# Patient Record
Sex: FEMALE | Race: WHITE | NOT HISPANIC OR LATINO | Employment: OTHER | URBAN - NONMETROPOLITAN AREA
[De-identification: names, ages, dates, MRNs, and addresses within clinical notes are randomized per-mention and may not be internally consistent; named-entity substitution may affect disease eponyms.]

---

## 2023-01-01 ENCOUNTER — TELEPHONE (OUTPATIENT)
Dept: PRIMARY CARE | Facility: CLINIC | Age: 68
End: 2023-01-01
Payer: MEDICARE

## 2023-01-01 ENCOUNTER — TREATMENT (OUTPATIENT)
Dept: PHYSICAL THERAPY | Facility: HOSPITAL | Age: 68
End: 2023-01-01
Payer: MEDICARE

## 2023-01-01 ENCOUNTER — OFFICE VISIT (OUTPATIENT)
Dept: PRIMARY CARE | Facility: CLINIC | Age: 68
End: 2023-01-01
Payer: MEDICARE

## 2023-01-01 ENCOUNTER — TELEPHONE (OUTPATIENT)
Dept: RHEUMATOLOGY | Facility: CLINIC | Age: 68
End: 2023-01-01

## 2023-01-01 ENCOUNTER — OFFICE VISIT (OUTPATIENT)
Dept: RHEUMATOLOGY | Facility: CLINIC | Age: 68
End: 2023-01-01
Payer: MEDICARE

## 2023-01-01 ENCOUNTER — LAB (OUTPATIENT)
Dept: LAB | Facility: LAB | Age: 68
End: 2023-01-01
Payer: MEDICARE

## 2023-01-01 ENCOUNTER — DOCUMENTATION (OUTPATIENT)
Dept: PHYSICAL THERAPY | Facility: HOSPITAL | Age: 68
End: 2023-01-01
Payer: MEDICARE

## 2023-01-01 ENCOUNTER — INFUSION (OUTPATIENT)
Dept: HEMATOLOGY/ONCOLOGY | Facility: HOSPITAL | Age: 68
End: 2023-01-01
Payer: MEDICARE

## 2023-01-01 ENCOUNTER — APPOINTMENT (OUTPATIENT)
Dept: PHYSICAL THERAPY | Facility: HOSPITAL | Age: 68
End: 2023-01-01
Payer: MEDICARE

## 2023-01-01 ENCOUNTER — TELEPHONE (OUTPATIENT)
Dept: HEMATOLOGY/ONCOLOGY | Facility: HOSPITAL | Age: 68
End: 2023-01-01
Payer: MEDICARE

## 2023-01-01 ENCOUNTER — EVALUATION (OUTPATIENT)
Dept: PHYSICAL THERAPY | Facility: HOSPITAL | Age: 68
End: 2023-01-01
Payer: MEDICARE

## 2023-01-01 ENCOUNTER — ANCILLARY PROCEDURE (OUTPATIENT)
Dept: RADIOLOGY | Facility: CLINIC | Age: 68
End: 2023-01-01
Payer: MEDICARE

## 2023-01-01 VITALS
RESPIRATION RATE: 16 BRPM | OXYGEN SATURATION: 99 % | BODY MASS INDEX: 31.82 KG/M2 | WEIGHT: 207.56 LBS | SYSTOLIC BLOOD PRESSURE: 140 MMHG | HEART RATE: 63 BPM | DIASTOLIC BLOOD PRESSURE: 79 MMHG | TEMPERATURE: 98.2 F

## 2023-01-01 VITALS
BODY MASS INDEX: 30.84 KG/M2 | DIASTOLIC BLOOD PRESSURE: 86 MMHG | WEIGHT: 203.48 LBS | SYSTOLIC BLOOD PRESSURE: 127 MMHG | HEIGHT: 68 IN | RESPIRATION RATE: 18 BRPM | HEART RATE: 84 BPM | OXYGEN SATURATION: 98 % | TEMPERATURE: 96.8 F

## 2023-01-01 VITALS
HEIGHT: 68 IN | WEIGHT: 228.5 LBS | HEART RATE: 58 BPM | SYSTOLIC BLOOD PRESSURE: 116 MMHG | BODY MASS INDEX: 34.63 KG/M2 | OXYGEN SATURATION: 98 % | DIASTOLIC BLOOD PRESSURE: 80 MMHG

## 2023-01-01 VITALS — HEIGHT: 67 IN | WEIGHT: 206 LBS | BODY MASS INDEX: 32.33 KG/M2

## 2023-01-01 VITALS
DIASTOLIC BLOOD PRESSURE: 70 MMHG | TEMPERATURE: 97 F | RESPIRATION RATE: 17 BRPM | OXYGEN SATURATION: 97 % | SYSTOLIC BLOOD PRESSURE: 137 MMHG | BODY MASS INDEX: 31.74 KG/M2 | HEART RATE: 64 BPM | WEIGHT: 207.01 LBS

## 2023-01-01 DIAGNOSIS — M25.562 ACUTE POSTOPERATIVE PAIN OF LEFT KNEE: Primary | ICD-10-CM

## 2023-01-01 DIAGNOSIS — G89.18 ACUTE POSTOPERATIVE PAIN OF LEFT KNEE: Primary | ICD-10-CM

## 2023-01-01 DIAGNOSIS — G89.18 ACUTE POSTOPERATIVE PAIN OF LEFT KNEE: ICD-10-CM

## 2023-01-01 DIAGNOSIS — M25.562 ACUTE POSTOPERATIVE PAIN OF LEFT KNEE: ICD-10-CM

## 2023-01-01 DIAGNOSIS — M25.561 ACUTE PAIN OF RIGHT KNEE: ICD-10-CM

## 2023-01-01 DIAGNOSIS — Z01.818 PREOPERATIVE CLEARANCE: Primary | ICD-10-CM

## 2023-01-01 DIAGNOSIS — E66.09 CLASS 1 OBESITY DUE TO EXCESS CALORIES WITH SERIOUS COMORBIDITY AND BODY MASS INDEX (BMI) OF 34.0 TO 34.9 IN ADULT: ICD-10-CM

## 2023-01-01 DIAGNOSIS — F41.1 PRE-OPERATIVE ANXIETY: ICD-10-CM

## 2023-01-01 DIAGNOSIS — R94.31 ABNORMAL EKG: Primary | ICD-10-CM

## 2023-01-01 DIAGNOSIS — Z96.652 S/P TOTAL KNEE REPLACEMENT, LEFT: ICD-10-CM

## 2023-01-01 DIAGNOSIS — M25.551 RIGHT HIP PAIN: ICD-10-CM

## 2023-01-01 DIAGNOSIS — M05.79 SEROPOSITIVE RHEUMATOID ARTHRITIS OF MULTIPLE JOINTS (MULTI): ICD-10-CM

## 2023-01-01 DIAGNOSIS — I48.91 ATRIAL FIBRILLATION, UNSPECIFIED TYPE (MULTI): ICD-10-CM

## 2023-01-01 DIAGNOSIS — M05.79 SEROPOSITIVE RHEUMATOID ARTHRITIS OF MULTIPLE JOINTS (MULTI): Primary | ICD-10-CM

## 2023-01-01 LAB
ALANINE AMINOTRANSFERASE (SGPT) (U/L) IN SER/PLAS: 12 U/L (ref 7–45)
ALBUMIN (G/DL) IN SER/PLAS: 3.8 G/DL (ref 3.4–5)
ALBUMIN SERPL BCP-MCNC: 3.5 G/DL (ref 3.4–5)
ALKALINE PHOSPHATASE (U/L) IN SER/PLAS: 93 U/L (ref 33–136)
ALP SERPL-CCNC: 106 U/L (ref 33–136)
ALT SERPL W P-5'-P-CCNC: 13 U/L (ref 7–45)
ANION GAP IN SER/PLAS: 9 MMOL/L (ref 10–20)
ANION GAP SERPL CALC-SCNC: 13 MMOL/L (ref 10–20)
APPEARANCE, URINE: CLEAR
ASPARTATE AMINOTRANSFERASE (SGOT) (U/L) IN SER/PLAS: 16 U/L (ref 9–39)
AST SERPL W P-5'-P-CCNC: 27 U/L (ref 9–39)
BACTERIA, URINE: ABNORMAL /HPF
BASOPHILS (10*3/UL) IN BLOOD BY AUTOMATED COUNT: 0.11 X10E9/L (ref 0–0.1)
BASOPHILS/100 LEUKOCYTES IN BLOOD BY AUTOMATED COUNT: 1 % (ref 0–2)
BILIRUB SERPL-MCNC: 0.5 MG/DL (ref 0–1.2)
BILIRUBIN TOTAL (MG/DL) IN SER/PLAS: 0.3 MG/DL (ref 0–1.2)
BILIRUBIN, URINE: NEGATIVE
BLOOD, URINE: NEGATIVE
BUN SERPL-MCNC: 14 MG/DL (ref 6–23)
CALCIUM (MG/DL) IN SER/PLAS: 9.3 MG/DL (ref 8.6–10.3)
CALCIUM SERPL-MCNC: 9.3 MG/DL (ref 8.6–10.3)
CARBON DIOXIDE, TOTAL (MMOL/L) IN SER/PLAS: 24 MMOL/L (ref 21–32)
CHLORIDE (MMOL/L) IN SER/PLAS: 107 MMOL/L (ref 98–107)
CHLORIDE SERPL-SCNC: 99 MMOL/L (ref 98–107)
CO2 SERPL-SCNC: 28 MMOL/L (ref 21–32)
COLOR, URINE: YELLOW
CREAT SERPL-MCNC: 1.02 MG/DL (ref 0.5–1.05)
CREATININE (MG/DL) IN SER/PLAS: 0.98 MG/DL (ref 0.5–1.05)
CRP SERPL-MCNC: 4.28 MG/DL
EOSINOPHILS (10*3/UL) IN BLOOD BY AUTOMATED COUNT: 0.22 X10E9/L (ref 0–0.7)
EOSINOPHILS/100 LEUKOCYTES IN BLOOD BY AUTOMATED COUNT: 1.9 % (ref 0–6)
ERYTHROCYTE DISTRIBUTION WIDTH (RATIO) BY AUTOMATED COUNT: 13.4 % (ref 11.5–14.5)
ERYTHROCYTE MEAN CORPUSCULAR HEMOGLOBIN CONCENTRATION (G/DL) BY AUTOMATED: 32.8 G/DL (ref 32–36)
ERYTHROCYTE MEAN CORPUSCULAR VOLUME (FL) BY AUTOMATED COUNT: 92 FL (ref 80–100)
ERYTHROCYTE [DISTWIDTH] IN BLOOD BY AUTOMATED COUNT: 15 % (ref 11.5–14.5)
ERYTHROCYTE [SEDIMENTATION RATE] IN BLOOD BY WESTERGREN METHOD: 79 MM/H (ref 0–30)
ERYTHROCYTES (10*6/UL) IN BLOOD BY AUTOMATED COUNT: 4.77 X10E12/L (ref 4–5.2)
GFR FEMALE: 63 ML/MIN/1.73M2
GFR SERPL CREATININE-BSD FRML MDRD: 60 ML/MIN/1.73M*2
GLUCOSE (MG/DL) IN SER/PLAS: 82 MG/DL (ref 74–99)
GLUCOSE SERPL-MCNC: 84 MG/DL (ref 74–99)
GLUCOSE, URINE: NEGATIVE MG/DL
HCT VFR BLD AUTO: 41.8 % (ref 36–46)
HEMATOCRIT (%) IN BLOOD BY AUTOMATED COUNT: 43.9 % (ref 36–46)
HEMOGLOBIN (G/DL) IN BLOOD: 14.4 G/DL (ref 12–16)
HGB BLD-MCNC: 13.2 G/DL (ref 12–16)
IMMATURE GRANULOCYTES/100 LEUKOCYTES IN BLOOD BY AUTOMATED COUNT: 0.4 % (ref 0–0.9)
KETONES, URINE: NEGATIVE MG/DL
LEUKOCYTE ESTERASE, URINE: ABNORMAL
LEUKOCYTES (10*3/UL) IN BLOOD BY AUTOMATED COUNT: 11.5 X10E9/L (ref 4.4–11.3)
LYMPHOCYTES (10*3/UL) IN BLOOD BY AUTOMATED COUNT: 2.84 X10E9/L (ref 1.2–4.8)
LYMPHOCYTES/100 LEUKOCYTES IN BLOOD BY AUTOMATED COUNT: 24.8 % (ref 13–44)
MCH RBC QN AUTO: 28.1 PG (ref 26–34)
MCHC RBC AUTO-ENTMCNC: 31.6 G/DL (ref 32–36)
MCV RBC AUTO: 89 FL (ref 80–100)
MONOCYTES (10*3/UL) IN BLOOD BY AUTOMATED COUNT: 1.18 X10E9/L (ref 0.1–1)
MONOCYTES/100 LEUKOCYTES IN BLOOD BY AUTOMATED COUNT: 10.3 % (ref 2–10)
NEUTROPHILS (10*3/UL) IN BLOOD BY AUTOMATED COUNT: 7.05 X10E9/L (ref 1.2–7.7)
NEUTROPHILS/100 LEUKOCYTES IN BLOOD BY AUTOMATED COUNT: 61.6 % (ref 40–80)
NIL(NEG) CONTROL SPOT COUNT: NORMAL
NITRITE, URINE: NEGATIVE
NRBC BLD-RTO: 0 /100 WBCS (ref 0–0)
PANEL A SPOT COUNT: 0
PANEL B SPOT COUNT: 0
PH, URINE: 5 (ref 5–8)
PLATELET # BLD AUTO: 487 X10*3/UL (ref 150–450)
PLATELETS (10*3/UL) IN BLOOD AUTOMATED COUNT: 302 X10E9/L (ref 150–450)
POS CONTROL SPOT COUNT: NORMAL
POTASSIUM (MMOL/L) IN SER/PLAS: 4.4 MMOL/L (ref 3.5–5.3)
POTASSIUM SERPL-SCNC: 4.1 MMOL/L (ref 3.5–5.3)
PROT SERPL-MCNC: 7.7 G/DL (ref 6.4–8.2)
PROTEIN TOTAL: 7 G/DL (ref 6.4–8.2)
PROTEIN, URINE: NEGATIVE MG/DL
RBC # BLD AUTO: 4.7 X10*6/UL (ref 4–5.2)
RBC, URINE: ABNORMAL /HPF (ref 0–5)
SODIUM (MMOL/L) IN SER/PLAS: 136 MMOL/L (ref 136–145)
SODIUM SERPL-SCNC: 136 MMOL/L (ref 136–145)
SPECIFIC GRAVITY, URINE: 1.02 (ref 1–1.03)
SQUAMOUS EPITHELIAL CELLS, URINE: ABNORMAL /HPF
T-SPOT. TB INTERPRETATION: NEGATIVE
UREA NITROGEN (MG/DL) IN SER/PLAS: 15 MG/DL (ref 6–23)
UROBILINOGEN, URINE: <2 MG/DL (ref 0–1.9)
WBC # BLD AUTO: 10.3 X10*3/UL (ref 4.4–11.3)
WBC, URINE: ABNORMAL /HPF (ref 0–5)

## 2023-01-01 PROCEDURE — 96375 TX/PRO/DX INJ NEW DRUG ADDON: CPT | Mod: INF

## 2023-01-01 PROCEDURE — 1036F TOBACCO NON-USER: CPT | Performed by: INTERNAL MEDICINE

## 2023-01-01 PROCEDURE — 96365 THER/PROPH/DIAG IV INF INIT: CPT | Mod: INF

## 2023-01-01 PROCEDURE — 85027 COMPLETE CBC AUTOMATED: CPT

## 2023-01-01 PROCEDURE — 96413 CHEMO IV INFUSION 1 HR: CPT

## 2023-01-01 PROCEDURE — 1159F MED LIST DOCD IN RCRD: CPT | Performed by: INTERNAL MEDICINE

## 2023-01-01 PROCEDURE — 2500000004 HC RX 250 GENERAL PHARMACY W/ HCPCS (ALT 636 FOR OP/ED): Performed by: INTERNAL MEDICINE

## 2023-01-01 PROCEDURE — 86140 C-REACTIVE PROTEIN: CPT

## 2023-01-01 PROCEDURE — 1160F RVW MEDS BY RX/DR IN RCRD: CPT | Performed by: FAMILY MEDICINE

## 2023-01-01 PROCEDURE — 97110 THERAPEUTIC EXERCISES: CPT | Mod: GP,CQ

## 2023-01-01 PROCEDURE — 96366 THER/PROPH/DIAG IV INF ADDON: CPT

## 2023-01-01 PROCEDURE — 97110 THERAPEUTIC EXERCISES: CPT | Mod: GP | Performed by: PHYSICAL THERAPIST

## 2023-01-01 PROCEDURE — 99213 OFFICE O/P EST LOW 20 MIN: CPT | Performed by: FAMILY MEDICINE

## 2023-01-01 PROCEDURE — 86481 TB AG RESPONSE T-CELL SUSP: CPT

## 2023-01-01 PROCEDURE — 1036F TOBACCO NON-USER: CPT | Performed by: FAMILY MEDICINE

## 2023-01-01 PROCEDURE — 3008F BODY MASS INDEX DOCD: CPT | Performed by: INTERNAL MEDICINE

## 2023-01-01 PROCEDURE — 3008F BODY MASS INDEX DOCD: CPT | Performed by: FAMILY MEDICINE

## 2023-01-01 PROCEDURE — 80053 COMPREHEN METABOLIC PANEL: CPT

## 2023-01-01 PROCEDURE — 1159F MED LIST DOCD IN RCRD: CPT | Performed by: FAMILY MEDICINE

## 2023-01-01 PROCEDURE — 96415 CHEMO IV INFUSION ADDL HR: CPT

## 2023-01-01 PROCEDURE — 1125F AMNT PAIN NOTED PAIN PRSNT: CPT | Performed by: INTERNAL MEDICINE

## 2023-01-01 PROCEDURE — 99214 OFFICE O/P EST MOD 30 MIN: CPT | Performed by: INTERNAL MEDICINE

## 2023-01-01 PROCEDURE — 73502 X-RAY EXAM HIP UNI 2-3 VIEWS: CPT | Mod: RT

## 2023-01-01 PROCEDURE — 73502 X-RAY EXAM HIP UNI 2-3 VIEWS: CPT | Mod: RIGHT SIDE | Performed by: RADIOLOGY

## 2023-01-01 PROCEDURE — 97140 MANUAL THERAPY 1/> REGIONS: CPT | Mod: GP,CQ

## 2023-01-01 PROCEDURE — 36415 COLL VENOUS BLD VENIPUNCTURE: CPT

## 2023-01-01 PROCEDURE — 85025 COMPLETE CBC W/AUTO DIFF WBC: CPT

## 2023-01-01 PROCEDURE — 81001 URINALYSIS AUTO W/SCOPE: CPT

## 2023-01-01 PROCEDURE — 97162 PT EVAL MOD COMPLEX 30 MIN: CPT | Mod: GP | Performed by: PHYSICAL THERAPIST

## 2023-01-01 PROCEDURE — 85652 RBC SED RATE AUTOMATED: CPT

## 2023-01-01 PROCEDURE — 1160F RVW MEDS BY RX/DR IN RCRD: CPT | Performed by: INTERNAL MEDICINE

## 2023-01-01 RX ORDER — EPINEPHRINE 0.3 MG/.3ML
0.3 INJECTION SUBCUTANEOUS EVERY 5 MIN PRN
Status: CANCELLED | OUTPATIENT
Start: 2023-01-01

## 2023-01-01 RX ORDER — ALBUTEROL SULFATE 0.83 MG/ML
3 SOLUTION RESPIRATORY (INHALATION) AS NEEDED
Status: CANCELLED | OUTPATIENT
Start: 2023-01-01

## 2023-01-01 RX ORDER — ACETAMINOPHEN 325 MG/1
650 TABLET ORAL ONCE
Status: CANCELLED | OUTPATIENT
Start: 2023-01-01 | End: 2023-01-01

## 2023-01-01 RX ORDER — ACETAMINOPHEN 500 MG
TABLET ORAL
Status: ON HOLD | COMMUNITY
Start: 2023-01-01 | End: 2024-01-01 | Stop reason: WASHOUT

## 2023-01-01 RX ORDER — HEPARIN SODIUM,PORCINE/PF 10 UNIT/ML
50 SYRINGE (ML) INTRAVENOUS AS NEEDED
Status: CANCELLED | OUTPATIENT
Start: 2023-01-01

## 2023-01-01 RX ORDER — HEPARIN 100 UNIT/ML
500 SYRINGE INTRAVENOUS AS NEEDED
Status: CANCELLED | OUTPATIENT
Start: 2023-01-01

## 2023-01-01 RX ORDER — NAPROXEN SODIUM 220 MG
1 TABLET ORAL 3 TIMES DAILY PRN
COMMUNITY
Start: 2021-02-10

## 2023-01-01 RX ORDER — ASPIRIN 81 MG/1
81 TABLET ORAL DAILY
Status: ON HOLD | COMMUNITY
End: 2024-01-01 | Stop reason: WASHOUT

## 2023-01-01 RX ORDER — EPINEPHRINE 0.3 MG/.3ML
0.3 INJECTION SUBCUTANEOUS EVERY 5 MIN PRN
Status: CANCELLED | OUTPATIENT
Start: 2024-01-01

## 2023-01-01 RX ORDER — DIPHENHYDRAMINE HYDROCHLORIDE 50 MG/ML
50 INJECTION INTRAMUSCULAR; INTRAVENOUS AS NEEDED
Status: CANCELLED | OUTPATIENT
Start: 2024-01-01

## 2023-01-01 RX ORDER — ACETAMINOPHEN 325 MG/1
1-2 TABLET ORAL AS NEEDED
Status: ON HOLD | COMMUNITY
End: 2024-01-01 | Stop reason: WASHOUT

## 2023-01-01 RX ORDER — MONTELUKAST SODIUM 10 MG/1
1 TABLET ORAL NIGHTLY
Status: ON HOLD | COMMUNITY
Start: 2014-05-08 | End: 2024-01-01 | Stop reason: WASHOUT

## 2023-01-01 RX ORDER — DIPHENHYDRAMINE HYDROCHLORIDE 50 MG/ML
50 INJECTION INTRAMUSCULAR; INTRAVENOUS AS NEEDED
Status: CANCELLED | OUTPATIENT
Start: 2023-01-01

## 2023-01-01 RX ORDER — FOLIC ACID 1 MG/1
1 TABLET ORAL DAILY
Status: ON HOLD | COMMUNITY
Start: 2013-11-25 | End: 2024-01-01 | Stop reason: WASHOUT

## 2023-01-01 RX ORDER — OXYCODONE HYDROCHLORIDE 5 MG/1
5 TABLET ORAL EVERY 8 HOURS PRN
Status: ON HOLD | COMMUNITY
Start: 2023-01-01 | End: 2024-01-01 | Stop reason: WASHOUT

## 2023-01-01 RX ORDER — ALBUTEROL SULFATE 0.83 MG/ML
3 SOLUTION RESPIRATORY (INHALATION) AS NEEDED
Status: CANCELLED | OUTPATIENT
Start: 2024-01-01

## 2023-01-01 RX ORDER — ERGOCALCIFEROL 1.25 MG/1
1 CAPSULE ORAL
Status: ON HOLD | COMMUNITY
Start: 2022-04-14 | End: 2024-01-01 | Stop reason: WASHOUT

## 2023-01-01 RX ORDER — FAMOTIDINE 10 MG/ML
20 INJECTION INTRAVENOUS ONCE AS NEEDED
Status: CANCELLED | OUTPATIENT
Start: 2023-01-01

## 2023-01-01 RX ORDER — DOCUSATE SODIUM 100 MG/1
100 CAPSULE, LIQUID FILLED ORAL 2 TIMES DAILY PRN
Status: ON HOLD | COMMUNITY
Start: 2023-01-01 | End: 2024-01-01 | Stop reason: WASHOUT

## 2023-01-01 RX ORDER — ACETAMINOPHEN 325 MG/1
650 TABLET ORAL ONCE
Status: COMPLETED | OUTPATIENT
Start: 2023-01-01 | End: 2023-01-01

## 2023-01-01 RX ORDER — FAMOTIDINE 10 MG/ML
20 INJECTION INTRAVENOUS ONCE AS NEEDED
Status: CANCELLED | OUTPATIENT
Start: 2024-01-01

## 2023-01-01 RX ORDER — ACETAMINOPHEN 325 MG/1
650 TABLET ORAL ONCE
Status: CANCELLED | OUTPATIENT
Start: 2024-01-01 | End: 2024-01-01

## 2023-01-01 RX ORDER — ACETAMINOPHEN 325 MG/1
650 TABLET ORAL ONCE
Status: DISCONTINUED | OUTPATIENT
Start: 2023-01-01 | End: 2023-01-01 | Stop reason: HOSPADM

## 2023-01-01 RX ORDER — ASPIRIN 325 MG
50000 TABLET, DELAYED RELEASE (ENTERIC COATED) ORAL WEEKLY
Status: ON HOLD | COMMUNITY
Start: 2013-12-09 | End: 2024-01-01 | Stop reason: WASHOUT

## 2023-01-01 RX ORDER — PREDNISONE 5 MG/1
5 TABLET ORAL 2 TIMES DAILY
Status: ON HOLD | COMMUNITY
Start: 2014-06-30 | End: 2024-01-01 | Stop reason: WASHOUT

## 2023-01-01 RX ORDER — METHOTREXATE 2.5 MG/1
6 TABLET ORAL
Status: ON HOLD | COMMUNITY
End: 2024-01-01 | Stop reason: WASHOUT

## 2023-01-01 RX ADMIN — Medication 280 MG: at 11:24

## 2023-01-01 RX ADMIN — METHYLPREDNISOLONE SODIUM SUCCINATE 100 MG: 125 INJECTION, POWDER, FOR SOLUTION INTRAMUSCULAR; INTRAVENOUS at 10:56

## 2023-01-01 RX ADMIN — ACETAMINOPHEN 650 MG: 325 TABLET ORAL at 10:33

## 2023-01-01 RX ADMIN — SODIUM CHLORIDE 277 MG: 9 INJECTION, SOLUTION INTRAVENOUS at 10:50

## 2023-01-01 RX ADMIN — METHYLPREDNISOLONE SODIUM SUCCINATE 100 MG: 125 INJECTION, POWDER, FOR SOLUTION INTRAMUSCULAR; INTRAVENOUS at 10:35

## 2023-01-01 RX ADMIN — SODIUM CHLORIDE 282 MG: 9 INJECTION, SOLUTION INTRAVENOUS at 11:23

## 2023-01-01 RX ADMIN — ACETAMINOPHEN 650 MG: 325 TABLET ORAL at 10:56

## 2023-01-01 RX ADMIN — METHYLPREDNISOLONE SODIUM SUCCINATE 100 MG: 125 INJECTION, POWDER, FOR SOLUTION INTRAMUSCULAR; INTRAVENOUS at 10:40

## 2023-01-01 ASSESSMENT — PAIN SCALES - GENERAL
PAINLEVEL_OUTOF10: 8
PAINLEVEL: 10-WORST PAIN EVER
PAINLEVEL_OUTOF10: 8
PAINLEVEL: 5
PAINLEVEL_OUTOF10: 9
PAINLEVEL_OUTOF10: 0 - NO PAIN
PAINLEVEL: 8
PAINLEVEL: 5
PAINLEVEL_OUTOF10: 9

## 2023-01-01 ASSESSMENT — ENCOUNTER SYMPTOMS
FATIGUE: 1
OCCASIONAL FEELINGS OF UNSTEADINESS: 1
ABDOMINAL PAIN: 0
LOSS OF SENSATION IN FEET: 0
DEPRESSION: 1
OCCASIONAL FEELINGS OF UNSTEADINESS: 0
SLEEP DISTURBANCE: 1
LOSS OF SENSATION IN FEET: 0
DEPRESSION: 0
OCCASIONAL FEELINGS OF UNSTEADINESS: 1
LOSS OF SENSATION IN FEET: 1
DEPRESSION: 0

## 2023-01-01 ASSESSMENT — PAIN DESCRIPTION - DESCRIPTORS
DESCRIPTORS: THROBBING
DESCRIPTORS: THROBBING
DESCRIPTORS: JABBING;THROBBING

## 2023-01-01 ASSESSMENT — PATIENT HEALTH QUESTIONNAIRE - PHQ9
SUM OF ALL RESPONSES TO PHQ9 QUESTIONS 1 & 2: 0
1. LITTLE INTEREST OR PLEASURE IN DOING THINGS: NOT AT ALL
2. FEELING DOWN, DEPRESSED OR HOPELESS: NOT AT ALL

## 2023-01-01 ASSESSMENT — PAIN - FUNCTIONAL ASSESSMENT
PAIN_FUNCTIONAL_ASSESSMENT: 0-10

## 2023-01-01 ASSESSMENT — ACTIVITIES OF DAILY LIVING (ADL): ADL_ASSISTANCE: INDEPENDENT

## 2023-04-25 NOTE — TELEPHONE ENCOUNTER
Patient daughter called for patient to get [employer] access. Sent link to patients email.

## 2023-05-30 PROBLEM — F17.210 CIGARETTE NICOTINE DEPENDENCE WITHOUT COMPLICATION: Status: ACTIVE | Noted: 2023-01-01

## 2023-05-30 PROBLEM — M25.561 CHRONIC PAIN OF BOTH KNEES: Status: ACTIVE | Noted: 2023-01-01

## 2023-05-30 PROBLEM — R53.82 CHRONIC FATIGUE: Status: ACTIVE | Noted: 2023-01-01

## 2023-05-30 PROBLEM — H10.13 ALLERGIC CONJUNCTIVITIS OF BOTH EYES: Status: ACTIVE | Noted: 2023-01-01

## 2023-05-30 PROBLEM — J01.01 ACUTE RECURRENT MAXILLARY SINUSITIS: Status: ACTIVE | Noted: 2023-01-01

## 2023-05-30 PROBLEM — N13.2 HYDRONEPHROSIS WITH URINARY OBSTRUCTION DUE TO URETERAL CALCULUS: Status: ACTIVE | Noted: 2023-01-01

## 2023-05-30 PROBLEM — M25.562 CHRONIC PAIN OF BOTH KNEES: Status: ACTIVE | Noted: 2023-01-01

## 2023-05-30 PROBLEM — M25.50 JOINT PAIN: Status: ACTIVE | Noted: 2023-01-01

## 2023-05-30 PROBLEM — E78.5 DYSLIPIDEMIA: Status: ACTIVE | Noted: 2023-01-01

## 2023-05-30 PROBLEM — N20.0 MULTIPLE KIDNEY STONES: Status: ACTIVE | Noted: 2023-01-01

## 2023-05-30 PROBLEM — R60.0 BILATERAL LEG EDEMA: Status: ACTIVE | Noted: 2023-01-01

## 2023-05-30 PROBLEM — R10.84 GENERALIZED ABDOMINAL PAIN: Status: ACTIVE | Noted: 2023-01-01

## 2023-05-30 PROBLEM — K21.9 GERD (GASTROESOPHAGEAL REFLUX DISEASE): Status: ACTIVE | Noted: 2023-01-01

## 2023-05-30 PROBLEM — M54.9 BACK PAIN: Status: ACTIVE | Noted: 2023-01-01

## 2023-05-30 PROBLEM — M54.41 ACUTE RIGHT-SIDED LOW BACK PAIN WITH RIGHT-SIDED SCIATICA: Status: ACTIVE | Noted: 2023-01-01

## 2023-05-30 PROBLEM — K80.20 GALL STONES: Status: ACTIVE | Noted: 2023-01-01

## 2023-05-30 PROBLEM — L30.9 DERMATITIS: Status: ACTIVE | Noted: 2023-01-01

## 2023-05-30 PROBLEM — M17.0 LOCALIZED OSTEOARTHRITIS OF KNEES, BILATERAL: Status: ACTIVE | Noted: 2023-01-01

## 2023-05-30 PROBLEM — R74.8 ABNORMAL LIVER ENZYMES: Status: ACTIVE | Noted: 2023-01-01

## 2023-05-30 PROBLEM — M54.50 LUMBAGO: Status: ACTIVE | Noted: 2023-01-01

## 2023-05-30 PROBLEM — I73.9 CLAUDICATION (CMS-HCC): Status: ACTIVE | Noted: 2023-01-01

## 2023-05-30 PROBLEM — B36.9 FUNGAL DERMATITIS: Status: ACTIVE | Noted: 2023-01-01

## 2023-05-30 PROBLEM — G89.29 CHRONIC PAIN OF BOTH KNEES: Status: ACTIVE | Noted: 2023-01-01

## 2023-05-30 PROBLEM — J20.9 ACUTE BRONCHITIS: Status: ACTIVE | Noted: 2023-01-01

## 2023-05-30 PROBLEM — R10.9 FLANK PAIN: Status: ACTIVE | Noted: 2023-01-01

## 2023-05-30 NOTE — PROGRESS NOTES
"Subjective     Meghan Waddell is a 68 y.o. female who presents for Pre-op Exam (Medical clearance.  Pt scheduled for right total knee arthroplasty 6/23/23 at [facility].).  Today she is accompanied by her daughter.   Educated on obesity and diet and exercise.  Advised to lose weight.  HPI  The patient is a 68 year-old male presenting to the clinic for preoperative clearance    Going for right knee replacement surgery.  Quit smoking tobacco     is going for June 9 for preadmission testing.      Discussed diet and exercise.  Patient reports that she quit smoking 4/2023.    Discussed kidney function.  Patient reports chronic right knee pain upon walking.       Educated on obesity and diet and exercise.  Advised to lose weight.  I did explain some right knee pain ongoing for right knee replacement surgery      Review of Systems  Review of systems:    General:  Denies fever.  Denies chills.    HEENT: Denies nasal congestion.  Denies sinus pressure.    Respiratory:  Denies cough.  Denies shortness of breath.    Cardiovascular:  Dictated as above.    Gastrointestinal:  Denies nausea vomiting diarrhea.  Denies abdominal pain.    Genitourinary: Denies burning urination.  Denies frequent urination.  Denies flank pain.  Denies blood in the urine.  Denies abnormal vaginal discharge.    Neurology:  Denies tingling numbness but denies weakness.  Denies headache.  Denies blurred vision.    Musculoskeletal:  Dictated as above.    Endocrinology:  Denies cold intolerance.  Denies hot intolerance.    Psychiatric:  Denies depression.  Denies anxiety.  Denies suicidal.  Denies homicidal.      Objective   /80 (BP Location: Left arm, Patient Position: Sitting, BP Cuff Size: Large adult)   Pulse 58   Ht 1.727 m (5' 8\")   Wt 104 kg (228 lb 8 oz)   SpO2 98%   BMI 34.74 kg/m²        Physical Exam      General.  Not in distress.  HEENT normocephalic anicteric sclerae.  Neck soft supple no thyromegaly.  No " carotid bruit.  Lungs are clear.  Heart regular.  Abdomen soft nontender nondistended bowel sounds are positive.  Extremities no clubbing cyanosis or edema.  Psychiatric.  Has good eye contact.  No crying spells noted.  Speech was normal.  Denies depression.  Denies suicidal.  Denies homicidal.    Assessment/Plan     1.  Preoperative clearance.  Recommended blood work and urinalysis and EKG.   is also getting clearance from rheumatologist.    2.  Obesity.  Educated on diet and exercise.  Advised to lose weight.    3.  Right knee pain.  Educated on knee exercises.  Keep appointment with the specialist.    4.  Preoperative anxiety.  Denies depression.  Denies anxiety.  Denies suicidal.  Denies homicidal.    Note was addended on 6/20/2023 8/5/2009.    After I have reviewed her EKG results which had shown atrial fibrillation I have called and discussed with the patient and recommended cardiology consultation.  I have discussed with cardiologist Dr. dumas.  Patient was seen by cardiologist and she was cleared by cardiologist.    Patient was medically cleared for the surgery                          Problem List Items Addressed This Visit    None  Visit Diagnoses       Pre-operative anxiety    -  Primary    Relevant Orders    ECG 12 lead (Ancillary Performed)    CBC and Auto Differential    Comprehensive metabolic panel    Urinalysis with Reflex Microscopic    Class 1 obesity due to excess calories with serious comorbidity and body mass index (BMI) of 34.0 to 34.9 in adult        Acute pain of right knee            Scribe Attestation  By signing my name below, Yun MARIE Scribe   attest that this documentation has been prepared under the direction and in the presence of Tate Hurley MD.

## 2023-06-06 NOTE — TELEPHONE ENCOUNTER
Recently patient was seen for preop clearance.  She had the EKG done.  EKG revealed atrial fibrillation.  I have called Dr. Bustamante and discussed with him and he kindly agreed to see the patient sooner.  I have called and explained the patient about abnormal EKG and patient is asymptomatic and denies shortness of breath and denies chest pain and denies heart palpitations.  She agreed to go see the cardiologist    Advised patient call 9 1 if develops chest pain and/or heart palpitations and/or shortness of breath.  She understood verbalized understood and agreed

## 2023-06-20 NOTE — TELEPHONE ENCOUNTER
Received notice from [facility] orthopedic [place] that Dr. Bustamante has cleared patient from cardiac standpoint and they require medical clearance from PCP.

## 2023-10-20 PROBLEM — F17.200 NICOTINE ADDICTION: Status: ACTIVE | Noted: 2023-01-01

## 2023-10-20 PROBLEM — E66.9 OBESITY: Status: ACTIVE | Noted: 2023-01-01

## 2023-10-20 PROBLEM — R00.2 PALPITATIONS: Status: ACTIVE | Noted: 2023-01-01

## 2023-10-20 PROBLEM — E53.8 FOLIC ACID DEFICIENCY: Status: ACTIVE | Noted: 2023-01-01

## 2023-10-20 PROBLEM — R06.02 EXERTIONAL SHORTNESS OF BREATH: Status: ACTIVE | Noted: 2023-01-01

## 2023-10-20 PROBLEM — I49.1 PAC (PREMATURE ATRIAL CONTRACTION): Status: ACTIVE | Noted: 2023-01-01

## 2023-10-20 PROBLEM — N18.30 STAGE 3 CHRONIC KIDNEY DISEASE (MULTI): Status: ACTIVE | Noted: 2023-01-01

## 2023-10-20 PROBLEM — M05.79 SEROPOSITIVE RHEUMATOID ARTHRITIS OF MULTIPLE JOINTS (MULTI): Status: ACTIVE | Noted: 2018-12-27

## 2023-10-23 NOTE — PROGRESS NOTES
Physical Therapy    Physical Therapy Evaluation and Treatment      Patient Name: Meghan Wdadell  MRN: [id number]  Today's Date: 10/23/2023  Time Calculation  Start Time: 0921  Stop Time: 0959  Time Calculation (min): 38 min      Assessment:  PT Assessment Results: Decreased strength, Decreased range of motion, Decreased endurance, Impaired balance, Decreased mobility, Decreased safety awareness, Impaired judgement  Rehab Prognosis: Fair  Evaluation/Treatment Tolerance: Patient limited by pain  Barriers to Participation: Attitude of self  Patient demonstrated impaired bilateral knee range of motion and impaired left lower extremity strength. Patient required encouragement to participate in exercises and range of motion activities. Skilled physical therapy is warranted to address the above stated impairments, so the patient can perform functional activities and work duties without increased pain or difficulty.      Plan:  Treatment/Interventions: Education/ Instruction, Gait training, Manual therapy, Neuromuscular re-education, Therapeutic activities, Therapeutic exercises  PT Plan: Skilled PT  PT Frequency: 2 times per week  Duration: 4 weeks  Onset Date: 09/29/23  Certification Period Start Date: 10/23/23  Certification Period End Date: 12/18/23  Number of Treatments Authorized: 1  Rehab Potential: Good  Plan of Care Agreement: Patient    Current Problem:   1. Acute postoperative pain of left knee  Follow Up In Physical Therapy      2. S/P total knee replacement, left  Referral to Physical Therapy          Subjective    General:  General  Reason for Referral: left knee pain  Referred By: Dr. Nolasco  Patient is a 68 year old female status post left total knee arthroplasty on 9/29/2023. Patient discharged home post op day 1 and received home health physical therapy until 10/17/2023. Patient has a history of right total knee arthroplasty in June 2023.    Precautions:  Precautions  STEADI Fall Risk Score (The score of 4  "or more indicates an increased risk of falling): 6  LE Weight Bearing Status: Weight Bearing as Tolerated  Medical Precautions: Fall precautions  Vital Signs:     Pain:  Pain Assessment  Pain Assessment: 0-10  Pain Score: 9  Pain Type: Chronic pain  Pain Location:  (\"from toes to neck\")  Pain Descriptors: Throbbing  Pain Frequency: Constant/continuous  Clinical Progression: Not changed  Patient's Stated Pain Goal: 3  Pain Interventions: Medication (See MAR)  Home Living:     Prior Level of Function:  Prior Function Per Pt/Caregiver Report  Level of [place]: Independent with ADLs and functional transfers, Needs assistance with homemaking  Receives Help From: Family  ADL Assistance: Independent  Homemaking Assistance: Needs assistance  Homemaking Assistance Comments: assistance for meals and chores  Ambulatory Assistance: Needs assistance  Transfers: Assistive device  Gait: Assistive device  Stairs: Assistive device, Railings  Prior Function Comments: Using a fww prior to left tka.    Objective     Extremity/Trunk Assessments:  AROM RLE (degrees)  R Knee Flexion 0-130: 105  R Knee Extension 0-130: -10  Strength RLE  R Hip Flexion: 3/5  R Knee Flexion: 4+/5  R Knee Extension: 4+/5  AROM LLE (degrees)  L Knee Flexion 0-130: 103  L Knee Extension 0-130: -11  Strength LLE  L Hip Flexion: 4-/5  L Knee Flexion: 4/5  L Knee Extension: 4/5    Outcome Measures:  LEFS=22     Treatments:  Therapeutic Exercise  Therapeutic Exercise Performed: Yes  Therapeutic Exercise Activity 1: QS x10  Therapeutic Exercise Activity 2: heel slides x20 LLE  Therapeutic Exercise Activity 3: SLR x10 LLE  Therapeutic Exercise Activity 4: SAQ x20 BLE    OP EDUCATION:  Education  Individual(s) Educated: Patient  Education Provided: Anatomy, Fall Risk, Home Exercise Program, POC  Patient/Caregiver Demonstrated Understanding: yes  Plan of Care Discussed and Agreed Upon: yes  Patient Response to Education: Patient/Caregiver Verbalized Understanding " of Information    Goals:  Active       PT Problem       Patient will ambulate household distance using cane with modified independent.       Start:  10/23/23    Expected End:  11/20/23            Patient will ambulate with normal gait pattern with no device household distances.       Start:  10/23/23    Expected End:  12/18/23            Patient will ascend and descend 1 flight of stairs with rail and no cane with modified independent.       Start:  10/23/23    Expected End:  12/18/23            Patient will achieve left knee ROM of  0-120 degrees for improved functional mobility.       Start:  10/23/23    Expected End:  12/18/23            Patient will achieve left knee flexion strength of at least 4+/5 for improved functional mobility.       Start:  10/23/23    Expected End:  11/20/23            Patient will achieve left knee extension strength of 4+/5 for improved functional mobility.       Start:  10/23/23    Expected End:  11/20/23            Patient will demonstrate independence in home program for support of progression       Start:  10/23/23    Expected End:  11/06/23            Patient will report pain of no more than 5/10 demonstrating a reduction of overall pain.       Start:  10/23/23    Expected End:  11/20/23            Patient will show a significant change in LEFS patient reported outcome tool to demonstrate subjective imporovement       Start:  10/23/23    Expected End:  11/20/23

## 2023-10-23 NOTE — LETTER
October 23, 2023    Emmanuel Nolasco MD  [address] [facility]  [place] [address]    Patient: Meghan Waddell   YOB: 1955   Date of Visit: 10/23/2023       Dear Emmanuel Nolasco Md  [address] [facility] [place] [address]    The attached plan of care is being sent to you because your patient’s medical reimbursement requires that you certify the plan of care. Your signature is required to allow uninterrupted insurance coverage.      You may indicate your approval by signing below and faxing this form back to us at Dept Fax: [phone].    Please call Dept: [phone] with any questions or concerns.    Thank you for this referral,        Colin Puente, PT  [place] [address] [facility] [address]    Payer: Payor: [employer] MEDICARE / Plan: HU[employer] GOLD CHOICE / Product Type: *No Product type* /                                                                         Date:     Dear Colin Puente, PT,     Re: Ms. Meghan Waddell, MRN:[id number]    I certify that I have reviewed the attached plan of care and it is medically necessary for Ms. Meghan Waddell (1955) who is under my care.          ______________________________________                    _________________  Provider name and credentials                                           Date and time                                                                                           Plan of Care 10/23/23   Effective from: 10/23/2023  Effective to: 12/18/2023    Plan ID: [id number]            Participants as of Finalize on 10/23/2023    Name Type Comments Contact Info    Emmanuel Nolasco MD Referring Provider  [phone]    Colin Puente, PT Physical Therapist  [phone]       Last Plan Note     Author: Colin Puente PT Status: Incomplete Last edited: 10/23/2023  9:15 AM      "  Physical Therapy    Physical Therapy Evaluation and Treatment      Patient Name: Meghan Waddell  MRN: [id number]  Today's Date: 10/23/2023  Time Calculation  Start Time: 0921  Stop Time: 0959  Time Calculation (min): 38 min      Assessment:  PT Assessment Results: Decreased strength, Decreased range of motion, Decreased endurance, Impaired balance, Decreased mobility, Decreased safety awareness, Impaired judgement  Rehab Prognosis: Fair  Evaluation/Treatment Tolerance: Patient limited by pain  Barriers to Participation: Attitude of self    Plan:       Current Problem:   1. Acute postoperative pain of left knee  Follow Up In Physical Therapy      2. S/P total knee replacement, left  Referral to Physical Therapy          Subjective   General:  General  Reason for Referral: left knee pain  Referred By: Dr. Nolasco  Patient is a 68 year old female status post left total knee arthroplasty on 9/29/2023. Patient discharged home post op day 1 and received home health physical therapy until 10/17/2023. Patient has a history of right total knee arthroplasty in June 2023.    Precautions:  Precautions  STEADI Fall Risk Score (The score of 4 or more indicates an increased risk of falling): 6  LE Weight Bearing Status: Weight Bearing as Tolerated  Medical Precautions: Fall precautions  Vital Signs:     Pain:  Pain Assessment  Pain Assessment: 0-10  Pain Score: 9  Pain Type: Chronic pain  Pain Location:  (\"from toes to neck\")  Pain Descriptors: Throbbing  Pain Frequency: Constant/continuous  Clinical Progression: Not changed  Patient's Stated Pain Goal: 3  Pain Interventions: Medication (See MAR)  Home Living:     Prior Level of Function:  Prior Function Per Pt/Caregiver Report  Level of [place]: Independent with ADLs and functional transfers, Needs assistance with homemaking  Receives Help From: Family  ADL Assistance: Independent  Homemaking Assistance: Needs assistance  Homemaking Assistance Comments: assistance for meals " and chores  Ambulatory Assistance: Needs assistance  Transfers: Assistive device  Gait: Assistive device  Stairs: Assistive device, Railings  Prior Function Comments: Using a fww prior to left tka.    Objective  Cognition:     General Assessments:  {General Assessments:67138}  Functional Assessments:  {Functional Assessments:71954}  Extremity/Trunk Assessments:  {Extremity/Trunk Assessments:04170}    {Spine,UE,LE,HAND,LYMPHEDEMA:69848}    Outcome Measures:  {PT Outcome Measures:57690}     Treatments:  {PT Treatments:78376}  Activity:  {Activity:68243}    OP EDUCATION:  Education  Individual(s) Educated: Patient  Education Provided: Anatomy, Fall Risk, Home Exercise Program, POC  Patient/Caregiver Demonstrated Understanding: yes  Plan of Care Discussed and Agreed Upon: yes  Patient Response to Education: Patient/Caregiver Verbalized Understanding of Information    Goals:                   Current Participants as of 10/23/2023    Name Type Comments Contact Info    Emmanuel Nolasco MD Referring Provider  [phone]    Signature pending    Colin Puente PT Physical Therapist  [phone]    Signature pending

## 2023-10-23 NOTE — LETTER
October 23, 2023     Patient: Meghan Waddell   YOB: 1955   Date of Visit: 10/23/2023       To Whom it May Concern:    Meghan Waddell was seen in my clinic on 10/23/2023. She {Return to school/sport:10826}.    If you have any questions or concerns, please don't hesitate to call.         Sincerely,          Colin Puente, PT        CC: No Recipients

## 2023-10-23 NOTE — LETTER
October 23, 2023     Patient: Meghan Waddell   YOB: 1955   Date of Visit: 10/23/2023       To Whom It May Concern:    It is my medical opinion that Meghan Waddell {Work release (duty restriction):22621}.    If you have any questions or concerns, please don't hesitate to call.         Sincerely,        Colin Puente, PT    CC: No Recipients

## 2023-11-01 NOTE — PROGRESS NOTES
Subjective   Patient ID: Meghan Waddell is a 68 y.o. female who presents for Rheumatoid Arthritis (Follow up).  HPI  Patient with positive RF/CCP rheumatoid arthritis and osteoarthritis.  Had elevations of transaminase with methotrexate.  Has been seen by rheumatology at [place], [place].  Had been on Orencia when she was in [place] and thought that it was helpful for her symptoms.  We had resumed Orencia infusions but only for a few months as she went forward with bilateral knee replacements and it had been held.    She is accompanied by her daughter today    At her last visit we had her hold off her Orencia infusions even after her bilateral knee replacements because she was still having issues after her right knee replacement.  Had decreased range of motion and they had talked about doing a procedure to improve range of motion.  She had been doing physical therapy.    Her ROM has been improving in the left knee but the right has been decreasing range of motion with increase in flexion.  The right hip has been having pain as well.  She has been in physical therapy    She has been having a lot of pain and cannot sleep.  She has been getting oxycodone through her orthopedics.    She is very frustrated and has a lot of pain.  Has lot of decreased range of motion in her bilateral shoulders more on the right than the left  Review of Systems   Constitutional:  Positive for fatigue.   Eyes:         Blurry vision   Respiratory:          History of tobacco use   Cardiovascular:  Negative for chest pain.   Gastrointestinal:  Negative for abdominal pain.   Musculoskeletal:         Per HPI   Skin:         Raynaud's symptoms   Neurological:         Memory issues   Psychiatric/Behavioral:  Positive for sleep disturbance.         Depression       Objective   Physical Exam  GEN: NAD A&O x3 appropriate affect  EYES:  no conjunctival redness, eyelids normal  LYMPH: no cervical   lymphadenopathy  NEURO: 5/5 strength upper and lower extremities, DTR +2 bicep and patellar tendons  SKIN: no rashes, ulcers, or subcutaneous nodules  PULSES: +radials  TENDER POINTS: 0/18   MSK:  Bilateral knee replacement.  Almost 180 degrees with the left knee but still has flexion contracture and right knee.  Still has swelling in the left knee.  Some mild tenderness in the PIPs and MCPs  Decreased range of motion of the bilateral shoulders more on the right than the left  Some mild discomfort with internal/external rotation of the right hip  Scoliosis kyphosis of the thoracic lumbar spine  Assessment/Plan        Positive RF/CCP rheumatoid arthritis and osteoarthritis -in the past methotrexate had cause elevations of transaminases.  Since discontinuation she has had normal LFTs.  Had been on Orencia and [place] and most recently at the beginning of the year she did not feel that it was as helpful.   -Having increasing rheumatoid symptoms and still having difficulty healing from bilateral knee replacement, the right is worse than the left.  Now also having right hip pain.     -I would like to start on infliximab 3 mg/kg dosing.  Discussed side effects of medication with her.  Positive tobacco use but she says she does not have any emphysema/COPD or heart failure.     -We will get blood work concerning her CBC and CMP to see if her liver functions are still normal without methotrexate.  We will retest her TB test.    Patient is asking for an prescription for oxycodone which she has been getting regularly from orthopedics though it sounds like they may not continue this medication.  I would rather not start her on this medication.  We will try to get her started with infusions again.  She should continue with physical therapy.    Right hip pain we will get a new x-ray.    We will have her come back in 4 months.

## 2023-11-02 PROBLEM — G89.18 ACUTE POSTOPERATIVE PAIN OF LEFT KNEE: Status: ACTIVE | Noted: 2023-01-01

## 2023-11-02 NOTE — PROGRESS NOTES
"Physical Therapy    Physical Therapy Treatment    Patient Name: Meghan Waddell  MRN: [id number]  Today's Date: 11/2/2023  Time Calculation  Start Time: 1255  Stop Time: 1342  Time Calculation (min): 47 min      Assessment:  PT Assessment  PT Assessment Results: Decreased strength, Decreased range of motion, Decreased endurance, Impaired balance, Decreased mobility, Decreased safety awareness, Impaired judgement  Rehab Prognosis: Fair  Evaluation/Treatment Tolerance: Patient tolerated treatment well  Barriers to Participation: Attitude of self (self limits due to pain)  Patient demonstrated difficulty performing straight leg raises, QS, and standing marching in place due to right groin pain.     Plan:  OP PT Plan  Treatment/Interventions: Education/ Instruction, Gait training, Manual therapy, Neuromuscular re-education, Therapeutic activities, Therapeutic exercises  PT Plan: Skilled PT  PT Frequency: 2 times per week  Duration: 4 weeks  Onset Date: 09/29/23  Certification Period Start Date: 10/23/23  Certification Period End Date: 12/18/23  Number of Treatments Authorized: 8 (2 of 8)  Rehab Potential: Good  Plan of Care Agreement: Patient    Current Problem  1. Acute postoperative pain of left knee  Follow Up In Physical Therapy          Subjective   General  Reason for Referral: left knee pain  Referred By: Dr. Nolasco  Patient states that she had x-ray performed on her right hip yesterday. She states that she is just awaiting the results.    Precautions  Precautions  LE Weight Bearing Status: Weight Bearing as Tolerated  Medical Precautions: Fall precautions  Vital Signs     Pain  Pain Assessment: 0-10  Pain Score: 9  Pain Type: Chronic pain  Pain Location:  (\"everywhere\")  Pain Frequency: Constant/continuous  Clinical Progression: Not changed    Objective     Extremity/Trunk Assessment  AROM LLE (degrees)  L Knee Flexion 0-130: 104    Treatments:  Therapeutic Exercise  Therapeutic Exercise Performed: Yes  Therapeutic " "Exercise Activity 1: QS with towel under heel x20  Therapeutic Exercise Activity 2: heel slides x20 LLE  Therapeutic Exercise Activity 3: SLR x10LLE  Therapeutic Exercise Activity 5: Sci Fit bike lvl1 x5' seat #11  Therapeutic Exercise Activity 6: standing hip abd x10 BLE  Therapeutic Exercise Activity 7: standing marching x10 BLE  Therapeutic Exercise Activity 8: heel raises x10  Therapeutic Exercise Activity 9: step ups 4\" x10  Therapeutic Exercise Activity 10: hamstring 3x15\" BLE  Therapeutic Exercise Activity 11: gastroc stretch x1' LLE  Therapeutic Exercise Activity 12: LAQ x20 BLE    OP EDUCATION:  Education  Individual(s) Educated: Patient  Education Provided: Anatomy, Fall Risk, Home Exercise Program, POC  Patient/Caregiver Demonstrated Understanding: yes  Plan of Care Discussed and Agreed Upon: yes  Patient Response to Education: Patient/Caregiver Verbalized Understanding of Information    Goals:  Active       PT Problem       Patient will ambulate household distance using cane with modified independent.       Start:  10/23/23    Expected End:  11/20/23            Patient will ambulate with normal gait pattern with no device household distances.       Start:  10/23/23    Expected End:  12/18/23            Patient will ascend and descend 1 flight of stairs with rail and no cane with modified independent.       Start:  10/23/23    Expected End:  12/18/23            Patient will achieve left knee ROM of  0-120 degrees for improved functional mobility.       Start:  10/23/23    Expected End:  12/18/23            Patient will achieve left knee flexion strength of at least 4+/5 for improved functional mobility.       Start:  10/23/23    Expected End:  11/20/23            Patient will achieve left knee extension strength of 4+/5 for improved functional mobility.       Start:  10/23/23    Expected End:  11/20/23            Patient will demonstrate independence in home program for support of progression       Start: "  10/23/23    Expected End:  11/06/23            Patient will report pain of no more than 5/10 demonstrating a reduction of overall pain.       Start:  10/23/23    Expected End:  11/20/23            Patient will show a significant change in LEFS patient reported outcome tool to demonstrate subjective imporovement       Start:  10/23/23    Expected End:  11/20/23

## 2023-11-02 NOTE — TELEPHONE ENCOUNTER
Patients daughter (Linda) called to schedule Remicade infusion appointments for her mom. Scheduled 3 infusion appointments for patient on 11/15/2023, 11/29/2023, and 12/27/2023 all at 10:00 am. Daughter asked if patients infusions are pre-certed earlier than scheduled dates that we move her appointments closer. Confirmed with her that I will keep a eye on pre-cert. Patients daughter verbalized and agreed to appointments.

## 2023-11-06 NOTE — PROGRESS NOTES
"Physical Therapy    Physical Therapy Treatment    Patient Name: Meghan Waddell  MRN: [id number]  Today's Date: 11/6/2023  Time Calculation  Start Time: 1345  Stop Time: 1425  Time Calculation (min): 40 min      Assessment: Pt requires increase time d/t R hip pain, to complete exercises which limits what she can get done in a session. Pt required cuing to increase heel/toe gait as she was keeping the L knee straight with step through phase, with good follow through demonstrated.  PT Assessment  PT Assessment Results: Decreased strength, Decreased range of motion, Decreased endurance, Impaired balance, Decreased mobility, Decreased safety awareness, Impaired judgement  Rehab Prognosis: Fair  Evaluation/Treatment Tolerance: Patient tolerated treatment well    Plan:Continue to progress current POC as tolerated to facilitate ability to perform functional activities.   OP PT Plan  PT Plan: Skilled PT  PT Frequency: 2 times per week    Current Problem  1. Acute postoperative pain of left knee  Follow Up In Physical Therapy          Subjective Pt states she is having increased pain in the R hip today.  General  Reason for Referral: left knee pain  Referred By: Dr. Nolasco  Precautions     Vital Signs     Pain  Pain Assessment: 0-10  Pain Score: 0 - No pain  Pain Location: Knee  Pain Orientation: Left    Objective   Cognition     Posture     Extremity/Trunk Assessment  AROM LLE (degrees)  L Knee Flexion 0-130: 112        Outcome Measures:      Treatments:  Therapeutic Exercise  Therapeutic Exercise Performed: Yes  Therapeutic Exercise Activity 2: heel slides x20 LLE  Therapeutic Exercise Activity 4: SAQ x20 BLE  Therapeutic Exercise Activity 5: Sci Fit bike Level 1 x5' seat #11  Therapeutic Exercise Activity 9: step ups 4\" x10  Therapeutic Exercise Activity 11: gastroc stretch x 2 30 LLE  Therapeutic Exercise Activity 13: sidestepping 8' x 4    Manual Therapy  Manual Therapy Performed: Yes  Manual Therapy Activity 1: L " alli singer  Manual Therapy Activity 2: L knee flexion PROM to increase ROM  Activity:    OP EDUCATION:       Goals:  Active       PT Problem       Patient will ambulate household distance using cane with modified independent.       Start:  10/23/23    Expected End:  11/20/23            Patient will ambulate with normal gait pattern with no device household distances. (Progressing)       Start:  10/23/23    Expected End:  12/18/23         Goal Note       Patient will ambulate with normal gait pattern w              Patient will ascend and descend 1 flight of stairs with rail and no cane with modified independent. (Progressing)       Start:  10/23/23    Expected End:  12/18/23            Patient will achieve left knee ROM of  0-120 degrees for improved functional mobility. (Progressing)       Start:  10/23/23    Expected End:  12/18/23            Patient will achieve left knee flexion strength of at least 4+/5 for improved functional mobility. (Progressing)       Start:  10/23/23    Expected End:  11/20/23            Patient will achieve left knee extension strength of 4+/5 for improved functional mobility. (Progressing)       Start:  10/23/23    Expected End:  11/20/23            Patient will demonstrate independence in home program for support of progression (Progressing)       Start:  10/23/23    Expected End:  11/06/23            Patient will report pain of no more than 5/10 demonstrating a reduction of overall pain. (Progressing)       Start:  10/23/23    Expected End:  11/20/23            Patient will show a significant change in LEFS patient reported outcome tool to demonstrate subjective imporovement (Progressing)       Start:  10/23/23    Expected End:  11/20/23

## 2023-11-06 NOTE — RESULT ENCOUNTER NOTE
I have reviewed your x-ray of your right hip from 11/2/2023.  There did show some mild to moderate arthritis which may be contributing to your symptoms.  You did have elevations of your inflammatory markers that are consistent with your rheumatoid activity.  I would have you continue with our plan to proceed with infliximab (Remicade) infusions.

## 2023-11-09 NOTE — PROGRESS NOTES
Physical Therapy    Physical Therapy Treatment    Patient Name: Meghan Waddell  MRN: [id number]  Today's Date: 11/9/2023  Time Calculation  Start Time: 1350  Stop Time: 1430  Time Calculation (min): 40 min      Assessment: Pt. C/o increased LE pain today due to RA and being off her RA meds. Pt able to complete most of her exercises, but refused to lay down for supine exercises. Modified some of her exercises for comfort. Pt did demonstrate improved L knee ext. After HS stretch.  PT Assessment  PT Assessment Results: Decreased strength, Decreased range of motion, Decreased endurance, Impaired balance, Decreased mobility, Decreased safety awareness, Impaired judgement  Rehab Prognosis: Fair  Evaluation/Treatment Tolerance: Patient limited by pain    Plan:continue with POC, advance as tolerated  OP PT Plan  PT Plan: Skilled PT  PT Frequency: 2 times per week    Current Problem  1. Acute postoperative pain of left knee  Follow Up In Physical Therapy          Subjective Pt. Reports she is having much pain today and has difficulty rising from a chair. Pt. Reports her RA is acting up today  General  Reason for Referral: left knee pain  Referred By: Dr. Nolasco  Precautions  Precautions  STEADI Fall Risk Score (The score of 4 or more indicates an increased risk of falling): 6  LE Weight Bearing Status: Weight Bearing as Tolerated  Medical Precautions: Fall precautions       Pain  Pain Assessment: 0-10  Pain Score: 8  Pain Type: Chronic pain  Pain Location: Knee  Pain Orientation: Left  Pain Descriptors: Jabbing, Throbbing  Pain Frequency: Constant/continuous  Clinical Progression: Not changed    Objective pt. Tolerated most of her exercises and had to modify some due to increased pain          Extremity/Trunk Assessment  AROM LLE (degrees)  L Knee Extension 0-130: 7              Treatments:  Therapeutic Exercise  Therapeutic Exercise Performed: Yes  Therapeutic Exercise Activity 5: Sci Fit bike Level 1 x5' seat  "#11  Therapeutic Exercise Activity 6: standing hip abd 2 x 10  Therapeutic Exercise Activity 7: standing march  Therapeutic Exercise Activity 8: heel rises x 15  Therapeutic Exercise Activity 9: step ups 4\" x10  Therapeutic Exercise Activity 10: HS [id number] sec. x 3  Therapeutic Exercise Activity 11: gastroc stretch x 2 30 LLE  Therapeutic Exercise Activity 12: LAQ x 20 ea.  Therapeutic Exercise Activity 13: sidestepping 8' x 4  Activity:  Endurance: Tolerates 30 min exercise with multiple rests  Activity Tolerance Comments: decreased today    OP EDUCATION:  Education  Individual(s) Educated: Patient  Education Provided:  (scar care)  Patient/Caregiver Demonstrated Understanding: yes  Plan of Care Discussed and Agreed Upon: yes  Patient Response to Education: Patient/Caregiver Verbalized Understanding of Information    Goals:  Active       PT Problem       Patient will ambulate household distance using cane with modified independent.       Start:  10/23/23    Expected End:  11/20/23            Patient will ambulate with normal gait pattern with no device household distances. (Progressing)       Start:  10/23/23    Expected End:  12/18/23         Goal Note       Patient will ambulate with normal gait pattern w              Patient will ascend and descend 1 flight of stairs with rail and no cane with modified independent. (Progressing)       Start:  10/23/23    Expected End:  12/18/23            Patient will achieve left knee ROM of  0-120 degrees for improved functional mobility. (Progressing)       Start:  10/23/23    Expected End:  12/18/23            Patient will achieve left knee flexion strength of at least 4+/5 for improved functional mobility. (Progressing)       Start:  10/23/23    Expected End:  11/20/23            Patient will achieve left knee extension strength of 4+/5 for improved functional mobility. (Progressing)       Start:  10/23/23    Expected End:  11/20/23            Patient will demonstrate " independence in home program for support of progression (Progressing)       Start:  10/23/23    Expected End:  11/06/23            Patient will report pain of no more than 5/10 demonstrating a reduction of overall pain. (Progressing)       Start:  10/23/23    Expected End:  11/20/23            Patient will show a significant change in LEFS patient reported outcome tool to demonstrate subjective imporovement (Progressing)       Start:  10/23/23    Expected End:  11/20/23

## 2023-11-13 NOTE — PROGRESS NOTES
Physical Therapy    Physical Therapy Treatment    Patient Name: Meghan Waddell  MRN: [id number]  Today's Date: 11/13/2023  Time Calculation  Start Time: 1347  Stop Time: 1426  Time Calculation (min): 39 min      Assessment:  PT Assessment  PT Assessment Results: Decreased strength, Decreased range of motion, Decreased endurance, Impaired balance, Decreased mobility, Decreased safety awareness, Impaired judgement  Rehab Prognosis: Fair  Evaluation/Treatment Tolerance: Patient tolerated treatment well  Patient demonstrated slow and antalgic gait pattern. Patient required encouragement to participate in exercises. Patient appears to be limited by significant right lower extremity pain.    Plan:  OP PT Plan  Treatment/Interventions: Education/ Instruction, Gait training, Manual therapy, Neuromuscular re-education, Therapeutic activities, Therapeutic exercises  PT Plan: Skilled PT  PT Frequency: 2 times per week  Duration: 4 weeks  Onset Date: 09/29/23  Certification Period Start Date: 10/23/23  Certification Period End Date: 12/18/23  Number of Treatments Authorized: 8 (5 of 8)  Rehab Potential: Good  Plan of Care Agreement: Patient    Current Problem  1. Acute postoperative pain of left knee  Follow Up In Physical Therapy          Subjective   General  Reason for Referral: left knee pain  Referred By: Dr. Nolasco  Patient walked into therapy today with a single point cane instead of a front wheeled walker.    Pain  Pain Assessment: 0-10  Pain Score: 8  Pain Type: Chronic pain  Pain Location: Knee  Pain Orientation: Left, Right  Pain Descriptors: Throbbing  Pain Frequency: Constant/continuous  Clinical Progression: Not changed    Objective     Treatments:  Therapeutic Exercise  Therapeutic Exercise Performed: Yes  Therapeutic Exercise Activity 3: SLR x10 aarom RLE, 2x10 LLE  Therapeutic Exercise Activity 5: Sci Fit bike Level 1 x5' seat #11  Therapeutic Exercise Activity 6: standing hip abd 2 x 10  Therapeutic Exercise  "Activity 7: standing march x10 R/L  Therapeutic Exercise Activity 8: heel rises x 15  Therapeutic Exercise Activity 9: step ups 4\" x10 R/L  Therapeutic Exercise Activity 11: gastroc stretch on wedge x1' LLE  Therapeutic Exercise Activity 12: LAQ x 25 R/L  Therapeutic Exercise Activity 13: sidestepping 8' x 4    OP EDUCATION:  Education  Individual(s) Educated: Patient  Education Provided: Anatomy, Fall Risk, Home Exercise Program, POC  Patient/Caregiver Demonstrated Understanding: yes  Plan of Care Discussed and Agreed Upon: yes  Patient Response to Education: Patient/Caregiver Verbalized Understanding of Information    Goals:  Active       PT Problem       Patient will ambulate household distance using cane with modified independent. (Met)       Start:  10/23/23    Expected End:  11/20/23    Resolved:  11/13/23         Patient will ambulate with normal gait pattern with no device household distances. (Progressing)       Start:  10/23/23    Expected End:  12/18/23         Goal Note       Patient will ambulate with normal gait pattern w              Patient will ascend and descend 1 flight of stairs with rail and no cane with modified independent. (Progressing)       Start:  10/23/23    Expected End:  12/18/23            Patient will achieve left knee ROM of  0-120 degrees for improved functional mobility. (Progressing)       Start:  10/23/23    Expected End:  12/18/23            Patient will achieve left knee flexion strength of at least 4+/5 for improved functional mobility. (Progressing)       Start:  10/23/23    Expected End:  11/20/23            Patient will achieve left knee extension strength of 4+/5 for improved functional mobility. (Progressing)       Start:  10/23/23    Expected End:  11/20/23            Patient will demonstrate independence in home program for support of progression (Progressing)       Start:  10/23/23    Expected End:  11/06/23            Patient will report pain of no more than 5/10 " demonstrating a reduction of overall pain. (Progressing)       Start:  10/23/23    Expected End:  11/20/23            Patient will show a significant change in LEFS patient reported outcome tool to demonstrate subjective imporovement (Progressing)       Start:  10/23/23    Expected End:  11/20/23

## 2023-11-16 NOTE — PROGRESS NOTES
Physical Therapy                 Therapy Communication Note    Patient Name: Meghan Waddell  MRN: [id number]  Today's Date: 11/16/2023     Discipline: Physical Therapy    Missed Visit Reason: Missed Visit Reason: No show    Missed Time: Cancel appt for 10 am.    Comment:  Car trouble

## 2023-12-27 NOTE — SIGNIFICANT EVENT
12/27/23 1019   Prechemo Checklist   Has the patient been in the hospital, ED, or urgent care since last date of service No   Chemo/Immuno Consent Signed Not applicable   Protocol/Indications Verified Yes   Confirmed to previous date/time of medication Yes   Compared to previous dose Yes   All medications are dated accurately Yes   Pregnancy Test Negative Not applicable   Parameters Met Yes   BSA/Weight-Height Verified Yes   Dose Calculations Verified Yes

## 2023-12-29 NOTE — PROGRESS NOTES
Physical Therapy    Discharge Summary    Name: Meghan Waddell  MRN: [id number]  : 1955  Date: 23    Discharge Summary: PT    Discharge Information: Date of discharge 2023, Date of last visit 2023, Date of evaluation 10/23/2023, Number of attended visits 5, Referred by Xavi, and Referred for left knee pain    Discharge Status: progressing towards goals     Rehab Discharge Reason: Failed to schedule and/or keep follow-up appointment(s)

## 2024-01-01 ENCOUNTER — APPOINTMENT (OUTPATIENT)
Dept: RHEUMATOLOGY | Facility: CLINIC | Age: 69
End: 2024-01-01
Payer: MEDICARE

## 2024-01-01 ENCOUNTER — APPOINTMENT (OUTPATIENT)
Dept: RADIOLOGY | Facility: HOSPITAL | Age: 69
DRG: 579 | End: 2024-01-01
Payer: MEDICARE

## 2024-01-01 ENCOUNTER — APPOINTMENT (OUTPATIENT)
Dept: CARDIOLOGY | Facility: HOSPITAL | Age: 69
DRG: 579 | End: 2024-01-01
Payer: MEDICARE

## 2024-01-01 ENCOUNTER — HOSPITAL ENCOUNTER (EMERGENCY)
Facility: HOSPITAL | Age: 69
Discharge: OTHER INSTITUTION | End: 2024-02-03
Attending: EMERGENCY MEDICINE
Payer: MEDICARE

## 2024-01-01 ENCOUNTER — APPOINTMENT (OUTPATIENT)
Dept: RADIOLOGY | Facility: HOSPITAL | Age: 69
End: 2024-01-01
Payer: MEDICARE

## 2024-01-01 ENCOUNTER — TELEPHONE (OUTPATIENT)
Dept: HEMATOLOGY/ONCOLOGY | Facility: CLINIC | Age: 69
End: 2024-01-01
Payer: MEDICARE

## 2024-01-01 ENCOUNTER — APPOINTMENT (OUTPATIENT)
Dept: CARDIOLOGY | Facility: HOSPITAL | Age: 69
End: 2024-01-01
Payer: MEDICARE

## 2024-01-01 ENCOUNTER — APPOINTMENT (OUTPATIENT)
Dept: GASTROENTEROLOGY | Facility: CLINIC | Age: 69
End: 2024-01-01
Payer: MEDICARE

## 2024-01-01 ENCOUNTER — TELEPHONE (OUTPATIENT)
Dept: RHEUMATOLOGY | Facility: CLINIC | Age: 69
End: 2024-01-01
Payer: MEDICARE

## 2024-01-01 ENCOUNTER — HOSPITAL ENCOUNTER (INPATIENT)
Facility: HOSPITAL | Age: 69
LOS: 24 days | DRG: 579 | End: 2024-02-27
Attending: STUDENT IN AN ORGANIZED HEALTH CARE EDUCATION/TRAINING PROGRAM | Admitting: INTERNAL MEDICINE
Payer: MEDICARE

## 2024-01-01 VITALS
DIASTOLIC BLOOD PRESSURE: 60 MMHG | RESPIRATION RATE: 18 BRPM | OXYGEN SATURATION: 97 % | WEIGHT: 206 LBS | TEMPERATURE: 97.8 F | BODY MASS INDEX: 31.22 KG/M2 | HEIGHT: 68 IN | SYSTOLIC BLOOD PRESSURE: 118 MMHG | HEART RATE: 70 BPM

## 2024-01-01 VITALS
HEIGHT: 67 IN | HEART RATE: 99 BPM | TEMPERATURE: 99.7 F | RESPIRATION RATE: 20 BRPM | WEIGHT: 235.67 LBS | DIASTOLIC BLOOD PRESSURE: 69 MMHG | OXYGEN SATURATION: 98 % | SYSTOLIC BLOOD PRESSURE: 115 MMHG | BODY MASS INDEX: 36.99 KG/M2

## 2024-01-01 DIAGNOSIS — R92.8 OTHER ABNORMAL AND INCONCLUSIVE FINDINGS ON DIAGNOSTIC IMAGING OF BREAST: ICD-10-CM

## 2024-01-01 DIAGNOSIS — C50.912 CARCINOMA OF LEFT BREAST METASTATIC TO LIVER (MULTI): Primary | ICD-10-CM

## 2024-01-01 DIAGNOSIS — C79.89 SECONDARY MALIGNANT NEOPLASM OF OTHER SPECIFIED SITES (MULTI): ICD-10-CM

## 2024-01-01 DIAGNOSIS — C50.912 CARCINOMA OF LEFT BREAST METASTATIC TO LIVER (MULTI): ICD-10-CM

## 2024-01-01 DIAGNOSIS — N64.9 LESION OF LEFT NIPPLE: ICD-10-CM

## 2024-01-01 DIAGNOSIS — E87.6 HYPOKALEMIA: ICD-10-CM

## 2024-01-01 DIAGNOSIS — S72.122A: ICD-10-CM

## 2024-01-01 DIAGNOSIS — K74.60 CIRRHOSIS (MULTI): Primary | ICD-10-CM

## 2024-01-01 DIAGNOSIS — Z51.81 ENCOUNTER FOR MONITORING CARDIOTOXIC DRUG THERAPY: ICD-10-CM

## 2024-01-01 DIAGNOSIS — R18.8 CIRRHOSIS OF LIVER WITH ASCITES, UNSPECIFIED HEPATIC CIRRHOSIS TYPE (MULTI): ICD-10-CM

## 2024-01-01 DIAGNOSIS — R10.9 ABDOMINAL PAIN, UNSPECIFIED ABDOMINAL LOCATION: Primary | ICD-10-CM

## 2024-01-01 DIAGNOSIS — C78.7 CARCINOMA OF LEFT BREAST METASTATIC TO LIVER (MULTI): ICD-10-CM

## 2024-01-01 DIAGNOSIS — R57.9 SHOCK (MULTI): ICD-10-CM

## 2024-01-01 DIAGNOSIS — K74.60 CIRRHOSIS OF LIVER WITH ASCITES, UNSPECIFIED HEPATIC CIRRHOSIS TYPE (MULTI): ICD-10-CM

## 2024-01-01 DIAGNOSIS — R60.0 BILATERAL LEG EDEMA: ICD-10-CM

## 2024-01-01 DIAGNOSIS — Z79.899 ENCOUNTER FOR MONITORING CARDIOTOXIC DRUG THERAPY: ICD-10-CM

## 2024-01-01 DIAGNOSIS — C78.7 CARCINOMA OF LEFT BREAST METASTATIC TO LIVER (MULTI): Primary | ICD-10-CM

## 2024-01-01 LAB
25(OH)D3 SERPL-MCNC: 26 NG/ML (ref 30–100)
A1AT SERPL NEPH-MCNC: 170 MG/DL (ref 84–218)
ABO GROUP (TYPE) IN BLOOD: NORMAL
ACANTHOCYTES BLD QL SMEAR: NORMAL
AFP SERPL-MCNC: <4 NG/ML (ref 0–9)
ALBUMIN FLD-MCNC: <0.5 G/DL
ALBUMIN SERPL BCP-MCNC: 1.6 G/DL (ref 3.4–5)
ALBUMIN SERPL BCP-MCNC: 1.8 G/DL (ref 3.4–5)
ALBUMIN SERPL BCP-MCNC: 1.8 G/DL (ref 3.4–5)
ALBUMIN SERPL BCP-MCNC: 2 G/DL (ref 3.4–5)
ALBUMIN SERPL BCP-MCNC: 2.1 G/DL (ref 3.4–5)
ALBUMIN SERPL BCP-MCNC: 2.2 G/DL (ref 3.4–5)
ALBUMIN SERPL BCP-MCNC: 2.3 G/DL (ref 3.4–5)
ALBUMIN SERPL BCP-MCNC: 2.4 G/DL (ref 3.4–5)
ALBUMIN SERPL BCP-MCNC: 2.4 G/DL (ref 3.4–5)
ALBUMIN SERPL BCP-MCNC: 2.5 G/DL (ref 3.4–5)
ALBUMIN SERPL BCP-MCNC: 2.9 G/DL (ref 3.4–5)
ALBUMIN SERPL BCP-MCNC: <1.5 G/DL (ref 3.4–5)
ALP SERPL-CCNC: 118 U/L (ref 33–136)
ALP SERPL-CCNC: 140 U/L (ref 33–136)
ALP SERPL-CCNC: 177 U/L (ref 33–136)
ALP SERPL-CCNC: 187 U/L (ref 33–136)
ALP SERPL-CCNC: 191 U/L (ref 33–136)
ALP SERPL-CCNC: 192 U/L (ref 33–136)
ALP SERPL-CCNC: 198 U/L (ref 33–136)
ALP SERPL-CCNC: 199 U/L (ref 33–136)
ALP SERPL-CCNC: 208 U/L (ref 33–136)
ALP SERPL-CCNC: 212 U/L (ref 33–136)
ALP SERPL-CCNC: 218 U/L (ref 33–136)
ALP SERPL-CCNC: 226 U/L (ref 33–136)
ALP SERPL-CCNC: 229 U/L (ref 33–136)
ALP SERPL-CCNC: 234 U/L (ref 33–136)
ALP SERPL-CCNC: 238 U/L (ref 33–136)
ALP SERPL-CCNC: 238 U/L (ref 33–136)
ALP SERPL-CCNC: 246 U/L (ref 33–136)
ALP SERPL-CCNC: 250 U/L (ref 33–136)
ALP SERPL-CCNC: 251 U/L (ref 33–136)
ALP SERPL-CCNC: 251 U/L (ref 33–136)
ALP SERPL-CCNC: 253 U/L (ref 33–136)
ALP SERPL-CCNC: 255 U/L (ref 33–136)
ALP SERPL-CCNC: 265 U/L (ref 33–136)
ALP SERPL-CCNC: 266 U/L (ref 33–136)
ALP SERPL-CCNC: 269 U/L (ref 33–136)
ALP SERPL-CCNC: 271 U/L (ref 33–136)
ALP SERPL-CCNC: 287 U/L (ref 33–136)
ALP SERPL-CCNC: 302 U/L (ref 33–136)
ALP SERPL-CCNC: 346 U/L (ref 33–136)
ALP SERPL-CCNC: 85 U/L (ref 33–136)
ALP SERPL-CCNC: 88 U/L (ref 33–136)
ALP SERPL-CCNC: 93 U/L (ref 33–136)
ALP SERPL-CCNC: 99 U/L (ref 33–136)
ALT SERPL W P-5'-P-CCNC: 28 U/L (ref 7–45)
ALT SERPL W P-5'-P-CCNC: 28 U/L (ref 7–45)
ALT SERPL W P-5'-P-CCNC: 32 U/L (ref 7–45)
ALT SERPL W P-5'-P-CCNC: 33 U/L (ref 7–45)
ALT SERPL W P-5'-P-CCNC: 34 U/L (ref 7–45)
ALT SERPL W P-5'-P-CCNC: 36 U/L (ref 7–45)
ALT SERPL W P-5'-P-CCNC: 39 U/L (ref 7–45)
ALT SERPL W P-5'-P-CCNC: 39 U/L (ref 7–45)
ALT SERPL W P-5'-P-CCNC: 42 U/L (ref 7–45)
ALT SERPL W P-5'-P-CCNC: 46 U/L (ref 7–45)
ALT SERPL W P-5'-P-CCNC: 47 U/L (ref 7–45)
ALT SERPL W P-5'-P-CCNC: 48 U/L (ref 7–45)
ALT SERPL W P-5'-P-CCNC: 51 U/L (ref 7–45)
ALT SERPL W P-5'-P-CCNC: 51 U/L (ref 7–45)
ALT SERPL W P-5'-P-CCNC: 53 U/L (ref 7–45)
ALT SERPL W P-5'-P-CCNC: 53 U/L (ref 7–45)
ALT SERPL W P-5'-P-CCNC: 54 U/L (ref 7–45)
ALT SERPL W P-5'-P-CCNC: 55 U/L (ref 7–45)
ALT SERPL W P-5'-P-CCNC: 55 U/L (ref 7–45)
ALT SERPL W P-5'-P-CCNC: 57 U/L (ref 7–45)
ALT SERPL W P-5'-P-CCNC: 57 U/L (ref 7–45)
ALT SERPL W P-5'-P-CCNC: 58 U/L (ref 7–45)
ALT SERPL W P-5'-P-CCNC: 60 U/L (ref 7–45)
ALT SERPL W P-5'-P-CCNC: 62 U/L (ref 7–45)
ALT SERPL W P-5'-P-CCNC: 64 U/L (ref 7–45)
ALT SERPL W P-5'-P-CCNC: 66 U/L (ref 7–45)
ALT SERPL W P-5'-P-CCNC: 67 U/L (ref 7–45)
ALT SERPL W P-5'-P-CCNC: 71 U/L (ref 7–45)
AMMONIA PLAS-SCNC: 60 UMOL/L (ref 16–53)
AMMONIA PLAS-SCNC: 79 UMOL/L (ref 16–53)
ANA PATTERN: ABNORMAL
ANA SER QL HEP2 SUBST: POSITIVE
ANA TITR SER IF: ABNORMAL {TITER}
ANION GAP BLDA CALCULATED.4IONS-SCNC: 16 MMO/L (ref 10–25)
ANION GAP BLDA CALCULATED.4IONS-SCNC: 17 MMO/L (ref 10–25)
ANION GAP BLDA CALCULATED.4IONS-SCNC: 18 MMO/L (ref 10–25)
ANION GAP BLDA CALCULATED.4IONS-SCNC: 18 MMO/L (ref 10–25)
ANION GAP BLDA CALCULATED.4IONS-SCNC: 19 MMO/L (ref 10–25)
ANION GAP BLDA CALCULATED.4IONS-SCNC: 19 MMO/L (ref 10–25)
ANION GAP BLDA CALCULATED.4IONS-SCNC: 23 MMO/L (ref 10–25)
ANION GAP BLDV CALCULATED.4IONS-SCNC: 17 MMOL/L (ref 10–25)
ANION GAP BLDV CALCULATED.4IONS-SCNC: 19 MMOL/L (ref 10–25)
ANION GAP BLDV CALCULATED.4IONS-SCNC: 20 MMOL/L (ref 10–25)
ANION GAP SERPL CALC-SCNC: 11 MMOL/L (ref 10–20)
ANION GAP SERPL CALC-SCNC: 12 MMOL/L (ref 10–20)
ANION GAP SERPL CALC-SCNC: 13 MMOL/L (ref 10–20)
ANION GAP SERPL CALC-SCNC: 14 MMOL/L (ref 10–20)
ANION GAP SERPL CALC-SCNC: 15 MMOL/L (ref 10–20)
ANION GAP SERPL CALC-SCNC: 16 MMOL/L (ref 10–20)
ANION GAP SERPL CALC-SCNC: 16 MMOL/L (ref 10–20)
ANION GAP SERPL CALC-SCNC: 20 MMOL/L (ref 10–20)
ANION GAP SERPL CALC-SCNC: 21 MMOL/L (ref 10–20)
ANION GAP SERPL CALC-SCNC: 21 MMOL/L (ref 10–20)
ANION GAP SERPL CALC-SCNC: 22 MMOL/L (ref 10–20)
ANION GAP SERPL CALC-SCNC: 23 MMOL/L (ref 10–20)
ANION GAP SERPL CALC-SCNC: 24 MMOL/L (ref 10–20)
ANION GAP SERPL CALC-SCNC: 24 MMOL/L (ref 10–20)
ANION GAP SERPL CALC-SCNC: 25 MMOL/L (ref 10–20)
ANION GAP SERPL CALC-SCNC: 8 MMOL/L (ref 10–20)
ANION GAP SERPL CALC-SCNC: 9 MMOL/L (ref 10–20)
ANTIBODY SCREEN: NORMAL
AORTIC VALVE PEAK VELOCITY: 1.7 M/S
APPEARANCE UR: ABNORMAL
APPEARANCE UR: ABNORMAL
APTT PPP: 28 SECONDS (ref 27–38)
APTT PPP: 29 SECONDS (ref 27–38)
APTT PPP: 30 SECONDS (ref 27–38)
APTT PPP: 31 SECONDS (ref 27–38)
APTT PPP: 44 SECONDS (ref 27–38)
APTT PPP: 45 SECONDS (ref 27–38)
APTT PPP: 62 SECONDS (ref 27–38)
ASPERGILLUS GALACTOMANNAN EIA,SERUM: 0.07
AST SERPL W P-5'-P-CCNC: 129 U/L (ref 9–39)
AST SERPL W P-5'-P-CCNC: 145 U/L (ref 9–39)
AST SERPL W P-5'-P-CCNC: 147 U/L (ref 9–39)
AST SERPL W P-5'-P-CCNC: 149 U/L (ref 9–39)
AST SERPL W P-5'-P-CCNC: 160 U/L (ref 9–39)
AST SERPL W P-5'-P-CCNC: 161 U/L (ref 9–39)
AST SERPL W P-5'-P-CCNC: 162 U/L (ref 9–39)
AST SERPL W P-5'-P-CCNC: 169 U/L (ref 9–39)
AST SERPL W P-5'-P-CCNC: 171 U/L (ref 9–39)
AST SERPL W P-5'-P-CCNC: 179 U/L (ref 9–39)
AST SERPL W P-5'-P-CCNC: 181 U/L (ref 9–39)
AST SERPL W P-5'-P-CCNC: 186 U/L (ref 9–39)
AST SERPL W P-5'-P-CCNC: 188 U/L (ref 9–39)
AST SERPL W P-5'-P-CCNC: 194 U/L (ref 9–39)
AST SERPL W P-5'-P-CCNC: 194 U/L (ref 9–39)
AST SERPL W P-5'-P-CCNC: 196 U/L (ref 9–39)
AST SERPL W P-5'-P-CCNC: 197 U/L (ref 9–39)
AST SERPL W P-5'-P-CCNC: 198 U/L (ref 9–39)
AST SERPL W P-5'-P-CCNC: 199 U/L (ref 9–39)
AST SERPL W P-5'-P-CCNC: 200 U/L (ref 9–39)
AST SERPL W P-5'-P-CCNC: 207 U/L (ref 9–39)
AST SERPL W P-5'-P-CCNC: 37 U/L (ref 9–39)
AST SERPL W P-5'-P-CCNC: 45 U/L (ref 9–39)
AST SERPL W P-5'-P-CCNC: 55 U/L (ref 9–39)
AST SERPL W P-5'-P-CCNC: 56 U/L (ref 9–39)
AST SERPL W P-5'-P-CCNC: 59 U/L (ref 9–39)
AST SERPL W P-5'-P-CCNC: 63 U/L (ref 9–39)
AST SERPL W P-5'-P-CCNC: 64 U/L (ref 9–39)
AST SERPL W P-5'-P-CCNC: 68 U/L (ref 9–39)
AST SERPL W P-5'-P-CCNC: 83 U/L (ref 9–39)
AST SERPL W P-5'-P-CCNC: 86 U/L (ref 9–39)
AST SERPL W P-5'-P-CCNC: 92 U/L (ref 9–39)
AST SERPL W P-5'-P-CCNC: 95 U/L (ref 9–39)
ATRIAL RATE: 108 BPM
ATRIAL RATE: 111 BPM
ATRIAL RATE: 120 BPM
ATRIAL RATE: 274 BPM
ATRIAL RATE: 79 BPM
AV PEAK GRADIENT: 11.6 MMHG
AVA (PEAK VEL): 2.22 CM2
BACTERIA #/AREA URNS AUTO: ABNORMAL /HPF
BACTERIA BLD AEROBE CULT: ABNORMAL
BACTERIA BLD CULT: ABNORMAL
BACTERIA BLD CULT: NORMAL
BACTERIA BLD CULT: NORMAL
BACTERIA SPEC CULT: NORMAL
BACTERIA UR CULT: NORMAL
BASE EXCESS BLDA CALC-SCNC: -10.2 MMOL/L (ref -2–3)
BASE EXCESS BLDA CALC-SCNC: -11.3 MMOL/L (ref -2–3)
BASE EXCESS BLDA CALC-SCNC: -13.7 MMOL/L (ref -2–3)
BASE EXCESS BLDA CALC-SCNC: -6.5 MMOL/L (ref -2–3)
BASE EXCESS BLDA CALC-SCNC: -6.6 MMOL/L (ref -2–3)
BASE EXCESS BLDA CALC-SCNC: -7 MMOL/L (ref -2–3)
BASE EXCESS BLDA CALC-SCNC: -7.6 MMOL/L (ref -2–3)
BASE EXCESS BLDA CALC-SCNC: -7.9 MMOL/L (ref -2–3)
BASE EXCESS BLDA CALC-SCNC: -7.9 MMOL/L (ref -2–3)
BASE EXCESS BLDA CALC-SCNC: -9.3 MMOL/L (ref -2–3)
BASE EXCESS BLDA CALC-SCNC: -9.3 MMOL/L (ref -2–3)
BASE EXCESS BLDV CALC-SCNC: -11.6 MMOL/L (ref -2–3)
BASE EXCESS BLDV CALC-SCNC: -12.8 MMOL/L (ref -2–3)
BASE EXCESS BLDV CALC-SCNC: -7.3 MMOL/L (ref -2–3)
BASOPHILS # BLD AUTO: 0 X10*3/UL (ref 0–0.1)
BASOPHILS # BLD AUTO: 0.01 X10*3/UL (ref 0–0.1)
BASOPHILS # BLD AUTO: 0.02 X10*3/UL (ref 0–0.1)
BASOPHILS # BLD AUTO: 0.03 X10*3/UL (ref 0–0.1)
BASOPHILS # BLD AUTO: 0.03 X10*3/UL (ref 0–0.1)
BASOPHILS # BLD AUTO: 0.06 X10*3/UL (ref 0–0.1)
BASOPHILS # BLD AUTO: 0.08 X10*3/UL (ref 0–0.1)
BASOPHILS # BLD AUTO: 0.09 X10*3/UL (ref 0–0.1)
BASOPHILS # BLD AUTO: 0.1 X10*3/UL (ref 0–0.1)
BASOPHILS # BLD AUTO: 0.11 X10*3/UL (ref 0–0.1)
BASOPHILS # BLD AUTO: 0.11 X10*3/UL (ref 0–0.1)
BASOPHILS # BLD AUTO: 0.12 X10*3/UL (ref 0–0.1)
BASOPHILS # BLD AUTO: 0.16 X10*3/UL (ref 0–0.1)
BASOPHILS # BLD MANUAL: 0 X10*3/UL (ref 0–0.1)
BASOPHILS # BLD MANUAL: 0.1 X10*3/UL (ref 0–0.1)
BASOPHILS NFR BLD AUTO: 0 %
BASOPHILS NFR BLD AUTO: 0.1 %
BASOPHILS NFR BLD AUTO: 0.2 %
BASOPHILS NFR BLD AUTO: 0.5 %
BASOPHILS NFR BLD AUTO: 0.6 %
BASOPHILS NFR BLD AUTO: 0.9 %
BASOPHILS NFR BLD AUTO: 1 %
BASOPHILS NFR BLD AUTO: 1.1 %
BASOPHILS NFR BLD AUTO: 1.1 %
BASOPHILS NFR BLD AUTO: 1.2 %
BASOPHILS NFR BLD AUTO: 1.3 %
BASOPHILS NFR BLD AUTO: 1.4 %
BASOPHILS NFR BLD AUTO: 1.6 %
BASOPHILS NFR BLD MANUAL: 0 %
BASOPHILS NFR BLD MANUAL: 0.9 %
BASOPHILS NFR FLD MANUAL: 0 %
BILIRUB DIRECT SERPL-MCNC: 12.9 MG/DL (ref 0–0.3)
BILIRUB DIRECT SERPL-MCNC: 4.5 MG/DL (ref 0–0.3)
BILIRUB DIRECT SERPL-MCNC: 4.7 MG/DL (ref 0–0.3)
BILIRUB DIRECT SERPL-MCNC: 4.9 MG/DL (ref 0–0.3)
BILIRUB DIRECT SERPL-MCNC: 5 MG/DL (ref 0–0.3)
BILIRUB DIRECT SERPL-MCNC: 5.5 MG/DL (ref 0–0.3)
BILIRUB DIRECT SERPL-MCNC: 5.7 MG/DL (ref 0–0.3)
BILIRUB DIRECT SERPL-MCNC: 6.1 MG/DL (ref 0–0.3)
BILIRUB DIRECT SERPL-MCNC: 6.4 MG/DL (ref 0–0.3)
BILIRUB DIRECT SERPL-MCNC: 9.3 MG/DL (ref 0–0.3)
BILIRUB DIRECT SERPL-MCNC: 9.5 MG/DL (ref 0–0.3)
BILIRUB SERPL-MCNC: 10.2 MG/DL (ref 0–1.2)
BILIRUB SERPL-MCNC: 10.6 MG/DL (ref 0–1.2)
BILIRUB SERPL-MCNC: 11.1 MG/DL (ref 0–1.2)
BILIRUB SERPL-MCNC: 11.3 MG/DL (ref 0–1.2)
BILIRUB SERPL-MCNC: 11.4 MG/DL (ref 0–1.2)
BILIRUB SERPL-MCNC: 11.9 MG/DL (ref 0–1.2)
BILIRUB SERPL-MCNC: 12.1 MG/DL (ref 0–1.2)
BILIRUB SERPL-MCNC: 12.2 MG/DL (ref 0–1.2)
BILIRUB SERPL-MCNC: 12.7 MG/DL (ref 0–1.2)
BILIRUB SERPL-MCNC: 13.3 MG/DL (ref 0–1.2)
BILIRUB SERPL-MCNC: 13.4 MG/DL (ref 0–1.2)
BILIRUB SERPL-MCNC: 13.6 MG/DL (ref 0–1.2)
BILIRUB SERPL-MCNC: 14.1 MG/DL (ref 0–1.2)
BILIRUB SERPL-MCNC: 14.2 MG/DL (ref 0–1.2)
BILIRUB SERPL-MCNC: 15.1 MG/DL (ref 0–1.2)
BILIRUB SERPL-MCNC: 15.3 MG/DL (ref 0–1.2)
BILIRUB SERPL-MCNC: 15.3 MG/DL (ref 0–1.2)
BILIRUB SERPL-MCNC: 15.4 MG/DL (ref 0–1.2)
BILIRUB SERPL-MCNC: 15.5 MG/DL (ref 0–1.2)
BILIRUB SERPL-MCNC: 15.7 MG/DL (ref 0–1.2)
BILIRUB SERPL-MCNC: 15.8 MG/DL (ref 0–1.2)
BILIRUB SERPL-MCNC: 15.9 MG/DL (ref 0–1.2)
BILIRUB SERPL-MCNC: 16.3 MG/DL (ref 0–1.2)
BILIRUB SERPL-MCNC: 19.2 MG/DL (ref 0–1.2)
BILIRUB SERPL-MCNC: 20.9 MG/DL (ref 0–1.2)
BILIRUB SERPL-MCNC: 6.4 MG/DL (ref 0–1.2)
BILIRUB SERPL-MCNC: 7 MG/DL (ref 0–1.2)
BILIRUB SERPL-MCNC: 7.4 MG/DL (ref 0–1.2)
BILIRUB SERPL-MCNC: 7.8 MG/DL (ref 0–1.2)
BILIRUB SERPL-MCNC: 7.9 MG/DL (ref 0–1.2)
BILIRUB SERPL-MCNC: 8.1 MG/DL (ref 0–1.2)
BILIRUB SERPL-MCNC: 8.9 MG/DL (ref 0–1.2)
BILIRUB SERPL-MCNC: 9.6 MG/DL (ref 0–1.2)
BILIRUB UR STRIP.AUTO-MCNC: ABNORMAL MG/DL
BILIRUB UR STRIP.AUTO-MCNC: ABNORMAL MG/DL
BLASTS NFR FLD MANUAL: 0 %
BLOOD EXPIRATION DATE: NORMAL
BODY TEMPERATURE: 37 DEGREES CELSIUS
BUN SERPL-MCNC: 113 MG/DL (ref 6–23)
BUN SERPL-MCNC: 114 MG/DL (ref 6–23)
BUN SERPL-MCNC: 116 MG/DL (ref 6–23)
BUN SERPL-MCNC: 12 MG/DL (ref 6–23)
BUN SERPL-MCNC: 13 MG/DL (ref 6–23)
BUN SERPL-MCNC: 15 MG/DL (ref 6–23)
BUN SERPL-MCNC: 16 MG/DL (ref 6–23)
BUN SERPL-MCNC: 17 MG/DL (ref 6–23)
BUN SERPL-MCNC: 18 MG/DL (ref 6–23)
BUN SERPL-MCNC: 19 MG/DL (ref 6–23)
BUN SERPL-MCNC: 22 MG/DL (ref 6–23)
BUN SERPL-MCNC: 28 MG/DL (ref 6–23)
BUN SERPL-MCNC: 29 MG/DL (ref 6–23)
BUN SERPL-MCNC: 29 MG/DL (ref 6–23)
BUN SERPL-MCNC: 31 MG/DL (ref 6–23)
BUN SERPL-MCNC: 32 MG/DL (ref 6–23)
BUN SERPL-MCNC: 36 MG/DL (ref 6–23)
BUN SERPL-MCNC: 43 MG/DL (ref 6–23)
BUN SERPL-MCNC: 44 MG/DL (ref 6–23)
BUN SERPL-MCNC: 45 MG/DL (ref 6–23)
BUN SERPL-MCNC: 46 MG/DL (ref 6–23)
BUN SERPL-MCNC: 46 MG/DL (ref 6–23)
BUN SERPL-MCNC: 51 MG/DL (ref 6–23)
BUN SERPL-MCNC: 53 MG/DL (ref 6–23)
BUN SERPL-MCNC: 56 MG/DL (ref 6–23)
BUN SERPL-MCNC: 57 MG/DL (ref 6–23)
BUN SERPL-MCNC: 61 MG/DL (ref 6–23)
BUN SERPL-MCNC: 81 MG/DL (ref 6–23)
BUN SERPL-MCNC: 82 MG/DL (ref 6–23)
BUN SERPL-MCNC: 84 MG/DL (ref 6–23)
BUN SERPL-MCNC: 84 MG/DL (ref 6–23)
BUN SERPL-MCNC: 85 MG/DL (ref 6–23)
BUN SERPL-MCNC: 85 MG/DL (ref 6–23)
BUN SERPL-MCNC: 88 MG/DL (ref 6–23)
BUN SERPL-MCNC: 92 MG/DL (ref 6–23)
BUN SERPL-MCNC: 95 MG/DL (ref 6–23)
BUN SERPL-MCNC: 98 MG/DL (ref 6–23)
BURR CELLS BLD QL SMEAR: ABNORMAL
BURR CELLS BLD QL SMEAR: NORMAL
CA-I BLD-SCNC: 0.91 MMOL/L (ref 1.1–1.33)
CA-I BLD-SCNC: 1 MMOL/L (ref 1.1–1.33)
CA-I BLDA-SCNC: 1 MMOL/L (ref 1.1–1.33)
CA-I BLDA-SCNC: 1 MMOL/L (ref 1.1–1.33)
CA-I BLDA-SCNC: 1.03 MMOL/L (ref 1.1–1.33)
CA-I BLDA-SCNC: 1.06 MMOL/L (ref 1.1–1.33)
CA-I BLDA-SCNC: 1.07 MMOL/L (ref 1.1–1.33)
CA-I BLDA-SCNC: 1.08 MMOL/L (ref 1.1–1.33)
CA-I BLDA-SCNC: 1.13 MMOL/L (ref 1.1–1.33)
CA-I BLDA-SCNC: 1.13 MMOL/L (ref 1.1–1.33)
CA-I BLDA-SCNC: 1.21 MMOL/L (ref 1.1–1.33)
CA-I BLDV-SCNC: 1.08 MMOL/L (ref 1.1–1.33)
CA-I BLDV-SCNC: 1.11 MMOL/L (ref 1.1–1.33)
CA-I BLDV-SCNC: 1.15 MMOL/L (ref 1.1–1.33)
CALCIUM SERPL-MCNC: 7.2 MG/DL (ref 8.6–10.6)
CALCIUM SERPL-MCNC: 7.2 MG/DL (ref 8.6–10.6)
CALCIUM SERPL-MCNC: 7.3 MG/DL (ref 8.6–10.6)
CALCIUM SERPL-MCNC: 7.3 MG/DL (ref 8.6–10.6)
CALCIUM SERPL-MCNC: 7.6 MG/DL (ref 8.6–10.6)
CALCIUM SERPL-MCNC: 7.7 MG/DL (ref 8.6–10.6)
CALCIUM SERPL-MCNC: 7.7 MG/DL (ref 8.6–10.6)
CALCIUM SERPL-MCNC: 7.8 MG/DL (ref 8.6–10.6)
CALCIUM SERPL-MCNC: 7.9 MG/DL (ref 8.6–10.6)
CALCIUM SERPL-MCNC: 8 MG/DL (ref 8.6–10.6)
CALCIUM SERPL-MCNC: 8.1 MG/DL (ref 8.6–10.3)
CALCIUM SERPL-MCNC: 8.1 MG/DL (ref 8.6–10.6)
CALCIUM SERPL-MCNC: 8.1 MG/DL (ref 8.6–10.6)
CALCIUM SERPL-MCNC: 8.2 MG/DL (ref 8.6–10.6)
CALCIUM SERPL-MCNC: 8.2 MG/DL (ref 8.6–10.6)
CALCIUM SERPL-MCNC: 8.3 MG/DL (ref 8.6–10.6)
CALCIUM SERPL-MCNC: 8.4 MG/DL (ref 8.6–10.6)
CALCIUM SERPL-MCNC: 8.5 MG/DL (ref 8.6–10.6)
CALCIUM SERPL-MCNC: 8.6 MG/DL (ref 8.6–10.3)
CALCIUM SERPL-MCNC: 8.6 MG/DL (ref 8.6–10.6)
CALCIUM SERPL-MCNC: 8.6 MG/DL (ref 8.6–10.6)
CALCIUM SERPL-MCNC: 8.7 MG/DL (ref 8.6–10.6)
CALCIUM SERPL-MCNC: 8.9 MG/DL (ref 8.6–10.6)
CAOX CRY #/AREA UR COMP ASSIST: ABNORMAL /HPF
CENTROMERE B AB SER-ACNC: <0.2 AI
CERULOPLASMIN SERPL-MCNC: 42.6 MG/DL (ref 20–60)
CHLORIDE BLDA-SCNC: 101 MMOL/L (ref 98–107)
CHLORIDE BLDA-SCNC: 104 MMOL/L (ref 98–107)
CHLORIDE BLDA-SCNC: 105 MMOL/L (ref 98–107)
CHLORIDE BLDA-SCNC: 93 MMOL/L (ref 98–107)
CHLORIDE BLDA-SCNC: 96 MMOL/L (ref 98–107)
CHLORIDE BLDA-SCNC: 97 MMOL/L (ref 98–107)
CHLORIDE BLDA-SCNC: 98 MMOL/L (ref 98–107)
CHLORIDE BLDA-SCNC: 98 MMOL/L (ref 98–107)
CHLORIDE BLDA-SCNC: 99 MMOL/L (ref 98–107)
CHLORIDE BLDV-SCNC: 103 MMOL/L (ref 98–107)
CHLORIDE BLDV-SCNC: 104 MMOL/L (ref 98–107)
CHLORIDE BLDV-SCNC: 98 MMOL/L (ref 98–107)
CHLORIDE SERPL-SCNC: 100 MMOL/L (ref 98–107)
CHLORIDE SERPL-SCNC: 101 MMOL/L (ref 98–107)
CHLORIDE SERPL-SCNC: 102 MMOL/L (ref 98–107)
CHLORIDE SERPL-SCNC: 103 MMOL/L (ref 98–107)
CHLORIDE SERPL-SCNC: 103 MMOL/L (ref 98–107)
CHLORIDE SERPL-SCNC: 104 MMOL/L (ref 98–107)
CHLORIDE SERPL-SCNC: 105 MMOL/L (ref 98–107)
CHLORIDE SERPL-SCNC: 106 MMOL/L (ref 98–107)
CHLORIDE SERPL-SCNC: 106 MMOL/L (ref 98–107)
CHLORIDE SERPL-SCNC: 107 MMOL/L (ref 98–107)
CHLORIDE SERPL-SCNC: 109 MMOL/L (ref 98–107)
CHLORIDE SERPL-SCNC: 111 MMOL/L (ref 98–107)
CHLORIDE SERPL-SCNC: 86 MMOL/L (ref 98–107)
CHLORIDE SERPL-SCNC: 87 MMOL/L (ref 98–107)
CHLORIDE SERPL-SCNC: 88 MMOL/L (ref 98–107)
CHLORIDE SERPL-SCNC: 92 MMOL/L (ref 98–107)
CHLORIDE SERPL-SCNC: 93 MMOL/L (ref 98–107)
CHLORIDE SERPL-SCNC: 94 MMOL/L (ref 98–107)
CHLORIDE SERPL-SCNC: 94 MMOL/L (ref 98–107)
CHLORIDE SERPL-SCNC: 95 MMOL/L (ref 98–107)
CHLORIDE SERPL-SCNC: 96 MMOL/L (ref 98–107)
CHLORIDE SERPL-SCNC: 97 MMOL/L (ref 98–107)
CHLORIDE SERPL-SCNC: 98 MMOL/L (ref 98–107)
CHLORIDE SERPL-SCNC: 99 MMOL/L (ref 98–107)
CHLORIDE UR-SCNC: 46 MMOL/L
CHLORIDE UR-SCNC: <15 MMOL/L
CHLORIDE/CREATININE (MMOL/G) IN URINE: 42 MMOL/G CREAT (ref 38–318)
CHLORIDE/CREATININE (MMOL/G) IN URINE: NORMAL
CHROMATIN AB SERPL-ACNC: 0.9 AI
CK SERPL-CCNC: 163 U/L (ref 0–215)
CK SERPL-CCNC: 29 U/L (ref 0–215)
CLARITY FLD: CLEAR
CLINICAL SIG UPDATED INFO: NORMAL
CO2 SERPL-SCNC: 13 MMOL/L (ref 21–32)
CO2 SERPL-SCNC: 14 MMOL/L (ref 21–32)
CO2 SERPL-SCNC: 15 MMOL/L (ref 21–32)
CO2 SERPL-SCNC: 16 MMOL/L (ref 21–32)
CO2 SERPL-SCNC: 17 MMOL/L (ref 21–32)
CO2 SERPL-SCNC: 17 MMOL/L (ref 21–32)
CO2 SERPL-SCNC: 18 MMOL/L (ref 21–32)
CO2 SERPL-SCNC: 18 MMOL/L (ref 21–32)
CO2 SERPL-SCNC: 19 MMOL/L (ref 21–32)
CO2 SERPL-SCNC: 20 MMOL/L (ref 21–32)
CO2 SERPL-SCNC: 21 MMOL/L (ref 21–32)
CO2 SERPL-SCNC: 22 MMOL/L (ref 21–32)
CO2 SERPL-SCNC: 23 MMOL/L (ref 21–32)
CO2 SERPL-SCNC: 26 MMOL/L (ref 21–32)
COLOR FLD: YELLOW
COLOR UR: ABNORMAL
COLOR UR: ABNORMAL
CREAT SERPL-MCNC: 0.73 MG/DL (ref 0.5–1.05)
CREAT SERPL-MCNC: 0.76 MG/DL (ref 0.5–1.05)
CREAT SERPL-MCNC: 0.77 MG/DL (ref 0.5–1.05)
CREAT SERPL-MCNC: 0.8 MG/DL (ref 0.5–1.05)
CREAT SERPL-MCNC: 0.87 MG/DL (ref 0.5–1.05)
CREAT SERPL-MCNC: 0.98 MG/DL (ref 0.5–1.05)
CREAT SERPL-MCNC: 0.99 MG/DL (ref 0.5–1.05)
CREAT SERPL-MCNC: 1 MG/DL (ref 0.5–1.05)
CREAT SERPL-MCNC: 1.03 MG/DL (ref 0.5–1.05)
CREAT SERPL-MCNC: 1.04 MG/DL (ref 0.5–1.05)
CREAT SERPL-MCNC: 1.15 MG/DL (ref 0.5–1.05)
CREAT SERPL-MCNC: 1.21 MG/DL (ref 0.5–1.05)
CREAT SERPL-MCNC: 1.27 MG/DL (ref 0.5–1.05)
CREAT SERPL-MCNC: 1.28 MG/DL (ref 0.5–1.05)
CREAT SERPL-MCNC: 1.32 MG/DL (ref 0.5–1.05)
CREAT SERPL-MCNC: 1.34 MG/DL (ref 0.5–1.05)
CREAT SERPL-MCNC: 1.36 MG/DL (ref 0.5–1.05)
CREAT SERPL-MCNC: 1.37 MG/DL (ref 0.5–1.05)
CREAT SERPL-MCNC: 1.38 MG/DL (ref 0.5–1.05)
CREAT SERPL-MCNC: 1.4 MG/DL (ref 0.5–1.05)
CREAT SERPL-MCNC: 1.41 MG/DL (ref 0.5–1.05)
CREAT SERPL-MCNC: 1.42 MG/DL (ref 0.5–1.05)
CREAT SERPL-MCNC: 1.45 MG/DL (ref 0.5–1.05)
CREAT SERPL-MCNC: 1.49 MG/DL (ref 0.5–1.05)
CREAT SERPL-MCNC: 1.71 MG/DL (ref 0.5–1.05)
CREAT SERPL-MCNC: 1.71 MG/DL (ref 0.5–1.05)
CREAT SERPL-MCNC: 1.92 MG/DL (ref 0.5–1.05)
CREAT SERPL-MCNC: 2.28 MG/DL (ref 0.5–1.05)
CREAT SERPL-MCNC: 2.54 MG/DL (ref 0.5–1.05)
CREAT SERPL-MCNC: 2.64 MG/DL (ref 0.5–1.05)
CREAT SERPL-MCNC: 2.67 MG/DL (ref 0.5–1.05)
CREAT SERPL-MCNC: 2.79 MG/DL (ref 0.5–1.05)
CREAT SERPL-MCNC: 2.85 MG/DL (ref 0.5–1.05)
CREAT SERPL-MCNC: 2.86 MG/DL (ref 0.5–1.05)
CREAT SERPL-MCNC: 2.86 MG/DL (ref 0.5–1.05)
CREAT SERPL-MCNC: 2.88 MG/DL (ref 0.5–1.05)
CREAT SERPL-MCNC: 2.95 MG/DL (ref 0.5–1.05)
CREAT SERPL-MCNC: 2.96 MG/DL (ref 0.5–1.05)
CREAT SERPL-MCNC: 2.99 MG/DL (ref 0.5–1.05)
CREAT SERPL-MCNC: 3.02 MG/DL (ref 0.5–1.05)
CREAT SERPL-MCNC: 3.07 MG/DL (ref 0.5–1.05)
CREAT UR-MCNC: 110.8 MG/DL (ref 20–320)
CREAT UR-MCNC: 66 MG/DL (ref 20–320)
CRP SERPL-MCNC: 11.62 MG/DL
DACRYOCYTES BLD QL SMEAR: NORMAL
DISPENSE STATUS: NORMAL
DSDNA AB SER-ACNC: 2 IU/ML
EGFRCR SERPLBLD CKD-EPI 2021: 16 ML/MIN/1.73M*2
EGFRCR SERPLBLD CKD-EPI 2021: 16 ML/MIN/1.73M*2
EGFRCR SERPLBLD CKD-EPI 2021: 17 ML/MIN/1.73M*2
EGFRCR SERPLBLD CKD-EPI 2021: 18 ML/MIN/1.73M*2
EGFRCR SERPLBLD CKD-EPI 2021: 19 ML/MIN/1.73M*2
EGFRCR SERPLBLD CKD-EPI 2021: 19 ML/MIN/1.73M*2
EGFRCR SERPLBLD CKD-EPI 2021: 20 ML/MIN/1.73M*2
EGFRCR SERPLBLD CKD-EPI 2021: 23 ML/MIN/1.73M*2
EGFRCR SERPLBLD CKD-EPI 2021: 28 ML/MIN/1.73M*2
EGFRCR SERPLBLD CKD-EPI 2021: 33 ML/MIN/1.73M*2
EGFRCR SERPLBLD CKD-EPI 2021: 33 ML/MIN/1.73M*2
EGFRCR SERPLBLD CKD-EPI 2021: 38 ML/MIN/1.73M*2
EGFRCR SERPLBLD CKD-EPI 2021: 40 ML/MIN/1.73M*2
EGFRCR SERPLBLD CKD-EPI 2021: 41 ML/MIN/1.73M*2
EGFRCR SERPLBLD CKD-EPI 2021: 42 ML/MIN/1.73M*2
EGFRCR SERPLBLD CKD-EPI 2021: 42 ML/MIN/1.73M*2
EGFRCR SERPLBLD CKD-EPI 2021: 43 ML/MIN/1.73M*2
EGFRCR SERPLBLD CKD-EPI 2021: 44 ML/MIN/1.73M*2
EGFRCR SERPLBLD CKD-EPI 2021: 44 ML/MIN/1.73M*2
EGFRCR SERPLBLD CKD-EPI 2021: 46 ML/MIN/1.73M*2
EGFRCR SERPLBLD CKD-EPI 2021: 46 ML/MIN/1.73M*2
EGFRCR SERPLBLD CKD-EPI 2021: 49 ML/MIN/1.73M*2
EGFRCR SERPLBLD CKD-EPI 2021: 52 ML/MIN/1.73M*2
EGFRCR SERPLBLD CKD-EPI 2021: 59 ML/MIN/1.73M*2
EGFRCR SERPLBLD CKD-EPI 2021: 60 ML/MIN/1.73M*2
EGFRCR SERPLBLD CKD-EPI 2021: 62 ML/MIN/1.73M*2
EGFRCR SERPLBLD CKD-EPI 2021: 63 ML/MIN/1.73M*2
EGFRCR SERPLBLD CKD-EPI 2021: 63 ML/MIN/1.73M*2
EGFRCR SERPLBLD CKD-EPI 2021: 73 ML/MIN/1.73M*2
EGFRCR SERPLBLD CKD-EPI 2021: 81 ML/MIN/1.73M*2
EGFRCR SERPLBLD CKD-EPI 2021: 85 ML/MIN/1.73M*2
EGFRCR SERPLBLD CKD-EPI 2021: 86 ML/MIN/1.73M*2
EGFRCR SERPLBLD CKD-EPI 2021: 90 ML/MIN/1.73M*2
EJECTION FRACTION APICAL 4 CHAMBER: 75.2
EJECTION FRACTION APICAL 4 CHAMBER: 77.9
EJECTION FRACTION: 71 %
EJECTION FRACTION: 73 %
ENA JO1 AB SER QL IA: <0.2 AI
ENA RNP AB SER IA-ACNC: 0.2 AI
ENA SCL70 AB SER QL IA: <0.2 AI
ENA SM AB SER IA-ACNC: <0.2 AI
ENA SM+RNP AB SER QL IA: <0.2 AI
ENA SS-A AB SER IA-ACNC: <0.2 AI
ENA SS-B AB SER IA-ACNC: <0.2 AI
EOSINOPHIL # BLD AUTO: 0 X10*3/UL (ref 0–0.7)
EOSINOPHIL # BLD AUTO: 0.01 X10*3/UL (ref 0–0.7)
EOSINOPHIL # BLD AUTO: 0.02 X10*3/UL (ref 0–0.7)
EOSINOPHIL # BLD AUTO: 0.02 X10*3/UL (ref 0–0.7)
EOSINOPHIL # BLD AUTO: 0.03 X10*3/UL (ref 0–0.7)
EOSINOPHIL # BLD AUTO: 0.04 X10*3/UL (ref 0–0.7)
EOSINOPHIL # BLD AUTO: 0.05 X10*3/UL (ref 0–0.7)
EOSINOPHIL # BLD MANUAL: 0 X10*3/UL (ref 0–0.7)
EOSINOPHIL NFR BLD AUTO: 0 %
EOSINOPHIL NFR BLD AUTO: 0.1 %
EOSINOPHIL NFR BLD AUTO: 0.2 %
EOSINOPHIL NFR BLD AUTO: 0.3 %
EOSINOPHIL NFR BLD AUTO: 0.4 %
EOSINOPHIL NFR BLD AUTO: 0.5 %
EOSINOPHIL NFR BLD AUTO: 0.7 %
EOSINOPHIL NFR BLD MANUAL: 0 %
EOSINOPHIL NFR FLD MANUAL: 0 %
ERYTHROCYTE [DISTWIDTH] IN BLOOD BY AUTOMATED COUNT: 19 % (ref 11.5–14.5)
ERYTHROCYTE [DISTWIDTH] IN BLOOD BY AUTOMATED COUNT: 19.3 % (ref 11.5–14.5)
ERYTHROCYTE [DISTWIDTH] IN BLOOD BY AUTOMATED COUNT: 19.3 % (ref 11.5–14.5)
ERYTHROCYTE [DISTWIDTH] IN BLOOD BY AUTOMATED COUNT: 19.4 % (ref 11.5–14.5)
ERYTHROCYTE [DISTWIDTH] IN BLOOD BY AUTOMATED COUNT: 19.4 % (ref 11.5–14.5)
ERYTHROCYTE [DISTWIDTH] IN BLOOD BY AUTOMATED COUNT: 19.6 % (ref 11.5–14.5)
ERYTHROCYTE [DISTWIDTH] IN BLOOD BY AUTOMATED COUNT: 20.6 % (ref 11.5–14.5)
ERYTHROCYTE [DISTWIDTH] IN BLOOD BY AUTOMATED COUNT: 20.6 % (ref 11.5–14.5)
ERYTHROCYTE [DISTWIDTH] IN BLOOD BY AUTOMATED COUNT: 20.8 % (ref 11.5–14.5)
ERYTHROCYTE [DISTWIDTH] IN BLOOD BY AUTOMATED COUNT: 21.2 % (ref 11.5–14.5)
ERYTHROCYTE [DISTWIDTH] IN BLOOD BY AUTOMATED COUNT: 21.4 % (ref 11.5–14.5)
ERYTHROCYTE [DISTWIDTH] IN BLOOD BY AUTOMATED COUNT: 21.6 % (ref 11.5–14.5)
ERYTHROCYTE [DISTWIDTH] IN BLOOD BY AUTOMATED COUNT: 21.6 % (ref 11.5–14.5)
ERYTHROCYTE [DISTWIDTH] IN BLOOD BY AUTOMATED COUNT: 21.7 % (ref 11.5–14.5)
ERYTHROCYTE [DISTWIDTH] IN BLOOD BY AUTOMATED COUNT: 22.1 % (ref 11.5–14.5)
ERYTHROCYTE [DISTWIDTH] IN BLOOD BY AUTOMATED COUNT: 22.2 % (ref 11.5–14.5)
ERYTHROCYTE [DISTWIDTH] IN BLOOD BY AUTOMATED COUNT: 22.5 % (ref 11.5–14.5)
ERYTHROCYTE [DISTWIDTH] IN BLOOD BY AUTOMATED COUNT: 22.6 % (ref 11.5–14.5)
ERYTHROCYTE [DISTWIDTH] IN BLOOD BY AUTOMATED COUNT: 22.9 % (ref 11.5–14.5)
ERYTHROCYTE [DISTWIDTH] IN BLOOD BY AUTOMATED COUNT: 23 % (ref 11.5–14.5)
ERYTHROCYTE [DISTWIDTH] IN BLOOD BY AUTOMATED COUNT: 23 % (ref 11.5–14.5)
ERYTHROCYTE [DISTWIDTH] IN BLOOD BY AUTOMATED COUNT: 23.2 % (ref 11.5–14.5)
ERYTHROCYTE [DISTWIDTH] IN BLOOD BY AUTOMATED COUNT: 23.3 % (ref 11.5–14.5)
ERYTHROCYTE [DISTWIDTH] IN BLOOD BY AUTOMATED COUNT: 23.7 % (ref 11.5–14.5)
ERYTHROCYTE [DISTWIDTH] IN BLOOD BY AUTOMATED COUNT: 23.9 % (ref 11.5–14.5)
ERYTHROCYTE [DISTWIDTH] IN BLOOD BY AUTOMATED COUNT: 24.1 % (ref 11.5–14.5)
ERYTHROCYTE [DISTWIDTH] IN BLOOD BY AUTOMATED COUNT: 24.1 % (ref 11.5–14.5)
ERYTHROCYTE [DISTWIDTH] IN BLOOD BY AUTOMATED COUNT: 24.2 % (ref 11.5–14.5)
ERYTHROCYTE [DISTWIDTH] IN BLOOD BY AUTOMATED COUNT: 24.3 % (ref 11.5–14.5)
ERYTHROCYTE [DISTWIDTH] IN BLOOD BY AUTOMATED COUNT: 24.4 % (ref 11.5–14.5)
ERYTHROCYTE [DISTWIDTH] IN BLOOD BY AUTOMATED COUNT: 24.9 % (ref 11.5–14.5)
ERYTHROCYTE [DISTWIDTH] IN BLOOD BY AUTOMATED COUNT: 25.2 % (ref 11.5–14.5)
ERYTHROCYTE [SEDIMENTATION RATE] IN BLOOD BY WESTERGREN METHOD: 53 MM/H (ref 0–30)
ETHANOL SERPL-MCNC: <10 MG/DL
FERRITIN SERPL-MCNC: 406 NG/ML (ref 8–150)
FERRITIN SERPL-MCNC: 864 NG/ML (ref 8–150)
FIBRINOGEN PPP-MCNC: 279 MG/DL (ref 200–400)
FIBRINOGEN PPP-MCNC: 286 MG/DL (ref 200–400)
FOLATE SERPL-MCNC: 6.2 NG/ML
FUNGITELL BETA-D GLUCAN,SERUM: <31 PG/ML
G6PD RBC QL: NORMAL
GGT SERPL-CCNC: 324 U/L (ref 5–55)
GLUCOSE BLD MANUAL STRIP-MCNC: 100 MG/DL (ref 74–99)
GLUCOSE BLD MANUAL STRIP-MCNC: 106 MG/DL (ref 74–99)
GLUCOSE BLD MANUAL STRIP-MCNC: 108 MG/DL (ref 74–99)
GLUCOSE BLD MANUAL STRIP-MCNC: 115 MG/DL (ref 74–99)
GLUCOSE BLD MANUAL STRIP-MCNC: 116 MG/DL (ref 74–99)
GLUCOSE BLD MANUAL STRIP-MCNC: 119 MG/DL (ref 74–99)
GLUCOSE BLD MANUAL STRIP-MCNC: 122 MG/DL (ref 74–99)
GLUCOSE BLD MANUAL STRIP-MCNC: 125 MG/DL (ref 74–99)
GLUCOSE BLD MANUAL STRIP-MCNC: 128 MG/DL (ref 74–99)
GLUCOSE BLD MANUAL STRIP-MCNC: 129 MG/DL (ref 74–99)
GLUCOSE BLD MANUAL STRIP-MCNC: 129 MG/DL (ref 74–99)
GLUCOSE BLD MANUAL STRIP-MCNC: 140 MG/DL (ref 74–99)
GLUCOSE BLD MANUAL STRIP-MCNC: 147 MG/DL (ref 74–99)
GLUCOSE BLD MANUAL STRIP-MCNC: 43 MG/DL (ref 74–99)
GLUCOSE BLD MANUAL STRIP-MCNC: 73 MG/DL (ref 74–99)
GLUCOSE BLD MANUAL STRIP-MCNC: 84 MG/DL (ref 74–99)
GLUCOSE BLD MANUAL STRIP-MCNC: 90 MG/DL (ref 74–99)
GLUCOSE BLDA-MCNC: 104 MG/DL (ref 74–99)
GLUCOSE BLDA-MCNC: 109 MG/DL (ref 74–99)
GLUCOSE BLDA-MCNC: 113 MG/DL (ref 74–99)
GLUCOSE BLDA-MCNC: 118 MG/DL (ref 74–99)
GLUCOSE BLDA-MCNC: 121 MG/DL (ref 74–99)
GLUCOSE BLDA-MCNC: 130 MG/DL (ref 74–99)
GLUCOSE BLDA-MCNC: 131 MG/DL (ref 74–99)
GLUCOSE BLDA-MCNC: 153 MG/DL (ref 74–99)
GLUCOSE BLDA-MCNC: 74 MG/DL (ref 74–99)
GLUCOSE BLDV-MCNC: 158 MG/DL (ref 74–99)
GLUCOSE BLDV-MCNC: 51 MG/DL (ref 74–99)
GLUCOSE BLDV-MCNC: 89 MG/DL (ref 74–99)
GLUCOSE FLD-MCNC: 98 MG/DL
GLUCOSE SERPL-MCNC: 101 MG/DL (ref 74–99)
GLUCOSE SERPL-MCNC: 104 MG/DL (ref 74–99)
GLUCOSE SERPL-MCNC: 107 MG/DL (ref 74–99)
GLUCOSE SERPL-MCNC: 107 MG/DL (ref 74–99)
GLUCOSE SERPL-MCNC: 108 MG/DL (ref 74–99)
GLUCOSE SERPL-MCNC: 109 MG/DL (ref 74–99)
GLUCOSE SERPL-MCNC: 115 MG/DL (ref 74–99)
GLUCOSE SERPL-MCNC: 115 MG/DL (ref 74–99)
GLUCOSE SERPL-MCNC: 116 MG/DL (ref 74–99)
GLUCOSE SERPL-MCNC: 117 MG/DL (ref 74–99)
GLUCOSE SERPL-MCNC: 120 MG/DL (ref 74–99)
GLUCOSE SERPL-MCNC: 121 MG/DL (ref 74–99)
GLUCOSE SERPL-MCNC: 128 MG/DL (ref 74–99)
GLUCOSE SERPL-MCNC: 129 MG/DL (ref 74–99)
GLUCOSE SERPL-MCNC: 129 MG/DL (ref 74–99)
GLUCOSE SERPL-MCNC: 132 MG/DL (ref 74–99)
GLUCOSE SERPL-MCNC: 144 MG/DL (ref 74–99)
GLUCOSE SERPL-MCNC: 156 MG/DL (ref 74–99)
GLUCOSE SERPL-MCNC: 49 MG/DL (ref 74–99)
GLUCOSE SERPL-MCNC: 69 MG/DL (ref 74–99)
GLUCOSE SERPL-MCNC: 73 MG/DL (ref 74–99)
GLUCOSE SERPL-MCNC: 73 MG/DL (ref 74–99)
GLUCOSE SERPL-MCNC: 74 MG/DL (ref 74–99)
GLUCOSE SERPL-MCNC: 75 MG/DL (ref 74–99)
GLUCOSE SERPL-MCNC: 75 MG/DL (ref 74–99)
GLUCOSE SERPL-MCNC: 79 MG/DL (ref 74–99)
GLUCOSE SERPL-MCNC: 82 MG/DL (ref 74–99)
GLUCOSE SERPL-MCNC: 84 MG/DL (ref 74–99)
GLUCOSE SERPL-MCNC: 85 MG/DL (ref 74–99)
GLUCOSE SERPL-MCNC: 86 MG/DL (ref 74–99)
GLUCOSE SERPL-MCNC: 89 MG/DL (ref 74–99)
GLUCOSE SERPL-MCNC: 91 MG/DL (ref 74–99)
GLUCOSE SERPL-MCNC: 91 MG/DL (ref 74–99)
GLUCOSE SERPL-MCNC: 93 MG/DL (ref 74–99)
GLUCOSE SERPL-MCNC: 93 MG/DL (ref 74–99)
GLUCOSE SERPL-MCNC: 97 MG/DL (ref 74–99)
GLUCOSE SERPL-MCNC: 99 MG/DL (ref 74–99)
GLUCOSE UR STRIP.AUTO-MCNC: NEGATIVE MG/DL
GLUCOSE UR STRIP.AUTO-MCNC: NORMAL MG/DL
GRAM STN SPEC: ABNORMAL
GRAM STN SPEC: NORMAL
GRAM STN SPEC: NORMAL
H CAPSUL AB SER QL ID: NOT DETECTED
H CAPSUL AG UR QL: NOT DETECTED
H CAPSUL MYC AB TITR SER CF: NORMAL {TITER}
H CAPSUL YST AB TITR SER CF: NORMAL {TITER}
HAPTOGLOB SERPL-MCNC: 77 MG/DL (ref 37–246)
HAV AB SER QL IA: NONREACTIVE
HAV IGM SER QL: NONREACTIVE
HBV CORE IGM SER QL: NONREACTIVE
HBV SURFACE AB SER-ACNC: 580.6 MIU/ML
HBV SURFACE AG SERPL QL IA: NONREACTIVE
HBV SURFACE AG SERPL QL IA: NONREACTIVE
HCO3 BLDA-SCNC: 12.6 MMOL/L (ref 22–26)
HCO3 BLDA-SCNC: 14.1 MMOL/L (ref 22–26)
HCO3 BLDA-SCNC: 14.7 MMOL/L (ref 22–26)
HCO3 BLDA-SCNC: 14.7 MMOL/L (ref 22–26)
HCO3 BLDA-SCNC: 15.5 MMOL/L (ref 22–26)
HCO3 BLDA-SCNC: 15.7 MMOL/L (ref 22–26)
HCO3 BLDA-SCNC: 16 MMOL/L (ref 22–26)
HCO3 BLDA-SCNC: 16.5 MMOL/L (ref 22–26)
HCO3 BLDA-SCNC: 18.2 MMOL/L (ref 22–26)
HCO3 BLDA-SCNC: 18.3 MMOL/L (ref 22–26)
HCO3 BLDA-SCNC: 18.6 MMOL/L (ref 22–26)
HCO3 BLDV-SCNC: 13.9 MMOL/L (ref 22–26)
HCO3 BLDV-SCNC: 15.1 MMOL/L (ref 22–26)
HCO3 BLDV-SCNC: 18.8 MMOL/L (ref 22–26)
HCT VFR BLD AUTO: 28.3 % (ref 36–46)
HCT VFR BLD AUTO: 29 % (ref 36–46)
HCT VFR BLD AUTO: 29.9 % (ref 36–46)
HCT VFR BLD AUTO: 30.6 % (ref 36–46)
HCT VFR BLD AUTO: 31.7 % (ref 36–46)
HCT VFR BLD AUTO: 32.1 % (ref 36–46)
HCT VFR BLD AUTO: 32.3 % (ref 36–46)
HCT VFR BLD AUTO: 32.3 % (ref 36–46)
HCT VFR BLD AUTO: 33.4 % (ref 36–46)
HCT VFR BLD AUTO: 33.6 % (ref 36–46)
HCT VFR BLD AUTO: 33.9 % (ref 36–46)
HCT VFR BLD AUTO: 34 % (ref 36–46)
HCT VFR BLD AUTO: 34.4 % (ref 36–46)
HCT VFR BLD AUTO: 34.6 % (ref 36–46)
HCT VFR BLD AUTO: 34.9 % (ref 36–46)
HCT VFR BLD AUTO: 35 % (ref 36–46)
HCT VFR BLD AUTO: 35 % (ref 36–46)
HCT VFR BLD AUTO: 35.3 % (ref 36–46)
HCT VFR BLD AUTO: 35.3 % (ref 36–46)
HCT VFR BLD AUTO: 35.4 % (ref 36–46)
HCT VFR BLD AUTO: 35.5 % (ref 36–46)
HCT VFR BLD AUTO: 35.6 % (ref 36–46)
HCT VFR BLD AUTO: 35.9 % (ref 36–46)
HCT VFR BLD AUTO: 36.7 % (ref 36–46)
HCT VFR BLD AUTO: 36.9 % (ref 36–46)
HCT VFR BLD AUTO: 37.2 % (ref 36–46)
HCT VFR BLD AUTO: 37.3 % (ref 36–46)
HCT VFR BLD AUTO: 37.6 % (ref 36–46)
HCT VFR BLD AUTO: 38.6 % (ref 36–46)
HCT VFR BLD AUTO: 43.5 % (ref 36–46)
HCT VFR BLD EST: 33 % (ref 36–46)
HCT VFR BLD EST: 34 % (ref 36–46)
HCT VFR BLD EST: 36 % (ref 36–46)
HCT VFR BLD EST: 37 % (ref 36–46)
HCT VFR BLD EST: 38 % (ref 36–46)
HCT VFR BLD EST: 44 % (ref 36–46)
HCV AB SER QL: NONREACTIVE
HEMOCCULT SP1 STL QL: POSITIVE
HGB BLD-MCNC: 10.2 G/DL (ref 12–16)
HGB BLD-MCNC: 10.4 G/DL (ref 12–16)
HGB BLD-MCNC: 10.8 G/DL (ref 12–16)
HGB BLD-MCNC: 11.3 G/DL (ref 12–16)
HGB BLD-MCNC: 11.6 G/DL (ref 12–16)
HGB BLD-MCNC: 11.8 G/DL (ref 12–16)
HGB BLD-MCNC: 11.9 G/DL (ref 12–16)
HGB BLD-MCNC: 12.1 G/DL (ref 12–16)
HGB BLD-MCNC: 12.3 G/DL (ref 12–16)
HGB BLD-MCNC: 12.4 G/DL (ref 12–16)
HGB BLD-MCNC: 12.5 G/DL (ref 12–16)
HGB BLD-MCNC: 12.5 G/DL (ref 12–16)
HGB BLD-MCNC: 12.6 G/DL (ref 12–16)
HGB BLD-MCNC: 12.7 G/DL (ref 12–16)
HGB BLD-MCNC: 12.8 G/DL (ref 12–16)
HGB BLD-MCNC: 12.9 G/DL (ref 12–16)
HGB BLD-MCNC: 12.9 G/DL (ref 12–16)
HGB BLD-MCNC: 13 G/DL (ref 12–16)
HGB BLD-MCNC: 13.1 G/DL (ref 12–16)
HGB BLD-MCNC: 13.2 G/DL (ref 12–16)
HGB BLD-MCNC: 13.2 G/DL (ref 12–16)
HGB BLD-MCNC: 13.3 G/DL (ref 12–16)
HGB BLD-MCNC: 13.5 G/DL (ref 12–16)
HGB BLD-MCNC: 13.5 G/DL (ref 12–16)
HGB BLD-MCNC: 14 G/DL (ref 12–16)
HGB BLD-MCNC: 14.9 G/DL (ref 12–16)
HGB BLDA-MCNC: 10.9 G/DL (ref 12–16)
HGB BLDA-MCNC: 11.2 G/DL (ref 12–16)
HGB BLDA-MCNC: 12 G/DL (ref 12–16)
HGB BLDA-MCNC: 12 G/DL (ref 12–16)
HGB BLDA-MCNC: 12.4 G/DL (ref 12–16)
HGB BLDA-MCNC: 12.4 G/DL (ref 12–16)
HGB BLDA-MCNC: 12.5 G/DL (ref 12–16)
HGB BLDA-MCNC: 12.8 G/DL (ref 12–16)
HGB BLDA-MCNC: 12.8 G/DL (ref 12–16)
HGB BLDV-MCNC: 12.1 G/DL (ref 12–16)
HGB BLDV-MCNC: 12.2 G/DL (ref 12–16)
HGB BLDV-MCNC: 14.7 G/DL (ref 12–16)
HGB RETIC QN: 39 PG (ref 28–38)
HOLD SPECIMEN: NORMAL
HYALINE CASTS #/AREA URNS AUTO: ABNORMAL /LPF
HYPOCHROMIA BLD QL SMEAR: NORMAL
IMM GRANULOCYTES # BLD AUTO: 0.01 X10*3/UL (ref 0–0.7)
IMM GRANULOCYTES # BLD AUTO: 0.01 X10*3/UL (ref 0–0.7)
IMM GRANULOCYTES # BLD AUTO: 0.02 X10*3/UL (ref 0–0.7)
IMM GRANULOCYTES # BLD AUTO: 0.03 X10*3/UL (ref 0–0.7)
IMM GRANULOCYTES # BLD AUTO: 0.04 X10*3/UL (ref 0–0.7)
IMM GRANULOCYTES # BLD AUTO: 0.05 X10*3/UL (ref 0–0.7)
IMM GRANULOCYTES # BLD AUTO: 0.06 X10*3/UL (ref 0–0.7)
IMM GRANULOCYTES # BLD AUTO: 0.07 X10*3/UL (ref 0–0.7)
IMM GRANULOCYTES # BLD AUTO: 0.07 X10*3/UL (ref 0–0.7)
IMM GRANULOCYTES # BLD AUTO: 0.09 X10*3/UL (ref 0–0.7)
IMM GRANULOCYTES # BLD AUTO: 0.09 X10*3/UL (ref 0–0.7)
IMM GRANULOCYTES # BLD AUTO: 0.1 X10*3/UL (ref 0–0.7)
IMM GRANULOCYTES # BLD AUTO: 0.1 X10*3/UL (ref 0–0.7)
IMM GRANULOCYTES # BLD AUTO: 0.11 X10*3/UL (ref 0–0.7)
IMM GRANULOCYTES # BLD AUTO: 0.12 X10*3/UL (ref 0–0.7)
IMM GRANULOCYTES # BLD AUTO: 0.12 X10*3/UL (ref 0–0.7)
IMM GRANULOCYTES # BLD AUTO: 0.14 X10*3/UL (ref 0–0.7)
IMM GRANULOCYTES # BLD AUTO: 0.18 X10*3/UL (ref 0–0.7)
IMM GRANULOCYTES # BLD AUTO: 0.25 X10*3/UL (ref 0–0.7)
IMM GRANULOCYTES NFR BLD AUTO: 0.1 % (ref 0–0.9)
IMM GRANULOCYTES NFR BLD AUTO: 0.2 % (ref 0–0.9)
IMM GRANULOCYTES NFR BLD AUTO: 0.3 % (ref 0–0.9)
IMM GRANULOCYTES NFR BLD AUTO: 0.3 % (ref 0–0.9)
IMM GRANULOCYTES NFR BLD AUTO: 0.4 % (ref 0–0.9)
IMM GRANULOCYTES NFR BLD AUTO: 0.5 % (ref 0–0.9)
IMM GRANULOCYTES NFR BLD AUTO: 0.6 % (ref 0–0.9)
IMM GRANULOCYTES NFR BLD AUTO: 0.6 % (ref 0–0.9)
IMM GRANULOCYTES NFR BLD AUTO: 0.7 % (ref 0–0.9)
IMM GRANULOCYTES NFR BLD AUTO: 0.8 % (ref 0–0.9)
IMM GRANULOCYTES NFR BLD AUTO: 0.9 % (ref 0–0.9)
IMM GRANULOCYTES NFR BLD AUTO: 0.9 % (ref 0–0.9)
IMM GRANULOCYTES NFR BLD AUTO: 1.1 % (ref 0–0.9)
IMM GRANULOCYTES NFR BLD AUTO: 1.1 % (ref 0–0.9)
IMM GRANULOCYTES NFR BLD AUTO: 1.5 % (ref 0–0.9)
IMM GRANULOCYTES NFR BLD AUTO: 2 % (ref 0–0.9)
IMMATURE GRANULOCYTES IN FLUID: 0 %
IMMATURE RETIC FRACTION: 17.4 %
INHALED O2 CONCENTRATION: 26 %
INHALED O2 CONCENTRATION: 32 %
INHALED O2 CONCENTRATION: 32 %
INHALED O2 CONCENTRATION: 35 %
INHALED O2 CONCENTRATION: 36 %
INHALED O2 CONCENTRATION: 40 %
INHALED O2 CONCENTRATION: 60 %
INR PPP: 1.1 (ref 0.9–1.1)
INR PPP: 1.1 (ref 0.9–1.1)
INR PPP: 1.2 (ref 0.9–1.1)
INR PPP: 1.3 (ref 0.9–1.1)
INR PPP: 1.4 (ref 0.9–1.1)
INR PPP: 1.5 (ref 0.9–1.1)
INR PPP: 1.6 (ref 0.9–1.1)
INR PPP: 1.7 (ref 0.9–1.1)
INR PPP: 1.9 (ref 0.9–1.1)
INR PPP: 2 (ref 0.9–1.1)
INR PPP: 2 (ref 0.9–1.1)
INR PPP: 2.2 (ref 0.9–1.1)
IRON SATN MFR SERPL: 9 % (ref 25–45)
IRON SERPL-MCNC: 12 UG/DL (ref 35–150)
KETONES UR STRIP.AUTO-MCNC: NEGATIVE MG/DL
KETONES UR STRIP.AUTO-MCNC: NEGATIVE MG/DL
LAB AP ASR DISCLAIMER: NORMAL
LAB AP ASR DISCLAIMER: NORMAL
LABORATORY COMMENT REPORT: NORMAL
LABORATORY REPORT: NORMAL
LACTATE BLDA-SCNC: 5.5 MMOL/L (ref 0.4–2)
LACTATE BLDA-SCNC: 6.8 MMOL/L (ref 0.4–2)
LACTATE BLDA-SCNC: 7.3 MMOL/L (ref 0.4–2)
LACTATE BLDA-SCNC: 7.5 MMOL/L (ref 0.4–2)
LACTATE BLDA-SCNC: 8.3 MMOL/L (ref 0.4–2)
LACTATE BLDA-SCNC: 8.5 MMOL/L (ref 0.4–2)
LACTATE BLDA-SCNC: 8.5 MMOL/L (ref 0.4–2)
LACTATE BLDA-SCNC: 8.8 MMOL/L (ref 0.4–2)
LACTATE BLDA-SCNC: 9.1 MMOL/L (ref 0.4–2)
LACTATE BLDV-SCNC: 10.1 MMOL/L (ref 0.4–2)
LACTATE BLDV-SCNC: 7.4 MMOL/L (ref 0.4–2)
LACTATE BLDV-SCNC: 7.4 MMOL/L (ref 0.4–2)
LACTATE BLDV-SCNC: 8.4 MMOL/L (ref 0.4–2)
LACTATE BLDV-SCNC: 8.5 MMOL/L (ref 0.4–2)
LACTATE BLDV-SCNC: 8.6 MMOL/L (ref 0.4–2)
LACTATE BLDV-SCNC: 9 MMOL/L (ref 0.4–2)
LACTATE BLDV-SCNC: 9.1 MMOL/L (ref 0.4–2)
LACTATE BLDV-SCNC: 9.9 MMOL/L (ref 0.4–2)
LACTATE SERPL-SCNC: 1.9 MMOL/L (ref 0.4–2)
LACTATE SERPL-SCNC: 3 MMOL/L (ref 0.4–2)
LACTATE SERPL-SCNC: 4.7 MMOL/L (ref 0.4–2)
LACTATE SERPL-SCNC: 5 MMOL/L (ref 0.4–2)
LACTATE SERPL-SCNC: 8 MMOL/L (ref 0.4–2)
LACTATE SERPL-SCNC: 8.2 MMOL/L (ref 0.4–2)
LACTATE SERPL-SCNC: 8.3 MMOL/L (ref 0.4–2)
LACTATE SERPL-SCNC: 8.8 MMOL/L (ref 0.4–2)
LACTATE SERPL-SCNC: 9 MMOL/L (ref 0.4–2)
LACTATE SERPL-SCNC: 9 MMOL/L (ref 0.4–2)
LDH SERPL L TO P-CCNC: 183 U/L (ref 84–246)
LDH SERPL L TO P-CCNC: 211 U/L (ref 84–246)
LDH SERPL L TO P-CCNC: 233 U/L (ref 84–246)
LDH SERPL L TO P-CCNC: 241 U/L (ref 84–246)
LDH SERPL L TO P-CCNC: 303 U/L (ref 84–246)
LDH SERPL L TO P-CCNC: 310 U/L (ref 84–246)
LDH SERPL L TO P-CCNC: 315 U/L (ref 84–246)
LDH SERPL L TO P-CCNC: 322 U/L (ref 84–246)
LDH SERPL L TO P-CCNC: 337 U/L (ref 84–246)
LDH SERPL L TO P-CCNC: 363 U/L (ref 84–246)
LDH SERPL L TO P-CCNC: 398 U/L (ref 84–246)
LDH SERPL L TO P-CCNC: 402 U/L (ref 84–246)
LDH SERPL L TO P-CCNC: 403 U/L (ref 84–246)
LDH SERPL L TO P-CCNC: 433 U/L (ref 84–246)
LDH SERPL L TO P-CCNC: 434 U/L (ref 84–246)
LDH SERPL L TO P-CCNC: 447 U/L (ref 84–246)
LDH SERPL L TO P-CCNC: 449 U/L (ref 84–246)
LDH SERPL L TO P-CCNC: 604 U/L (ref 84–246)
LEFT ATRIUM VOLUME AREA LENGTH INDEX BSA: 29.9 ML/M2
LEFT ATRIUM VOLUME AREA LENGTH INDEX BSA: 36.1 ML/M2
LEFT VENTRICLE INTERNAL DIMENSION DIASTOLE: 3.5 CM (ref 3.5–6)
LEFT VENTRICLE INTERNAL DIMENSION DIASTOLE: 4.4 CM (ref 3.5–6)
LEFT VENTRICULAR OUTFLOW TRACT DIAMETER: 2.3 CM
LEUKOCYTE ESTERASE UR QL STRIP.AUTO: NEGATIVE
LEUKOCYTE ESTERASE UR QL STRIP.AUTO: NEGATIVE
LIPASE SERPL-CCNC: 53 U/L (ref 9–82)
LKM AB TITR SER IF: NORMAL {TITER}
LYMPHOCYTES # BLD AUTO: 0.25 X10*3/UL (ref 1.2–4.8)
LYMPHOCYTES # BLD AUTO: 0.31 X10*3/UL (ref 1.2–4.8)
LYMPHOCYTES # BLD AUTO: 0.79 X10*3/UL (ref 1.2–4.8)
LYMPHOCYTES # BLD AUTO: 0.85 X10*3/UL (ref 1.2–4.8)
LYMPHOCYTES # BLD AUTO: 0.87 X10*3/UL (ref 1.2–4.8)
LYMPHOCYTES # BLD AUTO: 0.95 X10*3/UL (ref 1.2–4.8)
LYMPHOCYTES # BLD AUTO: 0.96 X10*3/UL (ref 1.2–4.8)
LYMPHOCYTES # BLD AUTO: 1.2 X10*3/UL (ref 1.2–4.8)
LYMPHOCYTES # BLD AUTO: 1.31 X10*3/UL (ref 1.2–4.8)
LYMPHOCYTES # BLD AUTO: 1.78 X10*3/UL (ref 1.2–4.8)
LYMPHOCYTES # BLD AUTO: 1.94 X10*3/UL (ref 1.2–4.8)
LYMPHOCYTES # BLD AUTO: 2.01 X10*3/UL (ref 1.2–4.8)
LYMPHOCYTES # BLD AUTO: 2.03 X10*3/UL (ref 1.2–4.8)
LYMPHOCYTES # BLD AUTO: 2.08 X10*3/UL (ref 1.2–4.8)
LYMPHOCYTES # BLD AUTO: 2.19 X10*3/UL (ref 1.2–4.8)
LYMPHOCYTES # BLD AUTO: 2.21 X10*3/UL (ref 1.2–4.8)
LYMPHOCYTES # BLD AUTO: 2.26 X10*3/UL (ref 1.2–4.8)
LYMPHOCYTES # BLD AUTO: 2.27 X10*3/UL (ref 1.2–4.8)
LYMPHOCYTES # BLD AUTO: 2.34 X10*3/UL (ref 1.2–4.8)
LYMPHOCYTES # BLD AUTO: 2.42 X10*3/UL (ref 1.2–4.8)
LYMPHOCYTES # BLD AUTO: 2.83 X10*3/UL (ref 1.2–4.8)
LYMPHOCYTES # BLD MANUAL: 0.21 X10*3/UL (ref 1.2–4.8)
LYMPHOCYTES # BLD MANUAL: 0.25 X10*3/UL (ref 1.2–4.8)
LYMPHOCYTES # BLD MANUAL: 0.28 X10*3/UL (ref 1.2–4.8)
LYMPHOCYTES # BLD MANUAL: 0.39 X10*3/UL (ref 1.2–4.8)
LYMPHOCYTES # BLD MANUAL: 0.65 X10*3/UL (ref 1.2–4.8)
LYMPHOCYTES # BLD MANUAL: 0.74 X10*3/UL (ref 1.2–4.8)
LYMPHOCYTES # BLD MANUAL: 0.88 X10*3/UL (ref 1.2–4.8)
LYMPHOCYTES # BLD MANUAL: 0.92 X10*3/UL (ref 1.2–4.8)
LYMPHOCYTES # BLD MANUAL: 1.04 X10*3/UL (ref 1.2–4.8)
LYMPHOCYTES NFR BLD AUTO: 14.9 %
LYMPHOCYTES NFR BLD AUTO: 18.7 %
LYMPHOCYTES NFR BLD AUTO: 2.6 %
LYMPHOCYTES NFR BLD AUTO: 23.1 %
LYMPHOCYTES NFR BLD AUTO: 23.3 %
LYMPHOCYTES NFR BLD AUTO: 24.3 %
LYMPHOCYTES NFR BLD AUTO: 25.1 %
LYMPHOCYTES NFR BLD AUTO: 25.1 %
LYMPHOCYTES NFR BLD AUTO: 25.3 %
LYMPHOCYTES NFR BLD AUTO: 25.3 %
LYMPHOCYTES NFR BLD AUTO: 3 %
LYMPHOCYTES NFR BLD AUTO: 30.8 %
LYMPHOCYTES NFR BLD AUTO: 31.5 %
LYMPHOCYTES NFR BLD AUTO: 33.1 %
LYMPHOCYTES NFR BLD AUTO: 7 %
LYMPHOCYTES NFR BLD AUTO: 7.1 %
LYMPHOCYTES NFR BLD AUTO: 7.1 %
LYMPHOCYTES NFR BLD AUTO: 7.4 %
LYMPHOCYTES NFR BLD AUTO: 7.9 %
LYMPHOCYTES NFR BLD AUTO: 9.7 %
LYMPHOCYTES NFR BLD AUTO: 9.9 %
LYMPHOCYTES NFR BLD MANUAL: 1.7 %
LYMPHOCYTES NFR BLD MANUAL: 18.5 %
LYMPHOCYTES NFR BLD MANUAL: 2.7 %
LYMPHOCYTES NFR BLD MANUAL: 4.7 %
LYMPHOCYTES NFR BLD MANUAL: 6 %
LYMPHOCYTES NFR BLD MANUAL: 7.4 %
LYMPHOCYTES NFR BLD MANUAL: 7.8 %
LYMPHOCYTES NFR BLD MANUAL: 8.6 %
LYMPHOCYTES NFR BLD MANUAL: 9 %
LYMPHOCYTES NFR FLD MANUAL: 43 %
MAGNESIUM SERPL-MCNC: 1.97 MG/DL (ref 1.6–2.4)
MAGNESIUM SERPL-MCNC: 1.99 MG/DL (ref 1.6–2.4)
MAGNESIUM SERPL-MCNC: 2.03 MG/DL (ref 1.6–2.4)
MAGNESIUM SERPL-MCNC: 2.05 MG/DL (ref 1.6–2.4)
MAGNESIUM SERPL-MCNC: 2.06 MG/DL (ref 1.6–2.4)
MAGNESIUM SERPL-MCNC: 2.2 MG/DL (ref 1.6–2.4)
MAGNESIUM SERPL-MCNC: 2.23 MG/DL (ref 1.6–2.4)
MAGNESIUM SERPL-MCNC: 2.24 MG/DL (ref 1.6–2.4)
MAGNESIUM SERPL-MCNC: 2.27 MG/DL (ref 1.6–2.4)
MAGNESIUM SERPL-MCNC: 2.28 MG/DL (ref 1.6–2.4)
MAGNESIUM SERPL-MCNC: 2.31 MG/DL (ref 1.6–2.4)
MAGNESIUM SERPL-MCNC: 2.31 MG/DL (ref 1.6–2.4)
MAGNESIUM SERPL-MCNC: 2.32 MG/DL (ref 1.6–2.4)
MAGNESIUM SERPL-MCNC: 2.32 MG/DL (ref 1.6–2.4)
MAGNESIUM SERPL-MCNC: 2.35 MG/DL (ref 1.6–2.4)
MAGNESIUM SERPL-MCNC: 2.36 MG/DL (ref 1.6–2.4)
MAGNESIUM SERPL-MCNC: 2.37 MG/DL (ref 1.6–2.4)
MAGNESIUM SERPL-MCNC: 2.4 MG/DL (ref 1.6–2.4)
MAGNESIUM SERPL-MCNC: 2.45 MG/DL (ref 1.6–2.4)
MAGNESIUM SERPL-MCNC: 2.5 MG/DL (ref 1.6–2.4)
MAGNESIUM SERPL-MCNC: 2.6 MG/DL (ref 1.6–2.4)
MAGNESIUM SERPL-MCNC: 2.6 MG/DL (ref 1.6–2.4)
MAGNESIUM SERPL-MCNC: 2.62 MG/DL (ref 1.6–2.4)
MAGNESIUM SERPL-MCNC: 2.76 MG/DL (ref 1.6–2.4)
MAGNESIUM SERPL-MCNC: 2.81 MG/DL (ref 1.6–2.4)
MAGNESIUM SERPL-MCNC: 2.83 MG/DL (ref 1.6–2.4)
MAGNESIUM SERPL-MCNC: 2.85 MG/DL (ref 1.6–2.4)
MAGNESIUM SERPL-MCNC: 2.88 MG/DL (ref 1.6–2.4)
MCH RBC QN AUTO: 29.6 PG (ref 26–34)
MCH RBC QN AUTO: 30 PG (ref 26–34)
MCH RBC QN AUTO: 30.1 PG (ref 26–34)
MCH RBC QN AUTO: 30.3 PG (ref 26–34)
MCH RBC QN AUTO: 30.4 PG (ref 26–34)
MCH RBC QN AUTO: 30.5 PG (ref 26–34)
MCH RBC QN AUTO: 30.6 PG (ref 26–34)
MCH RBC QN AUTO: 30.7 PG (ref 26–34)
MCH RBC QN AUTO: 30.8 PG (ref 26–34)
MCH RBC QN AUTO: 30.9 PG (ref 26–34)
MCH RBC QN AUTO: 30.9 PG (ref 26–34)
MCH RBC QN AUTO: 31.1 PG (ref 26–34)
MCH RBC QN AUTO: 31.2 PG (ref 26–34)
MCH RBC QN AUTO: 31.3 PG (ref 26–34)
MCH RBC QN AUTO: 31.5 PG (ref 26–34)
MCH RBC QN AUTO: 31.8 PG (ref 26–34)
MCH RBC QN AUTO: 31.9 PG (ref 26–34)
MCH RBC QN AUTO: 31.9 PG (ref 26–34)
MCHC RBC AUTO-ENTMCNC: 34.3 G/DL (ref 32–36)
MCHC RBC AUTO-ENTMCNC: 34.4 G/DL (ref 32–36)
MCHC RBC AUTO-ENTMCNC: 35 G/DL (ref 32–36)
MCHC RBC AUTO-ENTMCNC: 35 G/DL (ref 32–36)
MCHC RBC AUTO-ENTMCNC: 35.6 G/DL (ref 32–36)
MCHC RBC AUTO-ENTMCNC: 35.7 G/DL (ref 32–36)
MCHC RBC AUTO-ENTMCNC: 35.7 G/DL (ref 32–36)
MCHC RBC AUTO-ENTMCNC: 35.8 G/DL (ref 32–36)
MCHC RBC AUTO-ENTMCNC: 35.8 G/DL (ref 32–36)
MCHC RBC AUTO-ENTMCNC: 35.9 G/DL (ref 32–36)
MCHC RBC AUTO-ENTMCNC: 36 G/DL (ref 32–36)
MCHC RBC AUTO-ENTMCNC: 36 G/DL (ref 32–36)
MCHC RBC AUTO-ENTMCNC: 36.1 G/DL (ref 32–36)
MCHC RBC AUTO-ENTMCNC: 36.2 G/DL (ref 32–36)
MCHC RBC AUTO-ENTMCNC: 36.2 G/DL (ref 32–36)
MCHC RBC AUTO-ENTMCNC: 36.3 G/DL (ref 32–36)
MCHC RBC AUTO-ENTMCNC: 36.3 G/DL (ref 32–36)
MCHC RBC AUTO-ENTMCNC: 36.5 G/DL (ref 32–36)
MCHC RBC AUTO-ENTMCNC: 36.5 G/DL (ref 32–36)
MCHC RBC AUTO-ENTMCNC: 36.6 G/DL (ref 32–36)
MCHC RBC AUTO-ENTMCNC: 36.8 G/DL (ref 32–36)
MCHC RBC AUTO-ENTMCNC: 36.9 G/DL (ref 32–36)
MCHC RBC AUTO-ENTMCNC: 36.9 G/DL (ref 32–36)
MCHC RBC AUTO-ENTMCNC: 37.2 G/DL (ref 32–36)
MCHC RBC AUTO-ENTMCNC: 37.6 G/DL (ref 32–36)
MCHC RBC AUTO-ENTMCNC: 37.7 G/DL (ref 32–36)
MCHC RBC AUTO-ENTMCNC: 38.6 G/DL (ref 32–36)
MCHC RBC AUTO-ENTMCNC: 38.9 G/DL (ref 32–36)
MCV RBC AUTO: 81 FL (ref 80–100)
MCV RBC AUTO: 82 FL (ref 80–100)
MCV RBC AUTO: 82 FL (ref 80–100)
MCV RBC AUTO: 83 FL (ref 80–100)
MCV RBC AUTO: 84 FL (ref 80–100)
MCV RBC AUTO: 85 FL (ref 80–100)
MCV RBC AUTO: 86 FL (ref 80–100)
MCV RBC AUTO: 86 FL (ref 80–100)
MCV RBC AUTO: 87 FL (ref 80–100)
MCV RBC AUTO: 88 FL (ref 80–100)
METAMYELOCYTES # BLD MANUAL: 0.14 X10*3/UL
METAMYELOCYTES # BLD MANUAL: 0.26 X10*3/UL
METAMYELOCYTES # BLD MANUAL: 0.46 X10*3/UL
METAMYELOCYTES NFR BLD MANUAL: 3 %
METAMYELOCYTES NFR BLD MANUAL: 4.3 %
METAMYELOCYTES NFR BLD MANUAL: 6 %
MITOCHONDRIA AB SER QL IF: NEGATIVE
MITRAL VALVE E/A RATIO: 0.67
MITRAL VALVE E/A RATIO: 2.25
MITRAL VALVE E/E' RATIO: 6.53
MITRAL VALVE E/E' RATIO: 8.35
MONOCYTES # BLD AUTO: 0.14 X10*3/UL (ref 0.1–1)
MONOCYTES # BLD AUTO: 0.43 X10*3/UL (ref 0.1–1)
MONOCYTES # BLD AUTO: 0.75 X10*3/UL (ref 0.1–1)
MONOCYTES # BLD AUTO: 0.81 X10*3/UL (ref 0.1–1)
MONOCYTES # BLD AUTO: 0.87 X10*3/UL (ref 0.1–1)
MONOCYTES # BLD AUTO: 0.88 X10*3/UL (ref 0.1–1)
MONOCYTES # BLD AUTO: 0.89 X10*3/UL (ref 0.1–1)
MONOCYTES # BLD AUTO: 0.9 X10*3/UL (ref 0.1–1)
MONOCYTES # BLD AUTO: 0.93 X10*3/UL (ref 0.1–1)
MONOCYTES # BLD AUTO: 1.02 X10*3/UL (ref 0.1–1)
MONOCYTES # BLD AUTO: 1.08 X10*3/UL (ref 0.1–1)
MONOCYTES # BLD AUTO: 1.14 X10*3/UL (ref 0.1–1)
MONOCYTES # BLD AUTO: 1.19 X10*3/UL (ref 0.1–1)
MONOCYTES # BLD AUTO: 1.29 X10*3/UL (ref 0.1–1)
MONOCYTES # BLD AUTO: 1.3 X10*3/UL (ref 0.1–1)
MONOCYTES # BLD AUTO: 1.35 X10*3/UL (ref 0.1–1)
MONOCYTES # BLD AUTO: 1.36 X10*3/UL (ref 0.1–1)
MONOCYTES # BLD AUTO: 1.42 X10*3/UL (ref 0.1–1)
MONOCYTES # BLD AUTO: 1.55 X10*3/UL (ref 0.1–1)
MONOCYTES # BLD MANUAL: 0.05 X10*3/UL (ref 0.1–1)
MONOCYTES # BLD MANUAL: 0.26 X10*3/UL (ref 0.1–1)
MONOCYTES # BLD MANUAL: 0.36 X10*3/UL (ref 0.1–1)
MONOCYTES # BLD MANUAL: 0.41 X10*3/UL (ref 0.1–1)
MONOCYTES # BLD MANUAL: 0.99 X10*3/UL (ref 0.1–1)
MONOCYTES # BLD MANUAL: 1.08 X10*3/UL (ref 0.1–1)
MONOCYTES # BLD MANUAL: 1.46 X10*3/UL (ref 0.1–1)
MONOCYTES # BLD MANUAL: 1.69 X10*3/UL (ref 0.1–1)
MONOCYTES # BLD MANUAL: 3.07 X10*3/UL (ref 0.1–1)
MONOCYTES NFR BLD AUTO: 1.7 %
MONOCYTES NFR BLD AUTO: 10 %
MONOCYTES NFR BLD AUTO: 11.3 %
MONOCYTES NFR BLD AUTO: 11.4 %
MONOCYTES NFR BLD AUTO: 11.7 %
MONOCYTES NFR BLD AUTO: 11.9 %
MONOCYTES NFR BLD AUTO: 12.1 %
MONOCYTES NFR BLD AUTO: 12.2 %
MONOCYTES NFR BLD AUTO: 12.3 %
MONOCYTES NFR BLD AUTO: 13 %
MONOCYTES NFR BLD AUTO: 13.1 %
MONOCYTES NFR BLD AUTO: 13.4 %
MONOCYTES NFR BLD AUTO: 14 %
MONOCYTES NFR BLD AUTO: 14.9 %
MONOCYTES NFR BLD AUTO: 3.7 %
MONOCYTES NFR BLD AUTO: 6.2 %
MONOCYTES NFR BLD AUTO: 6.6 %
MONOCYTES NFR BLD AUTO: 6.7 %
MONOCYTES NFR BLD AUTO: 7 %
MONOCYTES NFR BLD AUTO: 9.7 %
MONOCYTES NFR BLD AUTO: 9.7 %
MONOCYTES NFR BLD MANUAL: 0.6 %
MONOCYTES NFR BLD MANUAL: 11 %
MONOCYTES NFR BLD MANUAL: 2.3 %
MONOCYTES NFR BLD MANUAL: 2.9 %
MONOCYTES NFR BLD MANUAL: 23 %
MONOCYTES NFR BLD MANUAL: 3.4 %
MONOCYTES NFR BLD MANUAL: 44.2 %
MONOCYTES NFR BLD MANUAL: 54.8 %
MONOCYTES NFR BLD MANUAL: 9.6 %
MONOS+MACROS NFR FLD MANUAL: 49 %
MYELOCYTES # BLD MANUAL: 0.12 X10*3/UL
MYELOCYTES # BLD MANUAL: 0.12 X10*3/UL
MYELOCYTES # BLD MANUAL: 0.43 X10*3/UL
MYELOCYTES # BLD MANUAL: 1.23 X10*3/UL
MYELOCYTES NFR BLD MANUAL: 1.3 %
MYELOCYTES NFR BLD MANUAL: 10 %
MYELOCYTES NFR BLD MANUAL: 3.7 %
MYELOCYTES NFR BLD MANUAL: 8 %
NEUTROPHILS # BLD AUTO: 10.37 X10*3/UL (ref 1.2–7.7)
NEUTROPHILS # BLD AUTO: 10.82 X10*3/UL (ref 1.2–7.7)
NEUTROPHILS # BLD AUTO: 10.93 X10*3/UL (ref 1.2–7.7)
NEUTROPHILS # BLD AUTO: 11.01 X10*3/UL (ref 1.2–7.7)
NEUTROPHILS # BLD AUTO: 11.5 X10*3/UL (ref 1.2–7.7)
NEUTROPHILS # BLD AUTO: 3.51 X10*3/UL (ref 1.2–7.7)
NEUTROPHILS # BLD AUTO: 3.84 X10*3/UL (ref 1.2–7.7)
NEUTROPHILS # BLD AUTO: 4.69 X10*3/UL (ref 1.2–7.7)
NEUTROPHILS # BLD AUTO: 4.9 X10*3/UL (ref 1.2–7.7)
NEUTROPHILS # BLD AUTO: 4.99 X10*3/UL (ref 1.2–7.7)
NEUTROPHILS # BLD AUTO: 5.29 X10*3/UL (ref 1.2–7.7)
NEUTROPHILS # BLD AUTO: 5.64 X10*3/UL (ref 1.2–7.7)
NEUTROPHILS # BLD AUTO: 5.7 X10*3/UL (ref 1.2–7.7)
NEUTROPHILS # BLD AUTO: 5.95 X10*3/UL (ref 1.2–7.7)
NEUTROPHILS # BLD AUTO: 6.09 X10*3/UL (ref 1.2–7.7)
NEUTROPHILS # BLD AUTO: 7.46 X10*3/UL (ref 1.2–7.7)
NEUTROPHILS # BLD AUTO: 7.86 X10*3/UL (ref 1.2–7.7)
NEUTROPHILS # BLD AUTO: 8.58 X10*3/UL (ref 1.2–7.7)
NEUTROPHILS # BLD AUTO: 8.64 X10*3/UL (ref 1.2–7.7)
NEUTROPHILS # BLD AUTO: 9.09 X10*3/UL (ref 1.2–7.7)
NEUTROPHILS # BLD AUTO: 9.57 X10*3/UL (ref 1.2–7.7)
NEUTROPHILS # BLD MANUAL: 10.78 X10*3/UL (ref 1.2–7.7)
NEUTROPHILS # BLD MANUAL: 14.69 X10*3/UL (ref 1.2–7.7)
NEUTROPHILS # BLD MANUAL: 2.1 X10*3/UL (ref 1.2–7.7)
NEUTROPHILS # BLD MANUAL: 8.62 X10*3/UL (ref 1.2–7.7)
NEUTROPHILS NFR BLD AUTO: 52.8 %
NEUTROPHILS NFR BLD AUTO: 53.9 %
NEUTROPHILS NFR BLD AUTO: 55.6 %
NEUTROPHILS NFR BLD AUTO: 58.4 %
NEUTROPHILS NFR BLD AUTO: 60.7 %
NEUTROPHILS NFR BLD AUTO: 61 %
NEUTROPHILS NFR BLD AUTO: 61.2 %
NEUTROPHILS NFR BLD AUTO: 61.2 %
NEUTROPHILS NFR BLD AUTO: 61.5 %
NEUTROPHILS NFR BLD AUTO: 64.9 %
NEUTROPHILS NFR BLD AUTO: 67.1 %
NEUTROPHILS NFR BLD AUTO: 72.5 %
NEUTROPHILS NFR BLD AUTO: 77.4 %
NEUTROPHILS NFR BLD AUTO: 77.8 %
NEUTROPHILS NFR BLD AUTO: 82 %
NEUTROPHILS NFR BLD AUTO: 82.8 %
NEUTROPHILS NFR BLD AUTO: 84.8 %
NEUTROPHILS NFR BLD AUTO: 85 %
NEUTROPHILS NFR BLD AUTO: 85.4 %
NEUTROPHILS NFR BLD AUTO: 92.9 %
NEUTROPHILS NFR BLD AUTO: 93.4 %
NEUTROPHILS NFR FLD MANUAL: 8 %
NEUTS BAND # BLD MANUAL: 0.62 X10*3/UL (ref 0–0.7)
NEUTS BAND # BLD MANUAL: 0.9 X10*3/UL (ref 0–0.7)
NEUTS BAND # BLD MANUAL: 0.98 X10*3/UL (ref 0–0.7)
NEUTS BAND # BLD MANUAL: 2.43 X10*3/UL (ref 0–0.7)
NEUTS BAND NFR BLD MANUAL: 21 %
NEUTS BAND NFR BLD MANUAL: 26.7 %
NEUTS BAND NFR BLD MANUAL: 4 %
NEUTS BAND NFR BLD MANUAL: 6.2 %
NEUTS SEG # BLD MANUAL: 1.2 X10*3/UL (ref 1.2–7)
NEUTS SEG # BLD MANUAL: 1.23 X10*3/UL (ref 1.2–7)
NEUTS SEG # BLD MANUAL: 1.5 X10*3/UL (ref 1.2–7)
NEUTS SEG # BLD MANUAL: 10.16 X10*3/UL (ref 1.2–7)
NEUTS SEG # BLD MANUAL: 11.48 X10*3/UL (ref 1.2–7)
NEUTS SEG # BLD MANUAL: 13.71 X10*3/UL (ref 1.2–7)
NEUTS SEG # BLD MANUAL: 6.19 X10*3/UL (ref 1.2–7)
NEUTS SEG # BLD MANUAL: 7.89 X10*3/UL (ref 1.2–7)
NEUTS SEG # BLD MANUAL: 8.54 X10*3/UL (ref 1.2–7)
NEUTS SEG NFR BLD MANUAL: 26.7 %
NEUTS SEG NFR BLD MANUAL: 28 %
NEUTS SEG NFR BLD MANUAL: 37.4 %
NEUTS SEG NFR BLD MANUAL: 66 %
NEUTS SEG NFR BLD MANUAL: 68 %
NEUTS SEG NFR BLD MANUAL: 75.6 %
NEUTS SEG NFR BLD MANUAL: 86.8 %
NEUTS SEG NFR BLD MANUAL: 89.7 %
NEUTS SEG NFR BLD MANUAL: 94.9 %
NITRITE UR QL STRIP.AUTO: NEGATIVE
NITRITE UR QL STRIP.AUTO: NEGATIVE
NRBC BLD-RTO: 0 /100 WBCS (ref 0–0)
NRBC BLD-RTO: 0.1 /100 WBCS (ref 0–0)
NRBC BLD-RTO: 0.2 /100 WBCS (ref 0–0)
NRBC BLD-RTO: 0.2 /100 WBCS (ref 0–0)
NRBC BLD-RTO: 0.5 /100 WBCS (ref 0–0)
NRBC BLD-RTO: 0.6 /100 WBCS (ref 0–0)
NRBC BLD-RTO: 0.6 /100 WBCS (ref 0–0)
NRBC BLD-RTO: 0.7 /100 WBCS (ref 0–0)
NRBC BLD-RTO: 0.7 /100 WBCS (ref 0–0)
NRBC BLD-RTO: 0.8 /100 WBCS (ref 0–0)
NRBC BLD-RTO: 0.8 /100 WBCS (ref 0–0)
NRBC BLD-RTO: 0.9 /100 WBCS (ref 0–0)
NRBC BLD-RTO: 1.3 /100 WBCS (ref 0–0)
NRBC BLD-RTO: 1.8 /100 WBCS (ref 0–0)
NRBC BLD-RTO: 2.1 /100 WBCS (ref 0–0)
OSMOLALITY SERPL: 284 MOSM/KG (ref 280–300)
OSMOLALITY UR: 339 MOSM/KG (ref 200–1200)
OTHER CELLS NFR FLD MANUAL: 0 %
OVALOCYTES BLD QL SMEAR: NORMAL
OVALOCYTES BLD QL SMEAR: NORMAL
OXYHGB MFR BLDA: 91.8 % (ref 94–98)
OXYHGB MFR BLDA: 95 % (ref 94–98)
OXYHGB MFR BLDA: 95.7 % (ref 94–98)
OXYHGB MFR BLDA: 95.8 % (ref 94–98)
OXYHGB MFR BLDA: 96.2 % (ref 94–98)
OXYHGB MFR BLDA: 96.5 % (ref 94–98)
OXYHGB MFR BLDA: 96.6 % (ref 94–98)
OXYHGB MFR BLDA: 96.6 % (ref 94–98)
OXYHGB MFR BLDA: 96.7 % (ref 94–98)
OXYHGB MFR BLDA: 96.8 % (ref 94–98)
OXYHGB MFR BLDA: 97.5 % (ref 94–98)
OXYHGB MFR BLDV: 53.4 % (ref 45–75)
OXYHGB MFR BLDV: 62.9 % (ref 45–75)
OXYHGB MFR BLDV: 80 % (ref 45–75)
P AXIS: 22 DEGREES
P AXIS: 227 DEGREES
P AXIS: 62 DEGREES
P AXIS: 63 DEGREES
P OFFSET: 188 MS
P OFFSET: 192 MS
P OFFSET: 196 MS
P OFFSET: 200 MS
P ONSET: 126 MS
P ONSET: 126 MS
P ONSET: 129 MS
P ONSET: 131 MS
PATH REPORT.ADDENDUM SPEC: NORMAL
PATH REPORT.ADDENDUM SPEC: NORMAL
PATH REPORT.COMMENTS IMP SPEC: NORMAL
PATH REPORT.COMMENTS IMP SPEC: NORMAL
PATH REPORT.FINAL DX SPEC: NORMAL
PATH REPORT.GROSS SPEC: NORMAL
PATH REPORT.RELEVANT HX SPEC: NORMAL
PATH REPORT.TOTAL CANCER: NORMAL
PCO2 BLDA: 26 MM HG (ref 38–42)
PCO2 BLDA: 27 MM HG (ref 38–42)
PCO2 BLDA: 30 MM HG (ref 38–42)
PCO2 BLDA: 30 MM HG (ref 38–42)
PCO2 BLDA: 33 MM HG (ref 38–42)
PCO2 BLDA: 33 MM HG (ref 38–42)
PCO2 BLDA: 37 MM HG (ref 38–42)
PCO2 BLDA: 39 MM HG (ref 38–42)
PCO2 BLDV: 34 MM HG (ref 41–51)
PCO2 BLDV: 36 MM HG (ref 41–51)
PCO2 BLDV: 39 MM HG (ref 41–51)
PETH INTERPRETATION: NORMAL
PH BLDA: 7.23 PH (ref 7.38–7.42)
PH BLDA: 7.28 PH (ref 7.38–7.42)
PH BLDA: 7.31 PH (ref 7.38–7.42)
PH BLDA: 7.35 PH (ref 7.38–7.42)
PH BLDA: 7.36 PH (ref 7.38–7.42)
PH BLDA: 7.36 PH (ref 7.38–7.42)
PH BLDA: 7.38 PH (ref 7.38–7.42)
PH BLDA: 7.39 PH (ref 7.38–7.42)
PH BLDA: 7.41 PH (ref 7.38–7.42)
PH BLDV: 7.22 PH (ref 7.33–7.43)
PH BLDV: 7.23 PH (ref 7.33–7.43)
PH BLDV: 7.29 PH (ref 7.33–7.43)
PH UR STRIP.AUTO: 5 [PH]
PH UR STRIP.AUTO: 5 [PH]
PHOSPHATE SERPL-MCNC: 2.7 MG/DL (ref 2.5–4.9)
PHOSPHATE SERPL-MCNC: 3.1 MG/DL (ref 2.5–4.9)
PHOSPHATE SERPL-MCNC: 3.2 MG/DL (ref 2.5–4.9)
PHOSPHATE SERPL-MCNC: 3.2 MG/DL (ref 2.5–4.9)
PHOSPHATE SERPL-MCNC: 3.3 MG/DL (ref 2.5–4.9)
PHOSPHATE SERPL-MCNC: 3.4 MG/DL (ref 2.5–4.9)
PHOSPHATE SERPL-MCNC: 3.5 MG/DL (ref 2.5–4.9)
PHOSPHATE SERPL-MCNC: 3.6 MG/DL (ref 2.5–4.9)
PHOSPHATE SERPL-MCNC: 3.6 MG/DL (ref 2.5–4.9)
PHOSPHATE SERPL-MCNC: 3.7 MG/DL (ref 2.5–4.9)
PHOSPHATE SERPL-MCNC: 3.7 MG/DL (ref 2.5–4.9)
PHOSPHATE SERPL-MCNC: 3.8 MG/DL (ref 2.5–4.9)
PHOSPHATE SERPL-MCNC: 4.2 MG/DL (ref 2.5–4.9)
PHOSPHATE SERPL-MCNC: 4.2 MG/DL (ref 2.5–4.9)
PHOSPHATE SERPL-MCNC: 4.3 MG/DL (ref 2.5–4.9)
PHOSPHATE SERPL-MCNC: 4.3 MG/DL (ref 2.5–4.9)
PHOSPHATE SERPL-MCNC: 4.6 MG/DL (ref 2.5–4.9)
PHOSPHATE SERPL-MCNC: 4.6 MG/DL (ref 2.5–4.9)
PHOSPHATE SERPL-MCNC: 4.8 MG/DL (ref 2.5–4.9)
PHOSPHATE SERPL-MCNC: 4.9 MG/DL (ref 2.5–4.9)
PHOSPHATE SERPL-MCNC: 5.1 MG/DL (ref 2.5–4.9)
PHOSPHATE SERPL-MCNC: 5.2 MG/DL (ref 2.5–4.9)
PHOSPHATE SERPL-MCNC: 5.7 MG/DL (ref 2.5–4.9)
PHOSPHATE SERPL-MCNC: 6.2 MG/DL (ref 2.5–4.9)
PHOSPHATE SERPL-MCNC: 6.3 MG/DL (ref 2.5–4.9)
PLASMA CELLS NFR FLD MANUAL: 0 %
PLATELET # BLD AUTO: 128 X10*3/UL (ref 150–450)
PLATELET # BLD AUTO: 154 X10*3/UL (ref 150–450)
PLATELET # BLD AUTO: 158 X10*3/UL (ref 150–450)
PLATELET # BLD AUTO: 158 X10*3/UL (ref 150–450)
PLATELET # BLD AUTO: 163 X10*3/UL (ref 150–450)
PLATELET # BLD AUTO: 169 X10*3/UL (ref 150–450)
PLATELET # BLD AUTO: 172 X10*3/UL (ref 150–450)
PLATELET # BLD AUTO: 178 X10*3/UL (ref 150–450)
PLATELET # BLD AUTO: 189 X10*3/UL (ref 150–450)
PLATELET # BLD AUTO: 190 X10*3/UL (ref 150–450)
PLATELET # BLD AUTO: 191 X10*3/UL (ref 150–450)
PLATELET # BLD AUTO: 196 X10*3/UL (ref 150–450)
PLATELET # BLD AUTO: 197 X10*3/UL (ref 150–450)
PLATELET # BLD AUTO: 198 X10*3/UL (ref 150–450)
PLATELET # BLD AUTO: 200 X10*3/UL (ref 150–450)
PLATELET # BLD AUTO: 204 X10*3/UL (ref 150–450)
PLATELET # BLD AUTO: 205 X10*3/UL (ref 150–450)
PLATELET # BLD AUTO: 206 X10*3/UL (ref 150–450)
PLATELET # BLD AUTO: 212 X10*3/UL (ref 150–450)
PLATELET # BLD AUTO: 215 X10*3/UL (ref 150–450)
PLATELET # BLD AUTO: 221 X10*3/UL (ref 150–450)
PLATELET # BLD AUTO: 222 X10*3/UL (ref 150–450)
PLATELET # BLD AUTO: 26 X10*3/UL (ref 150–450)
PLATELET # BLD AUTO: 27 X10*3/UL (ref 150–450)
PLATELET # BLD AUTO: 43 X10*3/UL (ref 150–450)
PLATELET # BLD AUTO: 48 X10*3/UL (ref 150–450)
PLATELET # BLD AUTO: 6 X10*3/UL (ref 150–450)
PLATELET # BLD AUTO: 63 X10*3/UL (ref 150–450)
PLATELET # BLD AUTO: 72 X10*3/UL (ref 150–450)
PLATELET # BLD AUTO: 77 X10*3/UL (ref 150–450)
PLATELET # BLD AUTO: 8 X10*3/UL (ref 150–450)
PLATELET # BLD AUTO: 92 X10*3/UL (ref 150–450)
PLATELET CLUMP BLD QL SMEAR: PRESENT
PLATELET CLUMP BLD QL SMEAR: PRESENT
PLPETH BLD-MCNC: <10 NG/ML
PO2 BLDA: 103 MM HG (ref 85–95)
PO2 BLDA: 104 MM HG (ref 85–95)
PO2 BLDA: 123 MM HG (ref 85–95)
PO2 BLDA: 136 MM HG (ref 85–95)
PO2 BLDA: 70 MM HG (ref 85–95)
PO2 BLDA: 79 MM HG (ref 85–95)
PO2 BLDA: 86 MM HG (ref 85–95)
PO2 BLDA: 87 MM HG (ref 85–95)
PO2 BLDA: 90 MM HG (ref 85–95)
PO2 BLDA: 93 MM HG (ref 85–95)
PO2 BLDA: 93 MM HG (ref 85–95)
PO2 BLDV: 39 MM HG (ref 35–45)
PO2 BLDV: 44 MM HG (ref 35–45)
PO2 BLDV: 50 MM HG (ref 35–45)
POLYCHROMASIA BLD QL SMEAR: ABNORMAL
POLYCHROMASIA BLD QL SMEAR: NORMAL
POPETH BLD-MCNC: <10 NG/ML
POTASSIUM BLDA-SCNC: 5.3 MMOL/L (ref 3.5–5.3)
POTASSIUM BLDA-SCNC: 5.4 MMOL/L (ref 3.5–5.3)
POTASSIUM BLDA-SCNC: 5.6 MMOL/L (ref 3.5–5.3)
POTASSIUM BLDA-SCNC: 6.1 MMOL/L (ref 3.5–5.3)
POTASSIUM BLDA-SCNC: 6.3 MMOL/L (ref 3.5–5.3)
POTASSIUM BLDV-SCNC: 5.2 MMOL/L (ref 3.5–5.3)
POTASSIUM BLDV-SCNC: 5.4 MMOL/L (ref 3.5–5.3)
POTASSIUM BLDV-SCNC: 6 MMOL/L (ref 3.5–5.3)
POTASSIUM SERPL-SCNC: 3.3 MMOL/L (ref 3.5–5.3)
POTASSIUM SERPL-SCNC: 3.6 MMOL/L (ref 3.5–5.3)
POTASSIUM SERPL-SCNC: 3.8 MMOL/L (ref 3.5–5.3)
POTASSIUM SERPL-SCNC: 3.9 MMOL/L (ref 3.5–5.3)
POTASSIUM SERPL-SCNC: 4.1 MMOL/L (ref 3.5–5.3)
POTASSIUM SERPL-SCNC: 4.2 MMOL/L (ref 3.5–5.3)
POTASSIUM SERPL-SCNC: 4.3 MMOL/L (ref 3.5–5.3)
POTASSIUM SERPL-SCNC: 4.3 MMOL/L (ref 3.5–5.3)
POTASSIUM SERPL-SCNC: 4.4 MMOL/L (ref 3.5–5.3)
POTASSIUM SERPL-SCNC: 4.4 MMOL/L (ref 3.5–5.3)
POTASSIUM SERPL-SCNC: 4.5 MMOL/L (ref 3.5–5.3)
POTASSIUM SERPL-SCNC: 4.6 MMOL/L (ref 3.5–5.3)
POTASSIUM SERPL-SCNC: 4.9 MMOL/L (ref 3.5–5.3)
POTASSIUM SERPL-SCNC: 5 MMOL/L (ref 3.5–5.3)
POTASSIUM SERPL-SCNC: 5.1 MMOL/L (ref 3.5–5.3)
POTASSIUM SERPL-SCNC: 5.2 MMOL/L (ref 3.5–5.3)
POTASSIUM SERPL-SCNC: 5.3 MMOL/L (ref 3.5–5.3)
POTASSIUM SERPL-SCNC: 5.4 MMOL/L (ref 3.5–5.3)
POTASSIUM SERPL-SCNC: 5.4 MMOL/L (ref 3.5–5.3)
POTASSIUM SERPL-SCNC: 5.5 MMOL/L (ref 3.5–5.3)
POTASSIUM SERPL-SCNC: 5.6 MMOL/L (ref 3.5–5.3)
POTASSIUM SERPL-SCNC: 5.7 MMOL/L (ref 3.5–5.3)
POTASSIUM SERPL-SCNC: 5.8 MMOL/L (ref 3.5–5.3)
POTASSIUM SERPL-SCNC: 5.9 MMOL/L (ref 3.5–5.3)
POTASSIUM SERPL-SCNC: 6 MMOL/L (ref 3.5–5.3)
POTASSIUM SERPL-SCNC: 6.2 MMOL/L (ref 3.5–5.3)
POTASSIUM SERPL-SCNC: 6.2 MMOL/L (ref 3.5–5.3)
POTASSIUM SERPL-SCNC: 8.1 MMOL/L (ref 3.5–5.3)
POTASSIUM UR-SCNC: 29 MMOL/L
POTASSIUM UR-SCNC: 56 MMOL/L
POTASSIUM/CREAT UR-RTO: 44 MMOL/G CREAT
POTASSIUM/CREAT UR-RTO: 51 MMOL/G CREAT
PR INTERVAL: 178 MS
PR INTERVAL: 178 MS
PR INTERVAL: 180 MS
PROCALCITONIN SERPL-MCNC: 133.44 NG/ML
PRODUCT BLOOD TYPE: 5100
PRODUCT BLOOD TYPE: 5100
PRODUCT BLOOD TYPE: 6200
PRODUCT BLOOD TYPE: 6200
PRODUCT CODE: NORMAL
PROMYELOCYTES # BLD MANUAL: 0.04 X10*3/UL
PROMYELOCYTES NFR BLD MANUAL: 1.2 %
PROT FLD-MCNC: 0.8 G/DL
PROT SERPL-MCNC: 4.1 G/DL (ref 6.4–8.2)
PROT SERPL-MCNC: 4.3 G/DL (ref 6.4–8.2)
PROT SERPL-MCNC: 4.4 G/DL (ref 6.4–8.2)
PROT SERPL-MCNC: 4.6 G/DL (ref 6.4–8.2)
PROT SERPL-MCNC: 4.7 G/DL (ref 6.4–8.2)
PROT SERPL-MCNC: 5.1 G/DL (ref 6.4–8.2)
PROT SERPL-MCNC: 5.7 G/DL (ref 6.4–8.2)
PROT SERPL-MCNC: 5.7 G/DL (ref 6.4–8.2)
PROT SERPL-MCNC: 5.8 G/DL (ref 6.4–8.2)
PROT SERPL-MCNC: 5.9 G/DL (ref 6.4–8.2)
PROT SERPL-MCNC: 6 G/DL (ref 6.4–8.2)
PROT SERPL-MCNC: 6.1 G/DL (ref 6.4–8.2)
PROT SERPL-MCNC: 6.2 G/DL (ref 6.4–8.2)
PROT SERPL-MCNC: 6.3 G/DL (ref 6.4–8.2)
PROT SERPL-MCNC: 6.4 G/DL (ref 6.4–8.2)
PROT SERPL-MCNC: 6.4 G/DL (ref 6.4–8.2)
PROT SERPL-MCNC: 6.5 G/DL (ref 6.4–8.2)
PROT SERPL-MCNC: 6.6 G/DL (ref 6.4–8.2)
PROT SERPL-MCNC: 6.6 G/DL (ref 6.4–8.2)
PROT SERPL-MCNC: 6.7 G/DL (ref 6.4–8.2)
PROT SERPL-MCNC: 6.9 G/DL (ref 6.4–8.2)
PROT SERPL-MCNC: 7 G/DL (ref 6.4–8.2)
PROT SERPL-MCNC: 7.8 G/DL (ref 6.4–8.2)
PROT UR STRIP.AUTO-MCNC: ABNORMAL MG/DL
PROT UR STRIP.AUTO-MCNC: NEGATIVE MG/DL
PROTHROMBIN TIME: 12.7 SECONDS (ref 9.8–12.8)
PROTHROMBIN TIME: 12.9 SECONDS (ref 9.8–12.8)
PROTHROMBIN TIME: 13.1 SECONDS (ref 9.8–12.8)
PROTHROMBIN TIME: 13.3 SECONDS (ref 9.8–12.8)
PROTHROMBIN TIME: 13.3 SECONDS (ref 9.8–12.8)
PROTHROMBIN TIME: 13.6 SECONDS (ref 9.8–12.8)
PROTHROMBIN TIME: 13.6 SECONDS (ref 9.8–12.8)
PROTHROMBIN TIME: 13.8 SECONDS (ref 9.8–12.8)
PROTHROMBIN TIME: 14 SECONDS (ref 9.8–12.8)
PROTHROMBIN TIME: 14.1 SECONDS (ref 9.8–12.8)
PROTHROMBIN TIME: 14.2 SECONDS (ref 9.8–12.8)
PROTHROMBIN TIME: 14.5 SECONDS (ref 9.8–12.8)
PROTHROMBIN TIME: 14.8 SECONDS (ref 9.8–12.8)
PROTHROMBIN TIME: 14.9 SECONDS (ref 9.8–12.8)
PROTHROMBIN TIME: 15.2 SECONDS (ref 9.8–12.8)
PROTHROMBIN TIME: 15.3 SECONDS (ref 9.8–12.8)
PROTHROMBIN TIME: 15.5 SECONDS (ref 9.8–12.8)
PROTHROMBIN TIME: 15.6 SECONDS (ref 9.8–12.8)
PROTHROMBIN TIME: 16.4 SECONDS (ref 9.8–12.8)
PROTHROMBIN TIME: 16.4 SECONDS (ref 9.8–12.8)
PROTHROMBIN TIME: 17.1 SECONDS (ref 9.8–12.8)
PROTHROMBIN TIME: 17.3 SECONDS (ref 9.8–12.8)
PROTHROMBIN TIME: 18.4 SECONDS (ref 9.8–12.8)
PROTHROMBIN TIME: 18.8 SECONDS (ref 9.8–12.8)
PROTHROMBIN TIME: 22 SECONDS (ref 9.8–12.8)
PROTHROMBIN TIME: 22.4 SECONDS (ref 9.8–12.8)
PROTHROMBIN TIME: 22.8 SECONDS (ref 9.8–12.8)
PROTHROMBIN TIME: 24.5 SECONDS (ref 9.8–12.8)
Q ONSET: 214 MS
Q ONSET: 215 MS
Q ONSET: 215 MS
Q ONSET: 217 MS
Q ONSET: 219 MS
QRS COUNT: 13 BEATS
QRS COUNT: 18 BEATS
QRS COUNT: 18 BEATS
QRS COUNT: 19 BEATS
QRS COUNT: 22 BEATS
QRS DURATION: 68 MS
QRS DURATION: 82 MS
QRS DURATION: 84 MS
QRS DURATION: 84 MS
QRS DURATION: 88 MS
QT INTERVAL: 248 MS
QT INTERVAL: 298 MS
QT INTERVAL: 318 MS
QT INTERVAL: 324 MS
QT INTERVAL: 408 MS
QTC CALCULATION(BAZETT): 374 MS
QTC CALCULATION(BAZETT): 405 MS
QTC CALCULATION(BAZETT): 430 MS
QTC CALCULATION(BAZETT): 434 MS
QTC CALCULATION(BAZETT): 467 MS
QTC FREDERICIA: 326 MS
QTC FREDERICIA: 366 MS
QTC FREDERICIA: 389 MS
QTC FREDERICIA: 394 MS
QTC FREDERICIA: 447 MS
R AXIS: 15 DEGREES
R AXIS: 2 DEGREES
R AXIS: 23 DEGREES
R AXIS: 27 DEGREES
R AXIS: 38 DEGREES
RBC # BLD AUTO: 3.35 X10*6/UL (ref 4–5.2)
RBC # BLD AUTO: 3.39 X10*6/UL (ref 4–5.2)
RBC # BLD AUTO: 3.4 X10*6/UL (ref 4–5.2)
RBC # BLD AUTO: 3.67 X10*6/UL (ref 4–5.2)
RBC # BLD AUTO: 3.72 X10*6/UL (ref 4–5.2)
RBC # BLD AUTO: 3.78 X10*6/UL (ref 4–5.2)
RBC # BLD AUTO: 3.85 X10*6/UL (ref 4–5.2)
RBC # BLD AUTO: 3.85 X10*6/UL (ref 4–5.2)
RBC # BLD AUTO: 3.86 X10*6/UL (ref 4–5.2)
RBC # BLD AUTO: 3.89 X10*6/UL (ref 4–5.2)
RBC # BLD AUTO: 3.99 X10*6/UL (ref 4–5.2)
RBC # BLD AUTO: 4.05 X10*6/UL (ref 4–5.2)
RBC # BLD AUTO: 4.06 X10*6/UL (ref 4–5.2)
RBC # BLD AUTO: 4.1 X10*6/UL (ref 4–5.2)
RBC # BLD AUTO: 4.12 X10*6/UL (ref 4–5.2)
RBC # BLD AUTO: 4.14 X10*6/UL (ref 4–5.2)
RBC # BLD AUTO: 4.14 X10*6/UL (ref 4–5.2)
RBC # BLD AUTO: 4.18 X10*6/UL (ref 4–5.2)
RBC # BLD AUTO: 4.19 X10*6/UL (ref 4–5.2)
RBC # BLD AUTO: 4.19 X10*6/UL (ref 4–5.2)
RBC # BLD AUTO: 4.21 X10*6/UL (ref 4–5.2)
RBC # BLD AUTO: 4.21 X10*6/UL (ref 4–5.2)
RBC # BLD AUTO: 4.23 X10*6/UL (ref 4–5.2)
RBC # BLD AUTO: 4.23 X10*6/UL (ref 4–5.2)
RBC # BLD AUTO: 4.24 X10*6/UL (ref 4–5.2)
RBC # BLD AUTO: 4.25 X10*6/UL (ref 4–5.2)
RBC # BLD AUTO: 4.34 X10*6/UL (ref 4–5.2)
RBC # BLD AUTO: 4.37 X10*6/UL (ref 4–5.2)
RBC # BLD AUTO: 4.39 X10*6/UL (ref 4–5.2)
RBC # BLD AUTO: 4.4 X10*6/UL (ref 4–5.2)
RBC # BLD AUTO: 4.44 X10*6/UL (ref 4–5.2)
RBC # BLD AUTO: 5.03 X10*6/UL (ref 4–5.2)
RBC # FLD AUTO: 113 /UL
RBC # UR STRIP.AUTO: ABNORMAL /UL
RBC # UR STRIP.AUTO: ABNORMAL /UL
RBC #/AREA URNS AUTO: >20 /HPF
RBC #/AREA URNS AUTO: ABNORMAL /HPF
RBC MORPH BLD: ABNORMAL
RBC MORPH BLD: NORMAL
RESIDENT REVIEW: NORMAL
RETICS #: 0.1 X10*6/UL (ref 0.02–0.11)
RETICS/RBC NFR AUTO: 2.5 % (ref 0.5–2)
RH FACTOR (ANTIGEN D): NORMAL
RIBOSOMAL P AB SER-ACNC: <0.2 AI
RIGHT VENTRICLE FREE WALL PEAK S': 12.6 CM/S
RIGHT VENTRICLE FREE WALL PEAK S': 17.2 CM/S
RIGHT VENTRICLE PEAK SYSTOLIC PRESSURE: 22.9 MMHG
RIGHT VENTRICLE PEAK SYSTOLIC PRESSURE: 30 MMHG
SAO2 % BLDA: 100 % (ref 94–100)
SAO2 % BLDA: 100 % (ref 94–100)
SAO2 % BLDA: 94 % (ref 94–100)
SAO2 % BLDA: 98 % (ref 94–100)
SAO2 % BLDA: 98 % (ref 94–100)
SAO2 % BLDA: 99 % (ref 94–100)
SAO2 % BLDV: 55 % (ref 45–75)
SAO2 % BLDV: 64 % (ref 45–75)
SAO2 % BLDV: 82 % (ref 45–75)
SCAN RESULT: NORMAL
SMOOTH MUSCLE AB SER QL IF: ABNORMAL
SODIUM BLDA-SCNC: 120 MMOL/L (ref 136–145)
SODIUM BLDA-SCNC: 125 MMOL/L (ref 136–145)
SODIUM BLDA-SCNC: 128 MMOL/L (ref 136–145)
SODIUM BLDA-SCNC: 128 MMOL/L (ref 136–145)
SODIUM BLDA-SCNC: 129 MMOL/L (ref 136–145)
SODIUM BLDA-SCNC: 130 MMOL/L (ref 136–145)
SODIUM BLDA-SCNC: 131 MMOL/L (ref 136–145)
SODIUM BLDV-SCNC: 129 MMOL/L (ref 136–145)
SODIUM BLDV-SCNC: 131 MMOL/L (ref 136–145)
SODIUM BLDV-SCNC: 132 MMOL/L (ref 136–145)
SODIUM SERPL-SCNC: 119 MMOL/L (ref 136–145)
SODIUM SERPL-SCNC: 121 MMOL/L (ref 136–145)
SODIUM SERPL-SCNC: 121 MMOL/L (ref 136–145)
SODIUM SERPL-SCNC: 125 MMOL/L (ref 136–145)
SODIUM SERPL-SCNC: 127 MMOL/L (ref 136–145)
SODIUM SERPL-SCNC: 128 MMOL/L (ref 136–145)
SODIUM SERPL-SCNC: 129 MMOL/L (ref 136–145)
SODIUM SERPL-SCNC: 130 MMOL/L (ref 136–145)
SODIUM SERPL-SCNC: 131 MMOL/L (ref 136–145)
SODIUM SERPL-SCNC: 132 MMOL/L (ref 136–145)
SODIUM SERPL-SCNC: 133 MMOL/L (ref 136–145)
SODIUM SERPL-SCNC: 134 MMOL/L (ref 136–145)
SODIUM SERPL-SCNC: 135 MMOL/L (ref 136–145)
SODIUM SERPL-SCNC: 136 MMOL/L (ref 136–145)
SODIUM SERPL-SCNC: 136 MMOL/L (ref 136–145)
SODIUM SERPL-SCNC: 138 MMOL/L (ref 136–145)
SODIUM SERPL-SCNC: 138 MMOL/L (ref 136–145)
SODIUM UR-SCNC: 10 MMOL/L
SODIUM UR-SCNC: 37 MMOL/L
SODIUM/CREAT UR-RTO: 15 MMOL/G CREAT
SODIUM/CREAT UR-RTO: 33 MMOL/G CREAT
SOLUBLE LIVER IGG SER IA-ACNC: 5.1 U (ref 0–24.9)
SP GR UR STRIP.AUTO: 1.02
SP GR UR STRIP.AUTO: 1.02
SQUAMOUS #/AREA URNS AUTO: ABNORMAL /HPF
SQUAMOUS #/AREA URNS AUTO: ABNORMAL /HPF
T AXIS: 23 DEGREES
T AXIS: 40 DEGREES
T AXIS: 57 DEGREES
T AXIS: 57 DEGREES
T AXIS: 76 DEGREES
T OFFSET: 341 MS
T OFFSET: 368 MS
T OFFSET: 373 MS
T OFFSET: 377 MS
T OFFSET: 419 MS
TARGETS BLD QL SMEAR: ABNORMAL
TARGETS BLD QL SMEAR: NORMAL
TIBC SERPL-MCNC: 130 UG/DL (ref 240–445)
TOTAL CELLS COUNTED BLD: 100
TOTAL CELLS COUNTED BLD: 100
TOTAL CELLS COUNTED BLD: 115
TOTAL CELLS COUNTED BLD: 117
TOTAL CELLS COUNTED BLD: 129
TOTAL CELLS COUNTED BLD: 136
TOTAL CELLS COUNTED BLD: 146
TOTAL CELLS COUNTED BLD: 150
TOTAL CELLS COUNTED BLD: 163
TOTAL CELLS COUNTED FLD: 100
TRANSFERRIN SERPL-MCNC: 136 MG/DL (ref 200–360)
TRICUSPID ANNULAR PLANE SYSTOLIC EXCURSION: 1.4 CM
TRICUSPID ANNULAR PLANE SYSTOLIC EXCURSION: 2.4 CM
UIBC SERPL-MCNC: 118 UG/DL (ref 110–370)
UNIT ABO: NORMAL
UNIT NUMBER: NORMAL
UNIT RH: NORMAL
UNIT VOLUME: 270
UNIT VOLUME: 299
UNIT VOLUME: 350
UNIT VOLUME: 570
URATE SERPL-MCNC: 3.4 MG/DL (ref 2.3–6.7)
URATE SERPL-MCNC: 3.4 MG/DL (ref 2.3–6.7)
URATE SERPL-MCNC: 3.5 MG/DL (ref 2.3–6.7)
URATE SERPL-MCNC: 3.6 MG/DL (ref 2.3–6.7)
URATE SERPL-MCNC: 3.8 MG/DL (ref 2.3–6.7)
URATE SERPL-MCNC: 3.8 MG/DL (ref 2.3–6.7)
URATE SERPL-MCNC: 4 MG/DL (ref 2.3–6.7)
URATE SERPL-MCNC: 4.1 MG/DL (ref 2.3–6.7)
URATE SERPL-MCNC: 4.2 MG/DL (ref 2.3–6.7)
URATE SERPL-MCNC: 4.7 MG/DL (ref 2.3–6.7)
URATE SERPL-MCNC: 5.1 MG/DL (ref 2.3–6.7)
URATE SERPL-MCNC: 5.4 MG/DL (ref 2.3–6.7)
URATE SERPL-MCNC: 5.7 MG/DL (ref 2.3–6.7)
URATE SERPL-MCNC: 6.4 MG/DL (ref 2.3–6.7)
URATE SERPL-MCNC: 6.5 MG/DL (ref 2.3–6.7)
URATE SERPL-MCNC: 6.6 MG/DL (ref 2.3–6.7)
URATE SERPL-MCNC: 7.1 MG/DL (ref 2.3–6.7)
URATE SERPL-MCNC: 8.6 MG/DL (ref 2.3–6.7)
UROBILINOGEN UR STRIP.AUTO-MCNC: 4 MG/DL
UROBILINOGEN UR STRIP.AUTO-MCNC: NORMAL MG/DL
VANCOMYCIN SERPL-MCNC: 15.8 UG/ML (ref 5–20)
VANCOMYCIN SERPL-MCNC: 16 UG/ML (ref 5–20)
VANCOMYCIN SERPL-MCNC: 16.5 UG/ML (ref 5–20)
VARIANT LYMPHS # BLD MANUAL: 0.02 X10*3/UL (ref 0–0.5)
VARIANT LYMPHS # BLD MANUAL: 0.06 X10*3/UL (ref 0–0.5)
VARIANT LYMPHS # BLD MANUAL: 0.13 X10*3/UL (ref 0–0.5)
VARIANT LYMPHS # BLD MANUAL: 0.31 X10*3/UL (ref 0–0.5)
VARIANT LYMPHS # BLD MANUAL: 0.69 X10*3/UL (ref 0–0.5)
VARIANT LYMPHS NFR BLD: 0.6 %
VARIANT LYMPHS NFR BLD: 0.7 %
VARIANT LYMPHS NFR BLD: 2 %
VARIANT LYMPHS NFR BLD: 3 %
VARIANT LYMPHS NFR BLD: 6.1 %
VENTRICULAR RATE: 108 BPM
VENTRICULAR RATE: 110 BPM
VENTRICULAR RATE: 111 BPM
VENTRICULAR RATE: 137 BPM
VENTRICULAR RATE: 79 BPM
VIT B12 SERPL-MCNC: 1576 PG/ML (ref 211–911)
WBC # BLD AUTO: 10 X10*3/UL (ref 4.4–11.3)
WBC # BLD AUTO: 11.1 X10*3/UL (ref 4.4–11.3)
WBC # BLD AUTO: 11.3 X10*3/UL (ref 4.4–11.3)
WBC # BLD AUTO: 11.8 X10*3/UL (ref 4.4–11.3)
WBC # BLD AUTO: 11.9 X10*3/UL (ref 4.4–11.3)
WBC # BLD AUTO: 12.1 X10*3/UL (ref 4.4–11.3)
WBC # BLD AUTO: 12.1 X10*3/UL (ref 4.4–11.3)
WBC # BLD AUTO: 12.3 X10*3/UL (ref 4.4–11.3)
WBC # BLD AUTO: 12.8 X10*3/UL (ref 4.4–11.3)
WBC # BLD AUTO: 13.3 X10*3/UL (ref 4.4–11.3)
WBC # BLD AUTO: 13.5 X10*3/UL (ref 4.4–11.3)
WBC # BLD AUTO: 13.9 X10*3/UL (ref 4.4–11.3)
WBC # BLD AUTO: 15.4 X10*3/UL (ref 4.4–11.3)
WBC # BLD AUTO: 15.8 X10*3/UL (ref 4.4–11.3)
WBC # BLD AUTO: 3.3 X10*3/UL (ref 4.4–11.3)
WBC # BLD AUTO: 3.9 X10*3/UL (ref 4.4–11.3)
WBC # BLD AUTO: 4.3 X10*3/UL (ref 4.4–11.3)
WBC # BLD AUTO: 5.6 X10*3/UL (ref 4.4–11.3)
WBC # BLD AUTO: 6.7 X10*3/UL (ref 4.4–11.3)
WBC # BLD AUTO: 7.1 X10*3/UL (ref 4.4–11.3)
WBC # BLD AUTO: 7.7 X10*3/UL (ref 4.4–11.3)
WBC # BLD AUTO: 8 X10*3/UL (ref 4.4–11.3)
WBC # BLD AUTO: 8.4 X10*3/UL (ref 4.4–11.3)
WBC # BLD AUTO: 8.8 X10*3/UL (ref 4.4–11.3)
WBC # BLD AUTO: 8.8 X10*3/UL (ref 4.4–11.3)
WBC # BLD AUTO: 9 X10*3/UL (ref 4.4–11.3)
WBC # BLD AUTO: 9.1 X10*3/UL (ref 4.4–11.3)
WBC # BLD AUTO: 9.1 X10*3/UL (ref 4.4–11.3)
WBC # BLD AUTO: 9.3 X10*3/UL (ref 4.4–11.3)
WBC # BLD AUTO: 9.7 X10*3/UL (ref 4.4–11.3)
WBC # FLD AUTO: 205 /UL
WBC #/AREA URNS AUTO: ABNORMAL /HPF
WBC #/AREA URNS AUTO: ABNORMAL /HPF

## 2024-01-01 PROCEDURE — 84100 ASSAY OF PHOSPHORUS: CPT

## 2024-01-01 PROCEDURE — 71045 X-RAY EXAM CHEST 1 VIEW: CPT | Performed by: RADIOLOGY

## 2024-01-01 PROCEDURE — 2500000001 HC RX 250 WO HCPCS SELF ADMINISTERED DRUGS (ALT 637 FOR MEDICARE OP)

## 2024-01-01 PROCEDURE — 86880 COOMBS TEST DIRECT: CPT

## 2024-01-01 PROCEDURE — 2500000005 HC RX 250 GENERAL PHARMACY W/O HCPCS

## 2024-01-01 PROCEDURE — 99233 SBSQ HOSP IP/OBS HIGH 50: CPT | Performed by: INTERNAL MEDICINE

## 2024-01-01 PROCEDURE — 82248 BILIRUBIN DIRECT: CPT

## 2024-01-01 PROCEDURE — 36415 COLL VENOUS BLD VENIPUNCTURE: CPT

## 2024-01-01 PROCEDURE — 84550 ASSAY OF BLOOD/URIC ACID: CPT

## 2024-01-01 PROCEDURE — 71045 X-RAY EXAM CHEST 1 VIEW: CPT

## 2024-01-01 PROCEDURE — 2500000004 HC RX 250 GENERAL PHARMACY W/ HCPCS (ALT 636 FOR OP/ED)

## 2024-01-01 PROCEDURE — 99232 SBSQ HOSP IP/OBS MODERATE 35: CPT

## 2024-01-01 PROCEDURE — 99233 SBSQ HOSP IP/OBS HIGH 50: CPT

## 2024-01-01 PROCEDURE — 83615 LACTATE (LD) (LDH) ENZYME: CPT

## 2024-01-01 PROCEDURE — 83735 ASSAY OF MAGNESIUM: CPT | Performed by: STUDENT IN AN ORGANIZED HEALTH CARE EDUCATION/TRAINING PROGRAM

## 2024-01-01 PROCEDURE — 2500000004 HC RX 250 GENERAL PHARMACY W/ HCPCS (ALT 636 FOR OP/ED): Mod: JZ

## 2024-01-01 PROCEDURE — 85007 BL SMEAR W/DIFF WBC COUNT: CPT

## 2024-01-01 PROCEDURE — 2780000003 HC OR 278 NO HCPCS

## 2024-01-01 PROCEDURE — 93005 ELECTROCARDIOGRAM TRACING: CPT

## 2024-01-01 PROCEDURE — A4648 IMPLANTABLE TISSUE MARKER: HCPCS

## 2024-01-01 PROCEDURE — 1210000001 HC SEMI-PRIVATE ROOM DAILY

## 2024-01-01 PROCEDURE — 1170000001 HC PRIVATE ONCOLOGY ROOM DAILY

## 2024-01-01 PROCEDURE — 82077 ASSAY SPEC XCP UR&BREATH IA: CPT | Performed by: EMERGENCY MEDICINE

## 2024-01-01 PROCEDURE — 99292 CRITICAL CARE ADDL 30 MIN: CPT

## 2024-01-01 PROCEDURE — 83735 ASSAY OF MAGNESIUM: CPT

## 2024-01-01 PROCEDURE — 88307 TISSUE EXAM BY PATHOLOGIST: CPT | Mod: TC,SUR,91 | Performed by: STUDENT IN AN ORGANIZED HEALTH CARE EDUCATION/TRAINING PROGRAM

## 2024-01-01 PROCEDURE — 85007 BL SMEAR W/DIFF WBC COUNT: CPT | Performed by: STUDENT IN AN ORGANIZED HEALTH CARE EDUCATION/TRAINING PROGRAM

## 2024-01-01 PROCEDURE — 36415 COLL VENOUS BLD VENIPUNCTURE: CPT | Performed by: EMERGENCY MEDICINE

## 2024-01-01 PROCEDURE — 83605 ASSAY OF LACTIC ACID: CPT

## 2024-01-01 PROCEDURE — 85610 PROTHROMBIN TIME: CPT

## 2024-01-01 PROCEDURE — 93308 TTE F-UP OR LMTD: CPT | Performed by: INTERNAL MEDICINE

## 2024-01-01 PROCEDURE — 2500000002 HC RX 250 W HCPCS SELF ADMINISTERED DRUGS (ALT 637 FOR MEDICARE OP, ALT 636 FOR OP/ED)

## 2024-01-01 PROCEDURE — 93306 TTE W/DOPPLER COMPLETE: CPT

## 2024-01-01 PROCEDURE — 85025 COMPLETE CBC W/AUTO DIFF WBC: CPT

## 2024-01-01 PROCEDURE — 82945 GLUCOSE OTHER FLUID: CPT

## 2024-01-01 PROCEDURE — 2020000001 HC ICU ROOM DAILY

## 2024-01-01 PROCEDURE — 88307 TISSUE EXAM BY PATHOLOGIST: CPT

## 2024-01-01 PROCEDURE — 19082 BX BREAST ADD LESION STRTCTC: CPT | Performed by: STUDENT IN AN ORGANIZED HEALTH CARE EDUCATION/TRAINING PROGRAM

## 2024-01-01 PROCEDURE — 5A1945Z RESPIRATORY VENTILATION, 24-96 CONSECUTIVE HOURS: ICD-10-PCS | Performed by: STUDENT IN AN ORGANIZED HEALTH CARE EDUCATION/TRAINING PROGRAM

## 2024-01-01 PROCEDURE — 82330 ASSAY OF CALCIUM: CPT

## 2024-01-01 PROCEDURE — 83935 ASSAY OF URINE OSMOLALITY: CPT

## 2024-01-01 PROCEDURE — 85384 FIBRINOGEN ACTIVITY: CPT | Performed by: STUDENT IN AN ORGANIZED HEALTH CARE EDUCATION/TRAINING PROGRAM

## 2024-01-01 PROCEDURE — 84075 ASSAY ALKALINE PHOSPHATASE: CPT | Performed by: EMERGENCY MEDICINE

## 2024-01-01 PROCEDURE — 85025 COMPLETE CBC W/AUTO DIFF WBC: CPT | Performed by: EMERGENCY MEDICINE

## 2024-01-01 PROCEDURE — 47000 NEEDLE BIOPSY OF LIVER PERQ: CPT | Performed by: RADIOLOGY

## 2024-01-01 PROCEDURE — 80074 ACUTE HEPATITIS PANEL: CPT | Mod: CONLAB | Performed by: EMERGENCY MEDICINE

## 2024-01-01 PROCEDURE — 49083 ABD PARACENTESIS W/IMAGING: CPT

## 2024-01-01 PROCEDURE — 88112 CYTOPATH CELL ENHANCE TECH: CPT | Performed by: PATHOLOGY

## 2024-01-01 PROCEDURE — 87040 BLOOD CULTURE FOR BACTERIA: CPT

## 2024-01-01 PROCEDURE — 82607 VITAMIN B-12: CPT

## 2024-01-01 PROCEDURE — 19084 BX BREAST ADD LESION US IMAG: CPT | Mod: LT

## 2024-01-01 PROCEDURE — 82436 ASSAY OF URINE CHLORIDE: CPT | Performed by: STUDENT IN AN ORGANIZED HEALTH CARE EDUCATION/TRAINING PROGRAM

## 2024-01-01 PROCEDURE — 82805 BLOOD GASES W/O2 SATURATION: CPT | Performed by: STUDENT IN AN ORGANIZED HEALTH CARE EDUCATION/TRAINING PROGRAM

## 2024-01-01 PROCEDURE — 80053 COMPREHEN METABOLIC PANEL: CPT

## 2024-01-01 PROCEDURE — 82947 ASSAY GLUCOSE BLOOD QUANT: CPT

## 2024-01-01 PROCEDURE — 84132 ASSAY OF SERUM POTASSIUM: CPT

## 2024-01-01 PROCEDURE — 88342 IMHCHEM/IMCYTCHM 1ST ANTB: CPT

## 2024-01-01 PROCEDURE — 84075 ASSAY ALKALINE PHOSPHATASE: CPT

## 2024-01-01 PROCEDURE — 19083 BX BREAST 1ST LESION US IMAG: CPT | Mod: LT

## 2024-01-01 PROCEDURE — 89051 BODY FLUID CELL COUNT: CPT | Performed by: NURSE PRACTITIONER

## 2024-01-01 PROCEDURE — 2500000004 HC RX 250 GENERAL PHARMACY W/ HCPCS (ALT 636 FOR OP/ED): Performed by: STUDENT IN AN ORGANIZED HEALTH CARE EDUCATION/TRAINING PROGRAM

## 2024-01-01 PROCEDURE — 1100000001 HC PRIVATE ROOM DAILY

## 2024-01-01 PROCEDURE — 93971 EXTREMITY STUDY: CPT

## 2024-01-01 PROCEDURE — 82105 ALPHA-FETOPROTEIN SERUM: CPT | Performed by: STUDENT IN AN ORGANIZED HEALTH CARE EDUCATION/TRAINING PROGRAM

## 2024-01-01 PROCEDURE — 99291 CRITICAL CARE FIRST HOUR: CPT

## 2024-01-01 PROCEDURE — 99231 SBSQ HOSP IP/OBS SF/LOW 25: CPT | Performed by: INTERNAL MEDICINE

## 2024-01-01 PROCEDURE — 84520 ASSAY OF UREA NITROGEN: CPT

## 2024-01-01 PROCEDURE — 2550000001 HC RX 255 CONTRASTS: Performed by: INTERNAL MEDICINE

## 2024-01-01 PROCEDURE — 82805 BLOOD GASES W/O2 SATURATION: CPT

## 2024-01-01 PROCEDURE — 2720000003 HC TRAY FOLEY SILER W URIMETER

## 2024-01-01 PROCEDURE — 87385 HISTOPLASMA CAPSUL AG IA: CPT

## 2024-01-01 PROCEDURE — 88112 CYTOPATH CELL ENHANCE TECH: CPT | Mod: TC,MCY | Performed by: NURSE PRACTITIONER

## 2024-01-01 PROCEDURE — 85045 AUTOMATED RETICULOCYTE COUNT: CPT

## 2024-01-01 PROCEDURE — 99291 CRITICAL CARE FIRST HOUR: CPT | Performed by: STUDENT IN AN ORGANIZED HEALTH CARE EDUCATION/TRAINING PROGRAM

## 2024-01-01 PROCEDURE — P9037 PLATE PHERES LEUKOREDU IRRAD: HCPCS

## 2024-01-01 PROCEDURE — 85027 COMPLETE CBC AUTOMATED: CPT

## 2024-01-01 PROCEDURE — 2500000004 HC RX 250 GENERAL PHARMACY W/ HCPCS (ALT 636 FOR OP/ED): Mod: JZ | Performed by: STUDENT IN AN ORGANIZED HEALTH CARE EDUCATION/TRAINING PROGRAM

## 2024-01-01 PROCEDURE — C1819 TISSUE LOCALIZATION-EXCISION: HCPCS

## 2024-01-01 PROCEDURE — 80202 ASSAY OF VANCOMYCIN: CPT

## 2024-01-01 PROCEDURE — 97110 THERAPEUTIC EXERCISES: CPT | Mod: GP,CQ

## 2024-01-01 PROCEDURE — 83605 ASSAY OF LACTIC ACID: CPT | Performed by: STUDENT IN AN ORGANIZED HEALTH CARE EDUCATION/TRAINING PROGRAM

## 2024-01-01 PROCEDURE — 81001 URINALYSIS AUTO W/SCOPE: CPT | Performed by: EMERGENCY MEDICINE

## 2024-01-01 PROCEDURE — 97116 GAIT TRAINING THERAPY: CPT | Mod: GP,CQ

## 2024-01-01 PROCEDURE — 96361 HYDRATE IV INFUSION ADD-ON: CPT

## 2024-01-01 PROCEDURE — 86901 BLOOD TYPING SEROLOGIC RH(D): CPT

## 2024-01-01 PROCEDURE — 74018 RADEX ABDOMEN 1 VIEW: CPT

## 2024-01-01 PROCEDURE — 87449 NOS EACH ORGANISM AG IA: CPT | Performed by: STUDENT IN AN ORGANIZED HEALTH CARE EDUCATION/TRAINING PROGRAM

## 2024-01-01 PROCEDURE — 84132 ASSAY OF SERUM POTASSIUM: CPT | Performed by: STUDENT IN AN ORGANIZED HEALTH CARE EDUCATION/TRAINING PROGRAM

## 2024-01-01 PROCEDURE — 87070 CULTURE OTHR SPECIMN AEROBIC: CPT | Mod: 59,CONLAB | Performed by: EMERGENCY MEDICINE

## 2024-01-01 PROCEDURE — 74176 CT ABD & PELVIS W/O CONTRAST: CPT | Mod: FOREIGN READ | Performed by: RADIOLOGY

## 2024-01-01 PROCEDURE — 73700 CT LOWER EXTREMITY W/O DYE: CPT | Mod: RIGHT SIDE | Performed by: STUDENT IN AN ORGANIZED HEALTH CARE EDUCATION/TRAINING PROGRAM

## 2024-01-01 PROCEDURE — 85730 THROMBOPLASTIN TIME PARTIAL: CPT

## 2024-01-01 PROCEDURE — 74176 CT ABD & PELVIS W/O CONTRAST: CPT

## 2024-01-01 PROCEDURE — 86708 HEPATITIS A ANTIBODY: CPT

## 2024-01-01 PROCEDURE — C9113 INJ PANTOPRAZOLE SODIUM, VIA: HCPCS

## 2024-01-01 PROCEDURE — 87305 ASPERGILLUS AG IA: CPT | Performed by: STUDENT IN AN ORGANIZED HEALTH CARE EDUCATION/TRAINING PROGRAM

## 2024-01-01 PROCEDURE — 85027 COMPLETE CBC AUTOMATED: CPT | Performed by: STUDENT IN AN ORGANIZED HEALTH CARE EDUCATION/TRAINING PROGRAM

## 2024-01-01 PROCEDURE — 80069 RENAL FUNCTION PANEL: CPT | Mod: CCI

## 2024-01-01 PROCEDURE — 2720000007 HC OR 272 NO HCPCS

## 2024-01-01 PROCEDURE — 85610 PROTHROMBIN TIME: CPT | Performed by: EMERGENCY MEDICINE

## 2024-01-01 PROCEDURE — 2500000005 HC RX 250 GENERAL PHARMACY W/O HCPCS: Performed by: STUDENT IN AN ORGANIZED HEALTH CARE EDUCATION/TRAINING PROGRAM

## 2024-01-01 PROCEDURE — 97161 PT EVAL LOW COMPLEX 20 MIN: CPT | Mod: GP

## 2024-01-01 PROCEDURE — 86376 MICROSOMAL ANTIBODY EACH: CPT

## 2024-01-01 PROCEDURE — 82306 VITAMIN D 25 HYDROXY: CPT

## 2024-01-01 PROCEDURE — 82042 OTHER SOURCE ALBUMIN QUAN EA: CPT

## 2024-01-01 PROCEDURE — 2500000001 HC RX 250 WO HCPCS SELF ADMINISTERED DRUGS (ALT 637 FOR MEDICARE OP): Performed by: STUDENT IN AN ORGANIZED HEALTH CARE EDUCATION/TRAINING PROGRAM

## 2024-01-01 PROCEDURE — 19084 BX BREAST ADD LESION US IMAG: CPT | Performed by: STUDENT IN AN ORGANIZED HEALTH CARE EDUCATION/TRAINING PROGRAM

## 2024-01-01 PROCEDURE — 80048 BASIC METABOLIC PNL TOTAL CA: CPT

## 2024-01-01 PROCEDURE — 82435 ASSAY OF BLOOD CHLORIDE: CPT

## 2024-01-01 PROCEDURE — 88341 IMHCHEM/IMCYTCHM EA ADD ANTB: CPT

## 2024-01-01 PROCEDURE — 99232 SBSQ HOSP IP/OBS MODERATE 35: CPT | Performed by: STUDENT IN AN ORGANIZED HEALTH CARE EDUCATION/TRAINING PROGRAM

## 2024-01-01 PROCEDURE — 74018 RADEX ABDOMEN 1 VIEW: CPT | Performed by: RADIOLOGY

## 2024-01-01 PROCEDURE — 97530 THERAPEUTIC ACTIVITIES: CPT | Mod: GP | Performed by: PHYSICAL THERAPIST

## 2024-01-01 PROCEDURE — 82550 ASSAY OF CK (CPK): CPT | Performed by: STUDENT IN AN ORGANIZED HEALTH CARE EDUCATION/TRAINING PROGRAM

## 2024-01-01 PROCEDURE — 37799 UNLISTED PX VASCULAR SURGERY: CPT

## 2024-01-01 PROCEDURE — 86140 C-REACTIVE PROTEIN: CPT

## 2024-01-01 PROCEDURE — 82390 ASSAY OF CERULOPLASMIN: CPT | Mod: CONLAB

## 2024-01-01 PROCEDURE — A9575 INJ GADOTERATE MEGLUMI 0.1ML: HCPCS | Performed by: INTERNAL MEDICINE

## 2024-01-01 PROCEDURE — 94762 N-INVAS EAR/PLS OXIMTRY CONT: CPT

## 2024-01-01 PROCEDURE — 74183 MRI ABD W/O CNTR FLWD CNTR: CPT | Performed by: STUDENT IN AN ORGANIZED HEALTH CARE EDUCATION/TRAINING PROGRAM

## 2024-01-01 PROCEDURE — 2500000002 HC RX 250 W HCPCS SELF ADMINISTERED DRUGS (ALT 637 FOR MEDICARE OP, ALT 636 FOR OP/ED): Performed by: STUDENT IN AN ORGANIZED HEALTH CARE EDUCATION/TRAINING PROGRAM

## 2024-01-01 PROCEDURE — 51702 INSERT TEMP BLADDER CATH: CPT

## 2024-01-01 PROCEDURE — 2500000004 HC RX 250 GENERAL PHARMACY W/ HCPCS (ALT 636 FOR OP/ED): Performed by: EMERGENCY MEDICINE

## 2024-01-01 PROCEDURE — 36415 COLL VENOUS BLD VENIPUNCTURE: CPT | Performed by: STUDENT IN AN ORGANIZED HEALTH CARE EDUCATION/TRAINING PROGRAM

## 2024-01-01 PROCEDURE — 94660 CPAP INITIATION&MGMT: CPT

## 2024-01-01 PROCEDURE — 82977 ASSAY OF GGT: CPT | Performed by: EMERGENCY MEDICINE

## 2024-01-01 PROCEDURE — 86706 HEP B SURFACE ANTIBODY: CPT

## 2024-01-01 PROCEDURE — 94003 VENT MGMT INPAT SUBQ DAY: CPT

## 2024-01-01 PROCEDURE — 77065 DX MAMMO INCL CAD UNI: CPT

## 2024-01-01 PROCEDURE — 70553 MRI BRAIN STEM W/O & W/DYE: CPT | Performed by: RADIOLOGY

## 2024-01-01 PROCEDURE — 99285 EMERGENCY DEPT VISIT HI MDM: CPT | Mod: 25 | Performed by: EMERGENCY MEDICINE

## 2024-01-01 PROCEDURE — 83690 ASSAY OF LIPASE: CPT | Performed by: EMERGENCY MEDICINE

## 2024-01-01 PROCEDURE — 76642 ULTRASOUND BREAST LIMITED: CPT | Mod: 50

## 2024-01-01 PROCEDURE — 37799 UNLISTED PX VASCULAR SURGERY: CPT | Performed by: STUDENT IN AN ORGANIZED HEALTH CARE EDUCATION/TRAINING PROGRAM

## 2024-01-01 PROCEDURE — 82728 ASSAY OF FERRITIN: CPT

## 2024-01-01 PROCEDURE — A9503 TC99M MEDRONATE: HCPCS | Performed by: INTERNAL MEDICINE

## 2024-01-01 PROCEDURE — 97530 THERAPEUTIC ACTIVITIES: CPT | Mod: GP

## 2024-01-01 PROCEDURE — 88360 TUMOR IMMUNOHISTOCHEM/MANUAL: CPT | Performed by: PATHOLOGY

## 2024-01-01 PROCEDURE — 84157 ASSAY OF PROTEIN OTHER: CPT | Performed by: NURSE PRACTITIONER

## 2024-01-01 PROCEDURE — 82436 ASSAY OF URINE CHLORIDE: CPT

## 2024-01-01 PROCEDURE — 36430 TRANSFUSION BLD/BLD COMPNT: CPT

## 2024-01-01 PROCEDURE — 19082 BX BREAST ADD LESION STRTCTC: CPT | Mod: RT

## 2024-01-01 PROCEDURE — 93306 TTE W/DOPPLER COMPLETE: CPT | Performed by: INTERNAL MEDICINE

## 2024-01-01 PROCEDURE — 2500000001 HC RX 250 WO HCPCS SELF ADMINISTERED DRUGS (ALT 637 FOR MEDICARE OP): Performed by: EMERGENCY MEDICINE

## 2024-01-01 PROCEDURE — 76942 ECHO GUIDE FOR BIOPSY: CPT

## 2024-01-01 PROCEDURE — 86850 RBC ANTIBODY SCREEN: CPT

## 2024-01-01 PROCEDURE — 93971 EXTREMITY STUDY: CPT | Performed by: STUDENT IN AN ORGANIZED HEALTH CARE EDUCATION/TRAINING PROGRAM

## 2024-01-01 PROCEDURE — 85730 THROMBOPLASTIN TIME PARTIAL: CPT | Performed by: EMERGENCY MEDICINE

## 2024-01-01 PROCEDURE — 36620 INSERTION CATHETER ARTERY: CPT | Performed by: STUDENT IN AN ORGANIZED HEALTH CARE EDUCATION/TRAINING PROGRAM

## 2024-01-01 PROCEDURE — 80069 RENAL FUNCTION PANEL: CPT | Mod: CCI | Performed by: STUDENT IN AN ORGANIZED HEALTH CARE EDUCATION/TRAINING PROGRAM

## 2024-01-01 PROCEDURE — 19083 BX BREAST 1ST LESION US IMAG: CPT | Performed by: STUDENT IN AN ORGANIZED HEALTH CARE EDUCATION/TRAINING PROGRAM

## 2024-01-01 PROCEDURE — 86381 MITOCHONDRIAL ANTIBODY EACH: CPT

## 2024-01-01 PROCEDURE — 3430000001 HC RX 343 DIAGNOSTIC RADIOPHARMACEUTICALS: Performed by: INTERNAL MEDICINE

## 2024-01-01 PROCEDURE — 88305 TISSUE EXAM BY PATHOLOGIST: CPT | Performed by: PATHOLOGY

## 2024-01-01 PROCEDURE — 99231 SBSQ HOSP IP/OBS SF/LOW 25: CPT | Performed by: STUDENT IN AN ORGANIZED HEALTH CARE EDUCATION/TRAINING PROGRAM

## 2024-01-01 PROCEDURE — 0W9G3ZZ DRAINAGE OF PERITONEAL CAVITY, PERCUTANEOUS APPROACH: ICD-10-PCS | Performed by: STUDENT IN AN ORGANIZED HEALTH CARE EDUCATION/TRAINING PROGRAM

## 2024-01-01 PROCEDURE — 82435 ASSAY OF BLOOD CHLORIDE: CPT | Performed by: STUDENT IN AN ORGANIZED HEALTH CARE EDUCATION/TRAINING PROGRAM

## 2024-01-01 PROCEDURE — 93975 VASCULAR STUDY: CPT

## 2024-01-01 PROCEDURE — 86015 ACTIN ANTIBODY EACH: CPT

## 2024-01-01 PROCEDURE — 83930 ASSAY OF BLOOD OSMOLALITY: CPT

## 2024-01-01 PROCEDURE — 82140 ASSAY OF AMMONIA: CPT

## 2024-01-01 PROCEDURE — 84466 ASSAY OF TRANSFERRIN: CPT

## 2024-01-01 PROCEDURE — 99223 1ST HOSP IP/OBS HIGH 75: CPT | Performed by: INTERNAL MEDICINE

## 2024-01-01 PROCEDURE — 49083 ABD PARACENTESIS W/IMAGING: CPT | Performed by: RADIOLOGY

## 2024-01-01 PROCEDURE — 83516 IMMUNOASSAY NONANTIBODY: CPT

## 2024-01-01 PROCEDURE — 99223 1ST HOSP IP/OBS HIGH 75: CPT | Performed by: STUDENT IN AN ORGANIZED HEALTH CARE EDUCATION/TRAINING PROGRAM

## 2024-01-01 PROCEDURE — 86235 NUCLEAR ANTIGEN ANTIBODY: CPT

## 2024-01-01 PROCEDURE — 96374 THER/PROPH/DIAG INJ IV PUSH: CPT

## 2024-01-01 PROCEDURE — 86698 HISTOPLASMA ANTIBODY: CPT

## 2024-01-01 PROCEDURE — 82103 ALPHA-1-ANTITRYPSIN TOTAL: CPT

## 2024-01-01 PROCEDURE — 76642 ULTRASOUND BREAST LIMITED: CPT | Performed by: RADIOLOGY

## 2024-01-01 PROCEDURE — 93325 DOPPLER ECHO COLOR FLOW MAPG: CPT

## 2024-01-01 PROCEDURE — 0FB03ZX EXCISION OF LIVER, PERCUTANEOUS APPROACH, DIAGNOSTIC: ICD-10-PCS | Performed by: STUDENT IN AN ORGANIZED HEALTH CARE EDUCATION/TRAINING PROGRAM

## 2024-01-01 PROCEDURE — G0279 TOMOSYNTHESIS, MAMMO: HCPCS | Performed by: RADIOLOGY

## 2024-01-01 PROCEDURE — 97530 THERAPEUTIC ACTIVITIES: CPT | Mod: GP,CQ

## 2024-01-01 PROCEDURE — 82960 TEST FOR G6PD ENZYME: CPT

## 2024-01-01 PROCEDURE — 83605 ASSAY OF LACTIC ACID: CPT | Performed by: EMERGENCY MEDICINE

## 2024-01-01 PROCEDURE — 82270 OCCULT BLOOD FECES: CPT | Performed by: EMERGENCY MEDICINE

## 2024-01-01 PROCEDURE — 99292 CRITICAL CARE ADDL 30 MIN: CPT | Performed by: INTERNAL MEDICINE

## 2024-01-01 PROCEDURE — 86038 ANTINUCLEAR ANTIBODIES: CPT

## 2024-01-01 PROCEDURE — C1729 CATH, DRAINAGE: HCPCS

## 2024-01-01 PROCEDURE — 76982 USE 1ST TARGET LESION: CPT | Mod: 50

## 2024-01-01 PROCEDURE — 88341 IMHCHEM/IMCYTCHM EA ADD ANTB: CPT | Performed by: PATHOLOGY

## 2024-01-01 PROCEDURE — 36556 INSERT NON-TUNNEL CV CATH: CPT | Performed by: STUDENT IN AN ORGANIZED HEALTH CARE EDUCATION/TRAINING PROGRAM

## 2024-01-01 PROCEDURE — 19081 BX BREAST 1ST LESION STRTCTC: CPT | Mod: RT

## 2024-01-01 PROCEDURE — 49083 ABD PARACENTESIS W/IMAGING: CPT | Performed by: NURSE PRACTITIONER

## 2024-01-01 PROCEDURE — 88342 IMHCHEM/IMCYTCHM 1ST ANTB: CPT | Performed by: PATHOLOGY

## 2024-01-01 PROCEDURE — 88360 TUMOR IMMUNOHISTOCHEM/MANUAL: CPT

## 2024-01-01 PROCEDURE — 10035 PLMT SFT TISS LOCLZJ DEV 1ST: CPT | Performed by: STUDENT IN AN ORGANIZED HEALTH CARE EDUCATION/TRAINING PROGRAM

## 2024-01-01 PROCEDURE — P9045 ALBUMIN (HUMAN), 5%, 250 ML: HCPCS | Mod: JZ

## 2024-01-01 PROCEDURE — P9047 ALBUMIN (HUMAN), 25%, 50ML: HCPCS | Mod: JZ

## 2024-01-01 PROCEDURE — 77066 DX MAMMO INCL CAD BI: CPT | Performed by: RADIOLOGY

## 2024-01-01 PROCEDURE — 80321 ALCOHOLS BIOMARKERS 1OR 2: CPT | Performed by: STUDENT IN AN ORGANIZED HEALTH CARE EDUCATION/TRAINING PROGRAM

## 2024-01-01 PROCEDURE — 2500000004 HC RX 250 GENERAL PHARMACY W/ HCPCS (ALT 636 FOR OP/ED): Performed by: INTERNAL MEDICINE

## 2024-01-01 PROCEDURE — 99223 1ST HOSP IP/OBS HIGH 75: CPT

## 2024-01-01 PROCEDURE — 78306 BONE IMAGING WHOLE BODY: CPT | Performed by: NUCLEAR MEDICINE

## 2024-01-01 PROCEDURE — 87040 BLOOD CULTURE FOR BACTERIA: CPT | Performed by: STUDENT IN AN ORGANIZED HEALTH CARE EDUCATION/TRAINING PROGRAM

## 2024-01-01 PROCEDURE — 19081 BX BREAST 1ST LESION STRTCTC: CPT | Performed by: STUDENT IN AN ORGANIZED HEALTH CARE EDUCATION/TRAINING PROGRAM

## 2024-01-01 PROCEDURE — 83010 ASSAY OF HAPTOGLOBIN QUANT: CPT

## 2024-01-01 PROCEDURE — HNCIP HEMOC NO CHARGE INPATIENT HOSPITAL: Performed by: INTERNAL MEDICINE

## 2024-01-01 PROCEDURE — 85384 FIBRINOGEN ACTIVITY: CPT

## 2024-01-01 PROCEDURE — 97165 OT EVAL LOW COMPLEX 30 MIN: CPT | Mod: GO

## 2024-01-01 PROCEDURE — 31500 INSERT EMERGENCY AIRWAY: CPT | Performed by: STUDENT IN AN ORGANIZED HEALTH CARE EDUCATION/TRAINING PROGRAM

## 2024-01-01 PROCEDURE — 85610 PROTHROMBIN TIME: CPT | Performed by: STUDENT IN AN ORGANIZED HEALTH CARE EDUCATION/TRAINING PROGRAM

## 2024-01-01 PROCEDURE — 80053 COMPREHEN METABOLIC PANEL: CPT | Performed by: EMERGENCY MEDICINE

## 2024-01-01 PROCEDURE — 84450 TRANSFERASE (AST) (SGOT): CPT

## 2024-01-01 PROCEDURE — 83540 ASSAY OF IRON: CPT

## 2024-01-01 PROCEDURE — 93975 VASCULAR STUDY: CPT | Performed by: STUDENT IN AN ORGANIZED HEALTH CARE EDUCATION/TRAINING PROGRAM

## 2024-01-01 PROCEDURE — 70553 MRI BRAIN STEM W/O & W/DYE: CPT

## 2024-01-01 PROCEDURE — 73700 CT LOWER EXTREMITY W/O DYE: CPT | Mod: RT

## 2024-01-01 PROCEDURE — 93321 DOPPLER ECHO F-UP/LMTD STD: CPT | Performed by: INTERNAL MEDICINE

## 2024-01-01 PROCEDURE — A4217 STERILE WATER/SALINE, 500 ML: HCPCS | Performed by: STUDENT IN AN ORGANIZED HEALTH CARE EDUCATION/TRAINING PROGRAM

## 2024-01-01 PROCEDURE — 77066 DX MAMMO INCL CAD BI: CPT | Performed by: STUDENT IN AN ORGANIZED HEALTH CARE EDUCATION/TRAINING PROGRAM

## 2024-01-01 PROCEDURE — 78306 BONE IMAGING WHOLE BODY: CPT

## 2024-01-01 PROCEDURE — 87340 HEPATITIS B SURFACE AG IA: CPT

## 2024-01-01 PROCEDURE — 76705 ECHO EXAM OF ABDOMEN: CPT

## 2024-01-01 PROCEDURE — 77062 BREAST TOMOSYNTHESIS BI: CPT

## 2024-01-01 PROCEDURE — 07B63ZX EXCISION OF LEFT AXILLARY LYMPHATIC, PERCUTANEOUS APPROACH, DIAGNOSTIC: ICD-10-PCS | Performed by: STUDENT IN AN ORGANIZED HEALTH CARE EDUCATION/TRAINING PROGRAM

## 2024-01-01 PROCEDURE — P9073 PLATELETS PHERESIS PATH REDU: HCPCS

## 2024-01-01 PROCEDURE — 74183 MRI ABD W/O CNTR FLWD CNTR: CPT

## 2024-01-01 PROCEDURE — 82746 ASSAY OF FOLIC ACID SERUM: CPT

## 2024-01-01 PROCEDURE — 99221 1ST HOSP IP/OBS SF/LOW 40: CPT

## 2024-01-01 PROCEDURE — 99221 1ST HOSP IP/OBS SF/LOW 40: CPT | Performed by: INTERNAL MEDICINE

## 2024-01-01 PROCEDURE — 93010 ELECTROCARDIOGRAM REPORT: CPT | Performed by: INTERNAL MEDICINE

## 2024-01-01 PROCEDURE — 81001 URINALYSIS AUTO W/SCOPE: CPT

## 2024-01-01 PROCEDURE — 93325 DOPPLER ECHO COLOR FLOW MAPG: CPT | Performed by: INTERNAL MEDICINE

## 2024-01-01 PROCEDURE — 0HBV3ZX EXCISION OF BILATERAL BREAST, PERCUTANEOUS APPROACH, DIAGNOSTIC: ICD-10-PCS | Performed by: STUDENT IN AN ORGANIZED HEALTH CARE EDUCATION/TRAINING PROGRAM

## 2024-01-01 PROCEDURE — 87086 URINE CULTURE/COLONY COUNT: CPT | Mod: CONLAB | Performed by: EMERGENCY MEDICINE

## 2024-01-01 PROCEDURE — 38505 NEEDLE BIOPSY LYMPH NODES: CPT | Performed by: STUDENT IN AN ORGANIZED HEALTH CARE EDUCATION/TRAINING PROGRAM

## 2024-01-01 PROCEDURE — 88360 TUMOR IMMUNOHISTOCHEM/MANUAL: CPT | Mod: TC,SUR,91 | Performed by: INTERNAL MEDICINE

## 2024-01-01 PROCEDURE — 36556 INSERT NON-TUNNEL CV CATH: CPT

## 2024-01-01 PROCEDURE — 85652 RBC SED RATE AUTOMATED: CPT

## 2024-01-01 PROCEDURE — 84145 PROCALCITONIN (PCT): CPT | Performed by: STUDENT IN AN ORGANIZED HEALTH CARE EDUCATION/TRAINING PROGRAM

## 2024-01-01 PROCEDURE — 76705 ECHO EXAM OF ABDOMEN: CPT | Performed by: STUDENT IN AN ORGANIZED HEALTH CARE EDUCATION/TRAINING PROGRAM

## 2024-01-01 RX ORDER — PANTOPRAZOLE SODIUM 40 MG/1
40 TABLET, DELAYED RELEASE ORAL
Status: DISCONTINUED | OUTPATIENT
Start: 2024-01-01 | End: 2024-01-01

## 2024-01-01 RX ORDER — OXYCODONE HYDROCHLORIDE 5 MG/1
5 TABLET ORAL EVERY 6 HOURS PRN
Status: DISCONTINUED | OUTPATIENT
Start: 2024-01-01 | End: 2024-01-01

## 2024-01-01 RX ORDER — DEXTROSE MONOHYDRATE AND SODIUM CHLORIDE 5; .9 G/100ML; G/100ML
100 INJECTION, SOLUTION INTRAVENOUS CONTINUOUS
Status: DISCONTINUED | OUTPATIENT
Start: 2024-01-01 | End: 2024-01-01 | Stop reason: HOSPADM

## 2024-01-01 RX ORDER — PALONOSETRON 0.05 MG/ML
250 INJECTION, SOLUTION INTRAVENOUS ONCE
Status: CANCELLED | OUTPATIENT
Start: 2024-03-06

## 2024-01-01 RX ORDER — DEXAMETHASONE 4 MG/1
4 TABLET ORAL 2 TIMES DAILY
Status: DISCONTINUED | OUTPATIENT
Start: 2024-01-01 | End: 2024-01-01

## 2024-01-01 RX ORDER — ONDANSETRON HYDROCHLORIDE 2 MG/ML
INJECTION, SOLUTION INTRAVENOUS
Status: COMPLETED
Start: 2024-01-01 | End: 2024-01-01

## 2024-01-01 RX ORDER — ONDANSETRON HYDROCHLORIDE 2 MG/ML
4 INJECTION, SOLUTION INTRAVENOUS ONCE
Status: COMPLETED | OUTPATIENT
Start: 2024-01-01 | End: 2024-01-01

## 2024-01-01 RX ORDER — CEFTRIAXONE 1 G/50ML
1 INJECTION, SOLUTION INTRAVENOUS EVERY 24 HOURS
Status: DISCONTINUED | OUTPATIENT
Start: 2024-01-01 | End: 2024-01-01

## 2024-01-01 RX ORDER — HYDROXYZINE HYDROCHLORIDE 25 MG/1
25 TABLET, FILM COATED ORAL ONCE
Status: COMPLETED | OUTPATIENT
Start: 2024-01-01 | End: 2024-01-01

## 2024-01-01 RX ORDER — EPINEPHRINE 0.1 MG/ML
0.3 INJECTION INTRACARDIAC; INTRAVENOUS EVERY 5 MIN PRN
Status: DISCONTINUED | OUTPATIENT
Start: 2024-01-01 | End: 2024-01-01 | Stop reason: HOSPADM

## 2024-01-01 RX ORDER — CALCIUM GLUCONATE 20 MG/ML
2 INJECTION, SOLUTION INTRAVENOUS ONCE
Status: COMPLETED | OUTPATIENT
Start: 2024-01-01 | End: 2024-01-01

## 2024-01-01 RX ORDER — LACTULOSE 10 G/15ML
20 SOLUTION ORAL 2 TIMES DAILY
Status: DISCONTINUED | OUTPATIENT
Start: 2024-01-01 | End: 2024-01-01 | Stop reason: HOSPADM

## 2024-01-01 RX ORDER — PHENYLEPHRINE HCL IN 0.9% NACL 0.4MG/10ML
40 SYRINGE (ML) INTRAVENOUS ONCE
Status: DISCONTINUED | OUTPATIENT
Start: 2024-01-01 | End: 2024-01-01

## 2024-01-01 RX ORDER — ENOXAPARIN SODIUM 100 MG/ML
40 INJECTION SUBCUTANEOUS EVERY 24 HOURS
Status: DISCONTINUED | OUTPATIENT
Start: 2024-01-01 | End: 2024-01-01

## 2024-01-01 RX ORDER — ETOMIDATE 2 MG/ML
20 INJECTION INTRAVENOUS ONCE
Status: COMPLETED | OUTPATIENT
Start: 2024-01-01 | End: 2024-01-01

## 2024-01-01 RX ORDER — SODIUM CHLORIDE 9 MG/ML
50 INJECTION, SOLUTION INTRAVENOUS CONTINUOUS
Status: DISCONTINUED | OUTPATIENT
Start: 2024-01-01 | End: 2024-01-01

## 2024-01-01 RX ORDER — ROCURONIUM BROMIDE 10 MG/ML
100 INJECTION, SOLUTION INTRAVENOUS ONCE
Status: COMPLETED | OUTPATIENT
Start: 2024-01-01 | End: 2024-01-01

## 2024-01-01 RX ORDER — FAMOTIDINE 10 MG/ML
20 INJECTION INTRAVENOUS ONCE AS NEEDED
Status: CANCELLED | OUTPATIENT
Start: 2024-01-01

## 2024-01-01 RX ORDER — PROPOFOL 10 MG/ML
INJECTION, EMULSION INTRAVENOUS
Status: COMPLETED
Start: 2024-01-01 | End: 2024-01-01

## 2024-01-01 RX ORDER — FENTANYL CITRATE 50 UG/ML
150 INJECTION, SOLUTION INTRAMUSCULAR; INTRAVENOUS ONCE
Status: DISCONTINUED | OUTPATIENT
Start: 2024-01-01 | End: 2024-01-01

## 2024-01-01 RX ORDER — DEXAMETHASONE 4 MG/1
4 TABLET ORAL
Status: DISCONTINUED | OUTPATIENT
Start: 2024-01-01 | End: 2024-01-01

## 2024-01-01 RX ORDER — POLYETHYLENE GLYCOL 3350 17 G/17G
17 POWDER, FOR SOLUTION ORAL DAILY
Status: DISCONTINUED | OUTPATIENT
Start: 2024-01-01 | End: 2024-01-01

## 2024-01-01 RX ORDER — LACTULOSE 10 G/15ML
20 SOLUTION ORAL 3 TIMES DAILY
Status: DISCONTINUED | OUTPATIENT
Start: 2024-01-01 | End: 2024-01-01

## 2024-01-01 RX ORDER — ALBUMIN HUMAN 250 G/1000ML
25 SOLUTION INTRAVENOUS ONCE
Status: DISCONTINUED | OUTPATIENT
Start: 2024-01-01 | End: 2024-01-01

## 2024-01-01 RX ORDER — MIDAZOLAM HYDROCHLORIDE 1 MG/ML
INJECTION INTRAMUSCULAR; INTRAVENOUS
Status: DISPENSED
Start: 2024-01-01 | End: 2024-01-01

## 2024-01-01 RX ORDER — ALBUTEROL SULFATE 0.83 MG/ML
3 SOLUTION RESPIRATORY (INHALATION) AS NEEDED
Status: CANCELLED | OUTPATIENT
Start: 2024-01-01

## 2024-01-01 RX ORDER — INDOMETHACIN 25 MG/1
50 CAPSULE ORAL ONCE
Status: COMPLETED | OUTPATIENT
Start: 2024-01-01 | End: 2024-01-01

## 2024-01-01 RX ORDER — PROCHLORPERAZINE MALEATE 10 MG
10 TABLET ORAL EVERY 6 HOURS PRN
Status: CANCELLED | OUTPATIENT
Start: 2024-03-06

## 2024-01-01 RX ORDER — ACETAMINOPHEN 650 MG/1
650 SUPPOSITORY RECTAL ONCE
Status: COMPLETED | OUTPATIENT
Start: 2024-01-01 | End: 2024-01-01

## 2024-01-01 RX ORDER — FUROSEMIDE 20 MG/1
20 TABLET ORAL ONCE
Status: COMPLETED | OUTPATIENT
Start: 2024-01-01 | End: 2024-01-01

## 2024-01-01 RX ORDER — POTASSIUM CHLORIDE 20 MEQ/1
40 TABLET, EXTENDED RELEASE ORAL ONCE
Status: COMPLETED | OUTPATIENT
Start: 2024-01-01 | End: 2024-01-01

## 2024-01-01 RX ORDER — ALBUMIN HUMAN 50 G/1000ML
25 SOLUTION INTRAVENOUS ONCE
Status: COMPLETED | OUTPATIENT
Start: 2024-01-01 | End: 2024-01-01

## 2024-01-01 RX ORDER — PROCHLORPERAZINE EDISYLATE 5 MG/ML
10 INJECTION INTRAMUSCULAR; INTRAVENOUS EVERY 6 HOURS PRN
Status: CANCELLED | OUTPATIENT
Start: 2024-01-01

## 2024-01-01 RX ORDER — ETOMIDATE 2 MG/ML
INJECTION INTRAVENOUS CODE/TRAUMA/SEDATION MEDICATION
Status: COMPLETED | OUTPATIENT
Start: 2024-01-01 | End: 2024-01-01

## 2024-01-01 RX ORDER — NAPROXEN 250 MG/1
250 TABLET ORAL ONCE
Status: COMPLETED | OUTPATIENT
Start: 2024-01-01 | End: 2024-01-01

## 2024-01-01 RX ORDER — ACETAMINOPHEN 650 MG/1
650 SUPPOSITORY RECTAL EVERY 8 HOURS PRN
Status: DISCONTINUED | OUTPATIENT
Start: 2024-01-01 | End: 2024-01-01 | Stop reason: HOSPADM

## 2024-01-01 RX ORDER — LORAZEPAM 2 MG/ML
0.5 INJECTION INTRAMUSCULAR EVERY 4 HOURS PRN
Status: DISCONTINUED | OUTPATIENT
Start: 2024-01-01 | End: 2024-01-01 | Stop reason: HOSPADM

## 2024-01-01 RX ORDER — LACTULOSE 10 G/15ML
10 SOLUTION ORAL 3 TIMES DAILY
Status: DISCONTINUED | OUTPATIENT
Start: 2024-01-01 | End: 2024-01-01

## 2024-01-01 RX ORDER — DEXTROSE MONOHYDRATE 100 MG/ML
35 INJECTION, SOLUTION INTRAVENOUS CONTINUOUS
Status: DISCONTINUED | OUTPATIENT
Start: 2024-01-01 | End: 2024-01-01 | Stop reason: HOSPADM

## 2024-01-01 RX ORDER — DEXAMETHASONE SODIUM PHOSPHATE 4 MG/ML
4 INJECTION, SOLUTION INTRA-ARTICULAR; INTRALESIONAL; INTRAMUSCULAR; INTRAVENOUS; SOFT TISSUE EVERY 12 HOURS
Status: DISCONTINUED | OUTPATIENT
Start: 2024-01-01 | End: 2024-01-01

## 2024-01-01 RX ORDER — ACETAMINOPHEN 160 MG/5ML
650 SOLUTION ORAL EVERY 8 HOURS PRN
Status: DISCONTINUED | OUTPATIENT
Start: 2024-01-01 | End: 2024-01-01 | Stop reason: HOSPADM

## 2024-01-01 RX ORDER — DEXTROSE 50 % IN WATER (D50W) INTRAVENOUS SYRINGE
25 ONCE
Status: COMPLETED | OUTPATIENT
Start: 2024-01-01 | End: 2024-01-01

## 2024-01-01 RX ORDER — FUROSEMIDE 20 MG/1
20 TABLET ORAL DAILY
Status: DISCONTINUED | OUTPATIENT
Start: 2024-01-01 | End: 2024-01-01

## 2024-01-01 RX ORDER — CALCIUM GLUCONATE 20 MG/ML
2 INJECTION, SOLUTION INTRAVENOUS EVERY 4 HOURS
Status: COMPLETED | OUTPATIENT
Start: 2024-01-01 | End: 2024-01-01

## 2024-01-01 RX ORDER — DIPHENHYDRAMINE HYDROCHLORIDE 50 MG/ML
50 INJECTION INTRAMUSCULAR; INTRAVENOUS AS NEEDED
Status: CANCELLED | OUTPATIENT
Start: 2024-01-01

## 2024-01-01 RX ORDER — SPIRONOLACTONE 25 MG/1
50 TABLET ORAL DAILY
Status: DISCONTINUED | OUTPATIENT
Start: 2024-01-01 | End: 2024-01-01 | Stop reason: HOSPADM

## 2024-01-01 RX ORDER — FUROSEMIDE 40 MG/1
20 TABLET ORAL DAILY
Status: DISCONTINUED | OUTPATIENT
Start: 2024-01-01 | End: 2024-01-01

## 2024-01-01 RX ORDER — ACETAMINOPHEN 325 MG/1
650 TABLET ORAL EVERY 8 HOURS PRN
Status: DISCONTINUED | OUTPATIENT
Start: 2024-01-01 | End: 2024-01-01 | Stop reason: HOSPADM

## 2024-01-01 RX ORDER — NOREPINEPHRINE BITARTRATE/D5W 8 MG/250ML
PLASTIC BAG, INJECTION (ML) INTRAVENOUS
Status: DISPENSED
Start: 2024-01-01 | End: 2024-01-01

## 2024-01-01 RX ORDER — LACTULOSE 10 G/15ML
20 SOLUTION ORAL DAILY
Status: DISCONTINUED | OUTPATIENT
Start: 2024-01-01 | End: 2024-01-01

## 2024-01-01 RX ORDER — PROPOFOL 10 MG/ML
5-20 INJECTION, EMULSION INTRAVENOUS CONTINUOUS
Status: DISCONTINUED | OUTPATIENT
Start: 2024-01-01 | End: 2024-01-01 | Stop reason: HOSPADM

## 2024-01-01 RX ORDER — EPINEPHRINE 0.3 MG/.3ML
0.3 INJECTION SUBCUTANEOUS EVERY 5 MIN PRN
Status: CANCELLED | OUTPATIENT
Start: 2024-03-06

## 2024-01-01 RX ORDER — ONDANSETRON HYDROCHLORIDE 2 MG/ML
8 INJECTION, SOLUTION INTRAVENOUS ONCE
Status: CANCELLED
Start: 2024-01-01 | End: 2024-01-01

## 2024-01-01 RX ORDER — NOREPINEPHRINE BITARTRATE/D5W 8 MG/250ML
0-3.3 PLASTIC BAG, INJECTION (ML) INTRAVENOUS CONTINUOUS
Status: DISCONTINUED | OUTPATIENT
Start: 2024-01-01 | End: 2024-01-01

## 2024-01-01 RX ORDER — POLYETHYLENE GLYCOL 3350 17 G/17G
17 POWDER, FOR SOLUTION ORAL DAILY
Status: DISCONTINUED | OUTPATIENT
Start: 2024-01-01 | End: 2024-01-01 | Stop reason: HOSPADM

## 2024-01-01 RX ORDER — HYDROMORPHONE HYDROCHLORIDE 1 MG/ML
0.2 INJECTION, SOLUTION INTRAMUSCULAR; INTRAVENOUS; SUBCUTANEOUS ONCE
Status: COMPLETED | OUTPATIENT
Start: 2024-01-01 | End: 2024-01-01

## 2024-01-01 RX ORDER — GADOTERATE MEGLUMINE 376.9 MG/ML
18 INJECTION INTRAVENOUS
Status: COMPLETED | OUTPATIENT
Start: 2024-01-01 | End: 2024-01-01

## 2024-01-01 RX ORDER — ANASTROZOLE 1 MG/1
1 TABLET ORAL DAILY
Status: DISCONTINUED | OUTPATIENT
Start: 2024-01-01 | End: 2024-01-01

## 2024-01-01 RX ORDER — MEROPENEM 1 G/1
1000 INJECTION, POWDER, FOR SOLUTION INTRAVENOUS 3 TIMES DAILY
Status: DISCONTINUED | OUTPATIENT
Start: 2024-01-01 | End: 2024-01-01

## 2024-01-01 RX ORDER — FERROUS SULFATE, DRIED 160(50) MG
2 TABLET, EXTENDED RELEASE ORAL DAILY
Status: DISCONTINUED | OUTPATIENT
Start: 2024-01-01 | End: 2024-01-01

## 2024-01-01 RX ORDER — FAMOTIDINE 10 MG/ML
20 INJECTION INTRAVENOUS ONCE AS NEEDED
Status: CANCELLED | OUTPATIENT
Start: 2024-03-06

## 2024-01-01 RX ORDER — PHENYLEPHRINE HCL IN 0.9% NACL 1 MG/10 ML
40 SYRINGE (ML) INTRAVENOUS ONCE
Status: DISCONTINUED | OUTPATIENT
Start: 2024-01-01 | End: 2024-01-01

## 2024-01-01 RX ORDER — FENTANYL CITRATE 50 UG/ML
INJECTION, SOLUTION INTRAMUSCULAR; INTRAVENOUS
Status: DISPENSED
Start: 2024-01-01 | End: 2024-01-01

## 2024-01-01 RX ORDER — OLANZAPINE 5 MG/1
5 TABLET ORAL NIGHTLY
Status: DISCONTINUED | OUTPATIENT
Start: 2024-01-01 | End: 2024-01-01

## 2024-01-01 RX ORDER — CALCIUM GLUCONATE 20 MG/ML
2 INJECTION, SOLUTION INTRAVENOUS EVERY 4 HOURS
Status: DISPENSED | OUTPATIENT
Start: 2024-01-01 | End: 2024-01-01

## 2024-01-01 RX ORDER — MEROPENEM 1 G/1
1000 INJECTION, POWDER, FOR SOLUTION INTRAVENOUS 2 TIMES DAILY
Status: DISCONTINUED | OUTPATIENT
Start: 2024-01-01 | End: 2024-01-01

## 2024-01-01 RX ORDER — DEXAMETHASONE SODIUM PHOSPHATE 100 MG/10ML
12 INJECTION INTRAMUSCULAR; INTRAVENOUS ONCE
Status: CANCELLED | OUTPATIENT
Start: 2024-03-06

## 2024-01-01 RX ORDER — DIPHENHYDRAMINE HYDROCHLORIDE 50 MG/ML
50 INJECTION INTRAMUSCULAR; INTRAVENOUS AS NEEDED
Status: CANCELLED | OUTPATIENT
Start: 2024-03-06

## 2024-01-01 RX ORDER — SODIUM CHLORIDE 9 MG/ML
125 INJECTION, SOLUTION INTRAVENOUS CONTINUOUS
Status: DISCONTINUED | OUTPATIENT
Start: 2024-01-01 | End: 2024-01-01 | Stop reason: HOSPADM

## 2024-01-01 RX ORDER — CALCIUM GLUCONATE 20 MG/ML
2 INJECTION, SOLUTION INTRAVENOUS ONCE
Status: DISCONTINUED | OUTPATIENT
Start: 2024-01-01 | End: 2024-01-01

## 2024-01-01 RX ORDER — FAMOTIDINE 10 MG/ML
20 INJECTION INTRAVENOUS ONCE AS NEEDED
Status: DISCONTINUED | OUTPATIENT
Start: 2024-01-01 | End: 2024-01-01 | Stop reason: HOSPADM

## 2024-01-01 RX ORDER — LIDOCAINE 560 MG/1
1 PATCH PERCUTANEOUS; TOPICAL; TRANSDERMAL DAILY
Status: DISCONTINUED | OUTPATIENT
Start: 2024-01-01 | End: 2024-01-01 | Stop reason: HOSPADM

## 2024-01-01 RX ORDER — VANCOMYCIN HYDROCHLORIDE 750 MG/150ML
750 INJECTION, SOLUTION INTRAVENOUS ONCE
Status: COMPLETED | OUTPATIENT
Start: 2024-01-01 | End: 2024-01-01

## 2024-01-01 RX ORDER — DEXAMETHASONE 4 MG/1
4 TABLET ORAL DAILY
Status: DISCONTINUED | OUTPATIENT
Start: 2024-01-01 | End: 2024-01-01

## 2024-01-01 RX ORDER — DEXAMETHASONE SODIUM PHOSPHATE 4 MG/ML
8 INJECTION, SOLUTION INTRA-ARTICULAR; INTRALESIONAL; INTRAMUSCULAR; INTRAVENOUS; SOFT TISSUE ONCE
Status: CANCELLED
Start: 2024-01-01 | End: 2024-01-01

## 2024-01-01 RX ORDER — HYDROMORPHONE HYDROCHLORIDE 1 MG/ML
0.4 INJECTION, SOLUTION INTRAMUSCULAR; INTRAVENOUS; SUBCUTANEOUS
Status: DISCONTINUED | OUTPATIENT
Start: 2024-01-01 | End: 2024-01-01

## 2024-01-01 RX ORDER — PROCHLORPERAZINE MALEATE 10 MG
10 TABLET ORAL EVERY 6 HOURS PRN
Status: CANCELLED | OUTPATIENT
Start: 2024-01-01

## 2024-01-01 RX ORDER — ALLOPURINOL 300 MG/1
300 TABLET ORAL DAILY
Status: DISCONTINUED | OUTPATIENT
Start: 2024-01-01 | End: 2024-01-01

## 2024-01-01 RX ORDER — DEXTROSE 50 % IN WATER (D50W) INTRAVENOUS SYRINGE
Status: COMPLETED
Start: 2024-01-01 | End: 2024-01-01

## 2024-01-01 RX ORDER — PROCHLORPERAZINE EDISYLATE 5 MG/ML
10 INJECTION INTRAMUSCULAR; INTRAVENOUS EVERY 6 HOURS PRN
Status: CANCELLED | OUTPATIENT
Start: 2024-03-06

## 2024-01-01 RX ORDER — FENTANYL CITRATE-0.9 % NACL/PF 10 MCG/ML
25-200 PLASTIC BAG, INJECTION (ML) INTRAVENOUS CONTINUOUS
Status: DISCONTINUED | OUTPATIENT
Start: 2024-01-01 | End: 2024-01-01 | Stop reason: HOSPADM

## 2024-01-01 RX ORDER — DEXTROSE 50 % IN WATER (D50W) INTRAVENOUS SYRINGE
25 ONCE
Status: DISCONTINUED | OUTPATIENT
Start: 2024-01-01 | End: 2024-01-01 | Stop reason: HOSPADM

## 2024-01-01 RX ORDER — VANCOMYCIN HYDROCHLORIDE 1 G/200ML
1000 INJECTION, SOLUTION INTRAVENOUS EVERY 12 HOURS
Status: DISCONTINUED | OUTPATIENT
Start: 2024-01-01 | End: 2024-01-01

## 2024-01-01 RX ORDER — PROCHLORPERAZINE MALEATE 10 MG
10 TABLET ORAL EVERY 6 HOURS PRN
Status: DISCONTINUED | OUTPATIENT
Start: 2024-01-01 | End: 2024-01-01 | Stop reason: HOSPADM

## 2024-01-01 RX ORDER — HYDROMORPHONE HYDROCHLORIDE 1 MG/ML
0.4 INJECTION, SOLUTION INTRAMUSCULAR; INTRAVENOUS; SUBCUTANEOUS ONCE
Status: DISCONTINUED | OUTPATIENT
Start: 2024-01-01 | End: 2024-01-01

## 2024-01-01 RX ORDER — CLINDAMYCIN PHOSPHATE 900 MG/50ML
900 INJECTION, SOLUTION INTRAVENOUS EVERY 8 HOURS
Status: DISCONTINUED | OUTPATIENT
Start: 2024-01-01 | End: 2024-01-01

## 2024-01-01 RX ORDER — FENTANYL CITRATE 50 UG/ML
25 INJECTION, SOLUTION INTRAMUSCULAR; INTRAVENOUS EVERY 30 MIN PRN
Status: DISCONTINUED | OUTPATIENT
Start: 2024-01-01 | End: 2024-01-01 | Stop reason: HOSPADM

## 2024-01-01 RX ORDER — PROCHLORPERAZINE EDISYLATE 5 MG/ML
10 INJECTION INTRAMUSCULAR; INTRAVENOUS EVERY 6 HOURS PRN
Status: DISCONTINUED | OUTPATIENT
Start: 2024-01-01 | End: 2024-01-01 | Stop reason: HOSPADM

## 2024-01-01 RX ORDER — ACETAMINOPHEN 325 MG/1
650 TABLET ORAL ONCE
Status: COMPLETED | OUTPATIENT
Start: 2024-01-01 | End: 2024-01-01

## 2024-01-01 RX ORDER — OXYCODONE HYDROCHLORIDE 5 MG/1
5 TABLET ORAL EVERY 4 HOURS PRN
Status: DISCONTINUED | OUTPATIENT
Start: 2024-01-01 | End: 2024-01-01

## 2024-01-01 RX ORDER — ETOMIDATE 2 MG/ML
INJECTION INTRAVENOUS
Status: COMPLETED
Start: 2024-01-01 | End: 2024-01-01

## 2024-01-01 RX ORDER — PHENYLEPHRINE HCL IN 0.9% NACL 0.4MG/10ML
SYRINGE (ML) INTRAVENOUS
Status: COMPLETED
Start: 2024-01-01 | End: 2024-01-01

## 2024-01-01 RX ORDER — PANTOPRAZOLE SODIUM 40 MG/10ML
40 INJECTION, POWDER, LYOPHILIZED, FOR SOLUTION INTRAVENOUS DAILY
Status: DISCONTINUED | OUTPATIENT
Start: 2024-01-01 | End: 2024-01-01 | Stop reason: HOSPADM

## 2024-01-01 RX ORDER — DEXTROSE MONOHYDRATE 100 MG/ML
50 INJECTION, SOLUTION INTRAVENOUS CONTINUOUS
Status: DISCONTINUED | OUTPATIENT
Start: 2024-01-01 | End: 2024-01-01 | Stop reason: HOSPADM

## 2024-01-01 RX ORDER — NAPROXEN 250 MG/1
TABLET ORAL
Status: COMPLETED
Start: 2024-01-01 | End: 2024-01-01

## 2024-01-01 RX ORDER — MEROPENEM 1 G/1
1000 INJECTION, POWDER, FOR SOLUTION INTRAVENOUS EVERY 12 HOURS
Status: DISCONTINUED | OUTPATIENT
Start: 2024-01-01 | End: 2024-01-01 | Stop reason: HOSPADM

## 2024-01-01 RX ORDER — DEXTROSE MONOHYDRATE 100 MG/ML
100 INJECTION, SOLUTION INTRAVENOUS CONTINUOUS
Status: DISCONTINUED | OUTPATIENT
Start: 2024-01-01 | End: 2024-01-01

## 2024-01-01 RX ORDER — DEXTROSE MONOHYDRATE 100 MG/ML
25 INJECTION, SOLUTION INTRAVENOUS CONTINUOUS
Status: ACTIVE | OUTPATIENT
Start: 2024-01-01 | End: 2024-01-01

## 2024-01-01 RX ORDER — ALBUTEROL SULFATE 0.83 MG/ML
3 SOLUTION RESPIRATORY (INHALATION) AS NEEDED
Status: CANCELLED | OUTPATIENT
Start: 2024-03-06

## 2024-01-01 RX ORDER — OLANZAPINE 2.5 MG/1
2.5 TABLET ORAL NIGHTLY
Status: DISCONTINUED | OUTPATIENT
Start: 2024-01-01 | End: 2024-01-01 | Stop reason: HOSPADM

## 2024-01-01 RX ORDER — FENTANYL CITRATE-0.9 % NACL/PF 10 MCG/ML
PLASTIC BAG, INJECTION (ML) INTRAVENOUS
Status: COMPLETED
Start: 2024-01-01 | End: 2024-01-01

## 2024-01-01 RX ORDER — NOREPINEPHRINE BITARTRATE/D5W 8 MG/250ML
0-3.3 PLASTIC BAG, INJECTION (ML) INTRAVENOUS CONTINUOUS
Status: DISCONTINUED | OUTPATIENT
Start: 2024-01-01 | End: 2024-01-01 | Stop reason: HOSPADM

## 2024-01-01 RX ORDER — INDOMETHACIN 25 MG/1
CAPSULE ORAL
Status: DISPENSED
Start: 2024-01-01 | End: 2024-01-01

## 2024-01-01 RX ORDER — ROCURONIUM BROMIDE 10 MG/ML
INJECTION, SOLUTION INTRAVENOUS
Status: COMPLETED
Start: 2024-01-01 | End: 2024-01-01

## 2024-01-01 RX ORDER — ALBUTEROL SULFATE 0.83 MG/ML
3 SOLUTION RESPIRATORY (INHALATION) AS NEEDED
Status: DISCONTINUED | OUTPATIENT
Start: 2024-01-01 | End: 2024-01-01 | Stop reason: HOSPADM

## 2024-01-01 RX ORDER — HYDROMORPHONE HYDROCHLORIDE 1 MG/ML
0.4 INJECTION, SOLUTION INTRAMUSCULAR; INTRAVENOUS; SUBCUTANEOUS
Status: DISCONTINUED | OUTPATIENT
Start: 2024-01-01 | End: 2024-01-01 | Stop reason: HOSPADM

## 2024-01-01 RX ORDER — ALBUMIN HUMAN 250 G/1000ML
25 SOLUTION INTRAVENOUS ONCE
Status: COMPLETED | OUTPATIENT
Start: 2024-01-01 | End: 2024-01-01

## 2024-01-01 RX ORDER — DIPHENHYDRAMINE HYDROCHLORIDE 50 MG/ML
50 INJECTION INTRAMUSCULAR; INTRAVENOUS AS NEEDED
Status: DISCONTINUED | OUTPATIENT
Start: 2024-01-01 | End: 2024-01-01 | Stop reason: HOSPADM

## 2024-01-01 RX ORDER — EPINEPHRINE 0.3 MG/.3ML
0.3 INJECTION SUBCUTANEOUS EVERY 5 MIN PRN
Status: CANCELLED | OUTPATIENT
Start: 2024-01-01

## 2024-01-01 RX ORDER — VANCOMYCIN HYDROCHLORIDE 1 G/20ML
INJECTION, POWDER, LYOPHILIZED, FOR SOLUTION INTRAVENOUS DAILY PRN
Status: DISCONTINUED | OUTPATIENT
Start: 2024-01-01 | End: 2024-01-01 | Stop reason: HOSPADM

## 2024-01-01 RX ORDER — MIDAZOLAM HYDROCHLORIDE 1 MG/ML
4 INJECTION INTRAMUSCULAR; INTRAVENOUS ONCE
Status: DISCONTINUED | OUTPATIENT
Start: 2024-01-01 | End: 2024-01-01

## 2024-01-01 RX ORDER — DICLOFENAC SODIUM 10 MG/G
4 GEL TOPICAL 4 TIMES DAILY PRN
Status: DISCONTINUED | OUTPATIENT
Start: 2024-01-01 | End: 2024-01-01 | Stop reason: HOSPADM

## 2024-01-01 RX ORDER — ACETAMINOPHEN 500 MG
5 TABLET ORAL NIGHTLY PRN
Status: DISCONTINUED | OUTPATIENT
Start: 2024-01-01 | End: 2024-01-01 | Stop reason: HOSPADM

## 2024-01-01 RX ADMIN — Medication 0.28 MCG/KG/MIN: at 16:46

## 2024-01-01 RX ADMIN — CALCIUM GLUCONATE 2 G: 20 INJECTION, SOLUTION INTRAVENOUS at 17:07

## 2024-01-01 RX ADMIN — HYDROCORTISONE SODIUM SUCCINATE 50 MG: 100 INJECTION, POWDER, FOR SOLUTION INTRAMUSCULAR; INTRAVENOUS at 13:36

## 2024-01-01 RX ADMIN — DEXTROSE 50 % IN WATER (D50W) INTRAVENOUS SYRINGE 50 ML: at 16:39

## 2024-01-01 RX ADMIN — Medication 0.24 MCG/KG/MIN: at 08:12

## 2024-01-01 RX ADMIN — Medication 2 TABLET: at 08:13

## 2024-01-01 RX ADMIN — DEXAMETHASONE 4 MG: 4 TABLET ORAL at 09:02

## 2024-01-01 RX ADMIN — DEXTROSE MONOHYDRATE 35 ML/HR: 100 INJECTION, SOLUTION INTRAVENOUS at 03:24

## 2024-01-01 RX ADMIN — PREDNISONE 30 MG: 5 TABLET ORAL at 08:13

## 2024-01-01 RX ADMIN — Medication 0.22 MCG/KG/MIN: at 12:28

## 2024-01-01 RX ADMIN — LIDOCAINE 1 PATCH: 4 PATCH TOPICAL at 12:22

## 2024-01-01 RX ADMIN — PROPOFOL 15 MCG/KG/MIN: 10 INJECTION, EMULSION INTRAVENOUS at 16:53

## 2024-01-01 RX ADMIN — SODIUM CHLORIDE 125 ML/HR: 9 INJECTION, SOLUTION INTRAVENOUS at 18:54

## 2024-01-01 RX ADMIN — OXYCODONE HYDROCHLORIDE 5 MG: 5 TABLET ORAL at 20:50

## 2024-01-01 RX ADMIN — PANTOPRAZOLE SODIUM 40 MG: 40 TABLET, DELAYED RELEASE ORAL at 08:05

## 2024-01-01 RX ADMIN — Medication 0.16 MCG/KG/MIN: at 11:04

## 2024-01-01 RX ADMIN — ANASTROZOLE 1 MG: 1 TABLET, COATED ORAL at 08:42

## 2024-01-01 RX ADMIN — ACETAMINOPHEN 650 MG: 325 TABLET ORAL at 13:50

## 2024-01-01 RX ADMIN — Medication 25 ML: at 11:34

## 2024-01-01 RX ADMIN — ENOXAPARIN SODIUM 40 MG: 100 INJECTION SUBCUTANEOUS at 21:30

## 2024-01-01 RX ADMIN — OXYCODONE HYDROCHLORIDE 5 MG: 5 TABLET ORAL at 20:43

## 2024-01-01 RX ADMIN — PROPOFOL 20 MCG/KG/MIN: 10 INJECTION, EMULSION INTRAVENOUS at 19:46

## 2024-01-01 RX ADMIN — RIFAXIMIN 550 MG: 550 TABLET ORAL at 22:36

## 2024-01-01 RX ADMIN — ACETAMINOPHEN 650 MG: 650 SUPPOSITORY RECTAL at 23:50

## 2024-01-01 RX ADMIN — PANTOPRAZOLE SODIUM 40 MG: 40 TABLET, DELAYED RELEASE ORAL at 08:04

## 2024-01-01 RX ADMIN — OXYCODONE HYDROCHLORIDE 5 MG: 5 TABLET ORAL at 20:51

## 2024-01-01 RX ADMIN — SODIUM CHLORIDE 1000 ML: 0.9 INJECTION, SOLUTION INTRAVENOUS at 17:30

## 2024-01-01 RX ADMIN — RIFAXIMIN 550 MG: 550 TABLET ORAL at 20:21

## 2024-01-01 RX ADMIN — ENOXAPARIN SODIUM 40 MG: 100 INJECTION SUBCUTANEOUS at 21:02

## 2024-01-01 RX ADMIN — PIPERACILLIN SODIUM AND TAZOBACTAM SODIUM 2.25 G: 2; .25 INJECTION, SOLUTION INTRAVENOUS at 20:02

## 2024-01-01 RX ADMIN — PROPOFOL 10 MCG/KG/MIN: 10 INJECTION, EMULSION INTRAVENOUS at 02:06

## 2024-01-01 RX ADMIN — VASOPRESSIN 0.03 UNITS/MIN: 0.2 INJECTION INTRAVENOUS at 09:14

## 2024-01-01 RX ADMIN — SODIUM BICARBONATE 50 MEQ: 84 INJECTION, SOLUTION INTRAVENOUS at 22:07

## 2024-01-01 RX ADMIN — HYDROCORTISONE SODIUM SUCCINATE 50 MG: 100 INJECTION, POWDER, FOR SOLUTION INTRAMUSCULAR; INTRAVENOUS at 20:02

## 2024-01-01 RX ADMIN — DEXTROSE AND SODIUM CHLORIDE 100 ML/HR: 5; 900 INJECTION, SOLUTION INTRAVENOUS at 01:54

## 2024-01-01 RX ADMIN — PREDNISONE 30 MG: 5 TABLET ORAL at 13:56

## 2024-01-01 RX ADMIN — Medication 2 TABLET: at 09:15

## 2024-01-01 RX ADMIN — ENOXAPARIN SODIUM 40 MG: 100 INJECTION SUBCUTANEOUS at 20:21

## 2024-01-01 RX ADMIN — HYDROCORTISONE SODIUM SUCCINATE 50 MG: 100 INJECTION, POWDER, FOR SOLUTION INTRAMUSCULAR; INTRAVENOUS at 07:59

## 2024-01-01 RX ADMIN — ALBUMIN HUMAN 25 G: 0.25 SOLUTION INTRAVENOUS at 16:41

## 2024-01-01 RX ADMIN — LACTULOSE 20 G: 20 SOLUTION ORAL at 09:14

## 2024-01-01 RX ADMIN — PIPERACILLIN SODIUM AND TAZOBACTAM SODIUM 2.25 G: 2; .25 INJECTION, SOLUTION INTRAVENOUS at 01:12

## 2024-01-01 RX ADMIN — DEXTROSE MONOHYDRATE 100 ML/HR: 100 INJECTION, SOLUTION INTRAVENOUS at 04:55

## 2024-01-01 RX ADMIN — SPIRONOLACTONE 50 MG: 25 TABLET, FILM COATED ORAL at 08:43

## 2024-01-01 RX ADMIN — Medication 2 TABLET: at 08:27

## 2024-01-01 RX ADMIN — MICAFUNGIN 100 MG: 20 INJECTION, POWDER, LYOPHILIZED, FOR SOLUTION INTRAVENOUS at 05:45

## 2024-01-01 RX ADMIN — ENOXAPARIN SODIUM 40 MG: 100 INJECTION SUBCUTANEOUS at 20:58

## 2024-01-01 RX ADMIN — PIPERACILLIN SODIUM AND TAZOBACTAM SODIUM 2.25 G: 2; .25 INJECTION, SOLUTION INTRAVENOUS at 01:27

## 2024-01-01 RX ADMIN — FUROSEMIDE 20 MG: 20 TABLET ORAL at 14:29

## 2024-01-01 RX ADMIN — VASOPRESSIN 0.03 UNITS/MIN: 0.2 INJECTION INTRAVENOUS at 03:46

## 2024-01-01 RX ADMIN — LACTULOSE 10 G: 20 SOLUTION ORAL at 23:12

## 2024-01-01 RX ADMIN — LIDOCAINE 1 PATCH: 4 PATCH TOPICAL at 08:06

## 2024-01-01 RX ADMIN — POLYETHYLENE GLYCOL 3350 17 G: 17 POWDER, FOR SOLUTION ORAL at 08:12

## 2024-01-01 RX ADMIN — ENOXAPARIN SODIUM 40 MG: 100 INJECTION SUBCUTANEOUS at 20:37

## 2024-01-01 RX ADMIN — OLANZAPINE 2.5 MG: 5 TABLET, FILM COATED ORAL at 20:21

## 2024-01-01 RX ADMIN — SODIUM ZIRCONIUM CYCLOSILICATE 10 G: 10 POWDER, FOR SUSPENSION ORAL at 11:01

## 2024-01-01 RX ADMIN — LACTULOSE 20 G: 20 SOLUTION ORAL at 20:54

## 2024-01-01 RX ADMIN — Medication 2 TABLET: at 09:22

## 2024-01-01 RX ADMIN — LACTULOSE 20 G: 20 SOLUTION ORAL at 20:50

## 2024-01-01 RX ADMIN — ENOXAPARIN SODIUM 40 MG: 100 INJECTION SUBCUTANEOUS at 20:52

## 2024-01-01 RX ADMIN — SODIUM CHLORIDE, POTASSIUM CHLORIDE, SODIUM LACTATE AND CALCIUM CHLORIDE 500 ML: 600; 310; 30; 20 INJECTION, SOLUTION INTRAVENOUS at 19:02

## 2024-01-01 RX ADMIN — DEXTROSE MONOHYDRATE 50 ML: 25 INJECTION, SOLUTION INTRAVENOUS at 16:39

## 2024-01-01 RX ADMIN — FUROSEMIDE 20 MG: 20 TABLET ORAL at 08:35

## 2024-01-01 RX ADMIN — PANTOPRAZOLE SODIUM 40 MG: 40 INJECTION, POWDER, FOR SOLUTION INTRAVENOUS at 08:00

## 2024-01-01 RX ADMIN — DEXTROSE MONOHYDRATE 35 ML/HR: 100 INJECTION, SOLUTION INTRAVENOUS at 19:46

## 2024-01-01 RX ADMIN — Medication 50 MCG/HR: at 16:47

## 2024-01-01 RX ADMIN — DICLOFENAC 4 G: 10 GEL TOPICAL at 12:08

## 2024-01-01 RX ADMIN — VANCOMYCIN HYDROCHLORIDE 750 MG: 750 INJECTION, SOLUTION INTRAVENOUS at 08:12

## 2024-01-01 RX ADMIN — SODIUM BICARBONATE 150 ML/HR: 84 INJECTION, SOLUTION INTRAVENOUS at 08:38

## 2024-01-01 RX ADMIN — POLYETHYLENE GLYCOL 3350 17 G: 17 POWDER, FOR SOLUTION ORAL at 08:03

## 2024-01-01 RX ADMIN — RIFAXIMIN 550 MG: 550 TABLET ORAL at 20:02

## 2024-01-01 RX ADMIN — CEFTRIAXONE SODIUM 1 G: 1 INJECTION, SOLUTION INTRAVENOUS at 16:37

## 2024-01-01 RX ADMIN — Medication 0.22 MCG/KG/MIN: at 12:34

## 2024-01-01 RX ADMIN — OXYCODONE HYDROCHLORIDE 5 MG: 5 TABLET ORAL at 04:23

## 2024-01-01 RX ADMIN — Medication 0.26 MCG/KG/MIN: at 01:52

## 2024-01-01 RX ADMIN — DEXTROSE AND SODIUM CHLORIDE 100 ML/HR: 5; 900 INJECTION, SOLUTION INTRAVENOUS at 07:28

## 2024-01-01 RX ADMIN — DEXTROSE MONOHYDRATE 35 ML/HR: 100 INJECTION, SOLUTION INTRAVENOUS at 17:01

## 2024-01-01 RX ADMIN — Medication 0.4 MCG/KG/MIN: at 18:44

## 2024-01-01 RX ADMIN — Medication 0.4 MG: at 17:30

## 2024-01-01 RX ADMIN — Medication 0.02 MCG/KG/MIN: at 22:00

## 2024-01-01 RX ADMIN — OXYCODONE HYDROCHLORIDE 5 MG: 5 TABLET ORAL at 10:40

## 2024-01-01 RX ADMIN — Medication 0.2 MCG/KG/MIN: at 06:41

## 2024-01-01 RX ADMIN — Medication 2 TABLET: at 08:31

## 2024-01-01 RX ADMIN — PIPERACILLIN SODIUM AND TAZOBACTAM SODIUM 2.25 G: 2; .25 INJECTION, SOLUTION INTRAVENOUS at 16:40

## 2024-01-01 RX ADMIN — CALCIUM GLUCONATE 2 G: 20 INJECTION, SOLUTION INTRAVENOUS at 22:07

## 2024-01-01 RX ADMIN — ALLOPURINOL 300 MG: 300 TABLET ORAL at 15:01

## 2024-01-01 RX ADMIN — HYDROCORTISONE SODIUM SUCCINATE 50 MG: 100 INJECTION, POWDER, FOR SOLUTION INTRAMUSCULAR; INTRAVENOUS at 16:53

## 2024-01-01 RX ADMIN — SODIUM CHLORIDE, POTASSIUM CHLORIDE, SODIUM LACTATE AND CALCIUM CHLORIDE 1000 ML: 600; 310; 30; 20 INJECTION, SOLUTION INTRAVENOUS at 05:45

## 2024-01-01 RX ADMIN — PIPERACILLIN SODIUM AND TAZOBACTAM SODIUM 2.25 G: 2; .25 INJECTION, SOLUTION INTRAVENOUS at 13:30

## 2024-01-01 RX ADMIN — CLINDAMYCIN PHOSPHATE 900 MG: 900 INJECTION, SOLUTION INTRAVENOUS at 18:41

## 2024-01-01 RX ADMIN — HYDROCORTISONE SODIUM SUCCINATE 100 MG: 100 INJECTION, POWDER, FOR SOLUTION INTRAMUSCULAR; INTRAVENOUS at 04:12

## 2024-01-01 RX ADMIN — LACTULOSE 20 G: 20 SOLUTION ORAL at 09:16

## 2024-01-01 RX ADMIN — PIPERACILLIN SODIUM AND TAZOBACTAM SODIUM 2.25 G: 2; .25 INJECTION, SOLUTION INTRAVENOUS at 01:51

## 2024-01-01 RX ADMIN — HYDROCORTISONE SODIUM SUCCINATE 50 MG: 100 INJECTION, POWDER, FOR SOLUTION INTRAMUSCULAR; INTRAVENOUS at 21:02

## 2024-01-01 RX ADMIN — HYDROCORTISONE SODIUM SUCCINATE 50 MG: 100 INJECTION, POWDER, FOR SOLUTION INTRAMUSCULAR; INTRAVENOUS at 08:39

## 2024-01-01 RX ADMIN — LACTULOSE 20 G: 20 SOLUTION ORAL at 10:36

## 2024-01-01 RX ADMIN — LIDOCAINE 1 PATCH: 4 PATCH TOPICAL at 17:27

## 2024-01-01 RX ADMIN — SODIUM BICARBONATE 150 ML/HR: 84 INJECTION, SOLUTION INTRAVENOUS at 05:06

## 2024-01-01 RX ADMIN — OXYCODONE HYDROCHLORIDE 5 MG: 5 TABLET ORAL at 20:54

## 2024-01-01 RX ADMIN — RIFAXIMIN 550 MG: 550 TABLET ORAL at 08:12

## 2024-01-01 RX ADMIN — LIDOCAINE 1 PATCH: 4 PATCH TOPICAL at 13:13

## 2024-01-01 RX ADMIN — HYDROCORTISONE SODIUM SUCCINATE 50 MG: 100 INJECTION, POWDER, FOR SOLUTION INTRAMUSCULAR; INTRAVENOUS at 08:13

## 2024-01-01 RX ADMIN — LACTULOSE 20 G: 20 SOLUTION ORAL at 08:13

## 2024-01-01 RX ADMIN — PREDNISONE 30 MG: 5 TABLET ORAL at 09:37

## 2024-01-01 RX ADMIN — PREDNISONE 30 MG: 5 TABLET ORAL at 08:05

## 2024-01-01 RX ADMIN — CLINDAMYCIN PHOSPHATE 900 MG: 900 INJECTION, SOLUTION INTRAVENOUS at 10:14

## 2024-01-01 RX ADMIN — DEXTROSE MONOHYDRATE 35 ML/HR: 100 INJECTION, SOLUTION INTRAVENOUS at 08:23

## 2024-01-01 RX ADMIN — PANTOPRAZOLE SODIUM 40 MG: 40 TABLET, DELAYED RELEASE ORAL at 09:14

## 2024-01-01 RX ADMIN — PANTOPRAZOLE SODIUM 40 MG: 40 TABLET, DELAYED RELEASE ORAL at 09:15

## 2024-01-01 RX ADMIN — VANCOMYCIN HYDROCHLORIDE 750 MG: 750 INJECTION, SOLUTION INTRAVENOUS at 01:11

## 2024-01-01 RX ADMIN — OXYCODONE HYDROCHLORIDE 5 MG: 5 TABLET ORAL at 21:06

## 2024-01-01 RX ADMIN — DICLOFENAC 4 G: 10 GEL TOPICAL at 11:13

## 2024-01-01 RX ADMIN — LACTULOSE 20 G: 20 SOLUTION ORAL at 09:38

## 2024-01-01 RX ADMIN — DEXAMETHASONE 4 MG: 4 TABLET ORAL at 12:00

## 2024-01-01 RX ADMIN — PIPERACILLIN SODIUM AND TAZOBACTAM SODIUM 2.25 G: 2; .25 INJECTION, SOLUTION INTRAVENOUS at 12:34

## 2024-01-01 RX ADMIN — HYDROCORTISONE SODIUM SUCCINATE 50 MG: 100 INJECTION, POWDER, FOR SOLUTION INTRAMUSCULAR; INTRAVENOUS at 01:27

## 2024-01-01 RX ADMIN — NAPROXEN 250 MG: 250 TABLET ORAL at 19:16

## 2024-01-01 RX ADMIN — HYDROMORPHONE HYDROCHLORIDE 0.4 MG: 1 INJECTION, SOLUTION INTRAMUSCULAR; INTRAVENOUS; SUBCUTANEOUS at 14:00

## 2024-01-01 RX ADMIN — SODIUM CHLORIDE 500 ML: 9 INJECTION, SOLUTION INTRAVENOUS at 13:30

## 2024-01-01 RX ADMIN — Medication 17 ML: at 12:47

## 2024-01-01 RX ADMIN — PROPOFOL 20 MCG/KG/MIN: 10 INJECTION, EMULSION INTRAVENOUS at 04:13

## 2024-01-01 RX ADMIN — ENOXAPARIN SODIUM 40 MG: 100 INJECTION SUBCUTANEOUS at 21:16

## 2024-01-01 RX ADMIN — SODIUM ZIRCONIUM CYCLOSILICATE 10 G: 10 POWDER, FOR SUSPENSION ORAL at 01:51

## 2024-01-01 RX ADMIN — OLANZAPINE 2.5 MG: 5 TABLET, FILM COATED ORAL at 20:54

## 2024-01-01 RX ADMIN — PANTOPRAZOLE SODIUM 40 MG: 40 TABLET, DELAYED RELEASE ORAL at 06:03

## 2024-01-01 RX ADMIN — SODIUM CHLORIDE, POTASSIUM CHLORIDE, SODIUM LACTATE AND CALCIUM CHLORIDE 500 ML: 600; 310; 30; 20 INJECTION, SOLUTION INTRAVENOUS at 02:25

## 2024-01-01 RX ADMIN — NAPROXEN 250 MG: 250 TABLET ORAL at 02:50

## 2024-01-01 RX ADMIN — LACTULOSE 20 G: 20 SOLUTION ORAL at 03:29

## 2024-01-01 RX ADMIN — ENOXAPARIN SODIUM 40 MG: 100 INJECTION SUBCUTANEOUS at 20:57

## 2024-01-01 RX ADMIN — Medication 5 MG: at 20:54

## 2024-01-01 RX ADMIN — PANTOPRAZOLE SODIUM 40 MG: 40 TABLET, DELAYED RELEASE ORAL at 09:23

## 2024-01-01 RX ADMIN — OXYCODONE HYDROCHLORIDE 5 MG: 5 TABLET ORAL at 08:44

## 2024-01-01 RX ADMIN — Medication 25 MCG/HR: at 06:53

## 2024-01-01 RX ADMIN — HYDROMORPHONE HYDROCHLORIDE 0.2 MG: 1 INJECTION, SOLUTION INTRAMUSCULAR; INTRAVENOUS; SUBCUTANEOUS at 05:45

## 2024-01-01 RX ADMIN — ENOXAPARIN SODIUM 40 MG: 100 INJECTION SUBCUTANEOUS at 21:22

## 2024-01-01 RX ADMIN — PANTOPRAZOLE SODIUM 40 MG: 40 TABLET, DELAYED RELEASE ORAL at 06:33

## 2024-01-01 RX ADMIN — PIPERACILLIN SODIUM AND TAZOBACTAM SODIUM 2.25 G: 2; .25 INJECTION, SOLUTION INTRAVENOUS at 21:32

## 2024-01-01 RX ADMIN — FOSAPREPITANT DIMEGLUMINE 150 MG: 150 INJECTION, POWDER, LYOPHILIZED, FOR SOLUTION INTRAVENOUS at 16:39

## 2024-01-01 RX ADMIN — OLANZAPINE 5 MG: 5 TABLET, FILM COATED ORAL at 20:58

## 2024-01-01 RX ADMIN — Medication 2 TABLET: at 08:21

## 2024-01-01 RX ADMIN — MICAFUNGIN 100 MG: 20 INJECTION, POWDER, LYOPHILIZED, FOR SOLUTION INTRAVENOUS at 01:52

## 2024-01-01 RX ADMIN — ALLOPURINOL 300 MG: 300 TABLET ORAL at 08:34

## 2024-01-01 RX ADMIN — PIPERACILLIN SODIUM AND TAZOBACTAM SODIUM 2.25 G: 2; .25 INJECTION, SOLUTION INTRAVENOUS at 07:58

## 2024-01-01 RX ADMIN — ALLOPURINOL 300 MG: 300 TABLET ORAL at 08:06

## 2024-01-01 RX ADMIN — SODIUM CHLORIDE 50 ML/HR: 9 INJECTION, SOLUTION INTRAVENOUS at 11:11

## 2024-01-01 RX ADMIN — POLYETHYLENE GLYCOL 3350 17 G: 17 POWDER, FOR SOLUTION ORAL at 17:21

## 2024-01-01 RX ADMIN — Medication 2 TABLET: at 08:34

## 2024-01-01 RX ADMIN — PIPERACILLIN SODIUM AND TAZOBACTAM SODIUM 2.25 G: 2; .25 INJECTION, SOLUTION INTRAVENOUS at 20:34

## 2024-01-01 RX ADMIN — ENOXAPARIN SODIUM 40 MG: 100 INJECTION SUBCUTANEOUS at 20:59

## 2024-01-01 RX ADMIN — ENOXAPARIN SODIUM 40 MG: 100 INJECTION SUBCUTANEOUS at 21:07

## 2024-01-01 RX ADMIN — FUROSEMIDE 20 MG: 20 TABLET ORAL at 12:06

## 2024-01-01 RX ADMIN — VANCOMYCIN HYDROCHLORIDE 2000 MG: 5 INJECTION, POWDER, LYOPHILIZED, FOR SOLUTION INTRAVENOUS at 17:54

## 2024-01-01 RX ADMIN — HYDROCORTISONE SODIUM SUCCINATE 50 MG: 100 INJECTION, POWDER, FOR SOLUTION INTRAMUSCULAR; INTRAVENOUS at 01:53

## 2024-01-01 RX ADMIN — LACTULOSE 20 G: 20 SOLUTION ORAL at 20:43

## 2024-01-01 RX ADMIN — DEXTROSE MONOHYDRATE 35 ML/HR: 100 INJECTION, SOLUTION INTRAVENOUS at 12:34

## 2024-01-01 RX ADMIN — Medication 2 TABLET: at 09:23

## 2024-01-01 RX ADMIN — VASOPRESSIN 0.03 UNITS/MIN: 0.2 INJECTION INTRAVENOUS at 12:49

## 2024-01-01 RX ADMIN — Medication 2 TABLET: at 08:53

## 2024-01-01 RX ADMIN — OLANZAPINE 2.5 MG: 5 TABLET, FILM COATED ORAL at 21:02

## 2024-01-01 RX ADMIN — DEXAMETHASONE 4 MG: 4 TABLET ORAL at 08:27

## 2024-01-01 RX ADMIN — LIDOCAINE 1 PATCH: 4 PATCH TOPICAL at 13:56

## 2024-01-01 RX ADMIN — LACTULOSE 20 G: 20 SOLUTION ORAL at 20:06

## 2024-01-01 RX ADMIN — PIPERACILLIN SODIUM AND TAZOBACTAM SODIUM 2.25 G: 2; .25 INJECTION, SOLUTION INTRAVENOUS at 06:42

## 2024-01-01 RX ADMIN — FUROSEMIDE 20 MG: 20 TABLET ORAL at 08:03

## 2024-01-01 RX ADMIN — LIDOCAINE 1 PATCH: 4 PATCH TOPICAL at 12:53

## 2024-01-01 RX ADMIN — RIFAXIMIN 550 MG: 550 TABLET ORAL at 08:35

## 2024-01-01 RX ADMIN — PIPERACILLIN SODIUM AND TAZOBACTAM SODIUM 2.25 G: 2; .25 INJECTION, SOLUTION INTRAVENOUS at 08:12

## 2024-01-01 RX ADMIN — ONDANSETRON HYDROCHLORIDE 4 MG: 2 INJECTION, SOLUTION INTRAVENOUS at 19:16

## 2024-01-01 RX ADMIN — PREDNISONE 30 MG: 5 TABLET ORAL at 09:15

## 2024-01-01 RX ADMIN — CALCIUM GLUCONATE 2 G: 20 INJECTION, SOLUTION INTRAVENOUS at 20:02

## 2024-01-01 RX ADMIN — ENOXAPARIN SODIUM 40 MG: 100 INJECTION SUBCUTANEOUS at 20:35

## 2024-01-01 RX ADMIN — DEXTROSE MONOHYDRATE 35 ML/HR: 100 INJECTION, SOLUTION INTRAVENOUS at 11:04

## 2024-01-01 RX ADMIN — LIDOCAINE 1 PATCH: 4 PATCH TOPICAL at 12:24

## 2024-01-01 RX ADMIN — CEFTRIAXONE SODIUM 1 G: 1 INJECTION, SOLUTION INTRAVENOUS at 15:44

## 2024-01-01 RX ADMIN — PANTOPRAZOLE SODIUM 40 MG: 40 INJECTION, POWDER, FOR SOLUTION INTRAVENOUS at 22:38

## 2024-01-01 RX ADMIN — OXYCODONE HYDROCHLORIDE 5 MG: 5 TABLET ORAL at 22:35

## 2024-01-01 RX ADMIN — SPIRONOLACTONE 50 MG: 25 TABLET, FILM COATED ORAL at 08:04

## 2024-01-01 RX ADMIN — Medication 2 TABLET: at 08:42

## 2024-01-01 RX ADMIN — ANASTROZOLE 1 MG: 1 TABLET, COATED ORAL at 21:07

## 2024-01-01 RX ADMIN — HYDROCORTISONE SODIUM SUCCINATE 50 MG: 100 INJECTION, POWDER, FOR SOLUTION INTRAMUSCULAR; INTRAVENOUS at 15:16

## 2024-01-01 RX ADMIN — CARBOXYMETHYLCELLULOSE SODIUM 1 DROP: 5 SOLUTION/ DROPS OPHTHALMIC at 22:41

## 2024-01-01 RX ADMIN — CLINDAMYCIN PHOSPHATE 900 MG: 900 INJECTION, SOLUTION INTRAVENOUS at 04:55

## 2024-01-01 RX ADMIN — PANTOPRAZOLE SODIUM 40 MG: 40 TABLET, DELAYED RELEASE ORAL at 06:06

## 2024-01-01 RX ADMIN — ETOMIDATE 20 MG: 2 INJECTION INTRAVENOUS at 17:30

## 2024-01-01 RX ADMIN — ACETAMINOPHEN 650 MG: 325 TABLET ORAL at 06:05

## 2024-01-01 RX ADMIN — LIDOCAINE 1 PATCH: 4 PATCH TOPICAL at 12:08

## 2024-01-01 RX ADMIN — Medication 2 TABLET: at 08:06

## 2024-01-01 RX ADMIN — DEXAMETHASONE 4 MG: 4 TABLET ORAL at 12:12

## 2024-01-01 RX ADMIN — CARBOXYMETHYLCELLULOSE SODIUM 1 DROP: 5 SOLUTION/ DROPS OPHTHALMIC at 20:16

## 2024-01-01 RX ADMIN — OLANZAPINE 2.5 MG: 5 TABLET, FILM COATED ORAL at 20:50

## 2024-01-01 RX ADMIN — FUROSEMIDE 20 MG: 20 TABLET ORAL at 09:23

## 2024-01-01 RX ADMIN — DEXTROSE MONOHYDRATE 35 ML/HR: 100 INJECTION, SOLUTION INTRAVENOUS at 18:47

## 2024-01-01 RX ADMIN — Medication 5 MG: at 20:43

## 2024-01-01 RX ADMIN — Medication 2 TABLET: at 08:04

## 2024-01-01 RX ADMIN — VASOPRESSIN 0.03 UNITS/MIN: 0.2 INJECTION INTRAVENOUS at 03:47

## 2024-01-01 RX ADMIN — Medication 1000 MG: at 09:00

## 2024-01-01 RX ADMIN — PANTOPRAZOLE SODIUM 40 MG: 40 TABLET, DELAYED RELEASE ORAL at 08:42

## 2024-01-01 RX ADMIN — ROCURONIUM BROMIDE 100 MG: 10 INJECTION INTRAVENOUS at 17:30

## 2024-01-01 RX ADMIN — Medication 0.22 MCG/KG/MIN: at 03:45

## 2024-01-01 RX ADMIN — PANTOPRAZOLE SODIUM 40 MG: 40 INJECTION, POWDER, FOR SOLUTION INTRAVENOUS at 08:22

## 2024-01-01 RX ADMIN — FUROSEMIDE 20 MG: 20 TABLET ORAL at 08:43

## 2024-01-01 RX ADMIN — Medication 2 TABLET: at 08:48

## 2024-01-01 RX ADMIN — Medication 2 TABLET: at 08:43

## 2024-01-01 RX ADMIN — ETOMIDATE 20 MG: 2 INJECTION INTRAVENOUS at 17:37

## 2024-01-01 RX ADMIN — ETOMIDATE 20 MG: 20 INJECTION, SOLUTION INTRAVENOUS at 17:30

## 2024-01-01 RX ADMIN — ANASTROZOLE 1 MG: 1 TABLET, COATED ORAL at 08:04

## 2024-01-01 RX ADMIN — CLINDAMYCIN PHOSPHATE 900 MG: 900 INJECTION, SOLUTION INTRAVENOUS at 03:24

## 2024-01-01 RX ADMIN — Medication 75 MCG/HR: at 19:46

## 2024-01-01 RX ADMIN — Medication 0.15 MCG/KG/MIN: at 04:39

## 2024-01-01 RX ADMIN — Medication 2 TABLET: at 10:49

## 2024-01-01 RX ADMIN — Medication 0.3 MCG/KG/MIN: at 21:50

## 2024-01-01 RX ADMIN — DEXAMETHASONE 4 MG: 4 TABLET ORAL at 08:30

## 2024-01-01 RX ADMIN — SODIUM ZIRCONIUM CYCLOSILICATE 10 G: 10 POWDER, FOR SUSPENSION ORAL at 18:35

## 2024-01-01 RX ADMIN — DEXTROSE MONOHYDRATE 35 ML/HR: 100 INJECTION, SOLUTION INTRAVENOUS at 22:51

## 2024-01-01 RX ADMIN — DEXAMETHASONE 4 MG: 4 TABLET ORAL at 12:24

## 2024-01-01 RX ADMIN — Medication 5 MG: at 02:18

## 2024-01-01 RX ADMIN — Medication 2 TABLET: at 09:37

## 2024-01-01 RX ADMIN — CARBOPLATIN 302 MG: 10 INJECTION, SOLUTION INTRAVENOUS at 17:20

## 2024-01-01 RX ADMIN — DEXAMETHASONE 4 MG: 4 TABLET ORAL at 08:35

## 2024-01-01 RX ADMIN — PROPOFOL 10 MCG/KG/MIN: 10 INJECTION, EMULSION INTRAVENOUS at 11:05

## 2024-01-01 RX ADMIN — VASOPRESSIN 0.03 UNITS/MIN: 0.2 INJECTION INTRAVENOUS at 16:54

## 2024-01-01 RX ADMIN — PANTOPRAZOLE SODIUM 40 MG: 40 TABLET, DELAYED RELEASE ORAL at 08:27

## 2024-01-01 RX ADMIN — ENOXAPARIN SODIUM 40 MG: 100 INJECTION SUBCUTANEOUS at 20:50

## 2024-01-01 RX ADMIN — OXYCODONE HYDROCHLORIDE 5 MG: 5 TABLET ORAL at 14:49

## 2024-01-01 RX ADMIN — Medication 0.3 MCG/KG/MIN: at 21:48

## 2024-01-01 RX ADMIN — TRASTUZUMAB-ANNS 720 MG: 420 INJECTION, POWDER, LYOPHILIZED, FOR SOLUTION INTRAVENOUS at 18:34

## 2024-01-01 RX ADMIN — SPIRONOLACTONE 50 MG: 25 TABLET, FILM COATED ORAL at 09:23

## 2024-01-01 RX ADMIN — LIDOCAINE 1 PATCH: 4 PATCH TOPICAL at 08:13

## 2024-01-01 RX ADMIN — ANASTROZOLE 1 MG: 1 TABLET, COATED ORAL at 08:43

## 2024-01-01 RX ADMIN — PANTOPRAZOLE SODIUM 40 MG: 40 TABLET, DELAYED RELEASE ORAL at 08:53

## 2024-01-01 RX ADMIN — ENOXAPARIN SODIUM 40 MG: 100 INJECTION SUBCUTANEOUS at 23:12

## 2024-01-01 RX ADMIN — Medication 5 MG: at 20:52

## 2024-01-01 RX ADMIN — ALLOPURINOL 300 MG: 300 TABLET ORAL at 08:13

## 2024-01-01 RX ADMIN — OXYCODONE HYDROCHLORIDE 5 MG: 5 TABLET ORAL at 09:02

## 2024-01-01 RX ADMIN — PANTOPRAZOLE SODIUM 40 MG: 40 TABLET, DELAYED RELEASE ORAL at 09:37

## 2024-01-01 RX ADMIN — Medication 2 TABLET: at 09:02

## 2024-01-01 RX ADMIN — Medication 2 TABLET: at 08:46

## 2024-01-01 RX ADMIN — PANTOPRAZOLE SODIUM 40 MG: 40 TABLET, DELAYED RELEASE ORAL at 08:30

## 2024-01-01 RX ADMIN — CALCIUM GLUCONATE 2 G: 20 INJECTION, SOLUTION INTRAVENOUS at 20:34

## 2024-01-01 RX ADMIN — LIDOCAINE 1 PATCH: 4 PATCH TOPICAL at 12:48

## 2024-01-01 RX ADMIN — PANTOPRAZOLE SODIUM 40 MG: 40 TABLET, DELAYED RELEASE ORAL at 09:09

## 2024-01-01 RX ADMIN — HYDROCORTISONE SODIUM SUCCINATE 50 MG: 100 INJECTION, POWDER, FOR SOLUTION INTRAMUSCULAR; INTRAVENOUS at 08:23

## 2024-01-01 RX ADMIN — DEXTROSE MONOHYDRATE 50 ML/HR: 100 INJECTION, SOLUTION INTRAVENOUS at 21:00

## 2024-01-01 RX ADMIN — LIDOCAINE 1 PATCH: 4 PATCH TOPICAL at 08:34

## 2024-01-01 RX ADMIN — RIFAXIMIN 550 MG: 550 TABLET ORAL at 08:32

## 2024-01-01 RX ADMIN — ENOXAPARIN SODIUM 40 MG: 100 INJECTION SUBCUTANEOUS at 20:22

## 2024-01-01 RX ADMIN — SPIRONOLACTONE 50 MG: 25 TABLET, FILM COATED ORAL at 08:46

## 2024-01-01 RX ADMIN — DEXAMETHASONE SODIUM PHOSPHATE 4 MG: 4 INJECTION, SOLUTION INTRAMUSCULAR; INTRAVENOUS at 22:39

## 2024-01-01 RX ADMIN — SODIUM ZIRCONIUM CYCLOSILICATE 10 G: 10 POWDER, FOR SUSPENSION ORAL at 03:29

## 2024-01-01 RX ADMIN — Medication 5 MG: at 21:02

## 2024-01-01 RX ADMIN — FUROSEMIDE 20 MG: 20 TABLET ORAL at 08:46

## 2024-01-01 RX ADMIN — SODIUM BICARBONATE 50 MEQ: 84 INJECTION, SOLUTION INTRAVENOUS at 22:14

## 2024-01-01 RX ADMIN — TECHNETIUM TC 99M MEDRONATE 25 MILLICURIE: 25 INJECTION, POWDER, FOR SOLUTION INTRAVENOUS at 10:05

## 2024-01-01 RX ADMIN — PROPOFOL 20 MCG/KG/MIN: 10 INJECTION, EMULSION INTRAVENOUS at 09:33

## 2024-01-01 RX ADMIN — ENOXAPARIN SODIUM 40 MG: 100 INJECTION SUBCUTANEOUS at 20:54

## 2024-01-01 RX ADMIN — LIDOCAINE 1 PATCH: 4 PATCH TOPICAL at 11:53

## 2024-01-01 RX ADMIN — SODIUM CHLORIDE, POTASSIUM CHLORIDE, SODIUM LACTATE AND CALCIUM CHLORIDE 1000 ML: 600; 310; 30; 20 INJECTION, SOLUTION INTRAVENOUS at 10:29

## 2024-01-01 RX ADMIN — SODIUM CHLORIDE 1000 ML: 9 INJECTION, SOLUTION INTRAVENOUS at 20:06

## 2024-01-01 RX ADMIN — HYDROCORTISONE SODIUM SUCCINATE 50 MG: 100 INJECTION, POWDER, FOR SOLUTION INTRAMUSCULAR; INTRAVENOUS at 01:12

## 2024-01-01 RX ADMIN — OXYCODONE HYDROCHLORIDE 5 MG: 5 TABLET ORAL at 21:02

## 2024-01-01 RX ADMIN — PANTOPRAZOLE SODIUM 40 MG: 40 INJECTION, POWDER, FOR SOLUTION INTRAVENOUS at 08:12

## 2024-01-01 RX ADMIN — ENOXAPARIN SODIUM 40 MG: 100 INJECTION SUBCUTANEOUS at 22:37

## 2024-01-01 RX ADMIN — LACTULOSE 20 G: 20 SOLUTION ORAL at 08:21

## 2024-01-01 RX ADMIN — DEXTROSE AND SODIUM CHLORIDE 100 ML/HR: 5; 900 INJECTION, SOLUTION INTRAVENOUS at 13:33

## 2024-01-01 RX ADMIN — PROPOFOL 10 MG: 10 INJECTION, EMULSION INTRAVENOUS at 18:42

## 2024-01-01 RX ADMIN — PANTOPRAZOLE SODIUM 40 MG: 40 TABLET, DELAYED RELEASE ORAL at 08:35

## 2024-01-01 RX ADMIN — Medication 0.2 MCG/KG/MIN: at 22:22

## 2024-01-01 RX ADMIN — POTASSIUM CHLORIDE 40 MEQ: 1500 TABLET, EXTENDED RELEASE ORAL at 01:56

## 2024-01-01 RX ADMIN — DEXTROSE MONOHYDRATE 35 ML/HR: 100 INJECTION, SOLUTION INTRAVENOUS at 02:11

## 2024-01-01 RX ADMIN — Medication 2 TABLET: at 08:35

## 2024-01-01 RX ADMIN — AMIODARONE HYDROCHLORIDE 150 MG: 1.5 INJECTION, SOLUTION INTRAVENOUS at 18:30

## 2024-01-01 RX ADMIN — DEXAMETHASONE 4 MG: 4 TABLET ORAL at 09:15

## 2024-01-01 RX ADMIN — OLANZAPINE 5 MG: 5 TABLET, FILM COATED ORAL at 20:43

## 2024-01-01 RX ADMIN — OXYCODONE HYDROCHLORIDE 5 MG: 5 TABLET ORAL at 02:18

## 2024-01-01 RX ADMIN — TOBRAMYCIN SULFATE 152 MG: 40 INJECTION, SOLUTION INTRAMUSCULAR; INTRAVENOUS at 20:38

## 2024-01-01 RX ADMIN — VASOPRESSIN 0.03 UNITS/MIN: 0.2 INJECTION INTRAVENOUS at 12:07

## 2024-01-01 RX ADMIN — Medication 5 MG: at 20:50

## 2024-01-01 RX ADMIN — HYDROXYZINE HYDROCHLORIDE 25 MG: 25 TABLET, FILM COATED ORAL at 02:53

## 2024-01-01 RX ADMIN — PIPERACILLIN SODIUM AND TAZOBACTAM SODIUM 2.25 G: 2; .25 INJECTION, SOLUTION INTRAVENOUS at 08:39

## 2024-01-01 RX ADMIN — VASOPRESSIN 0.03 UNITS/MIN: 0.2 INJECTION INTRAVENOUS at 23:56

## 2024-01-01 RX ADMIN — CARBOXYMETHYLCELLULOSE SODIUM 1 DROP: 5 SOLUTION/ DROPS OPHTHALMIC at 01:34

## 2024-01-01 RX ADMIN — HYDROCORTISONE SODIUM SUCCINATE 50 MG: 100 INJECTION, POWDER, FOR SOLUTION INTRAMUSCULAR; INTRAVENOUS at 20:34

## 2024-01-01 RX ADMIN — PANTOPRAZOLE SODIUM 40 MG: 40 TABLET, DELAYED RELEASE ORAL at 06:00

## 2024-01-01 RX ADMIN — FUROSEMIDE 20 MG: 20 TABLET ORAL at 15:03

## 2024-01-01 RX ADMIN — SODIUM CHLORIDE 50 ML/HR: 9 INJECTION, SOLUTION INTRAVENOUS at 15:02

## 2024-01-01 RX ADMIN — ENOXAPARIN SODIUM 40 MG: 100 INJECTION SUBCUTANEOUS at 20:43

## 2024-01-01 RX ADMIN — HYDROMORPHONE HYDROCHLORIDE 0.4 MG: 1 INJECTION, SOLUTION INTRAMUSCULAR; INTRAVENOUS; SUBCUTANEOUS at 13:22

## 2024-01-01 RX ADMIN — SODIUM ZIRCONIUM CYCLOSILICATE 10 G: 10 POWDER, FOR SUSPENSION ORAL at 10:23

## 2024-01-01 RX ADMIN — INSULIN HUMAN 5 UNITS: 100 INJECTION, SOLUTION PARENTERAL at 19:55

## 2024-01-01 RX ADMIN — MICAFUNGIN 100 MG: 20 INJECTION, POWDER, LYOPHILIZED, FOR SOLUTION INTRAVENOUS at 02:11

## 2024-01-01 RX ADMIN — SODIUM ZIRCONIUM CYCLOSILICATE 10 G: 10 POWDER, FOR SUSPENSION ORAL at 21:28

## 2024-01-01 RX ADMIN — ALBUMIN HUMAN 25 G: 0.05 INJECTION, SOLUTION INTRAVENOUS at 02:26

## 2024-01-01 RX ADMIN — FUROSEMIDE 20 MG: 20 TABLET ORAL at 08:53

## 2024-01-01 RX ADMIN — ACETAMINOPHEN 650 MG: 325 TABLET ORAL at 18:34

## 2024-01-01 RX ADMIN — LACTULOSE 10 G: 20 SOLUTION ORAL at 09:22

## 2024-01-01 RX ADMIN — PIPERACILLIN SODIUM AND TAZOBACTAM SODIUM 2.25 G: 2; .25 INJECTION, SOLUTION INTRAVENOUS at 21:02

## 2024-01-01 RX ADMIN — LACTULOSE 20 G: 20 SOLUTION ORAL at 08:03

## 2024-01-01 RX ADMIN — Medication 5 MG: at 20:58

## 2024-01-01 RX ADMIN — HYDROCORTISONE SODIUM SUCCINATE 50 MG: 100 INJECTION, POWDER, FOR SOLUTION INTRAMUSCULAR; INTRAVENOUS at 14:29

## 2024-01-01 RX ADMIN — OXYCODONE HYDROCHLORIDE 5 MG: 5 TABLET ORAL at 20:59

## 2024-01-01 RX ADMIN — ACETAMINOPHEN 650 MG: 325 TABLET ORAL at 20:59

## 2024-01-01 RX ADMIN — SODIUM CHLORIDE 1000 ML: 9 INJECTION, SOLUTION INTRAVENOUS at 15:02

## 2024-01-01 RX ADMIN — ALLOPURINOL 300 MG: 300 TABLET ORAL at 09:14

## 2024-01-01 RX ADMIN — FUROSEMIDE 20 MG: 20 TABLET ORAL at 08:49

## 2024-01-01 RX ADMIN — ROCURONIUM BROMIDE 100 MG: 10 INJECTION, SOLUTION INTRAVENOUS at 17:30

## 2024-01-01 RX ADMIN — POLYETHYLENE GLYCOL 3350 17 G: 17 POWDER, FOR SOLUTION ORAL at 10:00

## 2024-01-01 RX ADMIN — DEXTROSE MONOHYDRATE 25 ML/HR: 100 INJECTION, SOLUTION INTRAVENOUS at 18:45

## 2024-01-01 RX ADMIN — FUROSEMIDE 20 MG: 20 TABLET ORAL at 08:30

## 2024-01-01 RX ADMIN — Medication 75 MCG/HR: at 06:47

## 2024-01-01 RX ADMIN — SODIUM CHLORIDE, POTASSIUM CHLORIDE, SODIUM LACTATE AND CALCIUM CHLORIDE 500 ML: 600; 310; 30; 20 INJECTION, SOLUTION INTRAVENOUS at 20:00

## 2024-01-01 RX ADMIN — SPIRONOLACTONE 50 MG: 25 TABLET, FILM COATED ORAL at 08:53

## 2024-01-01 RX ADMIN — PANTOPRAZOLE SODIUM 40 MG: 40 TABLET, DELAYED RELEASE ORAL at 05:41

## 2024-01-01 RX ADMIN — ALLOPURINOL 300 MG: 300 TABLET ORAL at 09:02

## 2024-01-01 RX ADMIN — Medication 0.2 MCG/KG/MIN: at 05:56

## 2024-01-01 RX ADMIN — SODIUM ZIRCONIUM CYCLOSILICATE 10 G: 10 POWDER, FOR SUSPENSION ORAL at 09:42

## 2024-01-01 RX ADMIN — LACTULOSE 20 G: 20 SOLUTION ORAL at 09:02

## 2024-01-01 RX ADMIN — LACTULOSE 20 G: 20 SOLUTION ORAL at 08:34

## 2024-01-01 RX ADMIN — LACTULOSE 20 G: 20 SOLUTION ORAL at 08:12

## 2024-01-01 RX ADMIN — PANTOPRAZOLE SODIUM 40 MG: 40 TABLET, DELAYED RELEASE ORAL at 08:12

## 2024-01-01 RX ADMIN — Medication 75 MCG/HR: at 09:24

## 2024-01-01 RX ADMIN — DEXAMETHASONE 4 MG: 4 TABLET ORAL at 12:22

## 2024-01-01 RX ADMIN — LACTULOSE 20 G: 20 SOLUTION ORAL at 08:06

## 2024-01-01 RX ADMIN — PIPERACILLIN SODIUM AND TAZOBACTAM SODIUM 2.25 G: 2; .25 INJECTION, SOLUTION INTRAVENOUS at 02:52

## 2024-01-01 RX ADMIN — VASOPRESSIN 0.03 UNITS/MIN: 0.2 INJECTION INTRAVENOUS at 12:41

## 2024-01-01 RX ADMIN — DEXAMETHASONE SODIUM PHOSPHATE 12 MG: 10 INJECTION, SOLUTION INTRAMUSCULAR; INTRAVENOUS at 16:09

## 2024-01-01 RX ADMIN — DEXTROSE MONOHYDRATE 35 ML/HR: 100 INJECTION, SOLUTION INTRAVENOUS at 05:08

## 2024-01-01 RX ADMIN — Medication 25 MCG: at 18:41

## 2024-01-01 RX ADMIN — OXYCODONE HYDROCHLORIDE 5 MG: 5 TABLET ORAL at 20:58

## 2024-01-01 RX ADMIN — ONDANSETRON 16 MG: 2 INJECTION INTRAMUSCULAR; INTRAVENOUS at 16:22

## 2024-01-01 RX ADMIN — GADOTERATE MEGLUMINE 18 ML: 376.9 INJECTION INTRAVENOUS at 10:15

## 2024-01-01 RX ADMIN — RIFAXIMIN 550 MG: 550 TABLET ORAL at 03:29

## 2024-01-01 RX ADMIN — PROPOFOL 15 MCG/KG/MIN: 10 INJECTION, EMULSION INTRAVENOUS at 01:51

## 2024-01-01 RX ADMIN — ONDANSETRON 4 MG: 2 INJECTION INTRAMUSCULAR; INTRAVENOUS at 19:16

## 2024-01-01 RX ADMIN — SPIRONOLACTONE 50 MG: 25 TABLET, FILM COATED ORAL at 12:06

## 2024-01-01 RX ADMIN — CALCIUM GLUCONATE 2 G: 20 INJECTION, SOLUTION INTRAVENOUS at 01:11

## 2024-01-01 RX ADMIN — GADOTERATE MEGLUMINE 18 ML: 376.9 INJECTION INTRAVENOUS at 22:01

## 2024-01-01 RX ADMIN — ONDANSETRON 4 MG: 2 INJECTION INTRAMUSCULAR; INTRAVENOUS at 06:03

## 2024-01-01 SDOH — SOCIAL STABILITY: SOCIAL INSECURITY: WERE YOU ABLE TO COMPLETE ALL THE BEHAVIORAL HEALTH SCREENINGS?: YES

## 2024-01-01 SDOH — SOCIAL STABILITY: SOCIAL INSECURITY: DOES ANYONE TRY TO KEEP YOU FROM HAVING/CONTACTING OTHER FRIENDS OR DOING THINGS OUTSIDE YOUR HOME?: NO

## 2024-01-01 SDOH — SOCIAL STABILITY: SOCIAL INSECURITY: DO YOU FEEL UNSAFE GOING BACK TO THE PLACE WHERE YOU ARE LIVING?: NO

## 2024-01-01 SDOH — SOCIAL STABILITY: SOCIAL INSECURITY: ARE THERE ANY APPARENT SIGNS OF INJURIES/BEHAVIORS THAT COULD BE RELATED TO ABUSE/NEGLECT?: NO

## 2024-01-01 SDOH — SOCIAL STABILITY: SOCIAL INSECURITY: ABUSE: ADULT

## 2024-01-01 SDOH — SOCIAL STABILITY: SOCIAL INSECURITY: ARE YOU OR HAVE YOU BEEN THREATENED OR ABUSED PHYSICALLY, EMOTIONALLY, OR SEXUALLY BY ANYONE?: NO

## 2024-01-01 SDOH — SOCIAL STABILITY: SOCIAL INSECURITY: DO YOU FEEL ANYONE HAS EXPLOITED OR TAKEN ADVANTAGE OF YOU FINANCIALLY OR OF YOUR PERSONAL PROPERTY?: NO

## 2024-01-01 SDOH — SOCIAL STABILITY: SOCIAL INSECURITY: POSSIBLE ABUSE REPORTED TO:: OTHER (COMMENT)

## 2024-01-01 SDOH — SOCIAL STABILITY: SOCIAL INSECURITY

## 2024-01-01 SDOH — SOCIAL STABILITY: SOCIAL INSECURITY: HAS ANYONE EVER THREATENED TO HURT YOUR FAMILY OR YOUR PETS?: NO

## 2024-01-01 SDOH — SOCIAL STABILITY: SOCIAL INSECURITY: HAVE YOU HAD THOUGHTS OF HARMING ANYONE ELSE?: NO

## 2024-01-01 ASSESSMENT — COGNITIVE AND FUNCTIONAL STATUS - GENERAL
TOILETING: TOTAL
MOBILITY SCORE: 15
DRESSING REGULAR UPPER BODY CLOTHING: A LITTLE
PERSONAL GROOMING: A LITTLE
WALKING IN HOSPITAL ROOM: A LITTLE
STANDING UP FROM CHAIR USING ARMS: A LITTLE
MOBILITY SCORE: 6
DRESSING REGULAR UPPER BODY CLOTHING: A LITTLE
MOBILITY SCORE: 16
CLIMB 3 TO 5 STEPS WITH RAILING: A LITTLE
MOBILITY SCORE: 21
HELP NEEDED FOR BATHING: A LITTLE
DRESSING REGULAR LOWER BODY CLOTHING: A LITTLE
DRESSING REGULAR LOWER BODY CLOTHING: A LITTLE
PERSONAL GROOMING: A LITTLE
WALKING IN HOSPITAL ROOM: A LITTLE
MOBILITY SCORE: 15
MOVING TO AND FROM BED TO CHAIR: A LITTLE
STANDING UP FROM CHAIR USING ARMS: A LITTLE
DAILY ACTIVITIY SCORE: 19
MOVING TO AND FROM BED TO CHAIR: A LITTLE
PERSONAL GROOMING: A LITTLE
STANDING UP FROM CHAIR USING ARMS: A LITTLE
CLIMB 3 TO 5 STEPS WITH RAILING: TOTAL
MOVING FROM LYING ON BACK TO SITTING ON SIDE OF FLAT BED WITH BEDRAILS: A LITTLE
CLIMB 3 TO 5 STEPS WITH RAILING: TOTAL
WALKING IN HOSPITAL ROOM: A LOT
STANDING UP FROM CHAIR USING ARMS: A LITTLE
EATING MEALS: A LITTLE
HELP NEEDED FOR BATHING: TOTAL
TURNING FROM BACK TO SIDE WHILE IN FLAT BAD: TOTAL
DRESSING REGULAR UPPER BODY CLOTHING: A LOT
CLIMB 3 TO 5 STEPS WITH RAILING: TOTAL
DRESSING REGULAR UPPER BODY CLOTHING: A LITTLE
MOVING TO AND FROM BED TO CHAIR: A LITTLE
DRESSING REGULAR UPPER BODY CLOTHING: A LITTLE
DRESSING REGULAR LOWER BODY CLOTHING: A LOT
WALKING IN HOSPITAL ROOM: A LITTLE
MOBILITY SCORE: 16
MOBILITY SCORE: 16
PERSONAL GROOMING: A LITTLE
DAILY ACTIVITIY SCORE: 15
TURNING FROM BACK TO SIDE WHILE IN FLAT BAD: A LITTLE
MOVING TO AND FROM BED TO CHAIR: A LITTLE
STANDING UP FROM CHAIR USING ARMS: A LITTLE
CLIMB 3 TO 5 STEPS WITH RAILING: TOTAL
TURNING FROM BACK TO SIDE WHILE IN FLAT BAD: A LITTLE
PERSONAL GROOMING: TOTAL
MOBILITY SCORE: 16
DRESSING REGULAR UPPER BODY CLOTHING: A LITTLE
PERSONAL GROOMING: A LITTLE
DAILY ACTIVITIY SCORE: 16
TURNING FROM BACK TO SIDE WHILE IN FLAT BAD: A LOT
WALKING IN HOSPITAL ROOM: A LITTLE
TOILETING: A LITTLE
MOBILITY SCORE: 21
HELP NEEDED FOR BATHING: A LITTLE
MOVING FROM LYING ON BACK TO SITTING ON SIDE OF FLAT BED WITH BEDRAILS: A LITTLE
HELP NEEDED FOR BATHING: A LOT
CLIMB 3 TO 5 STEPS WITH RAILING: TOTAL
MOVING FROM LYING ON BACK TO SITTING ON SIDE OF FLAT BED WITH BEDRAILS: A LITTLE
DRESSING REGULAR LOWER BODY CLOTHING: A LITTLE
DRESSING REGULAR UPPER BODY CLOTHING: A LITTLE
EATING MEALS: A LOT
TOILETING: A LOT
STANDING UP FROM CHAIR USING ARMS: A LITTLE
MOVING FROM LYING ON BACK TO SITTING ON SIDE OF FLAT BED WITH BEDRAILS: A LITTLE
MOBILITY SCORE: 18
CLIMB 3 TO 5 STEPS WITH RAILING: TOTAL
HELP NEEDED FOR BATHING: A LITTLE
PERSONAL GROOMING: A LITTLE
TURNING FROM BACK TO SIDE WHILE IN FLAT BAD: A LITTLE
TURNING FROM BACK TO SIDE WHILE IN FLAT BAD: A LITTLE
TOILETING: A LITTLE
MOVING TO AND FROM BED TO CHAIR: A LITTLE
HELP NEEDED FOR BATHING: A LITTLE
HELP NEEDED FOR BATHING: A LITTLE
TOILETING: A LITTLE
PERSONAL GROOMING: A LITTLE
CLIMB 3 TO 5 STEPS WITH RAILING: TOTAL
DAILY ACTIVITIY SCORE: 18
HELP NEEDED FOR BATHING: A LITTLE
MOVING FROM LYING ON BACK TO SITTING ON SIDE OF FLAT BED WITH BEDRAILS: A LITTLE
STANDING UP FROM CHAIR USING ARMS: A LITTLE
MOVING FROM LYING ON BACK TO SITTING ON SIDE OF FLAT BED WITH BEDRAILS: A LITTLE
DAILY ACTIVITIY SCORE: 19
PERSONAL GROOMING: A LITTLE
DRESSING REGULAR LOWER BODY CLOTHING: A LOT
MOBILITY SCORE: 16
DAILY ACTIVITIY SCORE: 19
MOVING TO AND FROM BED TO CHAIR: A LITTLE
WALKING IN HOSPITAL ROOM: A LITTLE
CLIMB 3 TO 5 STEPS WITH RAILING: TOTAL
PERSONAL GROOMING: A LITTLE
CLIMB 3 TO 5 STEPS WITH RAILING: TOTAL
PERSONAL GROOMING: A LITTLE
CLIMB 3 TO 5 STEPS WITH RAILING: A LITTLE
TOILETING: A LITTLE
DRESSING REGULAR LOWER BODY CLOTHING: A LITTLE
MOVING TO AND FROM BED TO CHAIR: A LOT
DRESSING REGULAR LOWER BODY CLOTHING: A LITTLE
TURNING FROM BACK TO SIDE WHILE IN FLAT BAD: A LITTLE
CLIMB 3 TO 5 STEPS WITH RAILING: A LOT
TURNING FROM BACK TO SIDE WHILE IN FLAT BAD: A LOT
TOILETING: A LITTLE
TOILETING: A LITTLE
WALKING IN HOSPITAL ROOM: A LITTLE
HELP NEEDED FOR BATHING: A LITTLE
MOBILITY SCORE: 11
MOBILITY SCORE: 15
DRESSING REGULAR LOWER BODY CLOTHING: A LOT
MOVING FROM LYING ON BACK TO SITTING ON SIDE OF FLAT BED WITH BEDRAILS: A LITTLE
STANDING UP FROM CHAIR USING ARMS: A LOT
DAILY ACTIVITIY SCORE: 19
MOVING TO AND FROM BED TO CHAIR: A LITTLE
MOVING FROM LYING ON BACK TO SITTING ON SIDE OF FLAT BED WITH BEDRAILS: A LOT
STANDING UP FROM CHAIR USING ARMS: A LOT
DRESSING REGULAR LOWER BODY CLOTHING: TOTAL
DRESSING REGULAR LOWER BODY CLOTHING: TOTAL
DAILY ACTIVITIY SCORE: 19
DAILY ACTIVITIY SCORE: 19
CLIMB 3 TO 5 STEPS WITH RAILING: TOTAL
MOVING FROM LYING ON BACK TO SITTING ON SIDE OF FLAT BED WITH BEDRAILS: A LITTLE
STANDING UP FROM CHAIR USING ARMS: A LITTLE
DAILY ACTIVITIY SCORE: 19
MOVING TO AND FROM BED TO CHAIR: A LITTLE
TURNING FROM BACK TO SIDE WHILE IN FLAT BAD: A LITTLE
MOVING TO AND FROM BED TO CHAIR: A LOT
WALKING IN HOSPITAL ROOM: A LOT
MOVING TO AND FROM BED TO CHAIR: A LITTLE
TURNING FROM BACK TO SIDE WHILE IN FLAT BAD: A LOT
MOBILITY SCORE: 16
DAILY ACTIVITIY SCORE: 23
WALKING IN HOSPITAL ROOM: A LITTLE
DRESSING REGULAR LOWER BODY CLOTHING: A LITTLE
CLIMB 3 TO 5 STEPS WITH RAILING: TOTAL
WALKING IN HOSPITAL ROOM: A LITTLE
WALKING IN HOSPITAL ROOM: A LITTLE
HELP NEEDED FOR BATHING: A LITTLE
MOBILITY SCORE: 16
MOVING FROM LYING ON BACK TO SITTING ON SIDE OF FLAT BED WITH BEDRAILS: A LITTLE
DRESSING REGULAR LOWER BODY CLOTHING: A LOT
CLIMB 3 TO 5 STEPS WITH RAILING: TOTAL
MOBILITY SCORE: 16
STANDING UP FROM CHAIR USING ARMS: A LOT
MOVING FROM LYING ON BACK TO SITTING ON SIDE OF FLAT BED WITH BEDRAILS: A LITTLE
DAILY ACTIVITIY SCORE: 19
DRESSING REGULAR LOWER BODY CLOTHING: A LITTLE
DRESSING REGULAR UPPER BODY CLOTHING: TOTAL
STANDING UP FROM CHAIR USING ARMS: A LITTLE
DAILY ACTIVITIY SCORE: 24
MOVING TO AND FROM BED TO CHAIR: A LITTLE
PERSONAL GROOMING: TOTAL
MOBILITY SCORE: 20
HELP NEEDED FOR BATHING: A LOT
PERSONAL GROOMING: TOTAL
DAILY ACTIVITIY SCORE: 24
DRESSING REGULAR LOWER BODY CLOTHING: A LITTLE
TURNING FROM BACK TO SIDE WHILE IN FLAT BAD: A LITTLE
MOBILITY SCORE: 16
MOBILITY SCORE: 16
TOILETING: TOTAL
DAILY ACTIVITIY SCORE: 18
WALKING IN HOSPITAL ROOM: A LITTLE
TURNING FROM BACK TO SIDE WHILE IN FLAT BAD: A LITTLE
HELP NEEDED FOR BATHING: A LITTLE
WALKING IN HOSPITAL ROOM: A LITTLE
PERSONAL GROOMING: A LOT
DRESSING REGULAR LOWER BODY CLOTHING: A LOT
TOILETING: A LITTLE
CLIMB 3 TO 5 STEPS WITH RAILING: TOTAL
TURNING FROM BACK TO SIDE WHILE IN FLAT BAD: A LITTLE
MOVING TO AND FROM BED TO CHAIR: A LITTLE
STANDING UP FROM CHAIR USING ARMS: A LITTLE
TURNING FROM BACK TO SIDE WHILE IN FLAT BAD: A LITTLE
STANDING UP FROM CHAIR USING ARMS: A LOT
MOVING FROM LYING ON BACK TO SITTING ON SIDE OF FLAT BED WITH BEDRAILS: TOTAL
STANDING UP FROM CHAIR USING ARMS: A LOT
MOVING FROM LYING ON BACK TO SITTING ON SIDE OF FLAT BED WITH BEDRAILS: A LOT
MOVING FROM LYING ON BACK TO SITTING ON SIDE OF FLAT BED WITH BEDRAILS: A LOT
MOBILITY SCORE: 12
TURNING FROM BACK TO SIDE WHILE IN FLAT BAD: A LITTLE
WALKING IN HOSPITAL ROOM: A LOT
MOVING FROM LYING ON BACK TO SITTING ON SIDE OF FLAT BED WITH BEDRAILS: A LITTLE
HELP NEEDED FOR BATHING: A LITTLE
CLIMB 3 TO 5 STEPS WITH RAILING: TOTAL
TOILETING: A LITTLE
CLIMB 3 TO 5 STEPS WITH RAILING: TOTAL
MOBILITY SCORE: 16
TURNING FROM BACK TO SIDE WHILE IN FLAT BAD: A LITTLE
STANDING UP FROM CHAIR USING ARMS: A LOT
DRESSING REGULAR UPPER BODY CLOTHING: A LITTLE
MOVING FROM LYING ON BACK TO SITTING ON SIDE OF FLAT BED WITH BEDRAILS: A LOT
WALKING IN HOSPITAL ROOM: A LOT
MOVING TO AND FROM BED TO CHAIR: A LITTLE
CLIMB 3 TO 5 STEPS WITH RAILING: TOTAL
WALKING IN HOSPITAL ROOM: A LITTLE
STANDING UP FROM CHAIR USING ARMS: A LITTLE
DRESSING REGULAR LOWER BODY CLOTHING: A LITTLE
WALKING IN HOSPITAL ROOM: A LOT
WALKING IN HOSPITAL ROOM: A LITTLE
DAILY ACTIVITIY SCORE: 19
DRESSING REGULAR UPPER BODY CLOTHING: A LITTLE
EATING MEALS: A LOT
DAILY ACTIVITIY SCORE: 19
HELP NEEDED FOR BATHING: A LITTLE
TOILETING: A LITTLE
WALKING IN HOSPITAL ROOM: A LITTLE
MOBILITY SCORE: 16
DRESSING REGULAR UPPER BODY CLOTHING: A LITTLE
WALKING IN HOSPITAL ROOM: A LOT
WALKING IN HOSPITAL ROOM: A LITTLE
PERSONAL GROOMING: A LITTLE
TURNING FROM BACK TO SIDE WHILE IN FLAT BAD: A LITTLE
DRESSING REGULAR UPPER BODY CLOTHING: A LITTLE
MOBILITY SCORE: 13
DRESSING REGULAR LOWER BODY CLOTHING: A LITTLE
HELP NEEDED FOR BATHING: A LOT
MOVING FROM LYING ON BACK TO SITTING ON SIDE OF FLAT BED WITH BEDRAILS: A LITTLE
CLIMB 3 TO 5 STEPS WITH RAILING: TOTAL
TURNING FROM BACK TO SIDE WHILE IN FLAT BAD: A LITTLE
MOVING FROM LYING ON BACK TO SITTING ON SIDE OF FLAT BED WITH BEDRAILS: A LITTLE
STANDING UP FROM CHAIR USING ARMS: A LITTLE
DRESSING REGULAR UPPER BODY CLOTHING: A LITTLE
CLIMB 3 TO 5 STEPS WITH RAILING: TOTAL
DRESSING REGULAR LOWER BODY CLOTHING: A LITTLE
STANDING UP FROM CHAIR USING ARMS: A LOT
DAILY ACTIVITIY SCORE: 19
EATING MEALS: A LITTLE
TOILETING: A LOT
MOVING TO AND FROM BED TO CHAIR: A LITTLE
TURNING FROM BACK TO SIDE WHILE IN FLAT BAD: A LITTLE
STANDING UP FROM CHAIR USING ARMS: A LOT
MOBILITY SCORE: 21
MOVING TO AND FROM BED TO CHAIR: A LITTLE
HELP NEEDED FOR BATHING: A LITTLE
WALKING IN HOSPITAL ROOM: A LITTLE
WALKING IN HOSPITAL ROOM: A LITTLE
TURNING FROM BACK TO SIDE WHILE IN FLAT BAD: A LITTLE
WALKING IN HOSPITAL ROOM: A LOT
CLIMB 3 TO 5 STEPS WITH RAILING: TOTAL
HELP NEEDED FOR BATHING: A LOT
MOBILITY SCORE: 20
CLIMB 3 TO 5 STEPS WITH RAILING: TOTAL
MOVING FROM LYING ON BACK TO SITTING ON SIDE OF FLAT BED WITH BEDRAILS: A LITTLE
TOILETING: A LITTLE
DRESSING REGULAR LOWER BODY CLOTHING: A LITTLE
CLIMB 3 TO 5 STEPS WITH RAILING: A LOT
MOBILITY SCORE: 18
TOILETING: A LOT
MOVING FROM LYING ON BACK TO SITTING ON SIDE OF FLAT BED WITH BEDRAILS: A LITTLE
HELP NEEDED FOR BATHING: A LITTLE
TOILETING: A LITTLE
TOILETING: A LITTLE
MOVING TO AND FROM BED TO CHAIR: A LITTLE
PERSONAL GROOMING: A LITTLE
PERSONAL GROOMING: A LITTLE
HELP NEEDED FOR BATHING: A LITTLE
WALKING IN HOSPITAL ROOM: A LITTLE
STANDING UP FROM CHAIR USING ARMS: A LITTLE
TURNING FROM BACK TO SIDE WHILE IN FLAT BAD: A LITTLE
HELP NEEDED FOR BATHING: A LOT
STANDING UP FROM CHAIR USING ARMS: A LITTLE
PERSONAL GROOMING: A LITTLE
MOBILITY SCORE: 12
CLIMB 3 TO 5 STEPS WITH RAILING: TOTAL
STANDING UP FROM CHAIR USING ARMS: A LITTLE
TOILETING: TOTAL
TURNING FROM BACK TO SIDE WHILE IN FLAT BAD: A LITTLE
DAILY ACTIVITIY SCORE: 16
DAILY ACTIVITIY SCORE: 24
MOVING TO AND FROM BED TO CHAIR: A LITTLE
MOVING FROM LYING ON BACK TO SITTING ON SIDE OF FLAT BED WITH BEDRAILS: A LITTLE
MOBILITY SCORE: 17
DAILY ACTIVITIY SCORE: 24
DRESSING REGULAR LOWER BODY CLOTHING: A LITTLE
DRESSING REGULAR UPPER BODY CLOTHING: A LITTLE
MOBILITY SCORE: 12
WALKING IN HOSPITAL ROOM: A LITTLE
TURNING FROM BACK TO SIDE WHILE IN FLAT BAD: A LITTLE
CLIMB 3 TO 5 STEPS WITH RAILING: A LOT
MOBILITY SCORE: 16
MOVING TO AND FROM BED TO CHAIR: A LITTLE
STANDING UP FROM CHAIR USING ARMS: A LITTLE
WALKING IN HOSPITAL ROOM: A LITTLE
DRESSING REGULAR UPPER BODY CLOTHING: A LITTLE
STANDING UP FROM CHAIR USING ARMS: A LOT
DAILY ACTIVITIY SCORE: 19
PERSONAL GROOMING: A LITTLE
MOVING TO AND FROM BED TO CHAIR: A LITTLE
DRESSING REGULAR LOWER BODY CLOTHING: A LITTLE
MOBILITY SCORE: 13
MOVING FROM LYING ON BACK TO SITTING ON SIDE OF FLAT BED WITH BEDRAILS: A LITTLE
PERSONAL GROOMING: A LITTLE
PERSONAL GROOMING: A LITTLE
TURNING FROM BACK TO SIDE WHILE IN FLAT BAD: A LITTLE
TURNING FROM BACK TO SIDE WHILE IN FLAT BAD: A LOT
MOVING FROM LYING ON BACK TO SITTING ON SIDE OF FLAT BED WITH BEDRAILS: A LITTLE
WALKING IN HOSPITAL ROOM: A LITTLE
MOVING TO AND FROM BED TO CHAIR: A LITTLE
DRESSING REGULAR UPPER BODY CLOTHING: A LITTLE
DRESSING REGULAR UPPER BODY CLOTHING: TOTAL
CLIMB 3 TO 5 STEPS WITH RAILING: TOTAL
DRESSING REGULAR UPPER BODY CLOTHING: A LITTLE
MOVING FROM LYING ON BACK TO SITTING ON SIDE OF FLAT BED WITH BEDRAILS: A LITTLE
MOVING TO AND FROM BED TO CHAIR: A LITTLE
DAILY ACTIVITIY SCORE: 19
DRESSING REGULAR LOWER BODY CLOTHING: A LITTLE
MOBILITY SCORE: 16
PERSONAL GROOMING: A LITTLE
MOBILITY SCORE: 16
STANDING UP FROM CHAIR USING ARMS: A LITTLE
PERSONAL GROOMING: A LITTLE
WALKING IN HOSPITAL ROOM: A LITTLE
WALKING IN HOSPITAL ROOM: A LITTLE
MOVING FROM LYING ON BACK TO SITTING ON SIDE OF FLAT BED WITH BEDRAILS: A LITTLE
DAILY ACTIVITIY SCORE: 17
MOVING FROM LYING ON BACK TO SITTING ON SIDE OF FLAT BED WITH BEDRAILS: A LITTLE
TURNING FROM BACK TO SIDE WHILE IN FLAT BAD: A LITTLE
HELP NEEDED FOR BATHING: A LITTLE
CLIMB 3 TO 5 STEPS WITH RAILING: A LOT
TURNING FROM BACK TO SIDE WHILE IN FLAT BAD: A LITTLE
MOBILITY SCORE: 18
DRESSING REGULAR LOWER BODY CLOTHING: A LITTLE
TOILETING: A LOT
WALKING IN HOSPITAL ROOM: A LITTLE
CLIMB 3 TO 5 STEPS WITH RAILING: A LOT
DRESSING REGULAR UPPER BODY CLOTHING: A LITTLE
MOBILITY SCORE: 10
MOVING TO AND FROM BED TO CHAIR: A LITTLE
PERSONAL GROOMING: A LITTLE
WALKING IN HOSPITAL ROOM: A LITTLE
STANDING UP FROM CHAIR USING ARMS: A LITTLE
DRESSING REGULAR UPPER BODY CLOTHING: A LITTLE
DAILY ACTIVITIY SCORE: 17
TOILETING: A LOT
TOILETING: A LITTLE
TURNING FROM BACK TO SIDE WHILE IN FLAT BAD: A LOT
TOILETING: A LITTLE
DAILY ACTIVITIY SCORE: 6
MOVING TO AND FROM BED TO CHAIR: A LITTLE
DAILY ACTIVITIY SCORE: 22
STANDING UP FROM CHAIR USING ARMS: A LITTLE
TOILETING: A LOT
DRESSING REGULAR UPPER BODY CLOTHING: A LITTLE
WALKING IN HOSPITAL ROOM: A LITTLE
MOVING FROM LYING ON BACK TO SITTING ON SIDE OF FLAT BED WITH BEDRAILS: A LOT
HELP NEEDED FOR BATHING: A LITTLE
DRESSING REGULAR UPPER BODY CLOTHING: A LOT
DAILY ACTIVITIY SCORE: 7
PATIENT BASELINE BEDBOUND: NO
CLIMB 3 TO 5 STEPS WITH RAILING: TOTAL
DAILY ACTIVITIY SCORE: 19
TURNING FROM BACK TO SIDE WHILE IN FLAT BAD: A LITTLE
STANDING UP FROM CHAIR USING ARMS: A LITTLE
EATING MEALS: A LITTLE
MOVING FROM LYING ON BACK TO SITTING ON SIDE OF FLAT BED WITH BEDRAILS: A LITTLE
MOVING TO AND FROM BED TO CHAIR: TOTAL
TOILETING: A LITTLE
CLIMB 3 TO 5 STEPS WITH RAILING: TOTAL
MOVING TO AND FROM BED TO CHAIR: A LITTLE
MOVING FROM LYING ON BACK TO SITTING ON SIDE OF FLAT BED WITH BEDRAILS: A LITTLE
MOVING TO AND FROM BED TO CHAIR: A LITTLE
PERSONAL GROOMING: A LITTLE
CLIMB 3 TO 5 STEPS WITH RAILING: A LITTLE
MOVING TO AND FROM BED TO CHAIR: A LOT
DAILY ACTIVITIY SCORE: 8
STANDING UP FROM CHAIR USING ARMS: A LITTLE
TURNING FROM BACK TO SIDE WHILE IN FLAT BAD: A LOT
TURNING FROM BACK TO SIDE WHILE IN FLAT BAD: A LITTLE
DRESSING REGULAR LOWER BODY CLOTHING: A LITTLE
TOILETING: A LITTLE
STANDING UP FROM CHAIR USING ARMS: A LITTLE
TOILETING: A LITTLE
PERSONAL GROOMING: A LITTLE
CLIMB 3 TO 5 STEPS WITH RAILING: TOTAL
DRESSING REGULAR LOWER BODY CLOTHING: A LITTLE
MOVING TO AND FROM BED TO CHAIR: A LITTLE
PERSONAL GROOMING: A LITTLE
WALKING IN HOSPITAL ROOM: A LITTLE
WALKING IN HOSPITAL ROOM: A LITTLE
HELP NEEDED FOR BATHING: TOTAL
DRESSING REGULAR LOWER BODY CLOTHING: A LITTLE
MOVING FROM LYING ON BACK TO SITTING ON SIDE OF FLAT BED WITH BEDRAILS: A LITTLE
MOVING TO AND FROM BED TO CHAIR: A LITTLE
TOILETING: A LITTLE
DRESSING REGULAR UPPER BODY CLOTHING: A LITTLE
MOVING TO AND FROM BED TO CHAIR: A LITTLE
HELP NEEDED FOR BATHING: A LITTLE
MOBILITY SCORE: 21
CLIMB 3 TO 5 STEPS WITH RAILING: A LITTLE
STANDING UP FROM CHAIR USING ARMS: A LOT
DAILY ACTIVITIY SCORE: 20
MOBILITY SCORE: 16
MOVING TO AND FROM BED TO CHAIR: A LITTLE
CLIMB 3 TO 5 STEPS WITH RAILING: TOTAL
DRESSING REGULAR UPPER BODY CLOTHING: A LITTLE
CLIMB 3 TO 5 STEPS WITH RAILING: A LOT
DAILY ACTIVITIY SCORE: 19
MOVING FROM LYING ON BACK TO SITTING ON SIDE OF FLAT BED WITH BEDRAILS: A LITTLE
TOILETING: A LITTLE
MOVING FROM LYING ON BACK TO SITTING ON SIDE OF FLAT BED WITH BEDRAILS: A LITTLE
CLIMB 3 TO 5 STEPS WITH RAILING: TOTAL
CLIMB 3 TO 5 STEPS WITH RAILING: A LITTLE
DRESSING REGULAR LOWER BODY CLOTHING: TOTAL
EATING MEALS: TOTAL
PERSONAL GROOMING: A LITTLE
WALKING IN HOSPITAL ROOM: A LITTLE
MOBILITY SCORE: 16
TOILETING: A LITTLE
CLIMB 3 TO 5 STEPS WITH RAILING: A LOT
PERSONAL GROOMING: A LITTLE
WALKING IN HOSPITAL ROOM: TOTAL
TOILETING: A LITTLE
CLIMB 3 TO 5 STEPS WITH RAILING: TOTAL
HELP NEEDED FOR BATHING: A LITTLE
MOVING FROM LYING ON BACK TO SITTING ON SIDE OF FLAT BED WITH BEDRAILS: A LITTLE
HELP NEEDED FOR BATHING: A LOT
WALKING IN HOSPITAL ROOM: A LITTLE
WALKING IN HOSPITAL ROOM: TOTAL
STANDING UP FROM CHAIR USING ARMS: A LITTLE
TURNING FROM BACK TO SIDE WHILE IN FLAT BAD: A LITTLE
DAILY ACTIVITIY SCORE: 17
STANDING UP FROM CHAIR USING ARMS: TOTAL
PERSONAL GROOMING: A LITTLE
MOVING TO AND FROM BED TO CHAIR: A LITTLE
MOVING TO AND FROM BED TO CHAIR: A LITTLE
DAILY ACTIVITIY SCORE: 19
PERSONAL GROOMING: A LITTLE
DRESSING REGULAR UPPER BODY CLOTHING: A LITTLE
MOVING TO AND FROM BED TO CHAIR: A LITTLE
WALKING IN HOSPITAL ROOM: A LITTLE
HELP NEEDED FOR BATHING: A LITTLE
DRESSING REGULAR UPPER BODY CLOTHING: TOTAL
MOBILITY SCORE: 21
DRESSING REGULAR LOWER BODY CLOTHING: A LOT
DAILY ACTIVITIY SCORE: 24
HELP NEEDED FOR BATHING: TOTAL
STANDING UP FROM CHAIR USING ARMS: A LITTLE

## 2024-01-01 ASSESSMENT — PAIN - FUNCTIONAL ASSESSMENT
PAIN_FUNCTIONAL_ASSESSMENT: 0-10
PAIN_FUNCTIONAL_ASSESSMENT: CPOT (CRITICAL CARE PAIN OBSERVATION TOOL)
PAIN_FUNCTIONAL_ASSESSMENT: WONG-BAKER FACES
PAIN_FUNCTIONAL_ASSESSMENT: WONG-BAKER FACES
PAIN_FUNCTIONAL_ASSESSMENT: 0-10
PAIN_FUNCTIONAL_ASSESSMENT: CPOT (CRITICAL CARE PAIN OBSERVATION TOOL)
PAIN_FUNCTIONAL_ASSESSMENT: 0-10
PAIN_FUNCTIONAL_ASSESSMENT: CPOT (CRITICAL CARE PAIN OBSERVATION TOOL)
PAIN_FUNCTIONAL_ASSESSMENT: 0-10
PAIN_FUNCTIONAL_ASSESSMENT: CPOT (CRITICAL CARE PAIN OBSERVATION TOOL)
PAIN_FUNCTIONAL_ASSESSMENT: 0-10
PAIN_FUNCTIONAL_ASSESSMENT: CPOT (CRITICAL CARE PAIN OBSERVATION TOOL)
PAIN_FUNCTIONAL_ASSESSMENT: 0-10
PAIN_FUNCTIONAL_ASSESSMENT: CPOT (CRITICAL CARE PAIN OBSERVATION TOOL)
PAIN_FUNCTIONAL_ASSESSMENT: CPOT (CRITICAL CARE PAIN OBSERVATION TOOL)
PAIN_FUNCTIONAL_ASSESSMENT: 0-10
PAIN_FUNCTIONAL_ASSESSMENT: CPOT (CRITICAL CARE PAIN OBSERVATION TOOL)
PAIN_FUNCTIONAL_ASSESSMENT: CPOT (CRITICAL CARE PAIN OBSERVATION TOOL)
PAIN_FUNCTIONAL_ASSESSMENT: 0-10
PAIN_FUNCTIONAL_ASSESSMENT: CPOT (CRITICAL CARE PAIN OBSERVATION TOOL)
PAIN_FUNCTIONAL_ASSESSMENT: 0-10
PAIN_FUNCTIONAL_ASSESSMENT: CPOT (CRITICAL CARE PAIN OBSERVATION TOOL)
PAIN_FUNCTIONAL_ASSESSMENT: 0-10
PAIN_FUNCTIONAL_ASSESSMENT: CPOT (CRITICAL CARE PAIN OBSERVATION TOOL)
PAIN_FUNCTIONAL_ASSESSMENT: 0-10
PAIN_FUNCTIONAL_ASSESSMENT: CPOT (CRITICAL CARE PAIN OBSERVATION TOOL)
PAIN_FUNCTIONAL_ASSESSMENT: 0-10
PAIN_FUNCTIONAL_ASSESSMENT: CPOT (CRITICAL CARE PAIN OBSERVATION TOOL)

## 2024-01-01 ASSESSMENT — PAIN SCALES - GENERAL
PAINLEVEL_OUTOF10: 0 - NO PAIN
PAINLEVEL_OUTOF10: 3
PAINLEVEL_OUTOF10: 0 - NO PAIN
PAINLEVEL_OUTOF10: 3
PAINLEVEL_OUTOF10: 5 - MODERATE PAIN
PAINLEVEL_OUTOF10: 0 - NO PAIN
PAINLEVEL_OUTOF10: 10 - WORST POSSIBLE PAIN
PAINLEVEL_OUTOF10: 0 - NO PAIN
PAINLEVEL_OUTOF10: 4
PAINLEVEL_OUTOF10: 5 - MODERATE PAIN
PAINLEVEL_OUTOF10: 4
PAINLEVEL_OUTOF10: 0 - NO PAIN
PAINLEVEL_OUTOF10: 5 - MODERATE PAIN
PAINLEVEL_OUTOF10: 6
PAINLEVEL_OUTOF10: 6
PAINLEVEL_OUTOF10: 0 - NO PAIN
PAINLEVEL_OUTOF10: 0 - NO PAIN
PAINLEVEL_OUTOF10: 3
PAINLEVEL_OUTOF10: 4
PAINLEVEL_OUTOF10: 6
PAINLEVEL_OUTOF10: 5 - MODERATE PAIN
PAINLEVEL_OUTOF10: 4
PAINLEVEL_OUTOF10: 0 - NO PAIN
PAINLEVEL_OUTOF10: 0 - NO PAIN
PAINLEVEL_OUTOF10: 6
PAINLEVEL_OUTOF10: 0 - NO PAIN
PAINLEVEL_OUTOF10: 8
PAINLEVEL_OUTOF10: 0 - NO PAIN
PAINLEVEL_OUTOF10: 0 - NO PAIN
PAINLEVEL_OUTOF10: 2
PAINLEVEL_OUTOF10: 0 - NO PAIN
PAINLEVEL_OUTOF10: 3
PAINLEVEL_OUTOF10: 0 - NO PAIN
PAINLEVEL_OUTOF10: 4
PAINLEVEL_OUTOF10: 6
PAINLEVEL_OUTOF10: 3
PAINLEVEL_OUTOF10: 6
PAINLEVEL_OUTOF10: 8
PAINLEVEL_OUTOF10: 8
PAINLEVEL_OUTOF10: 0 - NO PAIN
PAINLEVEL_OUTOF10: 8
PAINLEVEL_OUTOF10: 0 - NO PAIN
PAINLEVEL_OUTOF10: 5 - MODERATE PAIN
PAINLEVEL_OUTOF10: 0 - NO PAIN
PAINLEVEL_OUTOF10: 8
PAINLEVEL_OUTOF10: 0 - NO PAIN
PAINLEVEL_OUTOF10: 5 - MODERATE PAIN
PAINLEVEL_OUTOF10: 0 - NO PAIN
PAINLEVEL_OUTOF10: 0 - NO PAIN
PAINLEVEL_OUTOF10: 5 - MODERATE PAIN
PAINLEVEL_OUTOF10: 2
PAINLEVEL_OUTOF10: 5 - MODERATE PAIN
PAINLEVEL_OUTOF10: 3
PAINLEVEL_OUTOF10: 10 - WORST POSSIBLE PAIN
PAINLEVEL_OUTOF10: 5 - MODERATE PAIN
PAINLEVEL_OUTOF10: 0 - NO PAIN

## 2024-01-01 ASSESSMENT — ACTIVITIES OF DAILY LIVING (ADL)
ADEQUATE_TO_COMPLETE_ADL: YES
TOILETING: INDEPENDENT
ASSISTIVE_DEVICE: WALKER
ADL_ASSISTANCE: INDEPENDENT
HEARING - RIGHT EAR: FUNCTIONAL
PATIENT'S MEMORY ADEQUATE TO SAFELY COMPLETE DAILY ACTIVITIES?: YES
HEARING - LEFT EAR: FUNCTIONAL
BATHING: INDEPENDENT
BATHING_ASSISTANCE: MODERATE
WALKS IN HOME: INDEPENDENT
GROOMING: INDEPENDENT
LACK_OF_TRANSPORTATION: NO
DRESSING YOURSELF: INDEPENDENT
FEEDING YOURSELF: INDEPENDENT
JUDGMENT_ADEQUATE_SAFELY_COMPLETE_DAILY_ACTIVITIES: YES

## 2024-01-01 ASSESSMENT — LIFESTYLE VARIABLES
HOW OFTEN DO YOU HAVE 6 OR MORE DRINKS ON ONE OCCASION: NEVER
AUDIT-C TOTAL SCORE: 1
AUDIT-C TOTAL SCORE: 1
HOW MANY STANDARD DRINKS CONTAINING ALCOHOL DO YOU HAVE ON A TYPICAL DAY: 1 OR 2
SUBSTANCE_ABUSE_PAST_12_MONTHS: NO
PRESCIPTION_ABUSE_PAST_12_MONTHS: NO
HOW OFTEN DO YOU HAVE A DRINK CONTAINING ALCOHOL: MONTHLY OR LESS
SKIP TO QUESTIONS 9-10: 1

## 2024-01-01 ASSESSMENT — ENCOUNTER SYMPTOMS
ACTIVITY CHANGE: 0
ABDOMINAL PAIN: 0
BACK PAIN: 0
SORE THROAT: 0
COUGH: 0
APPETITE CHANGE: 0

## 2024-01-01 ASSESSMENT — PAIN DESCRIPTION - LOCATION
LOCATION: HIP
LOCATION: ABDOMEN
LOCATION: GENERALIZED
LOCATION: ABDOMEN
LOCATION: HIP
LOCATION: HIP

## 2024-01-01 ASSESSMENT — PAIN SCALES - WONG BAKER
WONGBAKER_NUMERICALRESPONSE: NO HURT
WONGBAKER_NUMERICALRESPONSE: NO HURT
WONGBAKER_NUMERICALRESPONSE: HURTS LITTLE MORE
WONGBAKER_NUMERICALRESPONSE: HURTS LITTLE MORE
WONGBAKER_NUMERICALRESPONSE: NO HURT

## 2024-01-01 ASSESSMENT — PAIN SCALES - PAIN ASSESSMENT IN ADVANCED DEMENTIA (PAINAD)
BODYLANGUAGE: RELAXED
BREATHING: NORMAL
BREATHING: NORMAL
BODYLANGUAGE: RELAXED
BREATHING: NORMAL
FACIALEXPRESSION: SMILING OR INEXPRESSIVE
BODYLANGUAGE: RELAXED
FACIALEXPRESSION: SMILING OR INEXPRESSIVE
BODYLANGUAGE: RELAXED
BREATHING: NORMAL
FACIALEXPRESSION: SMILING OR INEXPRESSIVE
FACIALEXPRESSION: SMILING OR INEXPRESSIVE

## 2024-01-01 ASSESSMENT — COLUMBIA-SUICIDE SEVERITY RATING SCALE - C-SSRS
6. HAVE YOU EVER DONE ANYTHING, STARTED TO DO ANYTHING, OR PREPARED TO DO ANYTHING TO END YOUR LIFE?: NO
2. HAVE YOU ACTUALLY HAD ANY THOUGHTS OF KILLING YOURSELF?: NO
2. HAVE YOU ACTUALLY HAD ANY THOUGHTS OF KILLING YOURSELF?: NO
1. IN THE PAST MONTH, HAVE YOU WISHED YOU WERE DEAD OR WISHED YOU COULD GO TO SLEEP AND NOT WAKE UP?: NO
1. IN THE PAST MONTH, HAVE YOU WISHED YOU WERE DEAD OR WISHED YOU COULD GO TO SLEEP AND NOT WAKE UP?: NO
6. HAVE YOU EVER DONE ANYTHING, STARTED TO DO ANYTHING, OR PREPARED TO DO ANYTHING TO END YOUR LIFE?: NO

## 2024-01-01 ASSESSMENT — PATIENT HEALTH QUESTIONNAIRE - PHQ9
2. FEELING DOWN, DEPRESSED OR HOPELESS: NOT AT ALL
1. LITTLE INTEREST OR PLEASURE IN DOING THINGS: NOT AT ALL
SUM OF ALL RESPONSES TO PHQ9 QUESTIONS 1 & 2: 0

## 2024-01-01 ASSESSMENT — PAIN DESCRIPTION - ORIENTATION
ORIENTATION: RIGHT
ORIENTATION: RIGHT

## 2024-01-01 ASSESSMENT — PAIN DESCRIPTION - PAIN TYPE: TYPE: CHRONIC PAIN;ACUTE PAIN

## 2024-01-01 ASSESSMENT — PAIN DESCRIPTION - DESCRIPTORS: DESCRIPTORS: CRAMPING;OTHER (COMMENT)

## 2024-02-01 PROBLEM — R10.9 ABDOMINAL PAIN: Status: ACTIVE | Noted: 2023-01-01

## 2024-02-01 NOTE — ED PROVIDER NOTES
[facility]  ED  Provider Note  2/1/2024  5:30 PM  AC09/AC09        History of Present Illness:   Meghan Waddell is a 68 y.o. female presenting to the ED for full complaints including tarry stools, chronic diarrhea, crampy abdominal pain, blood in the urine, beginning 2 weeks ago.  The complaint has been intermittent, moderate in severity, and worsened by nothing.  Patient has crampy diffuse abdominal pain which is been present for several months.  It does feel little better after she moves her bowels.  She been having loose stools and occasionally black-yellow or green stools.  Her daughter is concerned she may appear jaundiced.  She denies any alcohol abuse.  She has had no fever or chills.  She does complain of hematuria.  She has history of a kidney stones with ureteral obstruction and acute renal failure.  She denies current flank pain.      Review of Systems:   Pertinent positives and review of systems as noted above.  Remaining 10 review of systems is negative or noncontributory to today's episode of care.  Review of Systems       --------------------------------------------- PAST HISTORY ---------------------------------------------  Past Medical History:  has a past medical history of Arthritis, rheumatoid (CMS/HCC), Cellulitis of right lower limb, and Personal history of other diseases of the nervous system and sense organs.    Past Surgical History:  has a past surgical history that includes Other surgical history (02/10/2021) and Joint replacement (Bilateral).    Social History:  reports that she has quit smoking. Her smoking use included cigarettes. She has a 2.00 pack-year smoking history. She has never used smokeless tobacco. She reports that she does not currently use alcohol. She reports that she does not use drugs.    Family History: family history includes Breast cancer in her sister; Diabetes type II in her mother; Heart disease in her father. Unless otherwise noted, family  history is non contributory    Patient's Medications   New Prescriptions    No medications on file   Previous Medications    ACETAMINOPHEN (TYLENOL EXTRA STRENGTH) 500 MG TABLET    TAKE 1 TABLET EVERY 4 TO 6 HOURS AS NEEDED.    ACETAMINOPHEN (TYLENOL) 325 MG TABLET    Take 1-2 tablets (325-650 mg) by mouth if needed for mild pain (1 - 3). every 4-6 hours    ASPIRIN 81 MG EC TABLET    Take 1 tablet (81 mg) by mouth once daily. TAKE 1 TABLET BY MOUTH TWICE A DAY AS PRESCRIBED FOR 4 WEEKS FOR DVT PROPHYLAXIS    CHOLECALCIFEROL (VITAMIN D-3) 50,000 UNIT CAPSULE    Take 1 capsule (50,000 Units) by mouth once a week.    DOCUSATE SODIUM (COLACE) 100 MG CAPSULE    Take 1 capsule (100 mg) by mouth 2 times a day as needed for constipation.    ERGOCALCIFEROL (VITAMIN D-2) 1.25 MG (10824 UT) CAPSULE    Take 1 capsule (1,250 mcg) by mouth 1 (one) time per week.    FOLIC ACID (FOLVITE) 1 MG TABLET    Take 1 tablet (1 mg) by mouth once daily.    METHOTREXATE (TREXALL) 2.5 MG TABLET    Take 6 tablets (15 mg total) by mouth 1 (one) time per week.    MONTELUKAST (SINGULAIR) 10 MG TABLET    Take 1 tablet (10 mg) by mouth once daily at bedtime.    NAPROXEN SODIUM (ALEVE) 220 MG TABLET    Take 1 tablet (220 mg) by mouth 3 times a day as needed.    OXYCODONE (ROXICODONE) 5 MG IMMEDIATE RELEASE TABLET    Take 1 tablet (5 mg) by mouth every 8 hours if needed.    PREDNISONE (DELTASONE) 5 MG TABLET    Take 1 tablet (5 mg) by mouth 2 times a day.   Modified Medications    No medications on file   Discontinued Medications    No medications on file      The patient’s home medications have been reviewed.    Allergies: Latex    -------------------------------------------------- RESULTS -------------------------------------------------  All laboratory and radiology results have been personally reviewed by myself   LABS:  Labs Reviewed   OCCULT BLOOD X1, STOOL - Abnormal       Result Value    Occult Blood, Stool X1 Positive (*)    CBC WITH AUTO  DIFFERENTIAL - Abnormal    WBC 8.8      nRBC 0.0      RBC 5.03      Hemoglobin 14.9      Hematocrit 43.5      MCV 87      MCH 29.6      MCHC 34.3      RDW 19.3 (*)     Platelets 222      Neutrophils % 64.9      Immature Granulocytes %, Automated 0.2      Lymphocytes % 23.1      Monocytes % 10.0      Eosinophils % 0.5      Basophils % 1.3      Neutrophils Absolute 5.70      Immature Granulocytes Absolute, Automated 0.02      Lymphocytes Absolute 2.03      Monocytes Absolute 0.88      Eosinophils Absolute 0.04      Basophils Absolute 0.11 (*)    COMPREHENSIVE METABOLIC PANEL - Abnormal    Glucose 115 (*)     Sodium 135 (*)     Potassium 3.3 (*)     Chloride 103      Bicarbonate 23      Anion Gap 12      Urea Nitrogen 18      Creatinine 0.99      eGFR 63      Calcium 8.6      Albumin 2.9 (*)     Alkaline Phosphatase 346 (*)     Total Protein 7.8       (*)     Bilirubin, Total 6.4 (*)     ALT 60 (*)    LACTATE - Abnormal    Lactate 3.0 (*)     Narrative:     Venipuncture immediately after or during the administration of Metamizole may lead to falsely low results. Testing should be performed immediately  prior to Metamizole dosing.   LIPASE - Normal    Lipase 53      Narrative:     Venipuncture immediately after or during the administration of Metamizole may lead to falsely low results. Testing should be performed immediately prior to Metamizole dosing.   URINE CULTURE   GRAY TOP    Extra Tube Hold for add-ons.     URINALYSIS WITH REFLEX CULTURE AND MICROSCOPIC    Narrative:     The following orders were created for panel order Urinalysis with Reflex Culture and Microscopic.  Procedure                               Abnormality         Status                     ---------                               -----------         ------                     Urinalysis with Reflex C...[[id number]]                      In process                 Extra Urine Gray Tube[[id number]]                                                     "     Please view results for these tests on the individual orders.   URINALYSIS WITH REFLEX CULTURE AND MICROSCOPIC   EXTRA URINE GRAY TUBE   LACTATE   PROTIME-INR   URINALYSIS MICROSCOPIC WITH REFLEX CULTURE         RADIOLOGY:  Interpreted by Radiologist.  CT abdomen pelvis wo IV contrast   Final Result   1. Since last year, interval development of significant parenchymal   liver disease with cirrhotic morphology.   2. Additional development of diffuse minimal ascites.   3. Unchanged bilateral small non-obstructing renal calculi; no   hydronephrosis.   4. No bowel obstruction or detectable bowel inflammation.   5. Likely subacute avulsion fracture lesser trochanter right proximal   femur.   6. Remainder as above.   Signed by Francisco Rhodes MD          No results found for this or any previous visit (from the past 4464 hour(s)).  ------------------------- NURSING NOTES AND VITALS REVIEWED ---------------------------   The nursing notes within the ED encounter and vital signs as below have been reviewed.   /74   Pulse (!) 104   Temp 36.3 °C (97.4 °F) (Tympanic)   Resp 20   Ht 1.727 m (5' 8\")   Wt 93.4 kg (206 lb)   SpO2 98%   BMI 31.32 kg/m²   Oxygen Saturation Interpretation: Normal      ---------------------------------------------------PHYSICAL EXAM--------------------------------------  Physical Exam   Constitutional/General: Alert and oriented x3, well appearing, non toxic in NAD  Head: Normocephalic and atraumatic  Eyes: PERRL, EOMI, conjunctiva normal, sclera non icteric  Mouth: Oropharynx clear, handling secretions, no trismus, no asymmetry of the posterior oropharynx or uvular edema  Neck: Supple, full ROM, non tender to palpation in the midline, no stridor, no crepitus, no meningeal signs  Respiratory: Lungs clear to auscultation bilaterally, no wheezes, rales, or rhonchi. Not in respiratory distress  Cardiovascular:  Regular rate. Regular rhythm. No murmurs, gallops, or rubs. 2+ distal " pulses  Chest: No chest wall tenderness  GI:  Abdomen Soft, mild diffuse tenderness without guarding, non distended.  +BS. No organomegaly, no palpable masses,  No rebound, guarding, or rigidity.  Exam reveals external hemorrhoids, the rectal vault has light brown stool and mucus.  It is guaiac positive.  Musculoskeletal: Moves all extremities x 4. Warm and well perfused, no clubbing, cyanosis, or edema. Capillary refill <3 seconds  Integument: skin warm and dry. No rashes.   Lymphatic: no lymphadenopathy noted  Neurologic: GCS 15, no focal deficits, symmetric strength 5/5 in the upper and lower extremities bilaterally  Psychiatric: Normal Affect    Procedures    ------------------------------ ED COURSE/MEDICAL DECISION MAKING----------------------    Medical Decision Making:   Patient has multiple complaints including chronic diarrhea and crampy abdominal pain hematuria with history of kidney stones and acute renal failure in the past.  She is pending laboratory testing and imaging studies.  Patient signed out to Dr. Noyola pending lab and imaging studies.  Patient laboratory testing returning.  She has evidence of hepatic injury on her CT scan with some mild cirrhosis.  She denies alcohol use.  She has been taking Remicade for rheumatoid arthritis which may be causing these liver issues.  Her bilirubin is elevated at 6.4.  LFTs are elevated.  The lipase is normal.  The lactate is elevated at 3.0.  CBC shows no acute pathology.  Urinalysis is pending at this time.    ED Course as of 02/05/24 0826   Thu Feb 01, 2024 2018 Received report from Dr Pierce [MN]   1640 Patient examined, chart reviewed [MN]   Fri Feb 02, 2024 2019 Patient reassessed. She feeling better. Tolerating PO all day. Hip pain controlled by naproxen [place] [facility] [place] [[place]]   Sat Feb 03, 2024   0808 CBC and Auto Differential(!)  CBC is unremarkable. [EC]   0808 Comprehensive metabolic panel(!)  Glucose and electrolytes are normal,  renal function is normal.  , ALT 46, total bilirubin 7, alk phos 234. [EC]   0809 Gamma-Glutamyl Transferase(!)  GGT is 324 [EC]   0809 Protime-INR(!)  INR is 1.3 [EC]   0810 Hepatitis panel, acute  Hepatitis B is nonreactive  Hepatitis A is nonreactive [EC]   0810 CT of the abdomen pelvis on 2/1/2024  IMPRESSION:  1. Since last year, interval development of significant parenchymal  liver disease with cirrhotic morphology.  2. Additional development of diffuse minimal ascites.  3. Unchanged bilateral small non-obstructing renal calculi; no  hydronephrosis.  4. No bowel obstruction or detectable bowel inflammation.  5. Likely subacute avulsion fracture lesser trochanter right proximal  femur.  6. Remainder as above.  Signed by Francisco Rhodes MD   [EC]      ED Course User Index  [EC] Hector Griffin DO  [MN] Lorrie Noyola MD         Diagnoses as of 02/05/24 0826   Abdominal pain, unspecified abdominal location   Cirrhosis of liver with ascites, unspecified hepatic cirrhosis type (CMS/HCC)   Closed avulsion fracture of lesser trochanter of left femur, initial encounter (CMS/HCC)   Hypokalemia   Lesion of left nipple      Counseling:   The emergency provider has spoken with the patient and discussed today’s results, in addition to providing specific details for the plan of care and counseling regarding the diagnosis and prognosis.  Questions are answered at this time and they are agreeable with the plan.      --------------------------------- IMPRESSION AND DISPOSITION ---------------------------------        IMPRESSION  1. Abdominal pain, unspecified abdominal location        DISPOSITION  Disposition:  Signed out  Patient condition is fair      Billing Provider Critical Care Time: 0 minutes     Floyd Pierce MD  02/05/24 0826

## 2024-02-02 PROBLEM — R18.8 CIRRHOSIS OF LIVER WITH ASCITES (MULTI): Status: ACTIVE | Noted: 2024-01-01

## 2024-02-02 PROBLEM — K74.60 CIRRHOSIS OF LIVER WITH ASCITES (MULTI): Status: ACTIVE | Noted: 2024-01-01

## 2024-02-02 PROBLEM — S72.122A: Status: ACTIVE | Noted: 2024-01-01

## 2024-02-02 PROBLEM — E87.6 HYPOKALEMIA: Status: ACTIVE | Noted: 2024-01-01

## 2024-02-02 PROBLEM — N64.9 LESION OF LEFT NIPPLE: Status: ACTIVE | Noted: 2024-01-01

## 2024-02-02 NOTE — PROGRESS NOTES
Meghan Waddell is a 68 y.o. female on day 0 of admission presenting with 2 week of abdominal pain and dark urine. She was initially evaluated by Dr Wan. Please his notes for full details of the H&P. Patient's LFTs were elevated and new cirrhotic changes were identified on CT. She was signed out to me pending repeat lactate and liver GI consult.    Subjective   Patient Is denying any pain or nausea at ths tie, she would just like some coffee. She does now note for the first time that she has a chronic non healing wound on her left nipple. She says it started as a abrasion, but has never gotten better. She did not tell her PCP about it and has never had mammogram. Breast cancer runs in her family. No fever, no nipple discharge of breat redness.       Objective     Physical Exam  Vitals reviewed.   HENT:      Head: Normocephalic.   Eyes:      General: Scleral icterus present.      Extraocular Movements: Extraocular movements intact.      Pupils: Pupils are equal, round, and reactive to light.   Cardiovascular:      Rate and Rhythm: Normal rate and regular rhythm.      Heart sounds: Normal heart sounds.   Pulmonary:      Effort: Pulmonary effort is normal.   Abdominal:      General: Abdomen is protuberant. Bowel sounds are normal. There is no distension or abdominal bruit.      Palpations: Abdomen is soft.      Tenderness: There is no abdominal tenderness.   Skin:     General: Skin is warm and dry.      Capillary Refill: Capillary refill takes less than 2 seconds.      Comments: Naresh Ruiz RN as chaperone -- scabbed area left nipple with 4 cm thickened skin and subQ. No discrete mass, no redness or warmth, no nipple discharge, no axillary adenopathy   Neurological:      General: No focal deficit present.      Mental Status: She is alert.   Psychiatric:         Mood and Affect: Mood normal.       Labs Reviewed   OCCULT BLOOD X1, STOOL - Abnormal       Result Value    Occult Blood, Stool X1 Positive (*)    CBC  WITH AUTO DIFFERENTIAL - Abnormal    WBC 8.8      nRBC 0.0      RBC 5.03      Hemoglobin 14.9      Hematocrit 43.5      MCV 87      MCH 29.6      MCHC 34.3      RDW 19.3 (*)     Platelets 222      Neutrophils % 64.9      Immature Granulocytes %, Automated 0.2      Lymphocytes % 23.1      Monocytes % 10.0      Eosinophils % 0.5      Basophils % 1.3      Neutrophils Absolute 5.70      Immature Granulocytes Absolute, Automated 0.02      Lymphocytes Absolute 2.03      Monocytes Absolute 0.88      Eosinophils Absolute 0.04      Basophils Absolute 0.11 (*)    COMPREHENSIVE METABOLIC PANEL - Abnormal    Glucose 115 (*)     Sodium 135 (*)     Potassium 3.3 (*)     Chloride 103      Bicarbonate 23      Anion Gap 12      Urea Nitrogen 18      Creatinine 0.99      eGFR 63      Calcium 8.6      Albumin 2.9 (*)     Alkaline Phosphatase 346 (*)     Total Protein 7.8       (*)     Bilirubin, Total 6.4 (*)     ALT 60 (*)    LACTATE - Abnormal    Lactate 3.0 (*)     Narrative:     Venipuncture immediately after or during the administration of Metamizole may lead to falsely low results. Testing should be performed immediately  prior to Metamizole dosing.   URINALYSIS WITH REFLEX CULTURE AND MICROSCOPIC - Abnormal    Color, Urine Yessenia (*)     Appearance, Urine Hazy (*)     Specific Gravity, Urine 1.023      pH, Urine 5.0      Protein, Urine NEGATIVE      Glucose, Urine NEGATIVE      Blood, Urine SMALL (1+) (*)     Ketones, Urine NEGATIVE      Bilirubin, Urine MODERATE (2+) (*)     Urobilinogen, Urine 4.0 (*)     Nitrite, Urine NEGATIVE      Leukocyte Esterase, Urine NEGATIVE     PROTIME-INR - Abnormal    Protime 12.9 (*)     INR 1.1     GAMMA-GLUTAMYL TRANSFERASE - Abnormal     (*)    URINALYSIS MICROSCOPIC WITH REFLEX CULTURE - Abnormal    WBC, Urine 1-5      RBC, Urine 3-5      Squamous Epithelial Cells, Urine 10-25 (FEW)      Bacteria, Urine 1+ (*)     Hyaline Casts, Urine 1+ (*)     Calcium Oxalate Crystals, Urine  2+ (*)    LIPASE - Normal    Lipase 53      Narrative:     Venipuncture immediately after or during the administration of Metamizole may lead to falsely low results. Testing should be performed immediately prior to Metamizole dosing.   LACTATE - Normal    Lactate 1.9      Narrative:     Venipuncture immediately after or during the administration of Metamizole may lead to falsely low results. Testing should be performed immediately  prior to Metamizole dosing.   ALCOHOL - Normal    Alcohol <10     APTT - Normal    aPTT 29      Narrative:     The APTT is no longer used for monitoring Unfractionated Heparin Therapy. For monitoring Heparin Therapy, use the Heparin Assay.   URINE CULTURE   TISSUE/WOUND CULTURE/SMEAR   GRAY TOP    Extra Tube Hold for add-ons.     URINALYSIS WITH REFLEX CULTURE AND MICROSCOPIC    Narrative:     The following orders were created for panel order Urinalysis with Reflex Culture and Microscopic.  Procedure                               Abnormality         Status                     ---------                               -----------         ------                     Urinalysis with Reflex C...[[id number]]  Abnormal            Final result               Extra Urine Gray Tube[[id number]]                                                         Please view results for these tests on the individual orders.   EXTRA URINE GRAY TUBE   HEPATITIS PANEL, ACUTE   PROTIME-INR     CT abdomen pelvis wo IV contrast   Final Result   1. Since last year, interval development of significant parenchymal   liver disease with cirrhotic morphology.   2. Additional development of diffuse minimal ascites.   3. Unchanged bilateral small non-obstructing renal calculi; no   hydronephrosis.   4. No bowel obstruction or detectable bowel inflammation.   5. Likely subacute avulsion fracture lesser trochanter right proximal   femur.   6. Remainder as above.   Signed by Francisco Rhodes MD          Last Recorded Vitals  Blood  "pressure 129/77, pulse (!) 104, temperature 36.3 °C (97.4 °F), temperature source Tympanic, resp. rate 20, height 1.727 m (5' 8\"), weight 93.4 kg (206 lb), SpO2 96 %.  Intake/Output last 3 Shifts:  No intake/output data recorded.    Relevant Results              2239 -- Consult with Dr Gareth Harrell [facility]. He is agreeable for patient to come to [facility] with his as consulting service. He would like medicine to be admitting service.  2335-- Discussed with Dr Bai of medicine at [facility], she accepts patient in transfer, but counsels that  they have critically high census and it may be days before a bed becomes available.  Case was discussed with Dr Francisco GOULD here. He notes that he does not feel patient is appropriate for this critical access hospital               Assessment/Plan   Active Problems:    Abdominal pain    Cirrhosis of liver with ascites (CMS/HCC)    Closed avulsion fracture of lesser trochanter of left femur (CMS/HCC)    Hypokalemia    Lesion of left nipple    This patient presents to the emergency department with the above history and physical.  She was having abdominal pain and dark urine for the past several weeks.  She thought the urine might be blood; however, urinalysis confirms that his bilirubin.  This is consistent with hyperbilirubinemia identified on her blood samples associated with true elevation of transaminases.  Patient is not having any discomfort at this time she is afebrile with a normal white count no evidence of cholecystitis or biliary obstruction on CT.    Hypokalemia identified and corrected in the emergency department orally.  Patient initially had elevated lactate on presentation, but this corrected with IV rehydration.  Patient had been having left hip pain ever since her knee replacement surgery she got due to repeatedly twisting it.  Imaging today does demonstrate avulsion of lesser trochanter which would explain her discomfort.  Patient had a tertiary complaint of a nonhealing scab " on her left breast.  It does not show any signs of infection on my examination.  I discussed patient concerned that this could be related to breast cancer as she has never had mammogram in breast cancer does run in the family.    Due to patient's multiple active problems which would require consultation, we feel it is necessary for her to be transferred for higher level of care.  [facility] is the only facility and system that has all of the specialist necessary.  Patient is agreeable with transfer.       ED diagnoses-    Abdominal pain    Cirrhosis of liver with ascites (CMS/HCC)    Closed avulsion fracture of lesser trochanter of left femur (CMS/HCC)    Hypokalemia    Lesion of left nipple    I spent 0 minutes in the critical care of this patient.      Lorrie Noyola MD

## 2024-02-02 NOTE — PROGRESS NOTES
Emergency Medicine Transition of Care Note.    I received Meghan Waddell in signout from Dr. ABHISHEK Noyola.  Please see the previous ED provider note for all HPI, PE and MDM up to the time of signout at 0800 on Friday Feb 2nd, 2024. This is in addition to the primary record.    In brief Meghan Waddell is an 68 y.o. female presenting for   Chief Complaint   Patient presents with    Blood in Urine     Blood in urine, diarrhea, abdominal pain    Abdominal Pain     1 month of pain, history of kidney stones and one episode of black tarry stool 5 days ago     At the time of signout we were awaiting: Available Bed AT [facility] for Transfer and further workup and testing.  Assessment/Plan   Active Problems:    Abdominal pain    Cirrhosis of liver with ascites (CMS/HCC)    Closed avulsion fracture of lesser trochanter of left femur (CMS/HCC)    Hypokalemia    Lesion of left nipple     This patient presents to the emergency department with the above history and physical.  She was having abdominal pain and dark urine for the past several weeks.  She thought the urine might be blood; however, urinalysis confirms that his bilirubin.  This is consistent with hyperbilirubinemia identified on her blood samples associated with true elevation of transaminases.  Patient is not having any discomfort at this time she is afebrile with a normal white count no evidence of cholecystitis or biliary obstruction on CT.     Hypokalemia identified and corrected in the emergency department orally.  Patient initially had elevated lactate on presentation, but this corrected with IV rehydration.  Patient had been having left hip pain ever since her knee replacement surgery she got due to repeatedly twisting it.  Imaging today does demonstrate avulsion of lesser trochanter which would explain her discomfort.  Patient had a tertiary complaint of a nonhealing scab on her left breast.  It does not show any signs of infection on my examination.  I discussed  patient concerned that this could be related to breast cancer as she has never had mammogram in breast cancer does run in the family.     Due to patient's multiple active problems which would require consultation, we feel it is necessary for her to be transferred for higher level of care.  [facility] is the only facility and system that has all of the specialist necessary.  Patient is agreeable with transfer.     ED diagnoses-    Abdominal pain    Cirrhosis of liver with ascites (CMS/HCC)    Closed avulsion fracture of lesser trochanter of left femur (CMS/HCC)    Hypokalemia    Lesion of left nipple     I spent 0 minutes in the critical care of this patient.        Lorrie Noyola MD      ED Course as of 02/02/24 1917   Thu Feb 01, 2024 2018 Received report from Dr Pierce [MN]   2125 Patient examined, chart reviewed [MN]      ED Course User Index  [MN] Lorrie Noyola MD         Diagnoses as of 02/02/24 1917   Abdominal pain, unspecified abdominal location   Cirrhosis of liver with ascites, unspecified hepatic cirrhosis type (CMS/HCC)   Closed avulsion fracture of lesser trochanter of left femur, initial encounter (CMS/HCC)   Hypokalemia   Lesion of left nipple       Medical Decision Making  Patient would not be receiving her Remicade here.  Patient is resting comfortably at this time awaiting transfer to [facility].  We will continue to monitor the patient closely.  Will obtain repeat labs every other day.  See orders        Final diagnoses:   [R10.9] Abdominal pain, unspecified abdominal location   [K74.60, R18.8] Cirrhosis of liver with ascites, unspecified hepatic cirrhosis type (CMS/HCC)   [S72.122A] Closed avulsion fracture of lesser trochanter of left femur, initial encounter (CMS/HCC)   [E87.6] Hypokalemia   [N64.9] Lesion of left nipple           Procedure  Procedures    Hector Griffin, DO

## 2024-02-03 PROBLEM — K74.60 CIRRHOSIS (MULTI): Status: ACTIVE | Noted: 2024-01-01

## 2024-02-03 NOTE — ED PROVIDER NOTES
HPI   Chief Complaint   Patient presents with   • Blood in Urine     Blood in urine, diarrhea, abdominal pain   • Abdominal Pain     1 month of pain, history of kidney stones and one episode of black tarry stool 5 days ago         History provided by:  Patient and relative   used: No         This patient had presented to the emergency department with abdominal pain and vomiting and dark-colored urine.  She was initially evaluated by Dr. Pierce please see his documentation for full details of the H&P.  She was detected to have cirrhosis with hyperbilirubinemia and elevation of transaminases.  Transfer was arranged to [facility] where she can be assessed by hepatology service.               [place] Coma Scale Score: 15                  Patient History   Past Medical History:   Diagnosis Date   • Arthritis, rheumatoid (CMS/HCC)    • Cellulitis of right lower limb     Cellulitis of right lower extremity   • Personal history of other diseases of the nervous system and sense organs     History of Bell's palsy     Past Surgical History:   Procedure Laterality Date   • JOINT REPLACEMENT Bilateral     Knees   • OTHER SURGICAL HISTORY  02/10/2021     section     Family History   Problem Relation Name Age of Onset   • Diabetes type II Mother     • Heart disease Father     • Breast cancer Sister       Social History     Tobacco Use   • Smoking status: Former     Packs/day: 0.50     Years: 4.00     Additional pack years: 0.00     Total pack years: 2.00     Types: Cigarettes   • Smokeless tobacco: Never   Vaping Use   • Vaping Use: Never used   Substance Use Topics   • Alcohol use: Not Currently   • Drug use: Never       Physical Exam   ED Triage Vitals   Temperature Heart Rate Respirations BP   24 1740 24 1740 24 1740 24 1740   36.3 °C (97.4 °F) (!) 104 20 117/74      SpO2 Temp Source Heart Rate Source Patient Position   24 1740 24 1740  02/01/24 1740 02/02/24 0247   98 % Tympanic Monitor Sitting      BP Location FiO2 (%)     02/02/24 0247 --     Left arm        Physical Exam  Vitals reviewed.   HENT:      Head: Normocephalic.   Eyes:      General: Scleral icterus present.      Extraocular Movements: Extraocular movements intact.      Pupils: Pupils are equal, round, and reactive to light.   Cardiovascular:      Rate and Rhythm: Normal rate and regular rhythm.   Pulmonary:      Effort: Pulmonary effort is normal.   Skin:     General: Skin is dry.   Neurological:      General: No focal deficit present.      Mental Status: She is alert and oriented to person, place, and time.   Psychiatric:         Mood and Affect: Mood normal.         ED Course & MDM   ED Course as of 02/02/24 2023   Thu Feb 01, 2024 2018 Received report from Dr Pierce [MN]   2125 Patient examined, chart reviewed [MN]   Fri Feb 02, 2024 2019 Patient reassessed. She feeling better. Tolerating PO all day. Hip pain controlled by naproxen Still waiting for [facility] bed. [MN]      ED Course User Index  [MN] Lorrie Noyola MD         Diagnoses as of 02/02/24 2023   Abdominal pain, unspecified abdominal location   Cirrhosis of liver with ascites, unspecified hepatic cirrhosis type (CMS/HCC)   Closed avulsion fracture of lesser trochanter of left femur, initial encounter (CMS/HCC)   Hypokalemia   Lesion of left nipple       Medical Decision Making  2/2/24 2019 -- Patient presented to the emergency department with abdominal pain and vomiting and dark-colored urine.  She was determined to have hyperbilirubinemia and elevated transaminases with cirrhotic changes on CT new from last CT.  Patient is pending bed placement at [facility].  Patient has been hemodynamically stable over the emergency department stay thus far.  She is tolerating an oral diet and managing pain well with naproxen.  Will continue close supervision in the emergency department pending bed  assignment and transport.  Plan for a.m. labs if she is still here at that time.    Procedure  Procedures     Lorrie Noyola MD  02/02/24 2023

## 2024-02-03 NOTE — Clinical Note
The patient tolerated 2 site stereotactic bx right breast: 9 ml lidocaine 12:47 #1 area of distortion:   1320 8 ml lidocaine #2 area calcifications:   1st debrief at 1300:  2nd debrief at 1329:  pressure held x 15 minutes each site:  dressings placed and reviewed  icing instructions with the patient and the nurse.  Reviewed dressing instructions:  called report to floor nurse:  post clip films done:  Verbal support given during the proc edure:

## 2024-02-03 NOTE — Clinical Note
Patient tolerated the left breast and axillary bx's very well :   lidocaine given 11:34 left axilla:   11:40 1:00 12 cm:   11:45 6:00 sub areolar:  debrief at 11:50 am:  dressings placed :  pressure held:  support during procedure from RADHA farrar , MD.

## 2024-02-03 NOTE — PROGRESS NOTES
Emergency Medicine Transition of Care Note.    I received Meghan Waddell in signout from Dr. ABHISHEK Noyola.  Please see the previous ED provider note for all HPI, PE and MDM up to the time of signout at 0800 on 2/3/24. . This is in addition to the primary record.    In brief Meghan Waddell is an 68 y.o. female presenting for   Chief Complaint   Patient presents with    Blood in Urine     Blood in urine, diarrhea, abdominal pain    Abdominal Pain     1 month of pain, history of kidney stones and one episode of black tarry stool 5 days ago     At the time of signout we were awaiting: Bed placement at [facility]    In Brief:  In brief Meghan Waddell is an 68 y.o. female presenting for        Chief Complaint   Patient presents with    Blood in Urine       Blood in urine, diarrhea, abdominal pain    Abdominal Pain       1 month of pain, history of kidney stones and one episode of black tarry stool 5 days ago     At the time of signout we were awaiting: Available Bed AT [facility] for Transfer and further workup and testing.  Assessment/Plan   Active Problems:    Abdominal pain    Cirrhosis of liver with ascites (CMS/HCC)    Closed avulsion fracture of lesser trochanter of left femur (CMS/HCC)    Hypokalemia    Lesion of left nipple     This patient presents to the emergency department with the above history and physical.  She was having abdominal pain and dark urine for the past several weeks.  She thought the urine might be blood; however, urinalysis confirms that his bilirubin.  This is consistent with hyperbilirubinemia identified on her blood samples associated with true elevation of transaminases.  Patient is not having any discomfort at this time she is afebrile with a normal white count no evidence of cholecystitis or biliary obstruction on CT.     Hypokalemia identified and corrected in the emergency department orally.  Patient initially had elevated lactate on presentation, but this corrected with IV rehydration.  Patient  had been having left hip pain ever since her knee replacement surgery she got due to repeatedly twisting it.  Imaging today does demonstrate avulsion of lesser trochanter which would explain her discomfort.  Patient had a tertiary complaint of a nonhealing scab on her left breast.  It does not show any signs of infection on my examination.  I discussed patient concerned that this could be related to breast cancer as she has never had mammogram in breast cancer does run in the family.     Due to patient's multiple active problems which would require consultation, we feel it is necessary for her to be transferred for higher level of care.  [facility] is the only facility and system that has all of the specialist necessary.  Patient is agreeable with transfer.     ED diagnoses-    Abdominal pain    Cirrhosis of liver with ascites (CMS/HCC)    Closed avulsion fracture of lesser trochanter of left femur (CMS/HCC)    Hypokalemia    Lesion of left nipple    ED Course as of 02/03/24 0811   Thu Feb 01, 2024 2018 Received report from Dr Pierce [MN]   2125 Patient examined, chart reviewed [MN]   Fri Feb 02, 2024 2019 Patient reassessed. She feeling better. Tolerating PO all day. Hip pain controlled by naproxen Still waiting for [facility] bed. [MN]   Sat Feb 03, 2024   0808 CBC and Auto Differential(!)  CBC is unremarkable. [EC]   0808 Comprehensive metabolic panel(!)  Glucose and electrolytes are normal, renal function is normal.  , ALT 46, total bilirubin 7, alk phos 234. [EC]   0809 Gamma-Glutamyl Transferase(!)  GGT is 324 [EC]   0809 Protime-INR(!)  INR is 1.3 [EC]   0810 Hepatitis panel, acute  Hepatitis B is nonreactive  Hepatitis A is nonreactive [EC]   0810 CT of the abdomen pelvis on 2/1/2024  IMPRESSION:  1. Since last year, interval development of significant parenchymal  liver disease with cirrhotic morphology.  2. Additional development of diffuse minimal ascites.  3. Unchanged bilateral  small non-obstructing renal calculi; no  hydronephrosis.  4. No bowel obstruction or detectable bowel inflammation.  5. Likely subacute avulsion fracture lesser trochanter right proximal  femur.  6. Remainder as above.  Signed by Francisco Rhodes MD   [EC]      ED Course User Index  [EC] Hector Griffin DO  [MN] Lorrie Noyola MD         Diagnoses as of 02/03/24 0811   Abdominal pain, unspecified abdominal location   Cirrhosis of liver with ascites, unspecified hepatic cirrhosis type (CMS/HCC)   Closed avulsion fracture of lesser trochanter of left femur, initial encounter (CMS/HCC)   Hypokalemia   Lesion of left nipple       Medical Decision Making  Repeat labs are reviewed above.  Patient is resting comfortably at this time.  We continue to wait for bed availability and transfer to [facility].  We will continue to monitor the patient closely.        Final diagnoses:   [R10.9] Abdominal pain, unspecified abdominal location   [K74.60, R18.8] Cirrhosis of liver with ascites, unspecified hepatic cirrhosis type (CMS/HCC)   [S72.122A] Closed avulsion fracture of lesser trochanter of left femur, initial encounter (CMS/HCC)   [E87.6] Hypokalemia   [N64.9] Lesion of left nipple           Procedure  Procedures    Hector Griffin DO

## 2024-02-04 NOTE — CONSULTS
[facility]   [facility]  INITIAL CONSULT NOTE       Reason For Consult  Cirrhosis     SUBJECTIVE     History Of Present Illness  Meghan Waddell is a 68 y.o. female with a past medical history of seropositive RA on infliximab, obesity (BM 30) presented to [place] ED on 2/1 for dark red urine and jaundice found to have new cirrhosis transferred for further work up.     Pt reports having abdominal pain at umbilicus to [facility] starting few weeks ago. Also reports having dark black jelly-like stool 1 week ago that has occurred again during this admission. Daughter noted for jaundice, dark urine and confusion starting few days ago. Per daughter, pt looks confused and having difficulty with recall.   Pt used to weight 280 lbs for many years and has been improving over the past years. (Now 200 lbs). Never told to have liver issues in the past.   Rarely drinks alcohol (at [place]).   Fhx includes breast CA of sister, ovarian Ca of GM and pancreatic Ca of brother but no liver cirrhosis, HCC or autoimmune diseases.   Pt never had EGD or colonoscopy.     Pt has RA and been treated with methotrexate, prednisone in the past. In the past methotrexate  Caused elevated LFT which improved after discontinuation. Has been on Orencia (inhibit T-cell (T lymphocyte) activation by binding to CD80 and CD86, thereby blocking interaction with CD28) at the beginning of 2023 but minimal relief. Currently on infliximab since 11/2023. Also has been on aleve 220mg TID PRN for pain control. Pt avoid tylenol.      (48 4/2022, 27 11/2023)  ALT 46 (50 4/2022, 13 11/2023)  T-lary 7.4 (0.5 11/2023)  D-lary 4.5   (222 2/2024, 259~487 11/2023)       -HCV neg , HBsAg neg/HBsAb 5.5/HBcIgG neg 2/2023   -A1AT neg 2/2023   -AMA, ASMA, JOCELYN in process  R factor 0.2, cholestatic   A1A 170  Ferritin 406  Lac 3.0  Lipase wnl at 53  Hb 12.7, MCV 87  Alcohol<10  Urine WBC wnl, LE/Nitrate neg,  Urobilinogen 4      MELD 3.0: 20 at 2/4/2024  5:45 AM  MELD-Na: 16 at 2/4/2024  5:45 AM  Calculated from:  Serum Creatinine: 0.73 mg/dL (Using min of 1 mg/dL) at 2/4/2024  1:58 AM  Serum Sodium: 138 mmol/L (Using max of 137 mmol/L) at 2/4/2024  1:58 AM  Total Bilirubin: 7.4 mg/dL at 2/4/2024  1:58 AM  Serum Albumin: 2.4 g/dL at 2/4/2024  1:58 AM  INR(ratio): 1.2 at 2/4/2024  5:45 AM  Age at listing (hypothetical): 68 years  Sex: Female at 2/4/2024  5:45 AM    U/S doppler 1. Liver cirrhosis with diffusely heterogenous hepatic parenchyma.  Though no definite focal lesion identified, neoplasm can not be  excluded. Further assessment with liver MRI with IV contrast is  recommended.  2. Perihepatic ascites, slow portal venous flow and recanalized  paraumbilical vein, likely sequela of portal hypertension.  Patent hepatic veins. No splenomegaly.    CTAP 2/1/2024 showed significant interval change from (1/30/2023) diffusely heterogeneous in density with cirrhotic morphology. In addition, there has been interval development of diffuse minimal ascites. Multiple gallstones present without CBD dilation.        Review of Systems  Constitutional: denies fever, chills, +weight loss  HEENT: denies oral ulcers, dysphagia  Cardiovascular: denies chest pain  Respiratory: denies shortness of breath  GI: see HPI  Musculoskeletal: +arthralgia, myalgia  Hem/Onc: denies easy bleeding or bruising  Dermatology: denies jaundice, rash  Psychology: denies depression    All ROS were negative unless otherwise stated above.           Past Medical History:    Past Medical History:   Diagnosis Date    Arthritis, rheumatoid (CMS/HCC)     Cellulitis of right lower limb     Cellulitis of right lower extremity    Personal history of other diseases of the nervous system and sense organs     History of Bell's palsy       Home Medications  Medications Prior to Admission   Medication Sig Dispense Refill Last Dose    naproxen sodium (Aleve) 220 mg tablet  Take 1 tablet (220 mg) by mouth 3 times a day as needed.            Surgical History:    Past Surgical History:   Procedure Laterality Date    JOINT REPLACEMENT Bilateral     Knees    OTHER SURGICAL HISTORY  02/10/2021     section       Allergies:    Allergies   Allergen Reactions    Latex Hives, Itching, Other and Swelling       Social History:    Social History     Socioeconomic History    Marital status:      Spouse name: Not on file    Number of children: Not on file    Years of education: Not on file    Highest education level: Not on file   Occupational History    Not on file   Tobacco Use    Smoking status: Former     Packs/day: 0.50     Years: 4.00     Additional pack years: 0.00     Total pack years: 2.00     Types: Cigarettes    Smokeless tobacco: Never   Vaping Use    Vaping Use: Never used   Substance and Sexual Activity    Alcohol use: Not Currently    Drug use: Never    Sexual activity: Not Currently   Other Topics Concern    Not on file   Social History Narrative    Not on file     Social Determinants of Health     Financial Resource Strain: Not on file   Food Insecurity: Not on file   Transportation Needs: Not on file   Physical Activity: Not on file   Stress: No Stress Concern Present (2023)    [employer] [place] [employer] - Occupational Stress Questionnaire     Feeling of Stress : Not at all   Social Connections: Not on file   Intimate Partner Violence: Not on file   Housing Stability: Not on file       Family History:    Family History   Problem Relation Name Age of Onset    Diabetes type II Mother      Heart disease Father      Breast cancer Sister         EXAM     Vitals:    Vitals:    24 2052 24 0439 24 0643 24 0840   BP: 118/72 104/59  125/71   Pulse: 79 73  73   Resp: 16 16  17   Temp: 36.1 °C (97 °F) 36.2 °C (97.2 °F)  36.3 °C (97.3 °F)   TempSrc:  Temporal     SpO2: 97% 97%  96%   Weight: 98.7 kg (217 lb 9.5 oz)  90.7 kg (200 lb)   "  Height: 1.727 m (5' 8\")  1.727 m (5' 8\")      Failed to redirect to the Timeline version of the [employer] SmartLink.  No intake or output data in the 24 hours ending 02/04/24 1017      Physical Exam  General: well-nourished, no acute distress. Scleral icterus. No asterixis. AAO x 3.   HEENT: EOMI. Moist mucosa  Respiratory: nonlabored breathing on room air  Cardiovascular: RRR, no murmurs/rubs/gallops  Abdomen: Soft, nontender, nondistended, bowel sounds present. No masses palpated  Extremities: no edema, no asterixis  Neuro: alert and oriented, CNII-XII grossly intact, moves all 4 extremities with no focal deficits    OBJECTIVE                                                                              Medications       Current Facility-Administered Medications:     calcium carbonate-vitamin D3 500 mg-5 mcg (200 unit) per tablet 2 tablet, 2 tablet, oral, Daily, Vero Russell MD, 2 tablet at 02/04/24 0922    diclofenac sodium (Voltaren) 1 % gel 4 g, 4 g, Topical, 4x daily PRN, Vero Russell MD    enoxaparin (Lovenox) syringe 40 mg, 40 mg, subcutaneous, q24h, Vero Russell MD, 40 mg at 02/03/24 2312    lactulose 20 gram/30 mL oral solution 10 g, 10 g, oral, TID, Vero Russell MD, 10 g at 02/04/24 0922    polyethylene glycol (Glycolax, Miralax) packet 17 g, 17 g, oral, Daily, Vero Russell MD                                                                            Labs     Results for orders placed or performed during the hospital encounter of 02/03/24 (from the past 24 hour(s))   Alpha-1-Antitrypsin   Result Value Ref Range    Alpha-1 Antitrypsin 170 84 - 218 mg/dL   Ferritin   Result Value Ref Range    Ferritin 406 (H) 8 - 150 ng/mL   Transferrin   Result Value Ref Range    Transferrin 136 (L) 200 - 360 mg/dL   Ammonia   Result Value Ref Range    Ammonia 79 (H) 16 - 53 umol/L   Magnesium   Result Value Ref Range    Magnesium 1.99 1.60 - 2.40 mg/dL   CBC and Auto Differential   Result Value Ref Range "    WBC 9.0 4.4 - 11.3 x10*3/uL    nRBC 0.0 0.0 - 0.0 /100 WBCs    RBC 4.37 4.00 - 5.20 x10*6/uL    Hemoglobin 13.3 12.0 - 16.0 g/dL    Hematocrit 37.2 36.0 - 46.0 %    MCV 85 80 - 100 fL    MCH 30.4 26.0 - 34.0 pg    MCHC 35.8 32.0 - 36.0 g/dL    RDW 19.3 (H) 11.5 - 14.5 %    Platelets 169 150 - 450 x10*3/uL    Neutrophils % 55.6 40.0 - 80.0 %    Immature Granulocytes %, Automated 0.2 0.0 - 0.9 %    Lymphocytes % 31.5 13.0 - 44.0 %    Monocytes % 11.4 2.0 - 10.0 %    Eosinophils % 0.3 0.0 - 6.0 %    Basophils % 1.0 0.0 - 2.0 %    Neutrophils Absolute 4.99 1.20 - 7.70 x10*3/uL    Immature Granulocytes Absolute, Automated 0.02 0.00 - 0.70 x10*3/uL    Lymphocytes Absolute 2.83 1.20 - 4.80 x10*3/uL    Monocytes Absolute 1.02 (H) 0.10 - 1.00 x10*3/uL    Eosinophils Absolute 0.03 0.00 - 0.70 x10*3/uL    Basophils Absolute 0.09 0.00 - 0.10 x10*3/uL   Hepatic function panel   Result Value Ref Range    Albumin 2.4 (L) 3.4 - 5.0 g/dL    Bilirubin, Total 7.4 (H) 0.0 - 1.2 mg/dL    Bilirubin, Direct 4.5 (H) 0.0 - 0.3 mg/dL    Alkaline Phosphatase 238 (H) 33 - 136 U/L    ALT 48 (H) 7 - 45 U/L     (H) 9 - 39 U/L    Total Protein 6.3 (L) 6.4 - 8.2 g/dL   Type and Screen   Result Value Ref Range    ABO TYPE O     Rh TYPE POS     ANTIBODY SCREEN NEG    Phosphorus   Result Value Ref Range    Phosphorus 2.7 2.5 - 4.9 mg/dL   Basic Metabolic Panel   Result Value Ref Range    Glucose 93 74 - 99 mg/dL    Sodium 138 136 - 145 mmol/L    Potassium 3.6 3.5 - 5.3 mmol/L    Chloride 111 (H) 98 - 107 mmol/L    Bicarbonate 22 21 - 32 mmol/L    Anion Gap 9 (L) 10 - 20 mmol/L    Urea Nitrogen 12 6 - 23 mg/dL    Creatinine 0.73 0.50 - 1.05 mg/dL    eGFR 90 >60 mL/min/1.73m*2    Calcium 8.6 8.6 - 10.6 mg/dL   Protime-INR   Result Value Ref Range    Protime 14.0 (H) 9.8 - 12.8 seconds    INR 1.2 (H) 0.9 - 1.1                                                                              Imaging           2/1/2024 CTAP  IMPRESSION:  1. Since last  year, interval development of significant parenchymal  liver disease with cirrhotic morphology.  2. Additional development of diffuse minimal ascites.  3. Unchanged bilateral small non-obstructing renal calculi; no  hydronephrosis.  4. No bowel obstruction or detectable bowel inflammation.  5. Likely subacute avulsion fracture lesser trochanter right proximal  femur.  6. Remainder as above.    2/4/2024 U/S  IMPRESSION:  1. Liver cirrhosis with diffusely heterogenous hepatic parenchyma.  Though no definite focal lesion identified, neoplasm can not be  excluded. Further assessment with liver MRI with IV contrast is  recommended.  2. Perihepatic ascites, slow portal venous flow and recanalized  paraumbilical vein, likely sequela of portal hypertension.  3. Cholelithiasis without sonographic evidence of acute cholecystitis.  4. Increased renal cortical echogenicity can be seen the setting of  medical renal disease. No hydronephrosis.                                                                           GI Procedures     NA         ASSESSMENT / PLAN                  ASSESSMENT/PLAN:    Meghan Waddell is a 68 y.o. female with a past medical history of RA on infliximab, obesity (BM 30) presented to [place] ED on 2/1 for dark red urine and jaundice found to have new cirrhosis with ascites and confusion transferred for further work up.     #New cirrhosis c/b ascites and confusion   #Abdominal pain   #Nausea  #Reported episode of dark watery stool without Hb change   Etiology: Unclear (Autoimmune hepatitis, NIETO, PBC needs to be ruled out)   -HCV neg , HBsAg neg/HBsAb 5.5/HBcAb neg 2/2023   -A1AT neg 2/2023   -AMA, ASMA, JOCELYN in process  MELD 3.0: 20 at 2/4/2024  5:45 AM  MELD-Na: 16 at 2/4/2024  5:45 AM  Calculated from:  Serum Creatinine: 0.73 mg/dL (Using min of 1 mg/dL) at 2/4/2024  1:58 AM  Serum Sodium: 138 mmol/L (Using max of 137 mmol/L) at 2/4/2024  1:58 AM  Total Bilirubin: 7.4 mg/dL at 2/4/2024  1:58 AM  Serum  Albumin: 2.4 g/dL at 2/4/2024  1:58 AM  INR(ratio): 1.2 at 2/4/2024  5:45 AM  Age at listing (hypothetical): 68 years  Sex: Female at 2/4/2024  5:45 AM  Ascites: +   HE: +  EV: Unclear   HCC: Unclear     Plan  -Daily meld lab (LFT, INR, Cre, Na)  -Lactulose 20g TID and to adjust for BM 3~5/day  -Na restriction   -Etiology work up     -Obtain MRI Liver/MRCP w/wo contrast to evaluate biliary tree    -Send immunoglobulins, anti-LKM-1, anti-SLA/LP, anti-liver cytosol antibody-1 [ALC-1]    -Send HAV IgM/IgG     -Follow up AMA, ASMA, JOCELYN      -Patient needs biopsy pending above   -Infectious work up which can be a cause of altered mental status/HE     -Obtain CXR to r/o PNA     -Obtain Bcx      -UA/Ucx neg      -Diagnostic paracentesis (cell count, differential, albumin, cultures, total protein, cytology) to rule out SBP; please start ceftriaxone empirically if diagnostic paracentesis not obtained today    Case discussed with Dr. Jessy Carmen, PGY-4 GI/Hepatology fellow

## 2024-02-04 NOTE — PROGRESS NOTES
Meghan Waddell is a 68 y.o. female on day 1 of admission presenting with Cirrhosis (CMS/HCC).    Subjective   Patient visited at bedside. Reports diffuse abdominal pain originating in RLQ. Reports yellow colored stools, 1 episode melanotic stool about a week ago. Endorses generalized nausea, no vomiting.       Objective     Physical Exam  Constitutional:       Appearance: mildly jaundiced  HENT:      Mouth/Throat:      Mouth: Mucous membranes are moist.   Cardiovascular:      Rate and Rhythm: Normal rate and regular rhythm.      Heart sounds: No murmur heard.  Pulmonary:      Effort: Pulmonary effort is normal. No respiratory distress.      Breath sounds: Normal breath sounds.   Abdominal:      Comments: Abdomen soft, diffusely mildly tender to palpation     No asterixis   Skin:     Comments: L breast with crusted draining lesion without purulence,fullness of the L lateral breast, nipple inversion with black scabbing, no obvious axillary LAD   Neurological:      General: No focal deficit present.      Mental Status: She is alert. A&OX3    Last Recorded Vitals  Vitals:    02/04/24 0840   BP: 125/71   Pulse: 73   Resp: 17   Temp: 36.3 °C (97.3 °F)   SpO2: 96%     Intake/Output last 3 Shifts:  No intake/output data recorded.    Relevant Results  Scheduled medications  calcium carbonate-vitamin D3, 2 tablet, oral, Daily  enoxaparin, 40 mg, subcutaneous, q24h  lactulose, 10 g, oral, TID  polyethylene glycol, 17 g, oral, Daily      Continuous medications     PRN medications  PRN medications: diclofenac sodium     Results from last 7 days   Lab Units 02/04/24  0158   WBC AUTO x10*3/uL 9.0   HEMOGLOBIN g/dL 13.3   HEMATOCRIT % 37.2   PLATELETS AUTO x10*3/uL 169     Results from last 7 days   Lab Units 02/04/24  0158   SODIUM mmol/L 138   POTASSIUM mmol/L 3.6   CHLORIDE mmol/L 111*   CO2 mmol/L 22   BUN mg/dL 12   CREATININE mg/dL 0.73   CALCIUM mg/dL 8.6   PROTEIN TOTAL g/dL 6.3*   BILIRUBIN TOTAL mg/dL 7.4*   ALK PHOS U/L  238*   ALT U/L 48*   AST U/L 149*   GLUCOSE mg/dL 93     Results from last 7 days   Lab Units 02/04/24  0158   ALK PHOS U/L 238*   BILIRUBIN TOTAL mg/dL 7.4*   BILIRUBIN DIRECT mg/dL 4.5*   PROTEIN TOTAL g/dL 6.3*   ALT U/L 48*   AST U/L 149*     Results from last 7 days   Lab Units 02/04/24  0545 02/02/24  0734 02/01/24  1824   APTT seconds  --   --  29   INR  1.2*   < > 1.1    < > = values in this interval not displayed.       Assessment/Plan   Ms. Waddell is 67 yo F history of Seropositive RA (on infliximab), osteoarthritis, nephrolithiasis, obesity, DLD, PACs, history of tobacco use (quit march 2023), who presented to [place] ED on 2/1 for dark red urine, generalized abdominal pain with nausea transferred to [facility] for further workup of her new cirrhosis and jaundice. Patient also reports 1 episode of melena last week, otherwise had yellow colored stools. Regarding Jaundice, she has generalized abdominal pain, which she states originated in the RLQ. She does have several calcified gallstones on her CT making biliary colic/choledocolithiasis a distinct possibility (no signs of infection), however regarding her new cirrhosis diagnosis - this seems new in the last year as 1/2023 CT scan of her abdomen showed a normal contour liver so this would have had to have progressed in the last year: no alcohol, negative viral serologies, she seems like a setup for an autoimmune hepatitis with her history of RA, less likely a hepatic toxicity from her infliximab, patient does report she previously took methotrexate for RA mgmt. Otherwise she is immunocompromised so opportunistic infection remains on the differential but this also seems less likely. RUQ revealed Liver cirrhosis with diffusely heterogenous hepatic parenchyma, no definite focal lesion identified, although neoplasm can not be excluded. Perihepatic ascites, slow portal venous flow and recanalized paraumbilical vein, likely sequela of portal hypertension.  Cholelithiasis without sonographic evidence of acute cholecystitis.     Additionally, she has a chronic L breast draining lesion which would be concerning for breast cancer (possibly mets to liver?) though no axillary lymphadenopathy.     2/4 updates:  - consult hepatology  - patient in need of diagnostic paracentesis, will see if fluid pocket available, otherwise will plan for potential IR guided  - will follow liver stamp  - will see if patient can have breast US inpatient, otherwise needs close outpatient follow up     #new liver cirrhosis  #Cholelithiasis  #mixed hepatocellular and cholestatic liver injury   #mild confusion possible grade 1 hepatic encephalopahthy  :: hepC negative, hepB negative, hepA negative, T spot negative in 11/2023  :: alpha 1 antitrypsin negative  :: ferritin 406 H/transferrin 136 L  :: RUQ as above in assessment  :: ammonia 79  Plan:  -fu autoimmune workup: JOCELYN, anti smooth muscle, anti mitochondrial  -lactulose 10 TID and can titrate up as needed   - hepatology consult  - needs diagnostic paracentesis     #RA - on infliximab  #osteoarthritis  :: hold home naproxen   - voltaren gel     #nonhealing, draining, L breast wound  - will try for inpatient breast ultrasound, otherwise..  -outpatient mammogram   -outpatient derm biopsy     #subacute avulsion fracture lesser trochanger R proximal femur  - chronic  - follow up with her orthopedic surgeon outpatient     #compression deformity at T12  -vitamin D and calcium     #abdominal aorta atherosclerosis on CT  #smoking history   - follow up with PCP outpatient to discuss potential statin/asa     F: none  E: PRN   N: regular diet  GI: none  P: lovenox  A: PIV  C: full code confirmed on admission   Surrogate:   Cierra her daughter [phone] (first option)  Emmanuel her son [phone]          Connie Hebert MD

## 2024-02-04 NOTE — PROGRESS NOTES
I saw and evaluated the patient. I personally obtained the key and critical portions of the history and physical exam or was physically present for key and critical portions performed by the resident/student. I reviewed the resident/student's documentation and discussed the patient with the resident/student. I agree with the resident/student's medical decision making as documented in the note with the exception/addition of the following:      Subjective   Meghan Waddell is a 68 y.o. female on hospital day 1 with past medical history as per housestaff note who presents with what she describes as bright red urine, her stools have turned yellow with the exception of 1 black jellylike stool approximately 1 week ago and then turned back to yellow as well as her daughter telling her she looks more jaundiced.  She is also has some abdominal discomfort and nausea for a few months leading to some decreased p.o. intake and about 20 pound weight loss in the last month.  Denies any vomiting.  She has also been experiencing some fatigue.  Patient denies ever having liver disease in the past.  She also denies any alcohol use or even acetaminophen.  She does take approximately 500 mg of Naprosyn every night otherwise no over-the-counter medications.  States her only prescribed medication she takes is infliximab for rheumatoid arthritis. No fevers/chills, no chest pain, no cough, no shortness of breath, no sore throat, no sinus pain, no diarrhea or constipation, no dysuria or urinary frequency.  Patient has also had a lesion by her nipple with occasional drainage.  She denies any pain.  She does endorse this ongoing for approximately 1 year but has not had it evaluated nor made mention to her doctors of these changes.      Objective     Exam     Vitals:    02/03/24 2052 02/04/24 0439 02/04/24 0643 02/04/24 0840   BP: 118/72 104/59  125/71   Pulse: 79 73  73   Resp: 16 16  17   Temp: 36.1 °C (97 °F) 36.2 °C (97.2 °F)  36.3 °C (97.3  "°F)   TempSrc:  Temporal     SpO2: 97% 97%  96%   Weight: 98.7 kg (217 lb 9.5 oz)  90.7 kg (200 lb)    Height: 1.727 m (5' 8\")  1.727 m (5' 8\")       Intake/Output last 3 shifts:  No intake/output data recorded.    Physical Exam  Vitals reviewed.   Constitutional:       Appearance: She is obese.   HENT:      Head: Normocephalic and atraumatic.      Mouth/Throat:      Mouth: Mucous membranes are dry.   Eyes:      General: Scleral icterus (Mild) present.   Cardiovascular:      Rate and Rhythm: Normal rate and regular rhythm.      Pulses: Normal pulses.      Heart sounds: No murmur heard.     No friction rub. No gallop.   Pulmonary:      Effort: Pulmonary effort is normal.      Breath sounds: Normal breath sounds. No wheezing, rhonchi or rales.      Comments: Anteriorly  Abdominal:      General: Bowel sounds are normal. There is no distension.      Palpations: Abdomen is soft.      Tenderness: There is no abdominal tenderness. There is no guarding or rebound.      Comments: Abdomen was soft and nontender throughout.  Did not appreciate any right upper quadrant tenderness in general and Bradley's was negative on my exam   Musculoskeletal:      Right lower leg: No edema.      Left lower leg: No edema.   Skin:     General: Skin is warm and dry.      Comments: Left breast with retracted nipple and scabbing without active drainage or fluctuance.  There is some fullness in the left upper outer quadrant which may represent a mass.  Area is nontender and there is no axillary lymphadenopathy that I can appreciate.  Breast skin itself was not erythematous   Neurological:      General: No focal deficit present.      Mental Status: She is alert and oriented to person, place, and time.      Comments: No asterixis   Psychiatric:         Mood and Affect: Mood normal.         Behavior: Behavior normal.            Medications   calcium carbonate-vitamin D3, 2 tablet, oral, Daily  enoxaparin, 40 mg, subcutaneous, q24h  lactulose, 10 g, " "oral, TID  polyethylene glycol, 17 g, oral, Daily       PRN medications: diclofenac sodium       Labs     All new labs reviewed:  some of the basic labs as follows -     Results from last 7 days   Lab Units 02/04/24  0158 02/03/24  0656 02/01/24  1823   WBC AUTO x10*3/uL 9.0 6.7 8.8   HEMOGLOBIN g/dL 13.3 12.7 14.9   HEMATOCRIT % 37.2 36.9 43.5   PLATELETS AUTO x10*3/uL 169 154 222   NEUTROS PCT AUTO % 55.6 52.8 64.9   LYMPHS PCT AUTO % 31.5 33.1 23.1   MONOS PCT AUTO % 11.4 12.1 10.0   EOS PCT AUTO % 0.3 0.7 0.5      Results from last 72 hours   Lab Units 02/04/24  0545 02/03/24  0656 02/02/24  0734 02/01/24  1824   INR  1.2* 1.3* 1.1 1.1   APTT seconds  --   --   --  29     Results from last 72 hours   Lab Units 02/04/24  0158 02/03/24  0656 02/01/24  1823   SODIUM mmol/L 138 136 135*   POTASSIUM mmol/L 3.6 3.8 3.3*   CHLORIDE mmol/L 111* 109* 103   CO2 mmol/L 22 23 23   BUN mg/dL 12 13 18   CREATININE mg/dL 0.73 0.80 0.99     Results from last 72 hours   Lab Units 02/04/24 0158 02/03/24  0656 02/01/24 2021 02/01/24 1848 02/01/24  1848 02/01/24  1823   ALK PHOS U/L 238* 234*  --   --   --  346*   AST U/L 149* 145*  --   --   --  188*   ALT U/L 48* 46*  --   --   --  60*   BILIRUBIN TOTAL mg/dL 7.4* 7.0*  --   --   --  6.4*   BILIRUBIN DIRECT mg/dL 4.5*  --   --   --   --   --    ALBUMIN g/dL 2.4* 2.3*  --   --   --  2.9*   PROTEIN TOTAL g/dL 6.3* 6.2*  --   --   --  7.8   LACTATE mmol/L  --   --  1.9   < > 3.0*  --    LIPASE U/L  --   --   --   --  53  --     < > = values in this interval not displayed.     Results from last 72 hours   Lab Units 02/04/24  0158 02/03/24  0656 02/01/24  1823   GLUCOSE mg/dL 93 85 115*     Results from last 72 hours   Lab Units 02/01/24  2019   LEUKOCYTES U  NEGATIVE   NITRITE U  NEGATIVE   WBC UR /HPF 1-5   RBC UR HPF /HPF 3-5   BLOOD UR  SMALL (1+)*     No results found for: \"TR1\"  Lab Results   Component Value Date    URINECULTURE No significant growth 02/01/2024        Liver " "Failure Workup Labs:    Lab Results   Component Value Date    FERRITIN 406 (H) 02/04/2024    E7NSOICIUWD 170 02/04/2024       Lab Results   Component Value Date    HEPAIGM Nonreactive 02/01/2024    HEPBSAB 5.5 02/03/2023    HEPBSAG Nonreactive 02/01/2024    HEPBCAB NONREACTIVE 02/03/2023    HEPCAB Nonreactive 02/01/2024        No results found for: \"CMVIGGINIT\", \"CMVPCRIU\", \"EBVIN\"     No results found for: \"AFP\", \"\", \"CEA\"      Imaging   US liver with doppler  Narrative: Interpreted By:  Jessenia Abraham,  and Kalpana Giles   STUDY:  US LIVER WITH DOPPLER;  2/4/2024 8:15 am      INDICATION:  Signs/Symptoms:new cirrhosis, jaundice, history of gallstones -  please obtain with dopplers.      COMPARISON:  CT abdomen pelvis 02/01/2024      ACCESSION NUMBER(S):  [id number]      ORDERING CLINICIAN:  BRANDY WILLIAM      TECHNIQUE:  Multiple images of the right upper quadrant were obtained.  Gray  scale, color Doppler and spectral Doppler waveform analysis was  performed.  This examination was interpreted at [facility].      FINDINGS:  Limited evaluation due to patient body habitus and bowel gas.      LIVER:  The liver measures 17.9 cm and demonstrates diffusely increased and  heterogenous echogenicity compatible with fibrotic changes. The liver  contour is nodular. No focal lesions are identified.      DOPPLER EVALUATION:      HEPATIC ARTERIES:  Hepatic artery and its right and left branches RI's are estimated at  .83, .84 and .85, respectively.      PORTAL VEIN:  Portal vein is patent and measures .71 cm . There is normal  respiratory variation. Portal vein velocities are calculated as  follows: main portal vein 6.9 cm/s, antegrade flow; left portal vein  branch 8.6 cm/s , antegrade flow; right portal vein anterior branch  8.0 cm/s , antegrade flow; right portal vein posterior branch 12.0  cm/s , antegrade flow. The splenic vein is also patent with " antegrade  flow measuring 10.8 cm/sec.      HEPATIC VEIN:  The right, middle and left hepatic veins are patent and demonstrate  triphasic antegrade flow. Limited evaluation of the IVC due to above  limitation.      There is venous flow measuring 14.8 cm/sec in a partially patent  umbilical vein      GALLBLADDER:  Multiple gallstones are present within the gallbladder. No  gallbladder wall thickening or pericholecystic fluid.. The  gallbladder wall thickness is 0.3 cm. Sonographic Bradley's sign is  reported to be negative.      BILIARY SYSTEM:  No evidence of intra or extrahepatic biliary dilatation is  identified; the common bile duct measures 0.2 cm.      PANCREAS:  The pancreas is poorly visualized due to overlying bowel gas.      RIGHT KIDNEY:  The right kidney measures 9.2 cm in length. Increased renal cortical  echogenicity. Normal renal cortical thickness. The renal calculus  seen on the comparison CT is not visualized. No hydronephrosis.      SPLEEN:  The spleen measures 11.2 cm and is grossly unremarkable.      PERITONEUM AND RETROPERITONEUM:  Simple appearing fluid around the liver.      Impression: 1. Liver cirrhosis with diffusely heterogenous hepatic parenchyma.  Though no definite focal lesion identified, neoplasm can not be  excluded. Further assessment with liver MRI with IV contrast is  recommended.  2. Perihepatic ascites, slow portal venous flow and recanalized  paraumbilical vein, likely sequela of portal hypertension.  3. Cholelithiasis without sonographic evidence of acute cholecystitis.  4. Increased renal cortical echogenicity can be seen the setting of  medical renal disease. No hydronephrosis.      I personally reviewed the images/study and I agree with the findings  as stated by Chan Acuna MD (resident) . This study was  interpreted at [facility],  [place], [place].      MACRO:  Critical Finding:  See findings. Notification was initiated  on  2/4/2024 at 10:34 am by Dr. Jessenia Abraham.  (**-YCF-**) Instructions:  MR Abdomen w and wo IV contrast. out-patient.      Signed by: Jessenia Abraham 2/4/2024 10:38 AM  Dictation workstation:   [id number]     No results found for this or any previous visit from the past 1095 days.     No results found for this or any previous visit (from the past 4464 hour(s)).       Assessment and Plan     Cirrhosis:  MELD 3.0: 20 at 2/4/2024  5:45 AM  MELD-Na: 16 at 2/4/2024  5:45 AM  Calculated from:  Serum Creatinine: 0.73 mg/dL (Using min of 1 mg/dL) at 2/4/2024  1:58 AM  Serum Sodium: 138 mmol/L (Using max of 137 mmol/L) at 2/4/2024  1:58 AM  Total Bilirubin: 7.4 mg/dL at 2/4/2024  1:58 AM  Serum Albumin: 2.4 g/dL at 2/4/2024  1:58 AM  INR(ratio): 1.2 at 2/4/2024  5:45 AM  Age at listing (hypothetical): 68 years  Sex: Female at 2/4/2024  5:45 AM     -Continue lactulose.  Titrate to 3-4 bowel movements per day.  Patient reports good bowel movement this morning after 1 dose of lactulose  -Continue PPI  -Strict I/o and every day wt   -Tend LFTs and Coags  -Low Na diet  -Consulted hepatology  -Check ceruloplasmin, AMA, JOCELYN, Anti-smooth muscle ab   -May need MRI  -Per hepatology we will ask IR to assess for possible paracentesis.  Given minimal ascites I am not clear this will be possible.  -Avoid NSAID    Breast changes: Raises concern for possible breast malignancy given the nipple retraction/scabbing and palpable fullness to the outer aspect of the breast  -Unlikely able to obtain mammogram/ultrasound as inpatient.  Can inquire with breast service as to when soon as possible evaluation can be undertaken    Subacute avulsion fracture:  -PT/OT  -Pain control  -Outpatient follow-up    DVT prophylaxis    Physical therapy/Occupational Therapy when appropriate    Ariel Carver MD     Of note the above was done with Dragon dictation system.  Note was proofread to minimize errors.

## 2024-02-04 NOTE — H&P
History Of Present Illness  Meghan Waddell is a 68 y.o. female history of Seropositive RA (on infliximab), osteoarthritis, nephrolithiasis, obesity, DLD, PACs, history of tobacco use (quit march 2023), who presented to [place] ED on 2/1 for dark red urine, generalized abdominal pain with nausea transferred to  main [place] for further workup of her new cirrhosis and jaundice.    She says that a few months ago she had severe RUQ abdominal pain which became generalized abdominal pain. She has been having a change in her bowel movements now having very loose stools (about 1 per day) with one episode of a dark black jelly-jairo stool 1 week ago which has not recurred. She has had pain with stooling recently. Also her urine has been dark red since a few weeks ago. She has nausea without vomiting.     No fevers. She feels fatigued, her daughter feels she is slightly more confused and having more difficulty with recall, patient feels she is losing track of her through process more frequently. Has some sleep disturbance but thinks it is mostly secondary to pain in her joints.    She feels that the infliximab has helped with her joint pain overall but she overall continues to struggle with pain.    She has R hip pain which started shortly after her knee was replaced last spring and has been ongoing and has caused her difficulty ambulating.    Also has had at least 1 year of a L breast draining lesion, has not noticed any lumps in her armpits.  She has lost 20lb since December, she says she has early satiety  No night sweats  Says she used to weight 280lb a year ago but was working on healthy eating    Says she doesn't like taking medications if she can help it    [place] course:  Vitals: T36.3 P 104 /74 O2sat 98% RR 20  Exam: oriented, well appearing, OSEAS with light brown stool, external hemorrhoids, guaiac positive  Labs:   -CBC: 8.8>14.9/43.5<222 64% neutrophils  -CMP: 135/3.96270/2318/0.99<115 , ALT 60, t  bili 6.4 Alk phos 346 albumin 2.9 no direct bili  -INR 1.1 à1.1  -Acute hepatitis panel: hepB nonimmune, hepA nonreactive, hepC nonreactive  - (ULD is 55)  -Guaiac positive stool  -Lactate 3.0 à1.9  -Lipase 53  -ethanol <10  -Urinalysis small blood, moderate bilirubin, 4+ urobilinogen negative nitrites and leuk esterase, microscopy with few squamous cells, 1+ bacteria, hyaline casts, calcium oxalate crystals, no pyuria or hematuria  -Urine culture negative  -Wound culture of draining breast lesion: mixed skin organisms  Imaging:   CT AP with contrast:  IMPRESSION:  1. Since last year, interval development of significant parenchymal  liver disease with cirrhotic morphology.  2. Additional development of diffuse minimal ascites.  3. Unchanged bilateral small non-obstructing renal calculi; no  hydronephrosis.  4. No bowel obstruction or detectable bowel inflammation.  5. Likely subacute avulsion fracture lesser trochanter right proximal  femur.  6. Remainder as above  Interventions: maintenance fluids, naproxen 250mg once, zofran 4mg, potassium 40mEq  PMH: as above  PSH: knee replacements, 4 cessarean, laser surgery on eyes, lithotripsy  Meds: naproxen  Allergies: latex  FH: brother with pancreatic cancer, son and daughter with fatty liver disease, several other cancers in the family   SH: lives with her daughter and her grandchildren, former heavy tobacco use 2ppd->1ppd for 50 years, quit in 2023, no etoh, no drugs       Past Medical History  Past Medical History:   Diagnosis Date    Arthritis, rheumatoid (CMS/HCC)     Cellulitis of right lower limb     Cellulitis of right lower extremity    Personal history of other diseases of the nervous system and sense organs     History of Bell's palsy       Surgical History  Past Surgical History:   Procedure Laterality Date    JOINT REPLACEMENT Bilateral     Knees    OTHER SURGICAL HISTORY  02/10/2021     section        Social History  She reports that she  has quit smoking. Her smoking use included cigarettes. She has a 2.00 pack-year smoking history. She has never used smokeless tobacco. She reports that she does not currently use alcohol. She reports that she does not use drugs.    Family History  Family History   Problem Relation Name Age of Onset    Diabetes type II Mother      Heart disease Father      Breast cancer Sister          Allergies  Latex    Review of Systems   Constitutional:  Negative for activity change and appetite change.   HENT:  Negative for sore throat.    Respiratory:  Negative for cough.    Cardiovascular:  Negative for chest pain.   Gastrointestinal:  Negative for abdominal pain.   Musculoskeletal:  Negative for back pain.   Skin:  Negative for rash.        Physical Exam  Constitutional:       Appearance: Normal appearance.   HENT:      Mouth/Throat:      Mouth: Mucous membranes are moist.   Cardiovascular:      Rate and Rhythm: Normal rate and regular rhythm.      Heart sounds: No murmur heard.  Pulmonary:      Effort: Pulmonary effort is normal. No respiratory distress.      Breath sounds: Normal breath sounds.   Abdominal:      Comments: Abdomen soft, diffusely mildly tender to palpation, nam sign positive, no ascites    No asterixis   Skin:     Comments: L breast with crusted draining lesion without purulence, no masses palpated, no obvious axillary LAD   Neurological:      General: No focal deficit present.      Mental Status: She is alert.          Last Recorded Vitals  Blood pressure 118/72, pulse 79, temperature 36.1 °C (97 °F), resp. rate 16, SpO2 97 %.    Relevant Results         Assessment/Plan   Principal Problem:    Cirrhosis (CMS/HCC)    67 yo F history of Seropositive RA (on infliximab), osteoarthritis, nephrolithiasis, obesity, DLD, PACs, history of tobacco use (quit march 2023), who presented to [place] ED on 2/1 for dark red urine, generalized abdominal pain with nausea transferred to [facility] for further workup of  her new cirrhosis and jaundice. Regarding Jaundice, she has RUQ pain with positive nam's as well as several calcified gallstones on her CT making biliary colic/choledocolithiasis a distinct possibility (no signs of infection), however regarding her new cirrhosis diagnosis - this seems new in the last year as 1/2023 CT scan of her abdomen showed a normal contour liver so this would have had to have progressed in the last year: no alcohol, negative viral serologies, she seems like a setup for an autoimmune hepatitis with her history of RA, less likely a hepatic toxicity from her infliximab. Otherwise she is immunocompromised so would consider some sort of opportunistic infection but this also seems less likely. She has a chronic L breast draining lesion which would be concerning for some sort of breast cancer (possibly mets to liver?) though no axillary lymphadenopathy. Will start with some labs and a RUQ ultrasound with dopplers to better characterize her liver and biliary tree. She has some mild confusion with sleep disturbance so will start lactulose for possible grade 1 HE to see if this helps.    Plan:   #new cirrhosis  #calcified gallstones with positive nam sign  #mixed hepatocellular and cholestatic liver injury   #mild confusion possible grade 1 hepatic encephalopahthy  -hepC negative, hepB negative, hepA negative, T spot negative in 11/2023  -fu alpha 1 antitrypsin, ferritin/transferrin  -ruq ultrasound with dopplers  -fu autoimmune workup: JOCELYN, anti smooth muscle, anti mitochondrial  - fu ammonia level  -start lactulose 10 TID and can titrate up as needed - patient is not too interested in this     #RA - on infliximab  #osteoarthritis  - hold home naproxen   - patient declines tylenol  - start voltaren gel    #nonhealing breast wound  -outpatient mammogram   -outpatient derm biopsy    #subacute avulsion fracture lesser trochanger R proximal femur  - chronic  - follow up with her orthopedic surgeon  outpatient    #compression deformity at T12  -start vitamin D and calcium    #abdominal aorta atherosclerosis on CT  #smoking history   - would recommend starting aspirin and statin, will defer this for now    F: none  E: PRN   N: regular diet  GI: none  P: lovenox  A: PIV  C: full code confirmed on admission   Surrogate:   Cierra her daughter [phone] (first option)  Emmanuel her son [phone]               Vero Russell MD

## 2024-02-04 NOTE — CARE PLAN
Problem: Pain  Goal: My pain/discomfort is manageable  Outcome: Progressing     Problem: Safety  Goal: Patient will be injury free during hospitalization  Outcome: Progressing  Goal: I will remain free of falls  Outcome: Progressing     Problem: Daily Care  Goal: Daily care needs are met  Outcome: Progressing     Problem: Psychosocial Needs  Goal: Demonstrates ability to cope with hospitalization/illness  Outcome: Progressing  Goal: Collaborate with me, my family, and caregiver to identify my specific goals  Outcome: Progressing     Problem: Discharge Barriers  Goal: My discharge needs are met  Outcome: Progressing     The clinical goals for the shift include  to remain HDS for the entire shift

## 2024-02-04 NOTE — HOSPITAL COURSE
Meghan Waddell is a 68 y.o. female history of Seropositive RA (on infliximab), osteoarthritis, nephrolithiasis, obesity, DLD, PACs, history of tobacco use (quit march 2023), who presented to [facility] ED on 2/1 for dark red urine, generalized abdominal pain with nausea. Patient also endorses yellow colored stools with 1 episode of melena the week prior to admission. Additionally, patient presents with at least 1 year of a L breast draining lesion, has not noticed any lumps in her armpits. She has lost 20lb since December, endorsing early satiety and denying night sweats. She was transferred to [facility] on 2/3/2024 for further workup of new cirrhosis and jaundice.     At [facility]:  Patient was afebrile and vitally stable, labs with CBC and CMP unremarkable. HFP showed , ALT 60, t bili 6.4 Alk phos 346 albumin 2.9 no direct bili, INR 1.1.  hepB nonimmune, hepA nonreactive, hepC nonreactive,  (ULD is 55), Guaiac positive stool, Lactate 3.0 >1.9, Lipase 53, ethanol <10, Urinalysis small blood, moderate bilirubin, 4+ urobilinogen negative nitrites and leuk esterase, microscopy with few squamous cells, 1+ bacteria, hyaline casts, calcium oxalate crystals, no pyuria or hematuria, Urine culture negative, Wound culture of draining breast lesion: mixed skin organisms. CT AP with contrast performed showing, since last year, interval development of significant parenchymal liver disease with cirrhotic morphology. Additional development of diffuse minimal ascites. Likely subacute avulsion fracture lesser trochanter right proximal femur. Interventions in [facility] included: maintenance fluids, naproxen 250mg once, zofran 4mg, potassium 40mEq.    Upon admission to [facility] RUQ with dopplers performed revealing Liver cirrhosis with diffusely heterogenous hepatic parenchyma, no definite focal lesion identified, although neoplasm can not be excluded. Perihepatic ascites, slow portal venous flow and recanalized  paraumbilical vein, likely sequela of portal hypertension. Cholelithiasis without sonographic evidence of acute cholecystitis. Alpha-1 antitrypsin returned negative, feritin 406, transferrin 136, ammonia 79. JOCELYN, ASMA, AMA, ceruloplasmin pending. Patient started on lactulose TID given confusion as noted per patient's daughter. Hepatology was consulted and recommended diagnostic paracentesis, MRI liver/ MRCP, anti-LKM, anti- SLA/LP, anti-liver cytosol antibody, CXR, Bcx, and empiric ceftriaxone.     Patient now s/p two paracenteses (~2 L each time), the first of which was negative for SBP and did not yield any malignant cells (did not run studies on second sample). Hepatology has recommended patient be started on lasix 20 mg PO and spironolactone 50 mg PO to help with ascitic fluid buildup (were held on 2/12/2024 iso SYLVIA). Patient started on lactulose 20g once daily as well. Patient's autoimmune workup was largely negative except for anti-SM antibody being positive 1:20. Although this could be suggestive of autoimmune hepatitis, specificity is not high given the weakness of titer and also Hx of RA. However, per GI, would be worth considering trial of steroids to see if it improves liver function. Prednisone 30 mg started 2/13/2024.    Throughout admission, patient's hepatic function has gradually worsened reflected by rising total bilirubin (12.1 on 2/13) and stable but elevated AST/ALT (53/197 non 2/13). Synthetic function also poor with albumin of 2.2 and INR 1.3. After discussions with oncology and hepatology, it appears that multiple etiologies may be responsible for patient's liver decompensation; however, malignant cells in liver appear to be the culprit. Patient was noted to be ER+ so anastrazole was started at recommendation of oncology team; however, remainder of tumor markers are still pending. Because of this, final oncology plan is still up in the air. Since inpatient chemotherapy is an option, oncology  team requested transfer to primary cancer service on 2/13/2024. Patient to receive remainder of care with [facility] team.    Final breast cancer pathology shows invasive ductal carcinoma grade 3, ER-/KY-/HER2+. Given ER-, anastrozole stopped. Patient was planned to start inpatient chemotherapy per discussion with Dr. Stovall on 2/14. TTE completed that morning prior to therapy showed EF 70% with impairment of diastolic relaxation. Plan to start carboplatin (AUC 4) and trastuzumab (loading dose 8 mg/kg). Baseline CMP, uric acid, and LDH obtained to monitor for tumor lysis syndrome. Repeat Hep B testing showed elevated antibody and non-reactive antigen. Patient tolerated chemotherapy well without nausea or vomiting. NM bone scan completed on 2/15 did not show any osseous metastatic disease. Patient cleared by ortho to WBAT. Supportive oncology consulted for pain management of hip and abdominal pain as well as social support with recommendations for olanzapine for sleep and appetite stimulation and continuing prn oxycodone for pain. Hepatology signed-off; pt had little improvement in LFTs on prednisone, 5 day course completed with the idea that her hepatic impairment was likely not autoimmune in nature. Lactulose was up titrated to BID as patient experienced intermittent confusion on 2/15 and 2/16. Obtained BID CMP, LDH, and uric acid to continue to monitor for TLS but these remained stable or improved. Allopurinol was discontinued for TLS. Patient began to experience significant fatigue on 12/17 likely a delayed response from chemotherapy. PT worked with her and recommended SNF but daughter and pt declined but are agreeable to home care. Started dexamethasone 4 mg qAM on 2/18 due to continued severe fatigue and low appetite, increased to BID 2/19.     2/19 US liver ordered to rule out compression in the liver as LFTs were slightly elevated. US showed markedly heterogenous hepatic parenchyma with cirrhosis, gallstones (no  cholecystitis) and moderate volume perihepatic ascites . Rifaxamin started for HE. Lasix 20mg daily started 2/20, held 2/23 given rise in Cr. 2/23 US of RLE ordered for DVT rule out and patient started on zosyn due to suspicion of cellulitis and Bcx with gram negative bacilil. Preliminary read of DVT US negative.    RR called 2/23 around 4:30pm for hypotension. Patient given 1.5L fluid bolus, 25g albumin. Vancomycin started in addition to zosyn due to concern for LE cellulitis.  VBG: pH 7.23, pCO2 36, pO2 39, lactate 10. CMP with glucose 49, K 6.2, HCO3 12, BUN 88, Cr 2.28. Calcium gluconate given for hyperkalemia. WBC decreased from 9>4.3, Plt 128>92 from AM labs. Hgb stable 14. Ammonia pending.  CXR with Mild pulmonary interstitial edema without focal consolidation, effusion, or pneumothorax.  EKG showed sinus tachycardia, no peaked T waves.  Patient transferred to MICU d/t concern for sepsis with potential sources including cellulitis of the LE vs gram negative bacteremia.     On 2/24, CT scan showed possible cellulitis of the right leg, possible proctocolitis, avulsion fx of R lesser trochanter, decreased size of left breast mass, moderate ascites and small bl pleural effusions. Infectious disease was consulted.  Patient give one does of tobramycin and placed on Vanc, Zosyn, and clindamycin. Vancomycin and Zosyn continued. Patient with delirium and AMS and intubated due to inability to protect her airway. Lactulose and Rifaximin started and Bicarb drip stopped. On 2/25, she was found to have a new onset AF. Patient remained oliguric and zosyn and micafungin continued. Patient's prognosis remained poor and patient's family expressed the desire to keep patient full code until her son got in from [place] tomorrow, when they would have a goals of care discussion. On 2/26, patient remained sedated with high pressor requirements. On 2/27, patient's family made the patient DNR.  She was febrile overnight and her  antibiotics were switched from zosyn to meropenem. Patient given lokelma for hyperkalemia. Her Hemoglobin remained stable at 10.2.    Other Pertinent Objective Data:    While here, patient had mammogram which was significant for the followin. Left breast spiculated masses and suspicious regionally distributed calcifications. The distribution of suspicious calcifications measures 17 cm. There is a spiculated 6:00 o'clock mass with nipple involvement and a spiculated mass in the upper-outer quadrant at 1:00 o'clock. For extent, biopsies of the 6:00 o'clock subareolar mass and 1:00 o'clock left breast mass are recommended.   2. 3 indeterminate left axillary lymph nodes. An ultrasound-guided core biopsy of 1 of the lymph nodes is recommended. Additionally, there is a large lymph node seen mammographically without a sonographic correlate.   3. Right breast mass at the 10 o'clock position 1 cm from the nipple. An ultrasound-guided core biopsy is recommended.   4. Indeterminate right breast calcifications. A stereotactic biopsy is recommended.   BI-RADS CATEGORY:  5 Highly Suggestive of Malignancy.   Recommendation:  Surgical Consultation and Biopsy.   Recommended Date:  Immediate.   Laterality:  Bilateral.     Patient now s/p several biopsies done with breast team. Details as follows:   Status post ultrasound guided core needle biopsy of a 2 left breast masses, left axillary lymph node, and 2 groupings of right breast calcifications followed by tissue marker placement. Pathology is pending.    Patient had a liver biopsy as well, with pathology significant for the following: Right lobe mass, biopsy: Poorly- differentiated adenocarcinoma, immuno phenotypically compatible with breast origin, see comment.    Brain MRI 2/10/2024:   IMPRESSION:  Small enhancing lesions in the cerebellum are favored to represent  metastatic disease given the patient's history. There is no  associated mass effect.  Nonspecific white matter  changes most likely represent small-vessel  ischemic disease in a patient of this age.  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 MRA of the head is  recommended for further evaluation.    MRCP 2/4/2024:  IMPRESSION:  1. Enhancing left breast masses concerning for a primary breast malignancy. Correlation with dedicated breast imaging is recommended.  2. Diffuse enhancing osseous lesions compatible with osseous  metastases.  3. Markedly nodular contour to the liver with diffuse heterogeneous nodular delayed enhancement, which is new compared to 01/30/2023 CT.No focal arterially enhancing lesion with washout or diffusion restriction. Differential includes diffuse necrotic hepatic metastases versus cirrhotic liver with fibrotic bands and regenerating nodules. Correlation with tissue sampling is recommended.  4. Prominent right cardiophrenic lymph nodes which could be reactive  or metastatic. Correlation with dedicated chest imaging is recommended.  5. Mild abdominopelvic ascites.  6. Additional chronic and incidental findings as described above.    TTE 2/14/2024:  CONCLUSIONS:   1. Left ventricular systolic function is hyperdynamic with a 70% estimated ejection fraction.   2. Spectral Doppler shows an impaired relaxation pattern of left ventricular diastolic filling.    NM Bone Scan 2/15/24:  IMPRESSION:  1. No evidence of osseous metastatic disease.  2. Nonspecific mild radiotracer deposition in the proximal right  femoral diaphysis and lesser trochanter which corresponds to a  partially visualized possible avulsion fracture deformity seen on  prior CT from 02/01/2024. Further evaluation with dedicated imaging  of the right femur can be considered.  3. Degenerative changes of the axial and appendicular skeleton as  above.

## 2024-02-04 NOTE — PROGRESS NOTES
02/04/24 1331   Discharge Planning   Living Arrangements Children;Family members  (Pt lives with dtr, son in law and grandchildren.)   Support Systems Children;Family members   Assistance Needed Pending   Type of Residence Private residence   Who is requesting discharge planning? Patient   Home or Post Acute Services   (pending)   Patient expects to be discharged to: Pending   Does the patient need discharge transport arranged? No  (Dtr will provide.)   Financial Resource Strain   How hard is it for you to pay for the very basics like food, housing, medical care, and heating? Not hard   Housing Stability   In the last 12 months, was there a time when you were not able to pay the mortgage or rent on time? N   In the last 12 months, was there a time when you did not have a steady place to sleep or slept in a shelter (including now)? N   Transportation Needs   In the past 12 months, has lack of transportation kept you from medical appointments or from getting medications? no   In the past 12 months, has lack of transportation kept you from meetings, work, or from getting things needed for daily living? No     Assessment Note:  Spoke with dtr Linda via phone (pt was asleep), and introduced myself as care coordinator and member of the Care Transitions team for discharge planning.   Pt is safe at home with support of family.  Pt is encouraged to be independent, ambulates with walker.  Pt's dtr provides transport to [facility] appts.  Pt's address, phone number and contact information was verified.  Dtr would like pt's specialist to receive information regarding this stay, but no other questions/concerns at this time.     Previous Home Care: None  DME: cane, BSC, shower chair, wheeled walker.  Pharmacy: [facility] in [place].  Falls: None  PCP:  Dr. Chikis Nieto in [place] (next appt scheduled for 5/2024).  Pt's orthopedic surgeon is Emmanuel Solis at [facility] in [place] and  Rheumatologist is Helena Go.    Raquel  Dickson MSN, RN-BC  Transitional Care Coordinator (TCC)  [phone]

## 2024-02-05 NOTE — CARE PLAN
Problem: Pain  Goal: My pain/discomfort is manageable  Outcome: Progressing     Problem: Safety  Goal: Patient will be injury free during hospitalization  Outcome: Progressing  Goal: I will remain free of falls  Outcome: Progressing     Problem: Daily Care  Goal: Daily care needs are met  Outcome: Progressing     Problem: Psychosocial Needs  Goal: Demonstrates ability to cope with hospitalization/illness  Outcome: Progressing  Goal: Collaborate with me, my family, and caregiver to identify my specific goals  Outcome: Progressing     Problem: Discharge Barriers  Goal: My discharge needs are met  Outcome: Progressing     Problem: Pain - Adult  Goal: Verbalizes/displays adequate comfort level or baseline comfort level  Outcome: Progressing     Problem: Safety - Adult  Goal: Free from fall injury  Outcome: Progressing     Problem: Discharge Planning  Goal: Discharge to home or other facility with appropriate resources  Outcome: Progressing     Problem: Chronic Conditions and Co-morbidities  Goal: Patient's chronic conditions and co-morbidity symptoms are monitored and maintained or improved  Outcome: Progressing       The clinical goals for the shift include patient daniel remain HDS throughout shift

## 2024-02-05 NOTE — PROGRESS NOTES
Meghan Waddell is a 68 y.o. female on day 2 of admission presenting with Cirrhosis (CMS/HCC).    Subjective   No acute events overnight. Endorses generalized nausea, no vomiting. Updated family regarding care plan.       Objective     Physical Exam  Constitutional:       Appearance: mildly jaundiced  HENT:      Mouth/Throat:      Mouth: Mucous membranes are moist.   Cardiovascular:      Rate and Rhythm: Normal rate and regular rhythm.      Heart sounds: No murmur heard.  Pulmonary:      Effort: Pulmonary effort is normal. No respiratory distress.      Breath sounds: Normal breath sounds.   Abdominal:      Comments: Abdomen soft, diffusely mildly tender to palpation  No asterixis   Skin:     Comments: L breast with crusted draining lesion without purulence,fullness of the L lateral breast, nipple inversion with black scabbing, no obvious axillary LAD   Neurological:      General: No focal deficit present.      Mental Status: She is alert. A&OX3    Last Recorded Vitals  Vitals:    02/05/24 1456   BP: 126/79   Pulse: 72   Resp: 18   Temp: 36.5 °C (97.7 °F)   SpO2: 97%     Intake/Output last 3 Shifts:  No intake/output data recorded.    Relevant Results  Scheduled medications  calcium carbonate-vitamin D3, 2 tablet, oral, Daily  cefTRIAXone, 1 g, intravenous, q24h  enoxaparin, 40 mg, subcutaneous, q24h  lactulose, 20 g, oral, TID  pantoprazole, 40 mg, oral, Daily before breakfast  polyethylene glycol, 17 g, oral, Daily      Continuous medications     PRN medications  PRN medications: diclofenac sodium     Results from last 7 days   Lab Units 02/04/24  0158   WBC AUTO x10*3/uL 9.0   HEMOGLOBIN g/dL 13.3   HEMATOCRIT % 37.2   PLATELETS AUTO x10*3/uL 169       Results from last 7 days   Lab Units 02/04/24  0158   SODIUM mmol/L 138   POTASSIUM mmol/L 3.6   CHLORIDE mmol/L 111*   CO2 mmol/L 22   BUN mg/dL 12   CREATININE mg/dL 0.73   CALCIUM mg/dL 8.6   PROTEIN TOTAL g/dL 6.3*   BILIRUBIN TOTAL mg/dL 7.4*   ALK PHOS U/L 238*    ALT U/L 48*   AST U/L 149*   GLUCOSE mg/dL 93       Results from last 7 days   Lab Units 02/04/24  0158   ALK PHOS U/L 238*   BILIRUBIN TOTAL mg/dL 7.4*   BILIRUBIN DIRECT mg/dL 4.5*   PROTEIN TOTAL g/dL 6.3*   ALT U/L 48*   AST U/L 149*       Results from last 7 days   Lab Units 02/04/24  0545 02/02/24  0734 02/01/24  1824   APTT seconds  --   --  29   INR  1.2*   < > 1.1    < > = values in this interval not displayed.         Assessment/Plan   Ms. Waddell is 67 yo F history of Seropositive RA (on infliximab), osteoarthritis, nephrolithiasis, obesity, DLD, PACs, history of tobacco use (quit march 2023), who presented to [facility] ED on 2/1 for dark red urine, generalized abdominal pain with nausea transferred to [facility] for further workup of her new cirrhosis and jaundice. Patient also reports 1 episode of melena last week, otherwise had yellow colored stools. Regarding Jaundice, she has generalized abdominal pain, which she states originated in the RLQ. She does have several calcified gallstones on her CT making biliary colic/choledocolithiasis a distinct possibility (no signs of infection), however regarding her new cirrhosis diagnosis - this seems new in the last year as 1/2023 CT scan of her abdomen showed a normal contour liver so this would have had to have progressed in the last year: no alcohol, negative viral serologies, she seems like a setup for an autoimmune hepatitis with her history of RA, less likely a hepatic toxicity from her infliximab, patient does report she previously took methotrexate for RA mgmt. Otherwise she is immunocompromised so opportunistic infection remains on the differential but this also seems less likely. RUQ revealed Liver cirrhosis with diffusely heterogenous hepatic parenchyma, no definite focal lesion identified, although neoplasm can not be excluded. Perihepatic ascites, slow portal venous flow and recanalized paraumbilical vein, likely sequela of portal hypertension.  Cholelithiasis without sonographic evidence of acute cholecystitis.     Additionally, she has a chronic L breast draining lesion which would be concerning for breast cancer (possibly mets to liver?) though no axillary lymphadenopathy.     2/5 updates:  - Appreciate hepatology recs. MRI/MRCP ordered, will f/up  - Diagnostic para ordered, empiric CTX for 5 days  - Sent for histoplasma studies  - will follow liver stamp  - Mammogram ordered     #new liver cirrhosis  #Cholelithiasis  #mixed hepatocellular and cholestatic liver injury   #mild confusion possible grade 1 hepatic encephalopahthy  :: hepC negative, hepB negative, hepA negative, T spot negative in 11/2023  :: alpha 1 antitrypsin negative  :: ferritin 406 H/transferrin 136 L  :: RUQ as above in assessment  :: ammonia 79  Plan:  -fu autoimmune workup: JOCELYN, anti smooth muscle, anti mitochondrial  -lactulose 10 TID and can titrate up as needed   - hepatology consult  - needs diagnostic paracentesis     #RA - on infliximab  #osteoarthritis  :: hold home naproxen   - voltaren gel     #nonhealing, draining, L breast wound  - will try for inpatient breast ultrasound, otherwise..  -outpatient mammogram   -outpatient derm biopsy     #subacute avulsion fracture lesser trochanger R proximal femur  - chronic  - follow up with her orthopedic surgeon outpatient     #compression deformity at T12  -vitamin D and calcium     #abdominal aorta atherosclerosis on CT  #smoking history   - follow up with PCP outpatient to discuss potential statin/asa     F: none  E: PRN   N: regular diet  GI: none  P: lovenox  A: PIV  C: full code confirmed on admission   Surrogate:   Cierra her daughter [phone] (first option)  Emmanuel her son [phone]    Marcell Ramírez MD

## 2024-02-05 NOTE — PROGRESS NOTES
Transitional Care Coordination Progress Note:  Patient discussed during interdisciplinary rounds.   Team members present: JOSE JAY  Plan per Medical/Surgical team: MRI/MRCP today  Payor: Cirrhosis   Discharge disposition: Home  Potential Barriers: none  ADOD: 2 days  Will continue to monitor for discharge planning needs.     Millicent BASSETT, RN  Transitional Care Coordinator (TCC)  [phone]

## 2024-02-05 NOTE — PROGRESS NOTES
Subjective   Meghan Waddell is a 68 y.o. female on hospital day 2 reports overall doing well.    Objective     Exam     Vitals:    02/04/24 1457 02/04/24 1901 02/05/24 0402 02/05/24 0831   BP: 116/71 124/78 112/72 119/66   BP Location:  Left arm     Patient Position:  Sitting     Pulse: 80 72 78 70   Resp: 17  17 18   Temp: 36.8 °C (98.2 °F) 36.6 °C (97.9 °F) 36.4 °C (97.5 °F) 36.5 °C (97.7 °F)   TempSrc:  Temporal Temporal    SpO2: 97% 96% 96% 95%   Weight:       Height:          Intake/Output last 3 shifts:  No intake/output data recorded.    Physical Exam  Vitals reviewed.   Constitutional:       Appearance: She is obese.   HENT:      Head: Normocephalic and atraumatic.      Mouth/Throat:      Mouth: Mucous membranes are dry.   Eyes:      General: Scleral icterus: Mild.   Cardiovascular:      Rate and Rhythm: Normal rate and regular rhythm.      Pulses: Normal pulses.      Heart sounds: No murmur heard.     No friction rub. No gallop.   Pulmonary:      Effort: Pulmonary effort is normal.      Breath sounds: Normal breath sounds. No wheezing, rhonchi or rales.      Comments: Anteriorly  Abdominal:      General: Bowel sounds are normal.      Palpations: Abdomen is soft.      Tenderness: There is no abdominal tenderness. There is no guarding or rebound.      Comments: Abdomen was soft and nontender throughout.     Musculoskeletal:      Right lower leg: No edema.      Left lower leg: No edema.   Skin:     General: Skin is warm and dry.      Comments: Breast not visualized today previous evaluation as follows :  left breast with retracted nipple and scabbing without active drainage or fluctuance.  There is some fullness in the left upper outer quadrant which may represent a mass.  Area is nontender and there is no axillary lymphadenopathy that I can appreciate.  Breast skin itself was not erythematous   Neurological:      General: No focal deficit present.      Mental Status: She is alert and oriented to person,  "place, and time.      Comments: No asterixis   Psychiatric:         Mood and Affect: Mood normal.         Behavior: Behavior normal.            Medications   calcium carbonate-vitamin D3, 2 tablet, oral, Daily  cefTRIAXone, 1 g, intravenous, q24h  enoxaparin, 40 mg, subcutaneous, q24h  lactulose, 20 g, oral, TID  pantoprazole, 40 mg, oral, Daily before breakfast  polyethylene glycol, 17 g, oral, Daily       PRN medications: diclofenac sodium       Labs     All new labs reviewed:  some of the basic labs as follows -     Results from last 7 days   Lab Units 02/04/24  0158 02/03/24  0656 02/01/24  1823   WBC AUTO x10*3/uL 9.0 6.7 8.8   HEMOGLOBIN g/dL 13.3 12.7 14.9   HEMATOCRIT % 37.2 36.9 43.5   PLATELETS AUTO x10*3/uL 169 154 222   NEUTROS PCT AUTO % 55.6 52.8 64.9   LYMPHS PCT AUTO % 31.5 33.1 23.1   MONOS PCT AUTO % 11.4 12.1 10.0   EOS PCT AUTO % 0.3 0.7 0.5        Results from last 72 hours   Lab Units 02/04/24  0545 02/03/24  0656   INR  1.2* 1.3*       Results from last 72 hours   Lab Units 02/04/24  0158 02/03/24  0656   SODIUM mmol/L 138 136   POTASSIUM mmol/L 3.6 3.8   CHLORIDE mmol/L 111* 109*   CO2 mmol/L 22 23   BUN mg/dL 12 13   CREATININE mg/dL 0.73 0.80       Results from last 72 hours   Lab Units 02/04/24  0158 02/03/24  0656   ALK PHOS U/L 238* 234*   AST U/L 149* 145*   ALT U/L 48* 46*   BILIRUBIN TOTAL mg/dL 7.4* 7.0*   BILIRUBIN DIRECT mg/dL 4.5*  --    ALBUMIN g/dL 2.4* 2.3*   PROTEIN TOTAL g/dL 6.3* 6.2*       Results from last 72 hours   Lab Units 02/04/24  0158 02/03/24  0656   GLUCOSE mg/dL 93 85             No results found for: \"TR1\"  Lab Results   Component Value Date    URINECULTURE No significant growth 02/01/2024    BLOODCULT Loaded on Instrument - Culture in progress 02/04/2024        Liver Failure Workup Labs:    Lab Results   Component Value Date    FERRITIN 406 (H) 02/04/2024    CERULOPLSM 42.6 02/01/2024    G0AXPGOPMPL 170 02/04/2024       Lab Results   Component Value Date    " "HEPATOT Nonreactive 02/04/2024    HEPAIGM Nonreactive 02/01/2024    HEPBSAB 5.5 02/03/2023    HEPBSAG Nonreactive 02/01/2024    HEPBCAB NONREACTIVE 02/03/2023    HEPCAB Nonreactive 02/01/2024        No results found for: \"CMVIGGINIT\", \"CMVPCRIU\", \"EBVIN\"     No results found for: \"AFP\", \"\", \"CEA\"      Imaging   XR chest 1 view  Narrative: Interpreted By:  Rayo Rose and Ritchie Brandon   STUDY:  XR CHEST 1 VIEW;  2/4/2024 4:05 pm      INDICATION:  Signs/Symptoms:confusion.      COMPARISON:  Chest x-ray 04/14/2021. CT abdomen and pelvis without IV contrast  02/01/2024      ACCESSION NUMBER(S):  [id number]      ORDERING CLINICIAN:  MARIBEL MUNOZ      FINDINGS:  AP radiograph of the chest was provided. Lordotic radiograph.              CARDIOMEDIASTINAL SILHOUETTE:  Cardiomediastinal silhouette is normal in size and configuration.      LUNGS:  Lungs are clear.      ABDOMEN:  No remarkable upper abdominal findings.      BONES:  No acute osseous changes.      Impression: 1.  No evidence of acute cardiopulmonary process.      I personally reviewed the images/study and I agree with the findings  as stated by resident Jeremy Knight. This study was interpreted  at [place], [place].      MACRO:  None      Signed by: Rayo Rose 2/5/2024 9:58 AM  Dictation workstation:   [id number]     No results found for this or any previous visit from the past 1095 days.     No results found for this or any previous visit (from the past 4464 hour(s)).       Assessment and Plan     Cirrhosis:  MELD 3.0: 20 at 2/4/2024  5:45 AM  MELD-Na: 16 at 2/4/2024  5:45 AM  Calculated from:  Serum Creatinine: 0.73 mg/dL (Using min of 1 mg/dL) at 2/4/2024  1:58 AM  Serum Sodium: 138 mmol/L (Using max of 137 mmol/L) at 2/4/2024  1:58 AM  Total Bilirubin: 7.4 mg/dL at 2/4/2024  1:58 AM  Serum Albumin: 2.4 g/dL at 2/4/2024  1:58 AM  INR(ratio): 1.2 at 2/4/2024  5:45 AM  Age at listing " (hypothetical): 68 years  Sex: Female at 2/4/2024  5:45 AM     -Continue lactulose.  Titrate to 3-4 bowel movements per day.  -Continue PPI  -Strict I/o and every day wt   -Tend LFTs and Coags  -Low Na diet  -Check AMA, JOCELYN, Anti-smooth muscle ab   -Checking MRI liver  -Checking AFP  -Check serum histoplasma antigen and/or histoplasma urine antigen  -Planning paracentesis if adequate ascites  -Per hepatology we will ask IR to assess for possible paracentesis.  Given minimal ascites I am not clear this will be possible.  -If workup unrevealing for etiology to cirrhosis may need liver biopsy  -Avoid NSAID  -Checking peth  -Follow-up hepatology recommendations.  Case was discussed this morning.    Breast changes: Raises concern for possible breast malignancy given the nipple retraction/scabbing and palpable fullness to the outer aspect of the breast  -Unlikely able to obtain mammogram/ultrasound as inpatient.  Can inquire with breast service as to when soon as possible evaluation can be undertaken    Subacute avulsion fracture:  -PT/OT  -Pain control  -Outpatient follow-up    DVT prophylaxis    Physical therapy/Occupational Therapy when appropriate    Ariel Carver MD     Of note the above was done with Dragon dictation system.  Note was proofread to minimize errors.

## 2024-02-05 NOTE — TELEPHONE ENCOUNTER
Called her daughter.  They did a paracentesis.  She has ascites.  Ultrasound was unable to view her liver very well.  She is getting an MRI and depending on that they may get a biopsy.  Told her it is up in the air if we should continue with her infliximab.  The possibility of autoimmune hepatitis.  Will try to keep an eye on her chart.  At this time cannot say if we can continue with her infliximab.  Has only had 4 infusions and she had significantly felt better with that.

## 2024-02-05 NOTE — PROGRESS NOTES
"[facility]  [facility] [place]  CONSULT FOLLOW-UP     Reason For Consult  Elevated LFTs    History Of Present Illness  Meghan Waddell is a 68 y.o. female with a past medical history of seropositive RA on infliximab since 12/2023, and obesity (BM 30) who presented to [place] ED on 2/1 for dark red urine, abdominal pain with nausea who was found to be jaundiced with elevated LFTs and was transferred to [facility] for further care. Hepatology is consulted for elevated LFTS    SUBJECTIVE     -Continues to have cramping abdominal pain, worse after eating pasta that has been present for the last few months  -Only new medication recently is infliximab which she started end of last year  -Denies any fevers or chills at home    EXAM     Last Recorded Vitals  Blood pressure 119/66, pulse 70, temperature 36.5 °C (97.7 °F), resp. rate 18, height 1.727 m (5' 8\"), weight 90.7 kg (200 lb), SpO2 95 %.    No intake or output data in the 24 hours ending 02/05/24 1027       Physical Exam  General: obese, no acute distress  HEENT: PERRLA, EOM intact, no scleral icterus, moist MM  Respiratory: CTA bilaterally, normal work of breathing  Cardiovascular: RRR, no murmurs/rubs/gallops  Abdomen: Soft, RUQ tenderness, nondistended, bowel sounds present, no masses palpated, no organomeagly  Extremities: no edema, no asterixis  Neuro: alert and oriented, CNII-XII grossly intact, moves all 4 extremities with no focal deficits      OBJECTIVE                                                                              Medications             Current Facility-Administered Medications:     calcium carbonate-vitamin D3 500 mg-5 mcg (200 unit) per tablet 2 tablet, 2 tablet, oral, Daily, Vero Russell MD, 2 tablet at 02/05/24 0821    cefTRIAXone (Rocephin) IVPB 1 g, 1 g, intravenous, q24h, Connie Hebert MD, Stopped at 02/04/24 1707    diclofenac sodium (Voltaren) 1 % gel 4 g, 4 g, Topical, 4x daily PRN, " Vero Russell MD    enoxaparin (Lovenox) syringe 40 mg, 40 mg, subcutaneous, q24h, Vero Russell MD, 40 mg at 02/04/24 2130    lactulose 20 gram/30 mL oral solution 20 g, 20 g, oral, TID, Connie Hebert MD, 20 g at 02/05/24 0821    pantoprazole (ProtoNix) EC tablet 40 mg, 40 mg, oral, Daily before breakfast, Connie Hebert MD, 40 mg at 02/05/24 0606    polyethylene glycol (Glycolax, Miralax) packet 17 g, 17 g, oral, Daily, Vero Russell MD                                                                            Labs     Results for orders placed or performed during the hospital encounter of 02/03/24 (from the past 24 hour(s))   Vitamin D 25-Hydroxy,Total (for eval of Vitamin D levels)   Result Value Ref Range    Vitamin D, 25-Hydroxy, Total 26 (L) 30 - 100 ng/mL   Blood Culture    Specimen: Peripheral Venipuncture; Blood culture   Result Value Ref Range    Blood Culture Loaded on Instrument - Culture in progress    Hepatitis A Antibody, Total   Result Value Ref Range    Hepatitis A  AB-Total Nonreactive Nonreactive                                                                          Imaging     2/4/2024 US Liver with Doppler  IMPRESSION:  1. Liver cirrhosis with diffusely heterogenous hepatic parenchyma.  Though no definite focal lesion identified, neoplasm can not be  excluded. Further assessment with liver MRI with IV contrast is  recommended.  2. Perihepatic ascites, slow portal venous flow and recanalized  paraumbilical vein, likely sequela of portal hypertension.  3. Cholelithiasis without sonographic evidence of acute cholecystitis.  4. Increased renal cortical echogenicity can be seen the setting of  medical renal disease. No hydronephrosis.      2/1/2024 CT abdomen Pelvis without IV contrast  Abdomen:   There has been significant interval change in the liver since last  year, now diffusely heterogeneous in density with cirrhotic  morphology. In addition, there has been interval  development of  diffuse minimal ascites.  Gallbladder remains contracted with calcified stones. Bile ducts are  normal caliber. Spleen maintains normal size. No abnormalities are  detectable in the pancreas or adrenal glands. Non-obstructing small  calculi are again seen in the kidneys. There are no ureteral calculi  and there is no hydronephrosis  There is stable mild dilatation of the mid abdominal aorta. Small  lymph nodes are seen in the abdomen and retroperitoneum. There is no  retroperitoneal hematoma  The stomach is normally distended with a small hiatal hernia. There is  no appreciable small bowel thickening or obstruction. Appendix is not  localized. Scattered stool is seen throughout the colon. There is no  free air.    IMPRESSION:  1. Since last year, interval development of significant parenchymal  liver disease with cirrhotic morphology.  2. Additional development of diffuse minimal ascites.  3. Unchanged bilateral small non-obstructing renal calculi; no  hydronephrosis.  4. No bowel obstruction or detectable bowel inflammation.  5. Likely subacute avulsion fracture lesser trochanter right proximal  femur.  6. Remainder as above.                                                                         GI Procedures     No prior    ASSESSMENT / PLAN     ASSESSMENT/PLAN:  Meghan Waddell is a 68 y.o. female with a past medical history of seropositive RA on infliximab since 12/2023, and obesity (BM 30) who presented to [place] ED on 2/1 for dark red urine, abdominal pain with nausea who was found to be jaundiced with elevated LFTs and was transferred to [facility] for further care. Hepatology is consulted for elevated LFTS    She has newly elevated LFTs this admission compared to prior. Mostly a cholestatic pattern (R Factor 0.6), but does have AST > ALT. She has cholelithiasis on imaging so would need to rule out gallstone causing biliary obstruction which can be done with MRI/MRCP. This would ideally be able  to show us changes in the small biliary ducts as well such as would be a concern in PSC. Given AST >ALT would consider alcohol and other sources of AST such as muscle    She has a very sudden change in the appearance of liver in CT compared to 1 year about. The liver is now very heterogenous in appearance and she has newly elevated LFTs. Would consider other causes such as AIH, infectious etiology (such as histoplasmosis now that she is on remicade), DILI although no clear medication culprits at this moment. History is not consistent with alcohol abuse but I have added on a PETH    The liver is quite nodular in appearance now with perihepatic ascites and a recanalized paraumbilical vein that is concerning for portal hypertension    #Newly decompensated cirrhosis  -Etiology uncertain, possible NIETO  -Liver stamp: A1AT 170, ceruloplasmin 42.6, HCV negative,  -Pending labs: AMA, ASMA, JOCELYN, LKM-1, PETH, immunoglobulins, anti-SLA/LP, anti-liver cytosol Ab (ALC-1)  MELD 3.0: 20 at 2/4/2024  5:45 AM  MELD-Na: 16 at 2/4/2024  5:45 AM  Calculated from:  Serum Creatinine: 0.73 mg/dL (Using min of 1 mg/dL) at 2/4/2024  1:58 AM  Serum Sodium: 138 mmol/L (Using max of 137 mmol/L) at 2/4/2024  1:58 AM  Total Bilirubin: 7.4 mg/dL at 2/4/2024  1:58 AM  Serum Albumin: 2.4 g/dL at 2/4/2024  1:58 AM  INR(ratio): 1.2 at 2/4/2024  5:45 AM  Age at listing (hypothetical): 68 years  Sex: Female at 2/4/2024  5:45 AM  - Ascites: minimal, perihepatic. Diagnostic paracentesis if able  - Volume: Euvolemic  - EV: unknown, no prior EGD  - HE: present/new. Started on Lactulose this admission  - Immunizations: HBV and HAV nonimmune, defer vaccination to outpatient  - HCC screening: AFP pending, MRI pending  - Transplant:         Recommendations:  -I have added on PETH and AFP and creatinine kinase  -Please obtain MRI/MRCP liver with and without contrast  -If MRI/MRCP is negative for liver infiltrative disease, CBD stones/strictures, then will  likely need liver biopsy  -Obtain diagnostic paracentesis if able, (send fluid for cell count with diff, total protein, albumin, gram stain/culture)  -Okay to continue Ceftriaxone empirically x5d total  -Consider sending serum histoplasma Ag and/or urine histoplasma Ag  -Continue lactulose, titrate to 2-3 BM daily  -Sodium restricted diet, 2g Na daily  -Continue to trend daily MELD labs: CMP and INR daily    Patient was seen and discussed with Dr. Carias    Thank you for the consultation. Hepatology will continue to the follow .  - During weekday hours of 7am- 5pm, please do not hesitate to contact me on [facility] Chat or page [phone] if there are any further questions   - After hours, on weekends, and on holidays, please page the on-call GI fellow at [phone]. Thank you.     Dottie Connors MD  PGY4 Gastroenterology Fellow  [facility]

## 2024-02-05 NOTE — POST-PROCEDURE NOTE
INTERVENTIONAL RADIOLOGY ADVANCED PRACTICE PROCEDURE  [facility]    A time out was performed and Right Hemiabdomen was examined with US and appropriate entry point was confirmed and marked.   The patient was prepped and draped in a sterile manner, 1% lidocaine was used to anesthesize the skin and subcutaneous tissue.   A 5F Centesis needle was then introduced through the skin into the peritoneal space, the centesis catheter was then threaded without difficulty.   1750 ml of yellow fluid was removed without difficulty. The catheter was then removed.   No immediate complications were noted during and immediately following the procedure.

## 2024-02-05 NOTE — CONSULTS
"   Nutrition Assessment    Reason for Assessment: Admission nursing screening    Meghan Waddell is a 68 y.o. female history of Seropositive RA (on infliximab), osteoarthritis, nephrolithiasis, obesity, DLD, PACs, history of tobacco use (quit march 2023), who presented to [place] ED on 2/1 for dark red urine, generalized abdominal pain with nausea transferred to [facility] for further workup of her new cirrhosis and jaundice.     Per Attending note, patient c/o abdominal distress, nausea, decreased PO intake and ~ 20 lb wt loss x 1 month.     Patient had paracentesis today: 1750 ml of yellow fluid was removed.     Nutrition History:  Patient states at baseline, she nibbles on her food throughout the day. Reports her portions sizes fit on a small dessert plate as opposed to a dinner plate. Rates her appetite as \"fair\". Uses salt and eats a lot of processed foods. Suggested a serving of Ensure for added protein and patient hesitant at first. Her sister was at her bedside and suggested she try it. Denies N/V/C/D. Denies problems chewing or swallowing.      Vitamin/Herbal Supplement Use -- None on home med list     Anthropometrics:  Height: 172.7 cm (5' 8\")   Weight: 90.7 kg (200 lb)   IBW/kg (Dietitian Calculated): 63.6 kg  Percent of IBW: 142.6 %  Adjusted Body Weight (kg): 70.4 kg    Weight History:      Wt Readings from Last 10 Encounters:   02/04/24 90.7 kg (200 lb)   02/01/24 93.4 kg (206 lb)   12/27/23 94.2 kg (207 lb 9 oz)   11/29/23 93.9 kg (207 lb 0.2 oz)   11/15/23 92.3 kg (203 lb 7.8 oz)   11/01/23 93.4 kg (206 lb)   07/11/23 103 kg (226 lb 8 oz)   05/30/23 104 kg (228 lb 8 oz)   04/18/23 104 kg (228 lb 6 oz)   03/31/23 104 kg (230 lb 2.6 oz)   2/17/23           105 kg (230.8 lbs)  4/14/22           111 kg (245 lbs)   2/22/21           112 kg (246 lbs)    Weight Change %:  Weight History / % Weight Change: 11.9% x 7 months  Significant Weight Loss: Yes    Nutrition Focused Physical Exam " Findings:  Deferred due to family visitors crowded around patient's bed.      Edema:  Edema: ascites  Physical Findings:  Skin: Positive (lesion by her nipple with occasional drainage.)    Nutrition Significant Labs:  CBC Trend:   Results from last 7 days   Lab Units 02/05/24 1451 02/04/24 0158 02/03/24 0656 02/01/24  1823   WBC AUTO x10*3/uL 8.0 9.0 6.7 8.8   RBC AUTO x10*6/uL 4.18 4.37 4.23 5.03   HEMOGLOBIN g/dL 12.6 13.3 12.7 14.9   HEMATOCRIT % 35.3* 37.2 36.9 43.5   MCV fL 84 85 87 87   PLATELETS AUTO x10*3/uL 172 169 154 222    , BMP Trend:   Results from last 7 days   Lab Units 02/05/24 1451 02/04/24 0158 02/03/24 0656 02/01/24  1823   GLUCOSE mg/dL 85 93 85 115*   CALCIUM mg/dL 8.9 8.6 8.1* 8.6   SODIUM mmol/L 135* 138 136 135*   POTASSIUM mmol/L 4.2 3.6 3.8 3.3*   CO2 mmol/L 20* 22 23 23   CHLORIDE mmol/L 107 111* 109* 103   BUN mg/dL 15 12 13 18   CREATININE mg/dL 0.77 0.73 0.80 0.99    Liver Function Trend:   Results from last 7 days   Lab Units 02/05/24 1451 02/04/24 0158 02/03/24 0656 02/01/24  1823   ALK PHOS U/L 255* 238* 234* 346*   AST U/L 147* 149* 145* 188*   ALT U/L 42 48* 46* 60*   BILIRUBIN TOTAL mg/dL 8.1* 7.4* 7.0* 6.4*    , Renal Lab Trend:   Results from last 7 days   Lab Units 02/05/24 1451 02/04/24 0158 02/03/24 0656 02/03/24 0656 02/01/24  1823   POTASSIUM mmol/L 4.2 3.6  --  3.8 3.3*   PHOSPHORUS mg/dL 3.1 2.7   < >  --   --    SODIUM mmol/L 135* 138  --  136 135*   MAGNESIUM mg/dL 1.97 1.99   < >  --   --    EGFR mL/min/1.73m*2 85 90  --  81 63   BUN mg/dL 15 12  --  13 18   CREATININE mg/dL 0.77 0.73  --  0.80 0.99    < > = values in this interval not displayed.    , Vit D:   Lab Results   Component Value Date    VITD25 26 (L) 02/04/2024    , Iron Panel:   Lab Results   Component Value Date    FERRITIN 406 (H) 02/04/2024         Nutrition Specific Medications:  Scheduled medications  calcium carbonate-vitamin D3, 2 tablet, oral, Daily  cefTRIAXone, 1 g, intravenous,  q24h  enoxaparin, 40 mg, subcutaneous, q24h  lactulose, 20 g, oral, TID  pantoprazole, 40 mg, oral, Daily before breakfast  polyethylene glycol, 17 g, oral, Daily    Continuous medications     PRN medications  PRN medications: diclofenac sodium    I/O:   Last BM Date: 02/05/24;      Dietary Orders (From admission, onward)       Start     Ordered    02/03/24 2110  Adult diet Regular  Diet effective now        Question:  Diet type  Answer:  Regular    02/03/24 2110                   Estimated Needs:   Total Energy Estimated Needs (kCal): 1900 kCal  Method for Estimating Needs: 70.4kg / 27kcal  Total Protein Estimated Needs (g): 90 g  Method for Estimating Needs: 70.4kg / 1.3g      Nutrition Diagnosis   Malnutrition Diagnosis  Patient has Malnutrition Diagnosis: Yes   Malnutrition Diagnosis: Moderate malnutrition related to chronic disease or condition  As Evidenced by: report of decreased PO intake x 1 month, 11.9% wt loss x 6 months and ascites.  Additional Assessment Information: Will follow with physical exam.        Nutrition Interventions/Recommendations         Nutrition Prescription:   2-3 gram sodium diet.   Ensure Plus ordered by Dietitian.   Prostat x 1 a day ordered by Dietitian.   Order daily MVI   Order Vitamin D3 1,000-2,000 IU daily.        Nutrition Education:   Encourage patient to choose protein rich foods and drinks. Encouraged Ensure Plus.  Patient voiced reluctance to omit salt from diet. Suggested that she make 1 change to start:  Avoid adding salt to food. Encourage generous use of herbs/spices/MrsAliza Romero.        Nutrition Monitoring and Evaluation   Food/Nutrient Related History Monitoring  Monitoring and Evaluation Plan: Energy intake  Criteria: >/= 50% of meals/supplements    Body Composition/Growth/Weight History  Monitoring and Evaluation Plan: Weight  Criteria: Stable weight    Biochemical Data, Medical Tests and Procedures  Monitoring and Evaluation Plan: Electrolyte/renal panel,  Glucose/endocrine profile  Criteria: Labs wnl        Time Spent/Follow-up Reminder:   Time Spent (min): 45 minutes  Last Date of Nutrition Visit: 02/05/24  Nutrition Follow-Up Needed?: Dietitian to reassess per policy  Follow up Comment: F/U with MARTA

## 2024-02-05 NOTE — CARE PLAN
The patient's goals for the shift include  patient will remain safe throughout shift    The clinical goals for the shift include patient daniel remain HDS throughout shift      Problem: Pain  Goal: My pain/discomfort is manageable  Outcome: Progressing     Problem: Safety  Goal: Patient will be injury free during hospitalization  Outcome: Progressing  Goal: I will remain free of falls  Outcome: Progressing     Problem: Daily Care  Goal: Daily care needs are met  Outcome: Progressing     Problem: Psychosocial Needs  Goal: Demonstrates ability to cope with hospitalization/illness  Outcome: Progressing  Goal: Collaborate with me, my family, and caregiver to identify my specific goals  Outcome: Progressing  Flowsheets (Taken 2/5/2024 0200)  Cultural Requests During Hospitalization: n/a  Spiritual Requests During Hospitalization: n/a     Problem: Discharge Barriers  Goal: My discharge needs are met  Outcome: Progressing     Problem: Pain - Adult  Goal: Verbalizes/displays adequate comfort level or baseline comfort level  Outcome: Progressing     Problem: Safety - Adult  Goal: Free from fall injury  Outcome: Progressing     Problem: Discharge Planning  Goal: Discharge to home or other facility with appropriate resources  Outcome: Progressing     Problem: Chronic Conditions and Co-morbidities  Goal: Patient's chronic conditions and co-morbidity symptoms are monitored and maintained or improved  Outcome: Progressing

## 2024-02-06 NOTE — CARE PLAN
Problem: Pain  Goal: My pain/discomfort is manageable  Outcome: Progressing     Problem: Safety  Goal: Patient will be injury free during hospitalization  Outcome: Progressing  Goal: I will remain free of falls  Outcome: Progressing     Problem: Daily Care  Goal: Daily care needs are met  Outcome: Progressing     Problem: Psychosocial Needs  Goal: Demonstrates ability to cope with hospitalization/illness  Outcome: Progressing  Goal: Collaborate with me, my family, and caregiver to identify my specific goals  Outcome: Progressing     Problem: Discharge Barriers  Goal: My discharge needs are met  Outcome: Progressing     Problem: Safety - Adult  Goal: Free from fall injury  Outcome: Progressing     Problem: Discharge Planning  Goal: Discharge to home or other facility with appropriate resources  Outcome: Progressing     Problem: Chronic Conditions and Co-morbidities  Goal: Patient's chronic conditions and co-morbidity symptoms are monitored and maintained or improved  Outcome: Progressing       The clinical goals for the shift include Pt will remain HDS throughout shift

## 2024-02-06 NOTE — CONSULTS
"Nutrition Note:     Patient is a 68 y.o. female who is here for further workup of her new cirrhosis and jaundice.      Nutrition History:  Energy Intake: Poor      Met with patient this morning to perform Nutrition Focused Physical Exam. Patient reports eating 1/2 piece of toast and almost all of her fruit cup for breakfast this morning. Patient upset that she received vanilla Ensure. \"I drank half of it. It was awful.\" Requesting chocolate only.    Nutrition Focused Physical Exam Findings:    Subcutaneous Fat Loss:   Orbital Fat Pads: Mild-Moderate (slight dark circles and slight hollowing)  Buccal Fat Pads: Mild-Moderate (flat cheeks, minimal bounce)  Triceps: Well nourished (ample fat tissue)  Ribs: Well nourished (full chest, ribs do not protrude)  Muscle Wasting:  Temporalis: Mild-Moderate (slight depression)  Pectoralis (Clavicular Region): Well nourished (clavicle not visible)  Deltoid/Trapezius: Well nourished (rounded appearance at arm, shoulder, neck)  Edema:  Edema: ascites  Physical Findings:  Hair: Negative  Eyes: Negative  Mouth: Negative  Nails: Negative  Skin: Positive (lesion by her nipple with occasional drainage.      Nutrition Diagnosis   Malnutrition Diagnosis  Patient has Malnutrition Diagnosis: Yes   Malnutrition Diagnosis: Severe malnutrition related to chronic disease or condition  As Evidenced by: report of decreased PO intake x 1 month, 11.9% wt loss x 6 month, moderate temporal muscle wasting, moderate buccal and orbital fat loss and ascites.        Nutrition Interventions/Recommendations         Nutrition Prescription:  Agree with Regular diet.   Order Daily MVI   Order Vitamin D3 1,000-2,000 IU daily.        Nutrition Monitoring and Evaluation   Food/Nutrient Related History Monitoring  Monitoring and Evaluation Plan: Energy intake  Criteria: >/= 50% of meals/supplements    Body Composition/Growth/Weight History  Monitoring and Evaluation Plan: Weight  Criteria: Stable " weight    Biochemical Data, Medical Tests and Procedures  Monitoring and Evaluation Plan: Electrolyte/renal panel, Glucose/endocrine profile  Criteria: Labs wnl        Time Spent/Follow-up Reminder:   Time Spent (min): 15 minutes  Last Date of Nutrition Visit: 02/06/24  Nutrition Follow-Up Needed?: Dietitian to reassess per policy  Follow up Comment: severe PCM

## 2024-02-06 NOTE — PROGRESS NOTES
Subjective   Meghan Waddell is a 68 y.o. female on hospital day 3 reports overall doing well.  Patient feels better from abdominal distention standpoint after paracentesis of a little under 2 L removed    Objective     Exam     Vitals:    02/05/24 0831 02/05/24 1456 02/05/24 2016 02/06/24 0448   BP: 119/66 126/79 109/61 114/62   Pulse: 70 72 82 81   Resp: 18 18 16 16   Temp: 36.5 °C (97.7 °F) 36.5 °C (97.7 °F)  36.1 °C (97 °F)   TempSrc:       SpO2: 95% 97% 97% 94%   Weight:       Height:          Intake/Output last 3 shifts:  No intake/output data recorded.    Physical Exam  Vitals reviewed.   Constitutional:       Appearance: She is obese.   HENT:      Head: Normocephalic and atraumatic.      Mouth/Throat:      Mouth: Mucous membranes are dry.   Eyes:      General: Scleral icterus: Mild.   Cardiovascular:      Rate and Rhythm: Normal rate and regular rhythm.      Pulses: Normal pulses.      Heart sounds: No murmur heard.     No friction rub. No gallop.   Pulmonary:      Effort: Pulmonary effort is normal.      Breath sounds: Normal breath sounds. No wheezing, rhonchi or rales.      Comments: Anteriorly  Abdominal:      General: Bowel sounds are normal.      Palpations: Abdomen is soft.      Tenderness: There is abdominal tenderness. There is no guarding or rebound.      Comments: Mild epigastric tenderness   Musculoskeletal:      Right lower leg: No edema.      Left lower leg: No edema.   Skin:     General: Skin is warm and dry.      Comments: Breast not visualized today previous evaluation as follows :  left breast with retracted nipple and scabbing without active drainage or fluctuance.  There is some fullness in the left upper outer quadrant which may represent a mass.  Area is nontender and there is no axillary lymphadenopathy that I can appreciate.  Breast skin itself was not erythematous   Neurological:      General: No focal deficit present.      Mental Status: She is alert and oriented to person, place,  "and time.      Comments: No asterixis   Psychiatric:         Mood and Affect: Mood normal.         Behavior: Behavior normal.            Medications   calcium carbonate-vitamin D3, 2 tablet, oral, Daily  enoxaparin, 40 mg, subcutaneous, q24h  furosemide, 20 mg, oral, Daily  [START ON 2/7/2024] lactulose, 20 g, oral, Daily  pantoprazole, 40 mg, oral, Daily before breakfast  polyethylene glycol, 17 g, oral, Daily  spironolactone, 50 mg, oral, Daily       PRN medications: diclofenac sodium       Labs     All new labs reviewed:  some of the basic labs as follows -     Results from last 7 days   Lab Units 02/06/24 0758 02/05/24 1451 02/04/24  0158   WBC AUTO x10*3/uL 7.7 8.0 9.0   HEMOGLOBIN g/dL 12.5 12.6 13.3   HEMATOCRIT % 33.9* 35.3* 37.2   PLATELETS AUTO x10*3/uL 163 172 169   NEUTROS PCT AUTO % 61.0 61.5 55.6   LYMPHS PCT AUTO % 25.3 25.3 31.5   MONOS PCT AUTO % 11.7 11.3 11.4   EOS PCT AUTO % 0.4 0.4 0.3        Results from last 72 hours   Lab Units 02/06/24 0758 02/05/24 1451 02/05/24 1451 02/04/24  0545   INR  1.2*  --  1.3* 1.2*   APTT seconds 30   < > 29  --     < > = values in this interval not displayed.       Results from last 72 hours   Lab Units 02/06/24 0758 02/05/24 1451 02/04/24  0158   SODIUM mmol/L 135* 135* 138   POTASSIUM mmol/L 4.1 4.2 3.6   CHLORIDE mmol/L 106 107 111*   CO2 mmol/L 21 20* 22   BUN mg/dL 16 15 12   CREATININE mg/dL 0.76 0.77 0.73       Results from last 72 hours   Lab Units 02/06/24 0758 02/05/24 1451 02/04/24  0158   ALK PHOS U/L 251* 255* 238*   AST U/L 161* 147* 149*   ALT U/L 48* 42 48*   BILIRUBIN TOTAL mg/dL 7.8* 8.1* 7.4*   BILIRUBIN DIRECT mg/dL 4.9* 5.0* 4.5*   ALBUMIN g/dL 2.5* 2.3* 2.4*   PROTEIN TOTAL g/dL 6.4 6.5 6.3*       Results from last 72 hours   Lab Units 02/06/24  0758 02/05/24  1451 02/04/24  0158   GLUCOSE mg/dL 79 85 93             No results found for: \"TR1\"  Lab Results   Component Value Date    URINECULTURE No significant growth 02/01/2024    " "BLOODCULT Loaded on Instrument - Culture in progress 02/05/2024    BLOODCULT No growth at 1 day 02/04/2024        Liver Failure Workup Labs:    Lab Results   Component Value Date    FERRITIN 406 (H) 02/04/2024    CERULOPLSM 42.6 02/01/2024    I7ONLANAYGT 170 02/04/2024       Lab Results   Component Value Date    HEPATOT Nonreactive 02/04/2024    HEPAIGM Nonreactive 02/01/2024    HEPBSAB 5.5 02/03/2023    HEPBSAG Nonreactive 02/01/2024    HEPBCAB NONREACTIVE 02/03/2023    HEPCAB Nonreactive 02/01/2024        No results found for: \"CMVIGGINIT\", \"CMVPCRIU\", \"EBVIN\"     Lab Results   Component Value Date    AFP <4 02/06/2024         Imaging   MRCP pancreas w and wo IV contrast  Narrative: Interpreted By:  Khari Birmingham,   STUDY:  MRCP PANCREAS W AND WO IV CONTRAST;  2/5/2024 10:00 pm      INDICATION:  Signs/Symptoms:new cirrhosis.      COMPARISON:  CT abdomen pelvis dated 01/30/2023, 02/01/2024      ACCESSION NUMBER(S):  [id number]      ORDERING CLINICIAN:  MARIBEL MUNOZ      TECHNIQUE:  MRI LIVER; Multiplanar magnetic resonance images of the abdomen were  obtained including the following sequences; T2-weighted SSFSE with  and without fat saturation, T1-weighted GRE in/opposed phase, DWI,  fat saturated 3D-T1w GRE pre and dynamically post contrast.  18 ml of  Gadolinium contrast agent Dotarem were administered intravenously  without immediate complication.      FINDINGS:  LIVER:  The liver is normal in size measuring 17 cm in craniocaudal length.  There is a markedly nodular contour to the liver with heterogeneity  of the hepatic parenchyma. The appearance is similar to the CT from  02/01/2024 and new from 01/30/2023 where the liver demonstrated a  normal contour. There is diffuse nodular appearing delayed  enhancement throughout the hepatic parenchyma with a few areas of  more confluence delayed enhancement, for example in hepatic segment 6  (series 20, image 38). There is no associated diffusion " restriction.  No arterially enhancing lesions or lesions with washout are  visualized.      BILE DUCTS:  There are focally dilated peripheral intrahepatic biliary ducts in  segment 4 B measuring up to 4 mm in diameter. There is otherwise no  intrahepatic or extrahepatic biliary ductal dilatation.      GALLBLADDER:  Cholelithiasis. No gallbladder wall thickening or distention.      PANCREAS:  Normal signal intensity. Normal enhancement. No masses. The  pancreatic duct is normal.      SPLEEN:  The spleen is normal in size without evidence of focal lesions.      ADRENAL GLANDS:  Within normal limits.      KIDNEYS:  Within normal limits. Bilateral simple renal cysts measuring 0.7 cm.      LYMPH NODES:  A prominent portacaval lymph node measures up to 1.4 cm in short  axis. There is otherwise no lymphadenopathy.      ABDOMINAL VESSELS:  Stable mild aneurysmal dilatation of the infrarenal abdominal aortic  measuring up to 3.1 x 2.9 cm in diameter. Superior mesenteric vein,  splenic vein, and main, right and left portal vein are patent.  Hepatic veins are patent.  No significant collaterals or esophageal  varices are present.      BOWEL:  Note is made of a small diverticulum arising posteriorly from the  gastric cardia.      PERITONEUM/RETROPERITONEUM:  A small amount of perihepatic fluid is noted.      BONES AND LOWER THORAX:  There are innumerable subcentimeter enhancing lesions throughout the  visualized osseous structures including the spine and pelvis  compatible with osseous metastases. In the partially imaged portion  of the left breast there is an enhancing nodular mass in the  subareolar left breast with associated nipple retraction (series 15,  image 15). A satellite enhancing mass is also seen in the lateral  left breast at posterior depth (Image 5). A few prominent right  cardiophrenic lymph nodes are present measuring up to 1 cm in short  axis (series 15, image 8). The visualized lower lung fields  are  unremarkable. The heart is normal in size.      Impression: 1. Enhancing left breast masses concerning for a primary breast  malignancy. Correlation with dedicated breast imaging is recommended.  2. Diffuse enhancing osseous lesions compatible with osseous  metastases.  3. Markedly nodular contour to the liver with diffuse nodular delayed  enhancement. Differential includes an atypical appearance of diffuse  hepatic metastases versus hepatic fibrosis/cirrhosis. Correlation  with tissue sampling is recommended.  4. Prominent right cardiophrenic lymph nodes which could be reactive  or metastatic. Correlation with dedicated chest imaging is  recommended.  5. Small volume ascites.  6. Additional chronic and incidental findings as described above.      I personally reviewed the images/study and I agree with the findings  as stated by resident physician Dr. Steven Birmingham.      MACRO:  None          Dictation workstation:   [id number]     No results found for this or any previous visit from the past 1095 days.     No results found for this or any previous visit (from the past 4464 hour(s)).       Assessment and Plan     Cirrhosis: Paracentesis negative for SBP.  Does have low total protein in the ascites.  MRI inconclusive for cirrhosis versus diffuse malignant invasion  MELD 3.0: 21 at 2/6/2024  7:58 AM  MELD-Na: 18 at 2/6/2024  7:58 AM  Calculated from:  Serum Creatinine: 0.76 mg/dL (Using min of 1 mg/dL) at 2/6/2024  7:58 AM  Serum Sodium: 135 mmol/L at 2/6/2024  7:58 AM  Total Bilirubin: 7.8 mg/dL at 2/6/2024  7:58 AM  Serum Albumin: 2.5 g/dL at 2/6/2024  7:58 AM  INR(ratio): 1.2 at 2/6/2024  7:58 AM  Age at listing (hypothetical): 68 years  Sex: Female at 2/6/2024  7:58 AM     -Continue lactulose.  Titrate to 3-4 bowel movements per day.  -Continue PPI  -Strict I/o and every day wt   -Tend LFTs and Coags  -Low Na diet  -Follow-up AMA, JOCELYN, Anti-smooth muscle ab   -Follow-up serum histoplasma antigen and/or  histoplasma urine antigen  -Avoid NSAID  -Follow-up hepatology recommendations.  Anticipate likely need for liver biopsy.  Patient may benefit from gentle diuretics to help deal with ascites +/- SBP prophylaxis in the setting of low total protein in ascites    Breast changes: Exam and nonspecific imaging both concerning for malignancy with possible bony involvement now.  -Given approval for mammogram.  Will start with this and determine biopsy needs    Subacute avulsion fracture:  -PT/OT  -Pain control  -Outpatient follow-up    DVT prophylaxis    Physical therapy/Occupational Therapy when appropriate    Ariel Carver MD     Of note the above was done with Dragon dictation system.  Note was proofread to minimize errors.

## 2024-02-06 NOTE — PROGRESS NOTES
Meghan Waddell is a 68 y.o. female on day 3 of admission presenting with Cirrhosis (CMS/HCC).    Subjective   No acute events overnight. Endorses generalized nausea, no vomiting. Feeling okay today, felt relief after fluid removal from paracentesis.       Objective     Physical Exam  Constitutional:       Appearance: mildly jaundiced  HENT:      Mouth/Throat:      Mouth: Mucous membranes are moist.   Cardiovascular:      Rate and Rhythm: Normal rate and regular rhythm.      Heart sounds: No murmur heard.  Pulmonary:      Effort: Pulmonary effort is normal. No respiratory distress.      Breath sounds: Normal breath sounds.   Abdominal:      Comments: Abdomen soft, diffusely mildly tender to palpation  No asterixis   Skin:     Comments: L breast with crusted draining lesion without purulence,fullness of the L lateral breast, nipple inversion with black scabbing, no obvious axillary LAD   Neurological:      General: No focal deficit present.      Mental Status: She is alert. A&OX3    Last Recorded Vitals  Vitals:    02/06/24 0448   BP: 114/62   Pulse: 81   Resp: 16   Temp: 36.1 °C (97 °F)   SpO2: 94%     Intake/Output last 3 Shifts:  No intake/output data recorded.    Relevant Results  Scheduled medications  calcium carbonate-vitamin D3, 2 tablet, oral, Daily  enoxaparin, 40 mg, subcutaneous, q24h  furosemide, 20 mg, oral, Daily  [START ON 2/7/2024] lactulose, 20 g, oral, Daily  pantoprazole, 40 mg, oral, Daily before breakfast  polyethylene glycol, 17 g, oral, Daily  spironolactone, 50 mg, oral, Daily      Continuous medications     PRN medications  PRN medications: diclofenac sodium     Results from last 7 days   Lab Units 02/06/24  0758   WBC AUTO x10*3/uL 7.7   HEMOGLOBIN g/dL 12.5   HEMATOCRIT % 33.9*   PLATELETS AUTO x10*3/uL 163       Results from last 7 days   Lab Units 02/06/24  0758   SODIUM mmol/L 135*   POTASSIUM mmol/L 4.1   CHLORIDE mmol/L 106   CO2 mmol/L 21   BUN mg/dL 16   CREATININE mg/dL 0.76    CALCIUM mg/dL 8.5*   PROTEIN TOTAL g/dL 6.4   BILIRUBIN TOTAL mg/dL 7.8*   ALK PHOS U/L 251*   ALT U/L 48*   AST U/L 161*   GLUCOSE mg/dL 79       Results from last 7 days   Lab Units 02/06/24  0758   ALK PHOS U/L 251*   BILIRUBIN TOTAL mg/dL 7.8*   BILIRUBIN DIRECT mg/dL 4.9*   PROTEIN TOTAL g/dL 6.4   ALT U/L 48*   AST U/L 161*       Results from last 7 days   Lab Units 02/06/24  0758   APTT seconds 30   INR  1.2*         Assessment/Plan   Ms. Waddell is 69 yo F history of Seropositive RA (on infliximab), osteoarthritis, nephrolithiasis, obesity, DLD, PACs, history of tobacco use (quit march 2023), who presented to [place] ED on 2/1 for dark red urine, generalized abdominal pain with nausea transferred to [facility] for further workup of her new cirrhosis and jaundice. Patient also reports 1 episode of melena last week, otherwise had yellow colored stools. Regarding Jaundice, she has generalized abdominal pain, which she states originated in the RLQ. She does have several calcified gallstones on her CT making biliary colic/choledocolithiasis a distinct possibility (no signs of infection), however regarding her new cirrhosis diagnosis - this seems new in the last year as 1/2023 CT scan of her abdomen showed a normal contour liver so this would have had to have progressed in the last year: no alcohol, negative viral serologies, she seems like a setup for an autoimmune hepatitis with her history of RA, less likely a hepatic toxicity from her infliximab, patient does report she previously took methotrexate for RA mgmt. Otherwise she is immunocompromised so opportunistic infection remains on the differential but this also seems less likely. RUQ revealed Liver cirrhosis with diffusely heterogenous hepatic parenchyma, no definite focal lesion identified, although neoplasm can not be excluded. Perihepatic ascites, slow portal venous flow and recanalized paraumbilical vein, likely sequela of portal hypertension.  Cholelithiasis without sonographic evidence of acute cholecystitis. Pending further workup with liver biopsy, suspect underlying metastatic disease.    2/6 updates:  - MRI Liver c/f infiltrative metastatic disease, planning for liver biopsy  - Lasix 20 mg aldactone 50 mg  - Lactulose decreased to 20 g  - Diagnostic para negative for SBP, discontinued CTX  - Sent for histoplasma studies  - Mammogram ordered, not done yet     #new liver cirrhosis  #Cholelithiasis  #mixed hepatocellular and cholestatic liver injury   #mild confusion possible grade 1 hepatic encephalopahthy  :: hepC negative, hepB negative, hepA negative, T spot negative in 11/2023  :: alpha 1 antitrypsin negative  :: ferritin 406 H/transferrin 136 L  :: RUQ as above in assessment  :: ammonia 79  Plan:  -fu autoimmune workup: JOCELYN, anti smooth muscle, anti mitochondrial  -lactulose 10 TID and can titrate up as needed   - hepatology consult  - needs diagnostic paracentesis     #RA - on infliximab  #osteoarthritis  :: hold home naproxen   - voltaren gel     #nonhealing, draining, L breast wound  - will try for inpatient breast ultrasound, otherwise..  -outpatient mammogram   -outpatient derm biopsy     #subacute avulsion fracture lesser trochanger R proximal femur  - chronic  - follow up with her orthopedic surgeon outpatient     #compression deformity at T12  -vitamin D and calcium     #abdominal aorta atherosclerosis on CT  #smoking history   - follow up with PCP outpatient to discuss potential statin/asa     F: none  E: PRN   N: regular diet  GI: none  P: lovenox  A: PIV  C: full code confirmed on admission   Surrogate:   Cierra her daughter [phone] (first option)  Emmanuel her son [phone]    Marcell Ramírez MD

## 2024-02-06 NOTE — PROGRESS NOTES
"[facility]  [facility]  CONSULT FOLLOW-UP     Reason For Consult  Elevated LFTs    History Of Present Illness  Meghan Waddell is a 68 y.o. female with a past medical history of seropositive RA on infliximab since 12/2023, and obesity (BM 30) who presented to [place] ED on 2/1 for dark red urine, abdominal pain with nausea who was found to be jaundiced with elevated LFTs and was transferred to [facility] for further care. Hepatology is consulted for elevated LFTS    SUBJECTIVE     -Had 1.7L paracentesis yesterday, no evidence of SBP. SAAG and protein consistent with portal hypertension  -Abdomen feels better but she still gets cramping from time to time    EXAM     Last Recorded Vitals  Blood pressure 114/62, pulse 81, temperature 36.1 °C (97 °F), resp. rate 16, height 1.727 m (5' 8\"), weight 90.7 kg (200 lb), SpO2 94 %.    No intake or output data in the 24 hours ending 02/06/24 1516       Physical Exam  General: obese, no acute distress  HEENT: PERRLA, EOM intact, no scleral icterus, moist MM, poor dentition  Respiratory: CTA bilaterally, normal work of breathing  Cardiovascular: RRR, no murmurs/rubs/gallops  Abdomen: Soft, RUQ tenderness, +ve fluid wave, bowel sounds present, no masses palpated, no organomeagly  Extremities: no edema, no asterixis  Neuro: alert and oriented, CNII-XII grossly intact, moves all 4 extremities with no focal deficits      OBJECTIVE                                                                              Medications             Current Facility-Administered Medications:     calcium carbonate-vitamin D3 500 mg-5 mcg (200 unit) per tablet 2 tablet, 2 tablet, oral, Daily, Vero Russell MD, 2 tablet at 02/06/24 1049    diclofenac sodium (Voltaren) 1 % gel 4 g, 4 g, Topical, 4x daily PRN, Vero Russell MD    enoxaparin (Lovenox) syringe 40 mg, 40 mg, subcutaneous, q24h, Vero Russell MD, 40 mg at 02/05/24 6825    furosemide (Lasix) " tablet 20 mg, 20 mg, oral, Daily, Dominick Hicks MD, 20 mg at 02/06/24 1206    [START ON 2/7/2024] lactulose 20 gram/30 mL oral solution 20 g, 20 g, oral, Daily, Dominick Hicks MD    pantoprazole (ProtoNix) EC tablet 40 mg, 40 mg, oral, Daily before breakfast, Connie Hebert MD, 40 mg at 02/06/24 0633    polyethylene glycol (Glycolax, Miralax) packet 17 g, 17 g, oral, Daily, Vero Russell MD    spironolactone (Aldactone) tablet 50 mg, 50 mg, oral, Daily, Dominick Hicks MD, 50 mg at 02/06/24 1206                                                                            Labs     Results for orders placed or performed during the hospital encounter of 02/03/24 (from the past 24 hour(s))   Non-gynecologic cytology   Result Value Ref Range    Case Report       Non-gynecologic Cytology                          Case: [id number]                                   Authorizing Provider:  Colten Willams,        Collected:           02/05/2024 1607                                     APRN-CNP                                                                     Ordering Location:     [facility]       Received:            02/05/2024 2104                                     [place] [address]                                                         Pathologist:           Estefania Yanez MD                                                         Specimen:    ASCITIC FLUID                                                                              Final Cytological Interpretation         A. ASCITIC FLUID , CYTOLOGY AND CELL BLOCK:     No malignant cells identified                       Slide(s) initially screened by DEONTE LEUNG at [facility] [id number] [facility] [id number]  By the signature on this report, the individual or group listed as making the Final Interpretation/Diagnosis certifies that they have reviewed this case.       Clinical History       new  cirrhosis, imaging ?neoplasm      Specimen Description       A. ASCITIC FLUID.  Received 1600 ml yellow cloudy fluid with particles in sterile bag .       Specimen Processing Detail       A1 Slides Only (No Block)   A1-1 Pap Stain NGYN ThinPrep    A2 Cell Block   A2-1 H&E      Body Fluid Cell Count   Result Value Ref Range    Color, Fluid Yellow Colorless, Straw, Yellow    Clarity, Fluid Clear Clear    WBC, Fluid 205 See Comment /uL    RBC, Fluid 113 0  /uL /uL   Body Fluid Differential   Result Value Ref Range    Neutrophils %, Manual, Fluid 8 <25 % %    Lymphocytes %, Manual, Fluid 43 <75 % %    Mono/Macrophages %, Manual, Fluid 49 <70 % %    Eosinophils %, Manual, Fluid 0 0 % %    Basophils %, Manual, Fluid 0 0 % %    Immature Granulocytes %, Manual, Fluid 0 0 % %    Blasts %, Manual, Fluid 0 0 % %    Unclassified Cells %, Manual, Fluid 0 0 % %    Plasma Cells %, Manual, Fluid 0 0 % %    Total Cells Counted, Fluid 100    Protein, Total Fluid   Result Value Ref Range    Protein, Total Fluid 0.8 Not established g/dL   Albumin, Fluid   Result Value Ref Range    Albumin, Fluid <0.5 Not established g/dL   Glucose, Fluid   Result Value Ref Range    Glucose, Fluid 98 Not established mg/dL   AFP Tumor Marker   Result Value Ref Range    Alpha-Fetoprotein <4 0 - 9 ng/mL   CBC and Auto Differential   Result Value Ref Range    WBC 7.7 4.4 - 11.3 x10*3/uL    nRBC 0.0 0.0 - 0.0 /100 WBCs    RBC 4.06 4.00 - 5.20 x10*6/uL    Hemoglobin 12.5 12.0 - 16.0 g/dL    Hematocrit 33.9 (L) 36.0 - 46.0 %    MCV 84 80 - 100 fL    MCH 30.8 26.0 - 34.0 pg    MCHC 36.9 (H) 32.0 - 36.0 g/dL    RDW 19.4 (H) 11.5 - 14.5 %    Platelets 163 150 - 450 x10*3/uL    Neutrophils % 61.0 40.0 - 80.0 %    Immature Granulocytes %, Automated 0.4 0.0 - 0.9 %    Lymphocytes % 25.3 13.0 - 44.0 %    Monocytes % 11.7 2.0 - 10.0 %    Eosinophils % 0.4 0.0 - 6.0 %    Basophils % 1.2 0.0 - 2.0 %    Neutrophils Absolute 4.69 1.20 - 7.70 x10*3/uL    Immature  Granulocytes Absolute, Automated 0.03 0.00 - 0.70 x10*3/uL    Lymphocytes Absolute 1.94 1.20 - 4.80 x10*3/uL    Monocytes Absolute 0.90 0.10 - 1.00 x10*3/uL    Eosinophils Absolute 0.03 0.00 - 0.70 x10*3/uL    Basophils Absolute 0.09 0.00 - 0.10 x10*3/uL   Magnesium   Result Value Ref Range    Magnesium 2.03 1.60 - 2.40 mg/dL   Hepatic Function Panel   Result Value Ref Range    Albumin 2.5 (L) 3.4 - 5.0 g/dL    Bilirubin, Total 7.8 (H) 0.0 - 1.2 mg/dL    Bilirubin, Direct 4.9 (H) 0.0 - 0.3 mg/dL    Alkaline Phosphatase 251 (H) 33 - 136 U/L    ALT 48 (H) 7 - 45 U/L     (H) 9 - 39 U/L    Total Protein 6.4 6.4 - 8.2 g/dL   Coagulation Screen   Result Value Ref Range    Protime 13.8 (H) 9.8 - 12.8 seconds    INR 1.2 (H) 0.9 - 1.1    aPTT 30 27 - 38 seconds   Phosphorus   Result Value Ref Range    Phosphorus 3.1 2.5 - 4.9 mg/dL   Basic Metabolic Panel   Result Value Ref Range    Glucose 79 74 - 99 mg/dL    Sodium 135 (L) 136 - 145 mmol/L    Potassium 4.1 3.5 - 5.3 mmol/L    Chloride 106 98 - 107 mmol/L    Bicarbonate 21 21 - 32 mmol/L    Anion Gap 12 10 - 20 mmol/L    Urea Nitrogen 16 6 - 23 mg/dL    Creatinine 0.76 0.50 - 1.05 mg/dL    eGFR 86 >60 mL/min/1.73m*2    Calcium 8.5 (L) 8.6 - 10.6 mg/dL                                                                          Imaging     1/5/2024 MRI/MRCP Liver with and without contrast  IMPRESSION:  1. Enhancing left breast masses concerning for a primary breast  malignancy. Correlation with dedicated breast imaging is recommended.  2. Diffuse enhancing osseous lesions compatible with osseous  metastases.  3. Markedly nodular contour to the liver with diffuse nodular delayed  enhancement. Differential includes an atypical appearance of diffuse  hepatic metastases versus hepatic fibrosis/cirrhosis. Correlation  with tissue sampling is recommended.  4. Prominent right cardiophrenic lymph nodes which could be reactive  or metastatic. Correlation with dedicated chest  imaging is  recommended.  5. Small volume ascites.  6. Additional chronic and incidental findings as described above.    2/4/2024 US Liver with Doppler  IMPRESSION:  1. Liver cirrhosis with diffusely heterogenous hepatic parenchyma.  Though no definite focal lesion identified, neoplasm can not be  excluded. Further assessment with liver MRI with IV contrast is  recommended.  2. Perihepatic ascites, slow portal venous flow and recanalized  paraumbilical vein, likely sequela of portal hypertension.  3. Cholelithiasis without sonographic evidence of acute cholecystitis.  4. Increased renal cortical echogenicity can be seen the setting of  medical renal disease. No hydronephrosis.      2/1/2024 CT abdomen Pelvis without IV contrast  Abdomen:   There has been significant interval change in the liver since last  year, now diffusely heterogeneous in density with cirrhotic  morphology. In addition, there has been interval development of  diffuse minimal ascites.  Gallbladder remains contracted with calcified stones. Bile ducts are  normal caliber. Spleen maintains normal size. No abnormalities are  detectable in the pancreas or adrenal glands. Non-obstructing small  calculi are again seen in the kidneys. There are no ureteral calculi  and there is no hydronephrosis  There is stable mild dilatation of the mid abdominal aorta. Small  lymph nodes are seen in the abdomen and retroperitoneum. There is no  retroperitoneal hematoma  The stomach is normally distended with a small hiatal hernia. There is  no appreciable small bowel thickening or obstruction. Appendix is not  localized. Scattered stool is seen throughout the colon. There is no  free air.    IMPRESSION:  1. Since last year, interval development of significant parenchymal  liver disease with cirrhotic morphology.  2. Additional development of diffuse minimal ascites.  3. Unchanged bilateral small non-obstructing renal calculi; no  hydronephrosis.  4. No bowel  obstruction or detectable bowel inflammation.  5. Likely subacute avulsion fracture lesser trochanter right proximal  femur.  6. Remainder as above.                                                                         GI Procedures     No prior    ASSESSMENT / PLAN     ASSESSMENT/PLAN:  Meghan Waddell is a 68 y.o. female with a past medical history of seropositive RA on infliximab since 12/2023, and obesity (BM 30) who presented to [place] ED on 2/1 for dark red urine, abdominal pain with nausea who was found to be jaundiced with elevated LFTs and was transferred to [facility] for further care. Hepatology is consulted for elevated LFTS    She has newly elevated LFTs this admission compared to prior. Mostly a cholestatic pattern (R Factor 0.6), but does have AST > ALT. She has cholelithiasis on imaging but MRI/MRCP did not show any cholelithiasis. She has a very sudden change in the appearance of liver in CT compared to 1 year ago. The liver is now very heterogenous in appearance and she has newly elevated LFTs. Would consider other causes such as AIH, infectious etiology (such as histoplasmosis now that she is on remicade), DILI although no clear medication culprits at this moment. History is not consistent with alcohol abuse but I have added on a PETH. She also has a new breast mass and concern for bony metastases on MRI. Her diagnostic paracentesis has SAAG >1.1 and low protein consistent with portal hypertension. Her MRI may just represent cirrhosis. While there are no discrete tumors or of the liver on MRI, infiltrative disease (in the setting of concern for metastatic breast cancer) and pseudocirrhosis is on the differential at this time.     #Newly decompensated cirrhosis  -Etiology uncertain, possible NIETO  -Liver stamp: A1AT 170, ceruloplasmin 42.6, HCV negative,  -Pending labs: AMA, ASMA, JOCELYN, LKM-1, PETH, immunoglobulins, anti-SLA/LP, anti-liver cytosol Ab (ALC-1)  MELD 3.0: 21 at 2/6/2024  7:58  AM  MELD-Na: 18 at 2/6/2024  7:58 AM  Calculated from:  Serum Creatinine: 0.76 mg/dL (Using min of 1 mg/dL) at 2/6/2024  7:58 AM  Serum Sodium: 135 mmol/L at 2/6/2024  7:58 AM  Total Bilirubin: 7.8 mg/dL at 2/6/2024  7:58 AM  Serum Albumin: 2.5 g/dL at 2/6/2024  7:58 AM  INR(ratio): 1.2 at 2/6/2024  7:58 AM  Age at listing (hypothetical): 68 years  Sex: Female at 2/6/2024  7:58 AM  - Ascites: minimal, perihepatic. Diagnostic paracentesis 1.7L removed 2/5 with SAAG 1.7 and total protein 0.8 consistent with portal HTN. Negative SBP and cytology  - Volume: start lasix 20mg and spironolactone 50mg PO  - EV: unknown, no prior EGD  - HE: present/new. Started on Lactulose this admission  - Immunizations: HBV and HAV nonimmune, defer vaccination to outpatient  - HCC screening: AFP pending, MRI pending  - Transplant:     Recommendations:  -Please consult IR for US guided liver biopsy. Please request an additional sample also be sent for fungal stain/culture  -Serum and urine Histoplasma Ag pending  -Okay to discontinue Ceftriaxone  -Trial of low dose Lasix 20mg PO and Spironolactone 50mg PO   -Consider sending serum histoplasma Ag and/or urine histoplasma Ag  -Decrease lactulose to 20g once daily given bloating/cramping. Goal is 2-3 BM daily, titrate as needed  -Sodium restricted diet, 2g Na daily  -Continue to trend daily MELD labs: CMP and INR daily    Patient was seen and discussed with Dr. Carias    Thank you for the consultation. Hepatology will continue to the follow .  - During weekday hours of 7am- 5pm, please do not hesitate to contact me on [employer] Chat or page [phone] if there are any further questions   - After hours, on weekends, and on holidays, please page the on-call GI fellow at [phone]. Thank you.     Dottie Connors MD  PGY4 Gastroenterology Fellow  [facility]

## 2024-02-06 NOTE — CARE PLAN
The clinical goals for the shift include Pt will remain HDS throughout shift.    Problem: Pain  Goal: My pain/discomfort is manageable  Outcome: Progressing     Problem: Safety  Goal: Patient will be injury free during hospitalization  Outcome: Progressing  Goal: I will remain free of falls  Outcome: Progressing     Problem: Daily Care  Goal: Daily care needs are met  Outcome: Progressing     Problem: Psychosocial Needs  Goal: Demonstrates ability to cope with hospitalization/illness  Outcome: Progressing  Goal: Collaborate with me, my family, and caregiver to identify my specific goals  Outcome: Progressing     Problem: Discharge Barriers  Goal: My discharge needs are met  Outcome: Progressing     Problem: Pain - Adult  Goal: Verbalizes/displays adequate comfort level or baseline comfort level  Outcome: Progressing     Problem: Safety - Adult  Goal: Free from fall injury  Outcome: Progressing     Problem: Discharge Planning  Goal: Discharge to home or other facility with appropriate resources  Outcome: Progressing     Problem: Chronic Conditions and Co-morbidities  Goal: Patient's chronic conditions and co-morbidity symptoms are monitored and maintained or improved  Outcome: Progressing

## 2024-02-06 NOTE — PROGRESS NOTES
Physical Therapy    Physical Therapy Evaluation & Treatment    Patient Name: Meghan Waddell  MRN: [id number]  Today's Date: 2/6/2024          Assessment/Plan   PT Assessment  PT Assessment Results: Decreased strength, Decreased endurance, Impaired balance, Decreased mobility, Decreased safety awareness, Pain  Rehab Prognosis: Excellent  Evaluation/Treatment Tolerance: Patient limited by pain  End of Session Communication: Bedside nurse  End of Session Patient Position: Up in chair, Alarm off, not on at start of session   IP OR SWING BED PT PLAN  Inpatient or Swing Bed: Inpatient  PT Plan  Treatment/Interventions: Bed mobility, Transfer training, Gait training, Stair training, Balance training, Strengthening, Endurance training, Therapeutic exercise, Therapeutic activity  PT Plan: Skilled PT  PT Frequency: 3 times per week  PT Discharge Recommendations: Moderate intensity level of continued care  PT Recommended Transfer Status: Contact guard  PT - OK to Discharge: Yes    Subjective     General Visit Information:  General  Reason for Referral: Cirrhosis of the liver  Past Medical History Relevant to Rehab: Seropositive RA (on infliximab), osteoarthritis, nephrolithiasis, obesity, DLD, PACs, history of tobacco use (quit march 2023); fracture of R lesser trochanter  Family/Caregiver Present: Yes (son present and supportive)  Prior to Session Communication: Bedside nurse  Patient Position Received: Bed, 3 rail up, Alarm off, not on at start of session  Home Living:  Home Living  Type of Home: House  Lives With: Adult children (daughter and son in law)  Home Adaptive Equipment: Walker rolling or standard, Cane  Home Layout: Two level  Alternate Level Stairs-Rails: Right  Alternate Level Stairs-Number of Steps: 14  Home Access: Stairs to enter with rails  Entrance Stairs-Rails: Right  Entrance Stairs-Number of Steps: 6  Bathroom Shower/Tub: Tub/shower unit  Home Living Comments: pt reports her daughter is a homemaker and is  around to help out whenever needed  Prior Level of Function:  Prior Function Per Pt/Caregiver Report  Level of [place]: Independent with ADLs and functional transfers  Receives Help From: Family  ADL Assistance: Independent  Homemaking Assistance: Independent  Ambulatory Assistance: Independent (with use of cane or WW)  Prior Function Comments: pt reports her daughter and son in law do all the cooking, cleaning, and laundry, but if needed she reports she would be able to do these tasks independently; household ambulator at baseline; use of cane or WW at baseline; (-)falls; (-) drive  Precautions:  Precautions  Medical Precautions: Fall precautions    Objective   Pain:  Pain Assessment  Pain Assessment: 0-10  Pain Score: 5 - Moderate pain  Pain Type: Chronic pain, Acute pain  Pain Location: Abdomen (pt also reports pain in R leg d/t known fracture of lesser trochanter)  Cognition:  Cognition  Overall Cognitive Status: Within Functional Limits  Arousal/Alertness: Appropriate responses to stimuli  Orientation Level: Oriented X4 (pt unable to identify date of the month)  Following Commands: Follows one step commands without difficulty  Safety Judgment: Decreased awareness of need for assistance  Impulsive: Mildly (pt requiring cues to not rush through movement and for appropriate use of AD during mobility and transfers)    General Assessments:     Sensation  Light Touch: No apparent deficits    Strength  Strength Comments: B LE >/= 3+/5; assessed via functional mobility  Static Sitting Balance  Static Sitting-Balance Support: Bilateral upper extremity supported  Static Sitting-Level of Assistance: Close supervision  Dynamic Sitting Balance  Dynamic Sitting-Balance Support: Bilateral upper extremity supported  Dynamic Sitting-Comments:  (Close supervision)    Static Standing Balance  Static Standing-Balance Support: Bilateral upper extremity supported  Static Standing-Level of Assistance: Modified independent,  Contact guard (use of WW)  Dynamic Standing Balance  Dynamic Standing-Balance Support: Bilateral upper extremity supported  Dynamic Standing-Comments:  (Modified independent; CGA; use of WW)  Functional Assessments:  Bed Mobility  Bed Mobility: Yes  Bed Mobility 1  Bed Mobility 1: Supine to sitting  Level of Assistance 1: Modified independent  Bed Mobility Comments 1: pt moved slowly and used bed rails to assist her mobility; HOB elevated  Bed Mobility 2  Bed Mobility  2: Scooting  Level of Assistance 2: Minimum assistance (x1)  Bed Mobility Comments 2: pt moved slowly; required minAx1 for scooting her R LE at EOB d/t pain from hip fracture; L LE did not require assist with scooting    Transfers  Transfer: Yes  Transfer 1  Technique 1: Sit to stand  Transfer Device 1: Walker  Transfer Level of Assistance 1: Minimum assistance (x1)  Trials/Comments 1: pt used momentum to assist with STS; cues given for hand placement  Transfers 2  Technique 2: Stand to sit  Transfer Device 2: Walker  Transfer Level of Assistance 2: Contact guard  Trials/Comments 2: pt demonstrated diminished eccentric control; pt neglected to use walker appropriately with transfer from standing to sitting; pt educated on appropriate use of WW with transfer    Ambulation/Gait Training  Ambulation/Gait Training Performed: Yes  Ambulation/Gait Training 1  Surface 1: Level tile  Device 1: Rolling walker  Assistance 1: Contact guard  Quality of Gait 1: Antalgic, Decreased step length, Forward flexed posture  Comments/Distance (ft) 1: 15 ft; pt heavily relies on WW during amb; slow/cautious gait; diminished stance time on R LE  Extremity/Trunk Assessments:  RLE   RLE : Within Functional Limits  LLE   LLE : Within Functional Limits  Treatments:  Therapeutic Activity  Therapeutic Activity Performed: Yes  Therapeutic Activity 1: sit to stand; 1 trial; use of WW; modified independent  Therapeutic Activity 2: ambulating 15 ft; use of WW; close supervision; gait  still antalgic, but more smooth than initial ambulation; pt sill heavily relies on WW with forward flexed posture  Therapeutic Activity 3: stand to sit; 1 trial; use of WW; close supervision; diminished eccentric control; pt reports pain in R hip with this mobility  Outcome Measures:  [facility] Basic Mobility  Turning from your back to your side while in a flat bed without using bedrails: A little  Moving from lying on your back to sitting on the side of a flat bed without using bedrails: A little  Moving to and from bed to chair (including a wheelchair): A little  Standing up from a chair using your arms (e.g. wheelchair or bedside chair): A little  To walk in hospital room: A little  Climbing 3-5 steps with railing: Total  Basic Mobility - Total Score: 16    ICU Mobility Screen  Early Mobility/Exercise Safety Screen: Proceed with mobilization - No exclusion criteria met  E = Exercise and Early Mobility  Early Mobility/Exercise Safety Screen: Proceed with mobilization - No exclusion criteria met  Current Activity: Ambulating in room    Encounter Problems       Encounter Problems (Active)       Mobility       Pt will ambulate >/=200 ft with supervision and use of LRAD       Start:  02/06/24    Expected End:  02/20/24            Pt will safely navigate >/=14 steps with use of 1 hand rail and supervision       Start:  02/06/24    Expected End:  02/20/24            Pt will complete all bed mobility independently with use of bed rails for support as needed        Start:  02/06/24    Expected End:  02/20/24               Transfers       Pt will perform STS independently with use of LRAD        Start:  02/06/24    Expected End:  02/20/24            Pt will demonstrate appropriate use of LRAD when complete all transfers with no cues        Start:  02/06/24    Expected End:  02/20/24                   Education Documentation  Mobility Training, taught by CASSIDY Lizarraga at 2/6/2024 10:28 AM.  Learner: Patient  Readiness:  Acceptance  Method: Explanation, Demonstration  Response: Needs Reinforcement    Education Comments  No comments found.    Lashell Ellsworth, SPT

## 2024-02-07 NOTE — SIGNIFICANT EVENT
Breast mass/LN biopsy approved.    Mammogram  Impression   1. Left breast spiculated masses and suspicious regionally distributed calcifications. The distribution of suspicious  calcifications measures 17 cm. There is a spiculated 6:00 o'clock mass with nipple involvement and a spiculated mass in the upper-outer  quadrant at 1:00 o'clock. For extent, biopsies of the 6:00 o'clock subareolar mass and 1:00 o'clock left breast mass are recommended.  2. 3 indeterminate left axillary lymph nodes. An ultrasound-guided core biopsy of 1 of the lymph nodes is recommended. Additionally,  there is a large lymph node seen mammographically without a sonographic correlate.  3. Right breast mass at the 10 o'clock position 1 cm from the nipple. An ultrasound-guided core biopsy is recommended.  4. Indeterminate right breast calcifications. A stereotactic biopsy is recommended.   BI-RADS CATEGORY:  5 Highly Suggestive of Malignancy. Recommendation:  Surgical Consultation and Biopsy. Recommended Date:  Immediate. Laterality:  Bilateral.

## 2024-02-07 NOTE — PROGRESS NOTES
Subjective   Meghan Waddell is a 68 y.o. female on hospital day 4 reports overall doing well and is eager to get her biopsies done    Objective     Exam     Vitals:    02/06/24 0448 02/06/24 2002 02/07/24 0441 02/07/24 0906   BP: 114/62 122/76 112/59 106/59   Pulse: 81 83 73 73   Resp: 16 16 16 17   Temp: 36.1 °C (97 °F) 36.7 °C (98.1 °F) 36.7 °C (98.1 °F) 36.9 °C (98.4 °F)   TempSrc:       SpO2: 94% 98% 94% 95%   Weight:       Height:          Intake/Output last 3 shifts:  No intake/output data recorded.    Physical Exam  Vitals reviewed.   Constitutional:       Appearance: She is obese.   HENT:      Head: Normocephalic and atraumatic.   Cardiovascular:      Rate and Rhythm: Normal rate and regular rhythm.      Pulses: Normal pulses.      Heart sounds: No murmur heard.     No friction rub. No gallop.   Pulmonary:      Effort: Pulmonary effort is normal.      Breath sounds: Normal breath sounds. No wheezing, rhonchi or rales.      Comments: Anteriorly  Abdominal:      General: Bowel sounds are normal.      Palpations: Abdomen is soft.      Tenderness: There is no abdominal tenderness. There is no guarding or rebound.   Musculoskeletal:      Right lower leg: No edema.      Left lower leg: No edema.   Skin:     General: Skin is warm and dry.      Comments: Breast not visualized today previous evaluation as follows :  left breast with retracted nipple and scabbing without active drainage or fluctuance.  There is some fullness in the left upper outer quadrant which may represent a mass.  Area is nontender and there is no axillary lymphadenopathy that I can appreciate.  Breast skin itself was not erythematous   Neurological:      General: No focal deficit present.      Mental Status: She is alert and oriented to person, place, and time.      Comments: Asterixis not checked today   Psychiatric:         Mood and Affect: Mood normal.         Behavior: Behavior normal.            Medications   calcium carbonate-vitamin D3,  "2 tablet, oral, Daily  [Held by provider] enoxaparin, 40 mg, subcutaneous, q24h  furosemide, 20 mg, oral, Daily  lactulose, 20 g, oral, Daily  pantoprazole, 40 mg, oral, Daily before breakfast  polyethylene glycol, 17 g, oral, Daily  spironolactone, 50 mg, oral, Daily       PRN medications: diclofenac sodium       Labs     All new labs reviewed:  some of the basic labs as follows -     Results from last 7 days   Lab Units 02/07/24 0820 02/06/24 0758 02/05/24  1451   WBC AUTO x10*3/uL 7.1 7.7 8.0   HEMOGLOBIN g/dL 13.1 12.5 12.6   HEMATOCRIT % 36.7 33.9* 35.3*   PLATELETS AUTO x10*3/uL 158 163 172   NEUTROS PCT AUTO % 53.9 61.0 61.5   LYMPHS PCT AUTO % 30.8 25.3 25.3   MONOS PCT AUTO % 13.1 11.7 11.3   EOS PCT AUTO % 0.4 0.4 0.4        Results from last 72 hours   Lab Units 02/07/24  0820 02/06/24 0758 02/05/24  1451   INR  1.2* 1.2* 1.3*   APTT seconds 29 30 29       Results from last 72 hours   Lab Units 02/07/24  0820 02/06/24 0758 02/05/24  1451   SODIUM mmol/L 134* 135* 135*   POTASSIUM mmol/L 3.9 4.1 4.2   CHLORIDE mmol/L 104 106 107   CO2 mmol/L 22 21 20*   BUN mg/dL 17 16 15   CREATININE mg/dL 0.87 0.76 0.77       Results from last 72 hours   Lab Units 02/07/24  0820 02/06/24 0758 02/05/24  1451   ALK PHOS U/L 253* 251* 255*   AST U/L 169* 161* 147*   ALT U/L 47* 48* 42   BILIRUBIN TOTAL mg/dL 7.9* 7.8* 8.1*   BILIRUBIN DIRECT mg/dL 4.7* 4.9* 5.0*   ALBUMIN g/dL 2.2* 2.5* 2.3*   PROTEIN TOTAL g/dL 6.3* 6.4 6.5       Results from last 72 hours   Lab Units 02/07/24 0820 02/06/24 0758 02/05/24  1451   GLUCOSE mg/dL 75 79 85             No results found for: \"TR1\"  Lab Results   Component Value Date    URINECULTURE No significant growth 02/01/2024    BLOODCULT No growth at 1 day 02/05/2024    BLOODCULT No growth at 2 days 02/04/2024        Liver Failure Workup Labs:    Lab Results   Component Value Date    FERRITIN 406 (H) 02/04/2024    CERULOPLSM 42.6 02/01/2024    JOCELYN Positive (A) 02/04/2024    ANATITER " "1:320 02/04/2024    B0RSHTPXRBK 170 02/04/2024    MITOAB Negative 02/04/2024       Lab Results   Component Value Date    HEPATOT Nonreactive 02/04/2024    HEPAIGM Nonreactive 02/01/2024    HEPBSAB 5.5 02/03/2023    HEPBSAG Nonreactive 02/01/2024    HEPBCAB NONREACTIVE 02/03/2023    HEPCAB Nonreactive 02/01/2024        No results found for: \"CMVIGGINIT\", \"CMVPCRIU\", \"EBVIN\"     Lab Results   Component Value Date    AFP <4 02/06/2024         Imaging   BI mammo bilateral diagnostic tomosynthesis, BI US breast limited bilateral  Narrative: Interpreted By:  Tamy Whitley,   STUDY:  BI MAMMO BILATERAL DIAGNOSTIC TOMOSYNTHESIS; BI US BREAST LIMITED  BILATERAL;  2/6/2024 3:20 pm; 2/6/2024 3:57 pm      ACCESSION NUMBER(S):  [id number]; [id number]      ORDERING CLINICIAN:  MARY BETANCUR      INDICATION:  Patient states 1 year ago noticed scabbing and itching around the  left nipple. Patient now has a crusted left breast nipple lesion with  purulent drainage. Family history of breast cancer with her sister  diagnosed. Family history of ovarian carcinoma with her grandmother  diagnosed.      COMPARISON:  Baseline study      FINDINGS:  MAMMOGRAPHY: 2D and tomosynthesis images were reviewed at 1 mm slice  thickness.      Density:  The breast tissue is heterogeneously dense, which may  obscure small masses.      Right breast  In the subareolar right breast there is a spiculated mass with  associated calcifications. A circumscribed mass is seen in the  upper-outer quadrant of the right breast at middle depth. Coarse  heterogeneous grouped calcifications are seen in the upper-outer  quadrant of the right breast at middle depth.      Left breast  Skin thickening is noted of the left breast. There is skin retraction  of the left nipple with an associated spiculated mass. Additional  spiculated masses are noted in the subareolar location of the left  breast. In the upper-outer quadrant of the left breast at posterior  depth there is a " spiculated mass measuring 2.1 cm. Fine pleomorphic  calcifications are seen in a regional distribution measuring 17 x 9  cm. An abnormal lymph node is noted in the left axilla.      ULTRASOUND:  Right breast  In the right breast at the 11 o'clock position 6 cm from the nipple  there is an oval circumscribed anechoic mass measuring 2.2 x 1.3 x  2.3 cm. It demonstrates fluid elastography characteristics and it is  a cyst. 2 additional small circumscribed anechoic masses are seen at  the 10 o'clock position 1 cm from the nipple. These are consistent  with cysts.      In the right breast at the 10 o'clock position 1 cm from the nipple  there is a mixed echogenic mass with calcifications. This measures  1.6 x 0.8 cm. It is intermediate with elastography.      Targeted ultrasound of the right axilla demonstrates 5 unremarkable  right axillary lymph nodes.      Left breast  In the left breast at the 1 o'clock position 12 cm from the nipple  there is an irregular hypoechoic mass measuring 1.7 x 2.1 x 1.9 cm.  It is hard with elastography and there is internal vascularity. This  corresponds to the spiculated mass in the upper-outer quadrant of the  left breast.      In the left breast 6:00 o'clock subareolar location there is an  irregular hypoechoic mass with calcifications associated with the  nipple which is retracted. This measures 2.8 x 1.1 cm.      Targeted ultrasound of the left axilla was performed. There is a low  lymph node at the 1 o'clock position 14 cm from the nipple measuring  0.5 x 0.4 x 0.6 cm.      The lymph node labeled 1 is unremarkable. The lymph node labeled 2 is  irregular and measures 1 x 1 x 0.9 cm. The lymph nodes labeled 3 and  4 are within normal limits. The lymph node labeled 5 demonstrates a  thickened cortex with possible calcifications. The cortex measures  0.32 cm.      Impression: 1. Left breast spiculated masses and suspicious regionally  distributed calcifications. The distribution of  suspicious  calcifications measures 17 cm. There is a spiculated 6:00 o'clock  mass with nipple involvement and a spiculated mass in the upper-outer  quadrant at 1:00 o'clock. For extent, biopsies of the 6:00 o'clock  subareolar mass and 1:00 o'clock left breast mass are recommended.  2. 3 indeterminate left axillary lymph nodes. An ultrasound-guided  core biopsy of 1 of the lymph nodes is recommended. Additionally,  there is a large lymph node seen mammographically without a  sonographic correlate.  3. Right breast mass at the 10 o'clock position 1 cm from the nipple.  An ultrasound-guided core biopsy is recommended.  4. Indeterminate right breast calcifications. A stereotactic biopsy  is recommended.      BI-RADS CATEGORY:      BI-RADS CATEGORY:  5 Highly Suggestive of Malignancy.  Recommendation:  Surgical Consultation and Biopsy.  Recommended Date:  Immediate.  Laterality:  Bilateral.      For any future breast imaging appointments, please call [phone]).          MACRO:  None      Signed by: Tamy Whitley 2/6/2024 4:17 PM  Dictation workstation:   [id number]  MRCP pancreas w and wo IV contrast  Narrative: Interpreted By:  Khari Birmingham,   STUDY:  MRCP PANCREAS W AND WO IV CONTRAST;  2/5/2024 10:00 pm      INDICATION:  Signs/Symptoms:new cirrhosis.      COMPARISON:  CT abdomen pelvis dated 01/30/2023, 02/01/2024      ACCESSION NUMBER(S):  [id number]      ORDERING CLINICIAN:  MARIBEL MUNOZ      TECHNIQUE:  MRI LIVER; Multiplanar magnetic resonance images of the abdomen were  obtained including the following sequences; T2-weighted SSFSE with  and without fat saturation, T1-weighted GRE in/opposed phase, DWI,  fat saturated 3D-T1w GRE pre and dynamically post contrast.  18 ml of  Gadolinium contrast agent Dotarem were administered intravenously  without immediate complication.      FINDINGS:  LIVER:  The liver is normal in size measuring 17 cm in craniocaudal length.  There is a markedly  nodular contour to the liver with heterogeneity  of the hepatic parenchyma. The appearance is similar to the CT from  02/01/2024 and new from 01/30/2023 where the liver demonstrated a  normal contour. There is diffuse nodular appearing delayed  enhancement throughout the hepatic parenchyma with a few areas of  more confluence delayed enhancement, for example in hepatic segment 6  (series 20, image 38). There is no associated diffusion restriction.  No arterially enhancing lesions or lesions with washout are  visualized.      BILE DUCTS:  There are focally dilated peripheral intrahepatic biliary ducts in  segment 4 B measuring up to 4 mm in diameter. There is otherwise no  intrahepatic or extrahepatic biliary ductal dilatation.      GALLBLADDER:  Cholelithiasis. No gallbladder wall thickening or distention.      PANCREAS:  Normal signal intensity. Normal enhancement. No masses. The  pancreatic duct is normal.      SPLEEN:  The spleen is normal in size without evidence of focal lesions.      ADRENAL GLANDS:  Within normal limits.      KIDNEYS:  Within normal limits. Bilateral simple renal cysts measuring 0.7 cm.      LYMPH NODES:  A prominent portacaval lymph node measures up to 1.4 cm in short  axis. There is otherwise no lymphadenopathy.      ABDOMINAL VESSELS:  Stable mild aneurysmal dilatation of the infrarenal abdominal aortic  measuring up to 3.1 x 2.9 cm in diameter. Superior mesenteric vein,  splenic vein, and main, right and left portal vein are patent.  Hepatic veins are patent.  No significant collaterals or esophageal  varices are present.      BOWEL:  Note is made of a small diverticulum arising posteriorly from the  gastric cardia.      PERITONEUM/RETROPERITONEUM:  A small amount of perihepatic fluid is noted.      BONES AND LOWER THORAX:  There are innumerable subcentimeter enhancing lesions throughout the  visualized osseous structures including the spine and pelvis  compatible with osseous metastases.  In the partially imaged portion  of the left breast there is an enhancing nodular mass in the  subareolar left breast with associated nipple retraction (series 15,  image 15). A satellite enhancing mass is also seen in the lateral  left breast at posterior depth (Image 5). A few prominent right  cardiophrenic lymph nodes are present measuring up to 1 cm in short  axis (series 15, image 8). The visualized lower lung fields are  unremarkable. The heart is normal in size.      Impression: 1. Enhancing left breast masses concerning for a primary breast  malignancy. Correlation with dedicated breast imaging is recommended.  2. Diffuse enhancing osseous lesions compatible with osseous  metastases.  3. Markedly nodular contour to the liver with diffuse nodular delayed  enhancement. Differential includes an atypical appearance of diffuse  hepatic metastases versus hepatic fibrosis/cirrhosis. Correlation  with tissue sampling is recommended.  4. Prominent right cardiophrenic lymph nodes which could be reactive  or metastatic. Correlation with dedicated chest imaging is  recommended.  5. Small volume ascites.  6. Additional chronic and incidental findings as described above.      I personally reviewed the images/study and I agree with the findings  as stated by resident physician Dr. Steven Birmingham.      MACRO:  None          Dictation workstation:   [id number]     No results found for this or any previous visit from the past 1095 days.     No results found for this or any previous visit (from the past 4464 hour(s)).       Assessment and Plan     Cirrhosis: Paracentesis negative for SBP.  Does have low total protein in the ascites.  MRI inconclusive for cirrhosis versus diffuse malignant invasion.  NA noted to be elevated.  MELD 3.0: 22 at 2/7/2024  8:20 AM  MELD-Na: 18 at 2/7/2024  8:20 AM  Calculated from:  Serum Creatinine: 0.87 mg/dL (Using min of 1 mg/dL) at 2/7/2024  8:20 AM  Serum Sodium: 134 mmol/L at 2/7/2024  8:20  AM  Total Bilirubin: 7.9 mg/dL at 2/7/2024  8:20 AM  Serum Albumin: 2.2 g/dL at 2/7/2024  8:20 AM  INR(ratio): 1.2 at 2/7/2024  8:20 AM  Age at listing (hypothetical): 68 years  Sex: Female at 2/7/2024  8:20 AM     -Continue lactulose.  Titrate to 3-4 bowel movements per day.  -Continue PPI  -Strict I/o and every day wt   -Tend LFTs and Coags  -Low Na diet  -Follow-up Anti-smooth muscle ab   -Follow-up serum histoplasma antigen and/or histoplasma urine antigen  -Avoid NSAID  -Continue Lasix/Aldactone.  Monitor ins and outs and renal function  -Follow-up hepatology recommendations.     -Obtaining liver biopsy.    Breast changes: Exam and specific imaging both highly concerning for malignancy with possible bony involvement now.  -Obtaining biopsies of various breast lesions/lymphadenopathy.  Assistance greatly appreciated    Subacute avulsion fracture:  -PT/OT  -Pain control  -Outpatient follow-up    DVT prophylaxis on hold for biopsy    Ariel Carver MD     Of note the above was done with Dragon dictation system.  Note was proofread to minimize errors.

## 2024-02-07 NOTE — PRE-PROCEDURE NOTE
INTERVENTIONAL RADIOLOGY PRE-PROCEDURE NOTE    Meghan Waddell is a 68 y.o. female with PMHx of new breast mass and hepatic lesions who presents to the interventional radiology department for liver biopsy.    Procedure: ultrasound guided liver biopsy    Indication for procedure: The primary encounter diagnosis was Cirrhosis (CMS/HCC). A diagnosis of Other abnormal and inconclusive findings on diagnostic imaging of breast was also pertinent to this visit.    Past Medical History:   Diagnosis Date    Arthritis, rheumatoid (CMS/HCC)     Cellulitis of right lower limb     Cellulitis of right lower extremity    Personal history of other diseases of the nervous system and sense organs     History of Bell's palsy      Past Surgical History:   Procedure Laterality Date    JOINT REPLACEMENT Bilateral     Knees    OTHER SURGICAL HISTORY  02/10/2021     section       Relevant Labs:   Lab Results   Component Value Date    CREATININE 0.87 2024    EGFR 73 2024    INR 1.2 (H) 2024    PROTIME 13.1 (H) 2024       Planned Sedation/Anesthesia: Moderate    Directed physical examination:    Normal appearance, behavior, cognition and NAD      Current Facility-Administered Medications:     calcium carbonate-vitamin D3 500 mg-5 mcg (200 unit) per tablet 2 tablet, 2 tablet, oral, Daily, Vero Russell MD, 2 tablet at 24 0848    diclofenac sodium (Voltaren) 1 % gel 4 g, 4 g, Topical, 4x daily PRN, Vero Russell MD    [Held by provider] enoxaparin (Lovenox) syringe 40 mg, 40 mg, subcutaneous, q24h, Vero Russell MD, 40 mg at 24 2237    furosemide (Lasix) tablet 20 mg, 20 mg, oral, Daily, Dominick Hicks MD, 20 mg at 24 0849    lactulose 20 gram/30 mL oral solution 20 g, 20 g, oral, Daily, Dominick Hicks MD    pantoprazole (ProtoNix) EC tablet 40 mg, 40 mg, oral, Daily before breakfast, Connie Hebert MD, 40 mg at 24 0600    polyethylene glycol (Glycolax, Miralax)  packet 17 g, 17 g, oral, Daily, Vero Russell MD    spironolactone (Aldactone) tablet 50 mg, 50 mg, oral, Daily, Dominick Hicks MD, 50 mg at 02/06/24 1206     Mallampati: II (hard and soft palate, upper portion of tonsils anduvula visible)    ASA Score: ASA 3 - Patient with moderate systemic disease with functional limitations    Benefits, risks and alternatives of procedure and planned sedation have been discussed with the patient and/or their representative. All questions answered and they agree to proceed.     Lexie Bourgeois,  PGY3  Vascular & Interventional Radiology  IR pager: [phone]    NON-Urgent on call weekends and after hours weekdays (5pm - 5am) IR pager: [phone]  Urgent & emergent on call weekends and after hours weekdays (5pm-7am) IR pager: [phone]

## 2024-02-07 NOTE — CARE PLAN
The patient's goals for the shift include  safety and comfort  Problem: Pain  Goal: My pain/discomfort is manageable  Outcome: Progressing     Problem: Safety  Goal: Patient will be injury free during hospitalization  Outcome: Progressing  Goal: I will remain free of falls  Outcome: Progressing     Problem: Daily Care  Goal: Daily care needs are met  Outcome: Progressing     Problem: Psychosocial Needs  Goal: Demonstrates ability to cope with hospitalization/illness  Outcome: Progressing  Goal: Collaborate with me, my family, and caregiver to identify my specific goals  Outcome: Progressing  Flowsheets (Taken 2/7/2024 0100)  Cultural Requests During Hospitalization: n/a  Spiritual Requests During Hospitalization: n/a     Problem: Discharge Barriers  Goal: My discharge needs are met  Outcome: Progressing     Problem: Safety - Adult  Goal: Free from fall injury  Outcome: Progressing     Problem: Discharge Planning  Goal: Discharge to home or other facility with appropriate resources  Outcome: Progressing     Problem: Chronic Conditions and Co-morbidities  Goal: Patient's chronic conditions and co-morbidity symptoms are monitored and maintained or improved  Outcome: Progressing       The clinical goals for the shift include pt will remain HDS throughout shift

## 2024-02-07 NOTE — PROGRESS NOTES
Meghan Waddell is a 68 y.o. female on day 4 of admission presenting with Cirrhosis (CMS/HCC).    Subjective   No acute events overnight. Endorses generalized nausea, no vomiting. Patient aware of breast imaging findings. Planning for liver and breast biopsy today       Objective     Physical Exam  Constitutional:       Appearance: mildly jaundiced  HENT:      Mouth/Throat:      Mouth: Mucous membranes are moist.   Cardiovascular:      Rate and Rhythm: Normal rate and regular rhythm.      Heart sounds: No murmur heard.  Pulmonary:      Effort: Pulmonary effort is normal. No respiratory distress.      Breath sounds: Normal breath sounds.   Abdominal:      Comments: Abdomen soft, diffusely mildly tender to palpation  No asterixis   Skin:     Comments: L breast with crusted draining lesion without purulence,fullness of the L lateral breast, nipple inversion with black scabbing, no obvious axillary LAD   Neurological:      General: No focal deficit present.      Mental Status: She is alert. A&OX3    Last Recorded Vitals  Vitals:    02/07/24 0906   BP: 106/59   Pulse: 73   Resp: 17   Temp: 36.9 °C (98.4 °F)   SpO2: 95%     Intake/Output last 3 Shifts:  No intake/output data recorded.    Relevant Results  Scheduled medications  calcium carbonate-vitamin D3, 2 tablet, oral, Daily  [Held by provider] enoxaparin, 40 mg, subcutaneous, q24h  furosemide, 20 mg, oral, Daily  lactulose, 20 g, oral, Daily  pantoprazole, 40 mg, oral, Daily before breakfast  polyethylene glycol, 17 g, oral, Daily  spironolactone, 50 mg, oral, Daily      Continuous medications     PRN medications  PRN medications: diclofenac sodium     Results from last 7 days   Lab Units 02/07/24  0820   WBC AUTO x10*3/uL 7.1   HEMOGLOBIN g/dL 13.1   HEMATOCRIT % 36.7   PLATELETS AUTO x10*3/uL 158       Results from last 7 days   Lab Units 02/07/24  0820   SODIUM mmol/L 134*   POTASSIUM mmol/L 3.9   CHLORIDE mmol/L 104   CO2 mmol/L 22   BUN mg/dL 17   CREATININE  mg/dL 0.87   CALCIUM mg/dL 8.4*   PROTEIN TOTAL g/dL 6.3*   BILIRUBIN TOTAL mg/dL 7.9*   ALK PHOS U/L 253*   ALT U/L 47*   AST U/L 169*   GLUCOSE mg/dL 75       Results from last 7 days   Lab Units 02/07/24  0820   ALK PHOS U/L 253*   BILIRUBIN TOTAL mg/dL 7.9*   BILIRUBIN DIRECT mg/dL 4.7*   PROTEIN TOTAL g/dL 6.3*   ALT U/L 47*   AST U/L 169*       Results from last 7 days   Lab Units 02/07/24  0820   APTT seconds 29   INR  1.2*       BI mammo bilateral diagnostic tomosynthesis    Result Date: 2/6/2024  Interpreted By:  Tamy Whitley, STUDY: BI MAMMO BILATERAL DIAGNOSTIC TOMOSYNTHESIS; BI US BREAST LIMITED BILATERAL;  2/6/2024 3:20 pm; 2/6/2024 3:57 pm   ACCESSION NUMBER(S): [id number]; [id number]   ORDERING CLINICIAN: MARY BETANCUR   INDICATION: Patient states 1 year ago noticed scabbing and itching around the left nipple. Patient now has a crusted left breast nipple lesion with purulent drainage. Family history of breast cancer with her sister diagnosed. Family history of ovarian carcinoma with her grandmother diagnosed.   COMPARISON: Baseline study   FINDINGS: MAMMOGRAPHY: 2D and tomosynthesis images were reviewed at 1 mm slice thickness.   Density:  The breast tissue is heterogeneously dense, which may obscure small masses.   Right breast In the subareolar right breast there is a spiculated mass with associated calcifications. A circumscribed mass is seen in the upper-outer quadrant of the right breast at middle depth. Coarse heterogeneous grouped calcifications are seen in the upper-outer quadrant of the right breast at middle depth.   Left breast Skin thickening is noted of the left breast. There is skin retraction of the left nipple with an associated spiculated mass. Additional spiculated masses are noted in the subareolar location of the left breast. In the upper-outer quadrant of the left breast at posterior depth there is a spiculated mass measuring 2.1 cm. Fine pleomorphic calcifications are seen in a  regional distribution measuring 17 x 9 cm. An abnormal lymph node is noted in the left axilla.   ULTRASOUND: Right breast In the right breast at the 11 o'clock position 6 cm from the nipple there is an oval circumscribed anechoic mass measuring 2.2 x 1.3 x 2.3 cm. It demonstrates fluid elastography characteristics and it is a cyst. 2 additional small circumscribed anechoic masses are seen at the 10 o'clock position 1 cm from the nipple. These are consistent with cysts.   In the right breast at the 10 o'clock position 1 cm from the nipple there is a mixed echogenic mass with calcifications. This measures 1.6 x 0.8 cm. It is intermediate with elastography.   Targeted ultrasound of the right axilla demonstrates 5 unremarkable right axillary lymph nodes.   Left breast In the left breast at the 1 o'clock position 12 cm from the nipple there is an irregular hypoechoic mass measuring 1.7 x 2.1 x 1.9 cm. It is hard with elastography and there is internal vascularity. This corresponds to the spiculated mass in the upper-outer quadrant of the left breast.   In the left breast 6:00 o'clock subareolar location there is an irregular hypoechoic mass with calcifications associated with the nipple which is retracted. This measures 2.8 x 1.1 cm.   Targeted ultrasound of the left axilla was performed. There is a low lymph node at the 1 o'clock position 14 cm from the nipple measuring 0.5 x 0.4 x 0.6 cm.   The lymph node labeled 1 is unremarkable. The lymph node labeled 2 is irregular and measures 1 x 1 x 0.9 cm. The lymph nodes labeled 3 and 4 are within normal limits. The lymph node labeled 5 demonstrates a thickened cortex with possible calcifications. The cortex measures 0.32 cm.       1. Left breast spiculated masses and suspicious regionally distributed calcifications. The distribution of suspicious calcifications measures 17 cm. There is a spiculated 6:00 o'clock mass with nipple involvement and a spiculated mass in the  upper-outer quadrant at 1:00 o'clock. For extent, biopsies of the 6:00 o'clock subareolar mass and 1:00 o'clock left breast mass are recommended. 2. 3 indeterminate left axillary lymph nodes. An ultrasound-guided core biopsy of 1 of the lymph nodes is recommended. Additionally, there is a large lymph node seen mammographically without a sonographic correlate. 3. Right breast mass at the 10 o'clock position 1 cm from the nipple. An ultrasound-guided core biopsy is recommended. 4. Indeterminate right breast calcifications. A stereotactic biopsy is recommended.   BI-RADS CATEGORY:   BI-RADS CATEGORY:  5 Highly Suggestive of Malignancy. Recommendation:  Surgical Consultation and Biopsy. Recommended Date:  Immediate. Laterality:  Bilateral.   For any future breast imaging appointments, please call [phone]).     MACRO: None   Signed by: Tamy Whitley 2/6/2024 4:17 PM Dictation workstation:   [id number]    BI US breast limited bilateral    Result Date: 2/6/2024  Interpreted By:  Tamy Whitley, STUDY: BI MAMMO BILATERAL DIAGNOSTIC TOMOSYNTHESIS; BI US BREAST LIMITED BILATERAL;  2/6/2024 3:20 pm; 2/6/2024 3:57 pm   ACCESSION NUMBER(S): [id number]; [id number]   ORDERING CLINICIAN: MARY BETANCUR   INDICATION: Patient states 1 year ago noticed scabbing and itching around the left nipple. Patient now has a crusted left breast nipple lesion with purulent drainage. Family history of breast cancer with her sister diagnosed. Family history of ovarian carcinoma with her grandmother diagnosed.   COMPARISON: Baseline study   FINDINGS: MAMMOGRAPHY: 2D and tomosynthesis images were reviewed at 1 mm slice thickness.   Density:  The breast tissue is heterogeneously dense, which may obscure small masses.   Right breast In the subareolar right breast there is a spiculated mass with associated calcifications. A circumscribed mass is seen in the upper-outer quadrant of the right breast at middle depth. Coarse heterogeneous  grouped calcifications are seen in the upper-outer quadrant of the right breast at middle depth.   Left breast Skin thickening is noted of the left breast. There is skin retraction of the left nipple with an associated spiculated mass. Additional spiculated masses are noted in the subareolar location of the left breast. In the upper-outer quadrant of the left breast at posterior depth there is a spiculated mass measuring 2.1 cm. Fine pleomorphic calcifications are seen in a regional distribution measuring 17 x 9 cm. An abnormal lymph node is noted in the left axilla.   ULTRASOUND: Right breast In the right breast at the 11 o'clock position 6 cm from the nipple there is an oval circumscribed anechoic mass measuring 2.2 x 1.3 x 2.3 cm. It demonstrates fluid elastography characteristics and it is a cyst. 2 additional small circumscribed anechoic masses are seen at the 10 o'clock position 1 cm from the nipple. These are consistent with cysts.   In the right breast at the 10 o'clock position 1 cm from the nipple there is a mixed echogenic mass with calcifications. This measures 1.6 x 0.8 cm. It is intermediate with elastography.   Targeted ultrasound of the right axilla demonstrates 5 unremarkable right axillary lymph nodes.   Left breast In the left breast at the 1 o'clock position 12 cm from the nipple there is an irregular hypoechoic mass measuring 1.7 x 2.1 x 1.9 cm. It is hard with elastography and there is internal vascularity. This corresponds to the spiculated mass in the upper-outer quadrant of the left breast.   In the left breast 6:00 o'clock subareolar location there is an irregular hypoechoic mass with calcifications associated with the nipple which is retracted. This measures 2.8 x 1.1 cm.   Targeted ultrasound of the left axilla was performed. There is a low lymph node at the 1 o'clock position 14 cm from the nipple measuring 0.5 x 0.4 x 0.6 cm.   The lymph node labeled 1 is unremarkable. The lymph node  labeled 2 is irregular and measures 1 x 1 x 0.9 cm. The lymph nodes labeled 3 and 4 are within normal limits. The lymph node labeled 5 demonstrates a thickened cortex with possible calcifications. The cortex measures 0.32 cm.       1. Left breast spiculated masses and suspicious regionally distributed calcifications. The distribution of suspicious calcifications measures 17 cm. There is a spiculated 6:00 o'clock mass with nipple involvement and a spiculated mass in the upper-outer quadrant at 1:00 o'clock. For extent, biopsies of the 6:00 o'clock subareolar mass and 1:00 o'clock left breast mass are recommended. 2. 3 indeterminate left axillary lymph nodes. An ultrasound-guided core biopsy of 1 of the lymph nodes is recommended. Additionally, there is a large lymph node seen mammographically without a sonographic correlate. 3. Right breast mass at the 10 o'clock position 1 cm from the nipple. An ultrasound-guided core biopsy is recommended. 4. Indeterminate right breast calcifications. A stereotactic biopsy is recommended.   BI-RADS CATEGORY:   BI-RADS CATEGORY:  5 Highly Suggestive of Malignancy. Recommendation:  Surgical Consultation and Biopsy. Recommended Date:  Immediate. Laterality:  Bilateral.   For any future breast imaging appointments, please call [phone]).     MACRO: None   Signed by: Tamy Whitley 2/6/2024 4:17 PM Dictation workstation:   [id number]    MRCP pancreas w and wo IV contrast    Result Date: 2/6/2024  Interpreted By:  Khari Birmingham, STUDY: MRCP PANCREAS W AND WO IV CONTRAST;  2/5/2024 10:00 pm   INDICATION: Signs/Symptoms:new cirrhosis.   COMPARISON: CT abdomen pelvis dated 01/30/2023, 02/01/2024   ACCESSION NUMBER(S): [id number]   ORDERING CLINICIAN: MARIBEL MUNOZ   TECHNIQUE: MRI LIVER; Multiplanar magnetic resonance images of the abdomen were obtained including the following sequences; T2-weighted SSFSE with and without fat saturation, T1-weighted GRE in/opposed phase,  DWI, fat saturated 3D-T1w GRE pre and dynamically post contrast.  18 ml of Gadolinium contrast agent Dotarem were administered intravenously without immediate complication.   FINDINGS: LIVER: The liver is normal in size measuring 17 cm in craniocaudal length. There is a markedly nodular contour to the liver with heterogeneity of the hepatic parenchyma. The appearance is similar to the CT from 02/01/2024 and new from 01/30/2023 where the liver demonstrated a normal contour. There is diffuse nodular appearing delayed enhancement throughout the hepatic parenchyma with a few areas of more confluence delayed enhancement, for example in hepatic segment 6 (series 20, image 38). There is no associated diffusion restriction. No arterially enhancing lesions or lesions with washout are visualized.   BILE DUCTS: There are focally dilated peripheral intrahepatic biliary ducts in segment 4 B measuring up to 4 mm in diameter. There is otherwise no intrahepatic or extrahepatic biliary ductal dilatation.   GALLBLADDER: Cholelithiasis. No gallbladder wall thickening or distention.   PANCREAS: Normal signal intensity. Normal enhancement. No masses. The pancreatic duct is normal.   SPLEEN: The spleen is normal in size without evidence of focal lesions.   ADRENAL GLANDS: Within normal limits.   KIDNEYS: Within normal limits. Bilateral simple renal cysts measuring 0.7 cm.   LYMPH NODES: A prominent portacaval lymph node measures up to 1.4 cm in short axis. There is otherwise no lymphadenopathy.   ABDOMINAL VESSELS: Stable mild aneurysmal dilatation of the infrarenal abdominal aortic measuring up to 3.1 x 2.9 cm in diameter. Superior mesenteric vein, splenic vein, and main, right and left portal vein are patent. Hepatic veins are patent.  No significant collaterals or esophageal varices are present.   BOWEL: Note is made of a small diverticulum arising posteriorly from the gastric cardia.   PERITONEUM/RETROPERITONEUM: A small amount of  perihepatic fluid is noted.   BONES AND LOWER THORAX: There are innumerable subcentimeter enhancing lesions throughout the visualized osseous structures including the spine and pelvis compatible with osseous metastases. In the partially imaged portion of the left breast there is an enhancing nodular mass in the subareolar left breast with associated nipple retraction (series 15, image 15). A satellite enhancing mass is also seen in the lateral left breast at posterior depth (Image 5). A few prominent right cardiophrenic lymph nodes are present measuring up to 1 cm in short axis (series 15, image 8). The visualized lower lung fields are unremarkable. The heart is normal in size.       1. Enhancing left breast masses concerning for a primary breast malignancy. Correlation with dedicated breast imaging is recommended. 2. Diffuse enhancing osseous lesions compatible with osseous metastases. 3. Markedly nodular contour to the liver with diffuse nodular delayed enhancement. Differential includes an atypical appearance of diffuse hepatic metastases versus hepatic fibrosis/cirrhosis. Correlation with tissue sampling is recommended. 4. Prominent right cardiophrenic lymph nodes which could be reactive or metastatic. Correlation with dedicated chest imaging is recommended. 5. Small volume ascites. 6. Additional chronic and incidental findings as described above.   I personally reviewed the images/study and I agree with the findings as stated by resident physician Dr. Steven Birmingham.   MACRO: None     Dictation workstation:   [id number]    US guided abdominal paracentesis    Result Date: 2/5/2024  Interpreted By:  Lashanda Tian, STUDY: US GUIDED ABDOMINAL PARACENTESIS; 2/5/20244:10 pm   INDICATION: Signs/Symptoms:diagnostic.   COMPARISON: None.   ACCESSION NUMBER(S): [id number]   ORDERING CLINICIAN: LASHANDA TIAN   TECHNIQUE: INTERVENTIONALIST(S): Lashanda Tian   CONSENT: The patient/patient's POA/next of kin was  informed of the nature of the proposed procedure. The purposes, alternatives, risks, and benefits were explained and discussed. All questions were answered and consent was obtained.   SEDATION: None   MEDICATION/CONTRAST:     TIME OUT: A time out was performed immediately prior to procedure start with the interventional team, correctly identifying the patient name, date of birth, MRN, procedure, anatomy (including marking of site and side), patient position, procedure consent form, relevant laboratory and imaging test results, antibiotic administration, safety precautions, and procedure-specific equipment needs.   FINDINGS: The patient was placed in the supine position.   The abdominal space was examined with grey scale ultrasound, and the most accessible fluid identified and marked for paracentesis.   The skin was prepped and draped in usual manner. Local anesthesia with Lidocaine was administered and a right-sided paracentesis was performed.  A 5 Welsh One-Step paracentesis needle/catheter was then placed where marked.  Approximately 1750 mL of yellowish colored fluid was removed.  The needle/catheter was then withdrawn.   The patient tolerated the procedure well and there were no immediate complications. Specimen(s) sent to the laboratory and pathology for further evaluation, per the requesting team.       Uneventful paracentesis, as detailed above. Right Hemiabdomen, 1750 mL   I personally performed and/or directly supervised this study and was present for the entire procedure.   Performed and dictated at [facility].   Signed by: Colten Willams 2/5/2024 4:10 PM Dictation workstation:   [id number]      Assessment/Plan   Ms. Waddell is 69 yo F history of Seropositive RA (on infliximab), osteoarthritis, nephrolithiasis, obesity, DLD, PACs, history of tobacco use (quit march 2023), who presented to [place] ED on 2/1 for dark red urine, generalized abdominal pain with nausea  transferred to  main [place] for further workup of her new cirrhosis and jaundice. Patient also reports 1 episode of melena last week, otherwise had yellow colored stools. Regarding Jaundice, she has generalized abdominal pain, which she states originated in the RLQ. She does have several calcified gallstones on her CT making biliary colic/choledocolithiasis a distinct possibility (no signs of infection), however regarding her new cirrhosis diagnosis - this seems new in the last year as 1/2023 CT scan of her abdomen showed a normal contour liver so this would have had to have progressed in the last year: no alcohol, negative viral serologies, she seems like a setup for an autoimmune hepatitis with her history of RA, less likely a hepatic toxicity from her infliximab, patient does report she previously took methotrexate for RA mgmt. Otherwise she is immunocompromised so opportunistic infection remains on the differential but this also seems less likely. RUQ revealed Liver cirrhosis with diffusely heterogenous hepatic parenchyma, no definite focal lesion identified, although neoplasm can not be excluded. Perihepatic ascites, slow portal venous flow and recanalized paraumbilical vein, likely sequela of portal hypertension. Cholelithiasis without sonographic evidence of acute cholecystitis. Pending further workup with liver biopsy, suspect underlying metastatic disease.    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  -outpatient mammogram   -outpatient derm biopsy     #subacute avulsion fracture lesser trochanger R proximal femur  - chronic  - follow up with her orthopedic surgeon outpatient     #compression deformity at T12  -vitamin D and calcium     #abdominal aorta atherosclerosis on CT  #smoking history   - follow up with PCP outpatient to discuss potential statin/asa     F: none  E: PRN   N: regular diet  GI: none  P: lovenox  A: PIV  C: full code confirmed on admission   Surrogate:   Cierra her daughter [phone] (first option)  Emmanuel her son [phone]    Marcell Ramírez MD

## 2024-02-07 NOTE — POST-PROCEDURE NOTE
Interventional Radiology Post-Procedure Note    2200 mL of clear yellow ascitic fluid was removed using a Yueh needle inserted in the RUQ.     Subsequently, three total passes were made into the Right lobe of the liver using an 18 gauge automated cutting needle Bard under ultrasound guidance. Scanning after the procedure demonstrated no evidence of bleeding. Specimen was sent to pathology for further analysis.    Please refer to full radiology report in PACS for full procedure details.       Indication for procedure: The primary encounter diagnosis was Cirrhosis (CMS/HCC). A diagnosis of Other abnormal and inconclusive findings on diagnostic imaging of breast was also pertinent to this visit.    Pre-Procedure Verification and Time Out:  · Procedure Location procedure area   · HUDDLE - Pre-procedure Verification completed   · TIME OUT - Final Verification completed immediately prior to procedure start   · DEBRIEF completed     General Information:  Date/Time of Procedure: 02/07/24 at 4:51 PM  Indication(s)/Pre - Procedure Diagnoses: liver lesions on MRI  Post-Procedure Diagnosis: liver lesions and ascites  Procedure Name: ultrasound guided paracentesis and liver biopsy  Findings: See PACS  Procedure performed by: Raheel Dunn MD  Assistant(s):  Lexie Bourgeois DO  Estimated Blood Loss (mL): minimal  Specimen: Yes, 3 core biopsies sent to pathology  Informed Consent: written consent obtained    Procedure Details:    Technically successful and uncomplicated paracentesis and liver biopsy.  Please see PACS for full procedural details.    Patient Tolerance: good  Complications: None    Prep:  Ultrasound Guided Insertion: Yes  Hand Hygiene, Surgical Cap, Surgical Mask, Sterile Gloves, and Scrubs  Patient Position: Supine  Site Prep: chlorhexidine, draped, usual sterile procedure followed    Anesthesia/Medications:  Procedural Sedation:  Medications  As of 02/07/24 1651      enoxaparin (Lovenox) syringe 40 mg (mg) Total  dose:  120 mg* Dosing weight:  93.4   *Administration not included in total     Date/Time Rate/Dose/Volume Action       02/03/24  2312 40 mg Given     02/04/24 2130 40 mg Given     02/05/24 2237 40 mg Given     02/06/24 2130 *40 mg Missed      2237 *Not included in total Held by provider               polyethylene glycol (Glycolax, Miralax) packet 17 g (g) Total dose:  0 g* Dosing weight:  93.4   *Administration not included in total     Date/Time Rate/Dose/Volume Action       02/04/24  0900 *17 g Missed     02/05/24 0900 *17 g Missed     02/06/24 0900 *17 g Missed     02/07/24 0900 *17 g Missed               lactulose 20 gram/30 mL oral solution 10 g (g) Total dose:  20 g* Dosing weight:  93.4   *Administration not included in total     Date/Time Rate/Dose/Volume Action       02/03/24 2312 10 g Given     02/04/24 0922 10 g Given      1500 *10 g Missed               lactulose 20 gram/30 mL oral solution 20 g (g) Total dose:  40 g* Dosing weight:  90.7   *Administration not included in total     Date/Time Rate/Dose/Volume Action       02/04/24  2020 *20 g Missed     02/05/24 0821 20 g Given      1500 *20 g Missed      2100 *20 g Missed     02/06/24  0916 20 g Given               lactulose 20 gram/30 mL oral solution 20 g (g) Total dose:  0 g* Dosing weight:  90.7   *Administration not included in total     Date/Time Rate/Dose/Volume Action       02/07/24 0900 *20 g Missed               calcium carbonate-vitamin D3 500 mg-5 mcg (200 unit) per tablet 2 tablet (tablet) Total dose:  8 tablet Dosing weight:  93.4      Date/Time Rate/Dose/Volume Action       02/04/24  0922 2 tablet Given     02/05/24 0821 2 tablet Given     02/06/24  1049 2 tablet Given     02/07/24  0848 2 tablet Given               pantoprazole (ProtoNix) EC tablet 40 mg (mg) Total dose:  120 mg Dosing weight:  90.7      Date/Time Rate/Dose/Volume Action       02/05/24  0606 40 mg Given     02/06/24  0633 40 mg Given     02/07/24 0600 40 mg  Given               cefTRIAXone (Rocephin) IVPB 1 g (mL/hr) Total volume:  Not documented* Dosing weight:  90.7   *Total volume has not been documented. View each administration to see the amount administered.     Date/Time Rate/Dose/Volume Action       02/04/24  1637 1 g - 100 mL/hr (over 30 min) New Bag      1707  (over 30 min) Stopped     02/05/24  1544 1 g - 100 mL/hr (over 30 min) New Bag      1614  (over 30 min) Stopped               gadoterate meglumine (Dotarem) 0.5 mmol/mL contrast injection 18 mL (mL) Total volume:  18 mL Dosing weight:  90.7      Date/Time Rate/Dose/Volume Action       02/05/24  2201 18 mL Given               furosemide (Lasix) tablet 20 mg (mg) Total dose:  40 mg Dosing weight:  90.7      Date/Time Rate/Dose/Volume Action       02/06/24  1206 20 mg Given     02/07/24  0849 20 mg Given               spironolactone (Aldactone) tablet 50 mg (mg) Total dose:  50 mg* Dosing weight:  90.7   *Administration not included in total     Date/Time Rate/Dose/Volume Action       02/06/24  1206 50 mg Given     02/07/24  0849 *50 mg Missed               lidocaine with 8.4% sod bicarb (Buffered Xylocaine) 0.9 % (10 mL) injection (mL) Total volume:  42 mL      Date/Time Rate/Dose/Volume Action       02/07/24  1134 25 mL Given      1247 17 mL Given                   1% Lidocaine: 14 mL    Attending Attestation:  I was present for the entire procedure    Lexie Bourgeois, DO PGY3  Interventional Radiology  IR pager: [phone]    NON-Urgent on call weekends and after hours weekdays (5pm - 5am) IR pager: [phone]  Urgent & emergent on call weekends and after hours weekdays (5pm-7am) IR pager: [phone]

## 2024-02-08 NOTE — CARE PLAN
The patient's goals for the shift include      The clinical goals for the shift include Patient will remain HDS throughout during this shift

## 2024-02-08 NOTE — PROGRESS NOTES
"Physical Therapy    Physical Therapy Treatment    Patient Name: Meghan Waddell  MRN: [id number]  Today's Date: 2/8/2024  Time Calculation  Start Time: 1606  Stop Time: 1626  Time Calculation (min): 20 min       Assessment/Plan      PT Plan  Inpatient/Swing Bed or Outpatient: Inpatient  PT Plan  Treatment/Interventions: Bed mobility, Transfer training, Gait training, Stair training, Balance training, Strengthening, Endurance training, Therapeutic exercise, Therapeutic activity  PT Plan: Skilled PT  PT Frequency: 3 times per week  PT Discharge Recommendations:  (Pt.'s daughter reports pt. is going home when medically ready however recommendation is mod intensity)  PT Recommended Transfer Status: Contact guard  PT - OK to Discharge: Yes      General Visit Information:   PT  Visit  PT Received On: 02/08/24  General  Reason for Referral: Cirrhosis of the liver  Past Medical History Relevant to Rehab: Seropositive RA (on infliximab), osteoarthritis, nephrolithiasis, obesity, DLD, PACs, history of tobacco use (quit march 2023); fracture of R lesser trochanter  Prior to Session Communication: Bedside nurse  General Comment: RN cleared for therapy. RN reports that pt. had a shower today. Sister , Daughter and friend present. (Increased time attempting to encourage /educate pt. regarding PT and the role we play. Pt. stating \"I already do the exercises\" \" I walk\" and \"i have a hip fx\" Pt. stated that she didnt want to walk therefore focused on le strengthening supine in bed.)    Subjective   Precautions:  Precautions  LE Weight Bearing Status: Weight Bearing as Tolerated  Medical Precautions: Fall precautions  Vital Signs:       Objective   Pain:     Cognition:     Postural Control:     Extremity/Trunk Assessments:                      Activity Tolerance:     Treatments:  Therapeutic Exercise  Therapeutic Exercise Performed: Yes  Therapeutic Exercise Activity 1: Supine bilat le ex AP'S, QS, SAQ'S, HS, AND ABD X 15 " REPS.    Outcome Measures:  [facility] Basic Mobility  Turning from your back to your side while in a flat bed without using bedrails: A little  Moving from lying on your back to sitting on the side of a flat bed without using bedrails: A little  Moving to and from bed to chair (including a wheelchair): A lot  Standing up from a chair using your arms (e.g. wheelchair or bedside chair): A lot  To walk in hospital room: A lot  Climbing 3-5 steps with railing: Total  Basic Mobility - Total Score: 13    Education Documentation  No documentation found.  Education Comments  No comments found.        OP EDUCATION:       Encounter Problems       Encounter Problems (Active)       Mobility       Pt will ambulate >/=200 ft with supervision and use of LRAD       Start:  02/06/24    Expected End:  02/20/24            Pt will safely navigate >/=14 steps with use of 1 hand rail and supervision       Start:  02/06/24    Expected End:  02/20/24            Pt will complete all bed mobility independently with use of bed rails for support as needed        Start:  02/06/24    Expected End:  02/20/24               Transfers       Pt will perform STS independently with use of LRAD        Start:  02/06/24    Expected End:  02/20/24            Pt will demonstrate appropriate use of LRAD when complete all transfers with no cues        Start:  02/06/24    Expected End:  02/20/24

## 2024-02-08 NOTE — PROGRESS NOTES
Meghan Waddell is a 68 y.o. female on day 5 of admission presenting with Cirrhosis (CMS/HCC).    Subjective   No acute events overnight. Endorses generalized nausea, no vomiting. Patient eagerly awaiting biopsy results.       Objective     Physical Exam  Constitutional:       Appearance: mildly jaundiced  HENT:      Mouth/Throat:      Mouth: Mucous membranes are moist.   Cardiovascular:      Rate and Rhythm: Normal rate and regular rhythm.      Heart sounds: No murmur heard.  Pulmonary:      Effort: Pulmonary effort is normal. No respiratory distress.      Breath sounds: Normal breath sounds.   Abdominal:      Comments: Abdomen soft, diffusely mildly tender to palpation  No asterixis   Skin:     Comments: L breast with crusted draining lesion without purulence,fullness of the L lateral breast, nipple inversion with black scabbing, no obvious axillary LAD   Neurological:      General: No focal deficit present.      Mental Status: She is alert. A&OX3    Last Recorded Vitals  Vitals:    02/08/24 1145   BP: 124/72   Pulse: 82   Resp: 18   Temp: 36.6 °C (97.9 °F)   SpO2: 95%     Intake/Output last 3 Shifts:  No intake/output data recorded.    Relevant Results  Scheduled medications  calcium carbonate-vitamin D3, 2 tablet, oral, Daily  enoxaparin, 40 mg, subcutaneous, q24h  furosemide, 20 mg, oral, Daily  lactulose, 20 g, oral, Daily  pantoprazole, 40 mg, oral, Daily before breakfast  polyethylene glycol, 17 g, oral, Daily  spironolactone, 50 mg, oral, Daily      Continuous medications     PRN medications  PRN medications: diclofenac sodium     Results from last 7 days   Lab Units 02/08/24  0534   WBC AUTO x10*3/uL 9.1   HEMOGLOBIN g/dL 12.6   HEMATOCRIT % 33.4*   PLATELETS AUTO x10*3/uL 178       Results from last 7 days   Lab Units 02/08/24  0534   SODIUM mmol/L 135*   POTASSIUM mmol/L 3.9   CHLORIDE mmol/L 103   CO2 mmol/L 23   BUN mg/dL 22   CREATININE mg/dL 0.98   CALCIUM mg/dL 8.4*   PROTEIN TOTAL g/dL 6.3*    BILIRUBIN TOTAL mg/dL 8.9*   ALK PHOS U/L 265*   ALT U/L 55*   AST U/L 194*   GLUCOSE mg/dL 75       Results from last 7 days   Lab Units 02/08/24  0534   ALK PHOS U/L 265*   BILIRUBIN TOTAL mg/dL 8.9*   BILIRUBIN DIRECT mg/dL 5.5*   PROTEIN TOTAL g/dL 6.3*   ALT U/L 55*   AST U/L 194*       Results from last 7 days   Lab Units 02/08/24  0534   APTT seconds 28   INR  1.2*       BI mammo bilateral diagnostic tomosynthesis    Result Date: 2/6/2024  Interpreted By:  Tamy Whitley, STUDY: BI MAMMO BILATERAL DIAGNOSTIC TOMOSYNTHESIS; BI US BREAST LIMITED BILATERAL;  2/6/2024 3:20 pm; 2/6/2024 3:57 pm   ACCESSION NUMBER(S): [id number]; [id number]   ORDERING CLINICIAN: MARY BETANCUR   INDICATION: Patient states 1 year ago noticed scabbing and itching around the left nipple. Patient now has a crusted left breast nipple lesion with purulent drainage. Family history of breast cancer with her sister diagnosed. Family history of ovarian carcinoma with her grandmother diagnosed.   COMPARISON: Baseline study   FINDINGS: MAMMOGRAPHY: 2D and tomosynthesis images were reviewed at 1 mm slice thickness.   Density:  The breast tissue is heterogeneously dense, which may obscure small masses.   Right breast In the subareolar right breast there is a spiculated mass with associated calcifications. A circumscribed mass is seen in the upper-outer quadrant of the right breast at middle depth. Coarse heterogeneous grouped calcifications are seen in the upper-outer quadrant of the right breast at middle depth.   Left breast Skin thickening is noted of the left breast. There is skin retraction of the left nipple with an associated spiculated mass. Additional spiculated masses are noted in the subareolar location of the left breast. In the upper-outer quadrant of the left breast at posterior depth there is a spiculated mass measuring 2.1 cm. Fine pleomorphic calcifications are seen in a regional distribution measuring 17 x 9 cm. An abnormal  lymph node is noted in the left axilla.   ULTRASOUND: Right breast In the right breast at the 11 o'clock position 6 cm from the nipple there is an oval circumscribed anechoic mass measuring 2.2 x 1.3 x 2.3 cm. It demonstrates fluid elastography characteristics and it is a cyst. 2 additional small circumscribed anechoic masses are seen at the 10 o'clock position 1 cm from the nipple. These are consistent with cysts.   In the right breast at the 10 o'clock position 1 cm from the nipple there is a mixed echogenic mass with calcifications. This measures 1.6 x 0.8 cm. It is intermediate with elastography.   Targeted ultrasound of the right axilla demonstrates 5 unremarkable right axillary lymph nodes.   Left breast In the left breast at the 1 o'clock position 12 cm from the nipple there is an irregular hypoechoic mass measuring 1.7 x 2.1 x 1.9 cm. It is hard with elastography and there is internal vascularity. This corresponds to the spiculated mass in the upper-outer quadrant of the left breast.   In the left breast 6:00 o'clock subareolar location there is an irregular hypoechoic mass with calcifications associated with the nipple which is retracted. This measures 2.8 x 1.1 cm.   Targeted ultrasound of the left axilla was performed. There is a low lymph node at the 1 o'clock position 14 cm from the nipple measuring 0.5 x 0.4 x 0.6 cm.   The lymph node labeled 1 is unremarkable. The lymph node labeled 2 is irregular and measures 1 x 1 x 0.9 cm. The lymph nodes labeled 3 and 4 are within normal limits. The lymph node labeled 5 demonstrates a thickened cortex with possible calcifications. The cortex measures 0.32 cm.       1. Left breast spiculated masses and suspicious regionally distributed calcifications. The distribution of suspicious calcifications measures 17 cm. There is a spiculated 6:00 o'clock mass with nipple involvement and a spiculated mass in the upper-outer quadrant at 1:00 o'clock. For extent, biopsies of  the 6:00 o'clock subareolar mass and 1:00 o'clock left breast mass are recommended. 2. 3 indeterminate left axillary lymph nodes. An ultrasound-guided core biopsy of 1 of the lymph nodes is recommended. Additionally, there is a large lymph node seen mammographically without a sonographic correlate. 3. Right breast mass at the 10 o'clock position 1 cm from the nipple. An ultrasound-guided core biopsy is recommended. 4. Indeterminate right breast calcifications. A stereotactic biopsy is recommended.   BI-RADS CATEGORY:   BI-RADS CATEGORY:  5 Highly Suggestive of Malignancy. Recommendation:  Surgical Consultation and Biopsy. Recommended Date:  Immediate. Laterality:  Bilateral.   For any future breast imaging appointments, please call [phone]).     MACRO: None   Signed by: Tamy Whitley 2/6/2024 4:17 PM Dictation workstation:   [id number]    BI US breast limited bilateral    Result Date: 2/6/2024  Interpreted By:  Tamy Whitley, STUDY: BI MAMMO BILATERAL DIAGNOSTIC TOMOSYNTHESIS; BI US BREAST LIMITED BILATERAL;  2/6/2024 3:20 pm; 2/6/2024 3:57 pm   ACCESSION NUMBER(S): [id number]; [id number]   ORDERING CLINICIAN: MARY BETANCUR   INDICATION: Patient states 1 year ago noticed scabbing and itching around the left nipple. Patient now has a crusted left breast nipple lesion with purulent drainage. Family history of breast cancer with her sister diagnosed. Family history of ovarian carcinoma with her grandmother diagnosed.   COMPARISON: Baseline study   FINDINGS: MAMMOGRAPHY: 2D and tomosynthesis images were reviewed at 1 mm slice thickness.   Density:  The breast tissue is heterogeneously dense, which may obscure small masses.   Right breast In the subareolar right breast there is a spiculated mass with associated calcifications. A circumscribed mass is seen in the upper-outer quadrant of the right breast at middle depth. Coarse heterogeneous grouped calcifications are seen in the upper-outer quadrant of the  right breast at middle depth.   Left breast Skin thickening is noted of the left breast. There is skin retraction of the left nipple with an associated spiculated mass. Additional spiculated masses are noted in the subareolar location of the left breast. In the upper-outer quadrant of the left breast at posterior depth there is a spiculated mass measuring 2.1 cm. Fine pleomorphic calcifications are seen in a regional distribution measuring 17 x 9 cm. An abnormal lymph node is noted in the left axilla.   ULTRASOUND: Right breast In the right breast at the 11 o'clock position 6 cm from the nipple there is an oval circumscribed anechoic mass measuring 2.2 x 1.3 x 2.3 cm. It demonstrates fluid elastography characteristics and it is a cyst. 2 additional small circumscribed anechoic masses are seen at the 10 o'clock position 1 cm from the nipple. These are consistent with cysts.   In the right breast at the 10 o'clock position 1 cm from the nipple there is a mixed echogenic mass with calcifications. This measures 1.6 x 0.8 cm. It is intermediate with elastography.   Targeted ultrasound of the right axilla demonstrates 5 unremarkable right axillary lymph nodes.   Left breast In the left breast at the 1 o'clock position 12 cm from the nipple there is an irregular hypoechoic mass measuring 1.7 x 2.1 x 1.9 cm. It is hard with elastography and there is internal vascularity. This corresponds to the spiculated mass in the upper-outer quadrant of the left breast.   In the left breast 6:00 o'clock subareolar location there is an irregular hypoechoic mass with calcifications associated with the nipple which is retracted. This measures 2.8 x 1.1 cm.   Targeted ultrasound of the left axilla was performed. There is a low lymph node at the 1 o'clock position 14 cm from the nipple measuring 0.5 x 0.4 x 0.6 cm.   The lymph node labeled 1 is unremarkable. The lymph node labeled 2 is irregular and measures 1 x 1 x 0.9 cm. The lymph nodes  labeled 3 and 4 are within normal limits. The lymph node labeled 5 demonstrates a thickened cortex with possible calcifications. The cortex measures 0.32 cm.       1. Left breast spiculated masses and suspicious regionally distributed calcifications. The distribution of suspicious calcifications measures 17 cm. There is a spiculated 6:00 o'clock mass with nipple involvement and a spiculated mass in the upper-outer quadrant at 1:00 o'clock. For extent, biopsies of the 6:00 o'clock subareolar mass and 1:00 o'clock left breast mass are recommended. 2. 3 indeterminate left axillary lymph nodes. An ultrasound-guided core biopsy of 1 of the lymph nodes is recommended. Additionally, there is a large lymph node seen mammographically without a sonographic correlate. 3. Right breast mass at the 10 o'clock position 1 cm from the nipple. An ultrasound-guided core biopsy is recommended. 4. Indeterminate right breast calcifications. A stereotactic biopsy is recommended.   BI-RADS CATEGORY:   BI-RADS CATEGORY:  5 Highly Suggestive of Malignancy. Recommendation:  Surgical Consultation and Biopsy. Recommended Date:  Immediate. Laterality:  Bilateral.   For any future breast imaging appointments, please call [phone]).     MACRO: None   Signed by: Tamy Whitley 2/6/2024 4:17 PM Dictation workstation:   [id number]    MRCP pancreas w and wo IV contrast    Result Date: 2/6/2024  Interpreted By:  Khari Birmingham, STUDY: MRCP PANCREAS W AND WO IV CONTRAST;  2/5/2024 10:00 pm   INDICATION: Signs/Symptoms:new cirrhosis.   COMPARISON: CT abdomen pelvis dated 01/30/2023, 02/01/2024   ACCESSION NUMBER(S): [id number]   ORDERING CLINICIAN: MARIBEL MUNOZ   TECHNIQUE: MRI LIVER; Multiplanar magnetic resonance images of the abdomen were obtained including the following sequences; T2-weighted SSFSE with and without fat saturation, T1-weighted GRE in/opposed phase, DWI, fat saturated 3D-T1w GRE pre and dynamically post contrast.  18  ml of Gadolinium contrast agent Dotarem were administered intravenously without immediate complication.   FINDINGS: LIVER: The liver is normal in size measuring 17 cm in craniocaudal length. There is a markedly nodular contour to the liver with heterogeneity of the hepatic parenchyma. The appearance is similar to the CT from 02/01/2024 and new from 01/30/2023 where the liver demonstrated a normal contour. There is diffuse nodular appearing delayed enhancement throughout the hepatic parenchyma with a few areas of more confluence delayed enhancement, for example in hepatic segment 6 (series 20, image 38). There is no associated diffusion restriction. No arterially enhancing lesions or lesions with washout are visualized.   BILE DUCTS: There are focally dilated peripheral intrahepatic biliary ducts in segment 4 B measuring up to 4 mm in diameter. There is otherwise no intrahepatic or extrahepatic biliary ductal dilatation.   GALLBLADDER: Cholelithiasis. No gallbladder wall thickening or distention.   PANCREAS: Normal signal intensity. Normal enhancement. No masses. The pancreatic duct is normal.   SPLEEN: The spleen is normal in size without evidence of focal lesions.   ADRENAL GLANDS: Within normal limits.   KIDNEYS: Within normal limits. Bilateral simple renal cysts measuring 0.7 cm.   LYMPH NODES: A prominent portacaval lymph node measures up to 1.4 cm in short axis. There is otherwise no lymphadenopathy.   ABDOMINAL VESSELS: Stable mild aneurysmal dilatation of the infrarenal abdominal aortic measuring up to 3.1 x 2.9 cm in diameter. Superior mesenteric vein, splenic vein, and main, right and left portal vein are patent. Hepatic veins are patent.  No significant collaterals or esophageal varices are present.   BOWEL: Note is made of a small diverticulum arising posteriorly from the gastric cardia.   PERITONEUM/RETROPERITONEUM: A small amount of perihepatic fluid is noted.   BONES AND LOWER THORAX: There are  innumerable subcentimeter enhancing lesions throughout the visualized osseous structures including the spine and pelvis compatible with osseous metastases. In the partially imaged portion of the left breast there is an enhancing nodular mass in the subareolar left breast with associated nipple retraction (series 15, image 15). A satellite enhancing mass is also seen in the lateral left breast at posterior depth (Image 5). A few prominent right cardiophrenic lymph nodes are present measuring up to 1 cm in short axis (series 15, image 8). The visualized lower lung fields are unremarkable. The heart is normal in size.       1. Enhancing left breast masses concerning for a primary breast malignancy. Correlation with dedicated breast imaging is recommended. 2. Diffuse enhancing osseous lesions compatible with osseous metastases. 3. Markedly nodular contour to the liver with diffuse nodular delayed enhancement. Differential includes an atypical appearance of diffuse hepatic metastases versus hepatic fibrosis/cirrhosis. Correlation with tissue sampling is recommended. 4. Prominent right cardiophrenic lymph nodes which could be reactive or metastatic. Correlation with dedicated chest imaging is recommended. 5. Small volume ascites. 6. Additional chronic and incidental findings as described above.   I personally reviewed the images/study and I agree with the findings as stated by resident physician Dr. Steven Birmingham.   MACRO: None     Dictation workstation:   [id number]    US guided abdominal paracentesis    Result Date: 2/5/2024  Interpreted By:  Lashanda Tian, STUDY: US GUIDED ABDOMINAL PARACENTESIS; 2/5/20244:10 pm   INDICATION: Signs/Symptoms:diagnostic.   COMPARISON: None.   ACCESSION NUMBER(S): [id number]   ORDERING CLINICIAN: LASHANDA TIAN   TECHNIQUE: INTERVENTIONALIST(S): Lashanda Tian   CONSENT: The patient/patient's POA/next of kin was informed of the nature of the proposed procedure. The purposes,  alternatives, risks, and benefits were explained and discussed. All questions were answered and consent was obtained.   SEDATION: None   MEDICATION/CONTRAST:     TIME OUT: A time out was performed immediately prior to procedure start with the interventional team, correctly identifying the patient name, date of birth, MRN, procedure, anatomy (including marking of site and side), patient position, procedure consent form, relevant laboratory and imaging test results, antibiotic administration, safety precautions, and procedure-specific equipment needs.   FINDINGS: The patient was placed in the supine position.   The abdominal space was examined with grey scale ultrasound, and the most accessible fluid identified and marked for paracentesis.   The skin was prepped and draped in usual manner. Local anesthesia with Lidocaine was administered and a right-sided paracentesis was performed.  A 5 [place] One-Step paracentesis needle/catheter was then placed where marked.  Approximately 1750 mL of yellowish colored fluid was removed.  The needle/catheter was then withdrawn.   The patient tolerated the procedure well and there were no immediate complications. Specimen(s) sent to the laboratory and pathology for further evaluation, per the requesting team.       Uneventful paracentesis, as detailed above. Right Hemiabdomen, 1750 mL   I personally performed and/or directly supervised this study and was present for the entire procedure.   Performed and dictated at [facility].   Signed by: Colten Willams 2/5/2024 4:10 PM Dictation workstation:   [id number]      Assessment/Plan   Ms. Waddell is 69 yo F history of Seropositive RA (on infliximab), osteoarthritis, nephrolithiasis, obesity, DLD, PACs, history of tobacco use (quit march 2023), who presented to [place] ED on 2/1 for dark red urine, generalized abdominal pain with nausea transferred to [facility] for further workup of her new  cirrhosis and jaundice. Patient also reports 1 episode of melena last week, otherwise had yellow colored stools. Regarding Jaundice, she has generalized abdominal pain, which she states originated in the RLQ. She does have several calcified gallstones on her CT making biliary colic/choledocolithiasis a distinct possibility (no signs of infection), however regarding her new cirrhosis diagnosis - this seems new in the last year as 1/2023 CT scan of her abdomen showed a normal contour liver so this would have had to have progressed in the last year: no alcohol, negative viral serologies, she seems like a setup for an autoimmune hepatitis with her history of RA, less likely a hepatic toxicity from her infliximab, patient does report she previously took methotrexate for RA mgmt. Otherwise she is immunocompromised so opportunistic infection remains on the differential but this also seems less likely. RUQ revealed Liver cirrhosis with diffusely heterogenous hepatic parenchyma, no definite focal lesion identified, although neoplasm can not be excluded. Perihepatic ascites, slow portal venous flow and recanalized paraumbilical vein, likely sequela of portal hypertension. Cholelithiasis without sonographic evidence of acute cholecystitis. Pending further workup with liver biopsy, suspect underlying metastatic disease.    2/8 updates:  - Restarted lovenox  - S/p liver and breast biopsies 2/7, will f/up results  - No change from hepatology standpoint, will continue to follow recs  - C/w lasix 20 mg aldactone 50 mg, lactulose 20 g, will f/up hepatology recs     #new liver cirrhosis  #Cholelithiasis  #mixed hepatocellular and cholestatic liver injury   #mild confusion possible grade 1 hepatic encephalopahthy  :: hepC negative, hepB negative, hepA negative, T spot negative in 11/2023  :: alpha 1 antitrypsin negative  :: ferritin 406 H/transferrin 136 L  :: RUQ as above in assessment  :: ammonia 79  Plan:  -fu autoimmune workup:  JOCELYN, anti smooth muscle, anti mitochondrial  -lactulose 10 TID and can titrate up as needed   - hepatology consult  - needs diagnostic paracentesis     #RA - on infliximab  #osteoarthritis  :: hold home naproxen   - voltaren gel     #nonhealing, draining, L breast wound  - will try for inpatient breast ultrasound, otherwise..  -outpatient mammogram   -outpatient derm biopsy     #subacute avulsion fracture lesser trochanger R proximal femur  - chronic  - follow up with her orthopedic surgeon outpatient     #compression deformity at T12  -vitamin D and calcium     #abdominal aorta atherosclerosis on CT  #smoking history   - follow up with PCP outpatient to discuss potential statin/asa     F: none  E: PRN   N: regular diet  GI: none  P: lovenox  A: PIV  C: full code confirmed on admission   Surrogate:   Cierra her daughter [phone] (first option)  Emmanuel her son [phone]    Marcell Ramírez MD

## 2024-02-08 NOTE — PROGRESS NOTES
Subjective   Meghan Waddell is a 68 y.o. female on hospital day 5 reports overall doing well and is eager to get her biopsies results.  Patient was also surprised how much fluid was tapped out yesterday with paracentesis.    Objective     Exam     Vitals:    02/07/24 1929 02/07/24 2002 02/08/24 0536 02/08/24 1145   BP: 108/73 120/74 114/63 124/72   BP Location:    Right arm   Patient Position:    Sitting   Pulse: 90 87 83 82   Resp: 16 16 17 18   Temp: 36.5 °C (97.7 °F) 36.9 °C (98.4 °F) 37 °C (98.6 °F) 36.6 °C (97.9 °F)   TempSrc:  Temporal  Tympanic   SpO2: 97% 95% 93% 95%   Weight:       Height:          Intake/Output last 3 shifts:  No intake/output data recorded.    Physical Exam  Vitals reviewed.   Constitutional:       Appearance: She is obese.   HENT:      Head: Normocephalic and atraumatic.   Cardiovascular:      Rate and Rhythm: Normal rate and regular rhythm.      Pulses: Normal pulses.      Heart sounds: No murmur heard.     No friction rub. No gallop.   Pulmonary:      Effort: Pulmonary effort is normal.      Breath sounds: Normal breath sounds. No wheezing, rhonchi or rales.      Comments: Anteriorly  Abdominal:      General: Bowel sounds are normal.      Palpations: Abdomen is soft.      Tenderness: There is no abdominal tenderness. There is no guarding or rebound.   Musculoskeletal:      Right lower leg: No edema.      Left lower leg: No edema.   Skin:     General: Skin is warm and dry.      Comments: Breast not visualized today previous evaluation as follows :  left breast with retracted nipple and scabbing without active drainage or fluctuance.  There is some fullness in the left upper outer quadrant which may represent a mass.  Area is nontender and there is no axillary lymphadenopathy that I can appreciate.  Breast skin itself was not erythematous   Neurological:      General: No focal deficit present.      Mental Status: She is alert and oriented to person, place, and time.   Psychiatric:     "     Mood and Affect: Mood normal.         Behavior: Behavior normal.            Medications   calcium carbonate-vitamin D3, 2 tablet, oral, Daily  enoxaparin, 40 mg, subcutaneous, q24h  furosemide, 20 mg, oral, Daily  lactulose, 20 g, oral, Daily  pantoprazole, 40 mg, oral, Daily before breakfast  polyethylene glycol, 17 g, oral, Daily  spironolactone, 50 mg, oral, Daily       PRN medications: diclofenac sodium       Labs     All new labs reviewed:  some of the basic labs as follows -     Results from last 7 days   Lab Units 02/08/24  0534 02/07/24  0820 02/06/24  0758   WBC AUTO x10*3/uL 9.1 7.1 7.7   HEMOGLOBIN g/dL 12.6 13.1 12.5   HEMATOCRIT % 33.4* 36.7 33.9*   PLATELETS AUTO x10*3/uL 178 158 163   NEUTROS PCT AUTO % 58.4 53.9 61.0   LYMPHS PCT AUTO % 25.1 30.8 25.3   MONOS PCT AUTO % 14.9 13.1 11.7   EOS PCT AUTO % 0.2 0.4 0.4        Results from last 72 hours   Lab Units 02/08/24  0534 02/07/24  0820 02/06/24  0758   INR  1.2* 1.2* 1.2*   APTT seconds 28 29 30       Results from last 72 hours   Lab Units 02/08/24  0534 02/07/24  0820 02/06/24  0758   SODIUM mmol/L 135* 134* 135*   POTASSIUM mmol/L 3.9 3.9 4.1   CHLORIDE mmol/L 103 104 106   CO2 mmol/L 23 22 21   BUN mg/dL 22 17 16   CREATININE mg/dL 0.98 0.87 0.76       Results from last 72 hours   Lab Units 02/08/24  0534 02/07/24  0820 02/06/24  0758   ALK PHOS U/L 265* 253* 251*   AST U/L 194* 169* 161*   ALT U/L 55* 47* 48*   BILIRUBIN TOTAL mg/dL 8.9* 7.9* 7.8*   BILIRUBIN DIRECT mg/dL 5.5* 4.7* 4.9*   ALBUMIN g/dL 2.2* 2.2* 2.5*   PROTEIN TOTAL g/dL 6.3* 6.3* 6.4       Results from last 72 hours   Lab Units 02/08/24  0534 02/07/24  0820 02/06/24  0758 02/05/24  1451   GLUCOSE mg/dL 75 75 79 85             No results found for: \"TR1\"  Lab Results   Component Value Date    URINECULTURE No significant growth 02/01/2024    BLOODCULT No growth at 2 days 02/05/2024    BLOODCULT No growth at 3 days 02/04/2024        Liver Failure Workup Labs:    Lab Results " "  Component Value Date    FERRITIN 406 (H) 02/04/2024    CERULOPLSM 42.6 02/01/2024    JOCELYN Positive (A) 02/04/2024    ANATITER 1:320 02/04/2024    ASMAB Positive 1:20 (A) 02/04/2024    [id number] 170 02/04/2024    MITOAB Negative 02/04/2024       Lab Results   Component Value Date    HEPATOT Nonreactive 02/04/2024    HEPAIGM Nonreactive 02/01/2024    HEPBSAB 5.5 02/03/2023    HEPBSAG Nonreactive 02/01/2024    HEPBCAB NONREACTIVE 02/03/2023    HEPCAB Nonreactive 02/01/2024        No results found for: \"CMVIGGINIT\", \"CMVPCRIU\", \"EBVIN\"     Lab Results   Component Value Date    AFP <4 02/06/2024         Imaging   MRCP pancreas w and wo IV contrast  Narrative: Interpreted By:  Jessenia Abraham,  and Vinnie Lucia   STUDY:  MRCP PANCREAS W AND WO IV CONTRAST;  2/5/2024 10:00 pm      INDICATION:  Signs/Symptoms:new cirrhosis.      COMPARISON:  CT abdomen pelvis dated 01/30/2023, 02/01/2024      ACCESSION NUMBER(S):  [id number]      ORDERING CLINICIAN:  MARIBEL MUNOZ      TECHNIQUE:  MRI LIVER; Multiplanar magnetic resonance images of the abdomen were  obtained including the following sequences; T2-weighted SSFSE with  and without fat saturation, T1-weighted GRE in/opposed phase, DWI,  fat saturated 3D-T1w GRE pre and dynamically post contrast.  18 ml of  Gadolinium contrast agent Dotarem were administered intravenously  without immediate complication.      FINDINGS:  LIVER:  The liver is normal in size measuring 17 cm in craniocaudal length.  There is a markedly nodular contour to the liver with heterogeneity  of the hepatic parenchyma. The appearance is similar to the CT from  02/01/2024 and new from 01/30/2023 where the liver demonstrated a  normal contour. There is diffuse nodular appearing delayed  enhancement throughout the hepatic parenchyma with a few areas of  more confluence delayed enhancement, for example in hepatic segment 6  (series 20, image 38). There is no associated diffusion restriction.  No " arterially enhancing lesions or lesions with washout are  visualized.      BILE DUCTS:  There are focally dilated peripheral intrahepatic biliary ducts in  segment 4 B measuring up to 4 mm in diameter. There is otherwise no  intrahepatic or extrahepatic biliary ductal dilatation.      GALLBLADDER:  Cholelithiasis. No gallbladder wall thickening or distention.      PANCREAS:  Normal signal intensity. Normal enhancement. No masses. The  pancreatic duct is normal.      SPLEEN:  The spleen is normal in size without evidence of focal lesions.      ADRENAL GLANDS:  Within normal limits.      KIDNEYS:  Within normal limits. Bilateral simple renal cysts measuring 0.7 cm.      LYMPH NODES:  A prominent portacaval lymph node measures up to 1.4 cm in short  axis. There is otherwise no lymphadenopathy.      ABDOMINAL VESSELS:  Stable mild aneurysmal dilatation of the infrarenal abdominal aortic  measuring up to 3.1 x 2.9 cm in diameter. Superior mesenteric vein,  splenic vein, and main, right and left portal vein are patent.  Hepatic veins are patent.  No significant collaterals or esophageal  varices are present.      BOWEL:  Note is made of a small diverticulum arising posteriorly from the  gastric cardia.      PERITONEUM/RETROPERITONEUM:  A small amount of perihepatic fluid is noted.      BONES AND LOWER THORAX:  There are innumerable subcentimeter enhancing lesions throughout the  visualized osseous structures including the spine and pelvis  compatible with osseous metastases. In the partially imaged portion  of the left breast there is an enhancing nodular mass in the  subareolar left breast with associated nipple retraction (series 15,  image 15). A satellite enhancing mass is also seen in the lateral  left breast at posterior depth (Image 5). A few prominent right  cardiophrenic lymph nodes are present measuring up to 1 cm in short  axis (series 15, image 8). The visualized lower lung fields are  unremarkable. The heart  is normal in size.      Impression: 1. Enhancing left breast masses concerning for a primary breast  malignancy. Correlation with dedicated breast imaging is recommended.  2. Diffuse enhancing osseous lesions compatible with osseous  metastases.  3. Markedly nodular contour to the liver with diffuse heterogeneous  nodular delayed enhancement, which is new compared to 01/30/2023 CT.  No focal arterially enhancing lesion with washout or diffusion  restriction. Differential includes diffuse necrotic hepatic  metastases versus cirrhotic liver with fibrotic bands and  regenerating nodules. Correlation with tissue sampling is recommended.  4. Prominent right cardiophrenic lymph nodes which could be reactive  or metastatic. Correlation with dedicated chest imaging is  recommended.  5. Mild abdominopelvic ascites.  6. Additional chronic and incidental findings as described above.      I personally reviewed the images/study and I agree with the findings  as stated by resident physician Dr. Steven Birmingham.      MACRO:  None      Signed by: Jessenia Abraham 2/7/2024 9:59 PM  Dictation workstation:   [id number]  BI stereotactic guided breast right localization and biopsy, BI US biopsy of lymph node, BI breast biopsy clip imaging, BI US guided breast localization and biopsy left  Narrative: Interpreted By:  Eron Landers,   STUDY:  BI BREAST BIOPSY CLIP IMAGING; BI STEREOTACTIC GUIDED BREAST RIGHT  LOCALIZATION AND BIOPSY; BI US BIOPSY OF LYMPH NODE; BI US GUIDED  BREAST LOCALIZATION AND BIOPSY LEFT;  2/7/2024 2:05 pm; 2/7/2024 1:31  pm; 2/7/2024 12:09 pm; 2/7/2024 12:02 pm      ACCESSION NUMBER(S):  [id number]; [id number]; [id number]; [id number]      ORDERING CLINICIAN:  MARY BETANCUR      INDICATION:  Abnormal Left breast masses x2, right breast calcifications x2 and  left axillary lymph node.      FINDINGS:  PREPROCEDURAL CONSULTATION: The history and physical exam pertinent  to the procedure were reviewed and no updates were  made.      The procedure was explained to the patient including the risks,  benefits, and alternatives. Medications were discussed, including any  prior use of blood thinning medications by the patient. Patient  allergies were reviewed. The risks, including but not limited to  infection and bleeding, were reviewed by the performing physician and  the patient agreed to undergo the procedure.      Prior to the procedure, an audible timeout was done to verify patient  identification, site and type of procedure. Dr. Eron Landers, a  radiology nurse and an ultrasound technologist were present.      PROCEDURE 1: Scanning of the left axilla redemonstrated the lymph  node of concern. The left axilla was marked under ultrasound guidance  and cleansed.  8 mL of buffered 1% lidocaine was injected  subcutaneously and then into the deeper tissues surrounding the node.  A small incision was made. 2 tissue core specimens were then obtained  with a 12-gauge spring-loaded biopsy needle with minimal bleeding  (estimated blood loss less than 10 mL).  A butterfly Hydromark tissue  marker was then placed into the biopsy site.  Hemostasis was achieved  with manual compression. The patient tolerated the second procedure  without difficulty.      PROCEDURE 2: Scanning of the left breast redemonstrated the mass of  concern in the 1 o'clock position, 12 cm from the nipple. The left  breast was marked under ultrasound guidance and cleansed. 8 mL of  buffered 1% lidocaine was injected subcutaneously and then into the  deeper tissues surrounding the mass. A small incision was made. 3  tissue core specimens were then obtained with a 12-gauge  spring-loaded biopsy needle with minimal bleeding (estimated blood  loss less than 10 mL).  A open coil Hydromark tissue marker was then  placed into the biopsy site.  Hemostasis was achieved with manual  compression. The patient tolerated the procedure without difficulty.      PROCEDURE 3: Scanning of the  left breast redemonstrated the mass of  concern in the subareolar breast at the 6 o'clock position. The left  breast was marked under ultrasound guidance and cleansed. 8 mL of  buffered 1% lidocaine was injected subcutaneously and then into the  deeper tissues surrounding the mass. A small incision was made. 3  tissue core specimens were then obtained with a 12-gauge  spring-loaded biopsy needle with minimal bleeding (estimated blood  loss less than 10 mL).  A butterfly Hydromark tissue marker was then  placed into the biopsy site.  Hemostasis was achieved with manual  compression. The patient tolerated the procedure without difficulty.      Prior to the next procedure, an audible timeout was done to verify  patient identification, site and type of procedure. Dr. Eron Landers, a  radiology nurse and two mammography technologists were present.      PROCEDURE 4: A  view of the right breast localized the  calcifications of concern in the lateral central right breast at  anterior depth. A superior approach was used. The breast was prepped  and draped in a sterile manner.  8 mL of buffered 1% lidocaine was  injected subcutaneously and then into the deeper tissues. A small  incision was made. 6 tissue core specimens were then obtained with a  9-gauge vacuum assisted biopsy needle with minimal bleeding  (estimated blood loss less than 10 mL). The specimen radiograph  demonstrated multiple calcifications in the tissues. A dumbbell tri  himanshu tissue marker was then placed into the biopsy site. Hemostasis  was achieved with manual compression.  The patient tolerated the  procedure without difficulty.      PROCEDURE 5: A  view of the right breast localized the  calcifications of concern in the upper outer breast at middle depth.  A superior approach was used. The breast was prepped and draped in a  sterile manner.  8 mL of buffered 1% lidocaine was injected  subcutaneously and then into the deeper tissues. A small  incision was  made. 6 tissue core specimens were then obtained with a 9-gauge  vacuum assisted biopsy needle with minimal bleeding (estimated blood  loss less than 10 mL). The specimen radiograph demonstrated multiple  calcifications in the tissues. A open coil Hydromark tissue marker  was then placed into the biopsy site. Hemostasis was achieved with  manual compression.  The patient tolerated the procedure without  difficulty.      The postbiopsy mammogram demonstrates satisfactory placement of 2  tissue markers in the left breast. Left axillary tissue marker is not  visualized, likely due to how deep the biopsied lymph node was  located. The post biopsy mammogram demonstrates satisfactory  placement of the right HydroMARK clip with approximally 2 cm of  inferior migration of the right tri himanshu clip.      The patient experienced no complications during the procedure.  Home-going/follow-up instructions were reviewed with the patient  before she left the department.      Impression: Status post ultrasound guided core needle biopsy of a 2 left breast  masses, left axillary lymph node, and 2 groupings of right breast  calcifications followed by tissue marker placement. Pathology is  pending.      Please note that there is 2 cm of tissue marker migration at the  right anterior biopsy site.      POST PROCEDURE MAMMOGRAM FOR MARKER PLACEMENT.          Signed by: Eron Landers 2/7/2024 3:23 PM  Dictation workstation:   [id number]     No results found for this or any previous visit from the past 1095 days.     No results found for this or any previous visit (from the past 4464 hour(s)).       Assessment and Plan     Cirrhosis: Paracentesis negative for SBP.  Does have low total protein in the ascites.  MRI inconclusive for cirrhosis versus diffuse malignant invasion.  NA noted to be elevated.  MELD 3.0: 22 at 2/8/2024  5:34 AM  MELD-Na: 19 at 2/8/2024  5:34 AM  Calculated from:  Serum Creatinine: 0.98 mg/dL (Using min of 1  mg/dL) at 2/8/2024  5:34 AM  Serum Sodium: 135 mmol/L at 2/8/2024  5:34 AM  Total Bilirubin: 8.9 mg/dL at 2/8/2024  5:34 AM  Serum Albumin: 2.2 g/dL at 2/8/2024  5:34 AM  INR(ratio): 1.2 at 2/8/2024  5:34 AM  Age at listing (hypothetical): 68 years  Sex: Female at 2/8/2024  5:34 AM     -Continue lactulose.  Titrate to 3-4 bowel movements per day.  -Continue PPI  -Strict I/o and every day wt   -Tend LFTs and Coags  -Low Na diet  -Follow-up serum histoplasma antigen and/or histoplasma urine antigen  -Continue Lasix/Aldactone.  Given rising ascites may need to double dose to 40 and 100 mg respectively.  Monitor ins and outs and renal function.  Patient is without peripheral edema   -Follow-up hepatology recommendations.     -Follow-up liver biopsy.  -See if able to send yesterday's paracentesis ascites fluid for cytology.  Initial samples for cytology was negative for malignant cells    Breast changes: Exam and specific imaging both highly concerning for malignancy with possible bony involvement now.  -Follow-up biopsies of various breast lesions/lymphadenopathy.  Assistance greatly appreciated  -Will see if able to obtain PET scan as inpatient versus defer to outpatient status    Subacute avulsion fracture:  -PT/OT  -Pain control  -Outpatient follow-up    DVT prophylaxis     Ariel Carver MD     Of note the above was done with Dragon dictation system.  Note was proofread to minimize errors.

## 2024-02-08 NOTE — CARE PLAN
The patient's goals for the shift include py will be free from falls.injury    The clinical goals for the shift include Pt will be freef rom falls/injury this shift

## 2024-02-08 NOTE — PROGRESS NOTES
Occupational Therapy    Evaluation    Patient Name: Meghan Waddell  MRN: [id number]  Today's Date: 2/8/2024  Time Calculation  Start Time: 1015  Stop Time: 1041  Time Calculation (min): 26 min    Assessment  IP OT Assessment  OT Assessment: MEGHAN WADDELL IS A 69 Y/O F WHO EXHIBITS DIFFICULTY PERFORMING HER ADL/IADL, FUNCTIONAL TRANSFERS, AND MOBILITY  Prognosis: Good  Evaluation/Treatment Tolerance: Patient limited by fatigue  Medical Staff Made Aware: Yes  End of Session Communication: PCT/NA/CTA  End of Session Patient Position: Bed, 3 rail up, Alarm off, not on at start of session (VISITING WITH FAMILY)  Plan:  Treatment Interventions: ADL retraining, Functional transfer training, UE strengthening/ROM, Endurance training, Patient/family training  OT Frequency: 3 times per week  OT Discharge Recommendations: Moderate intensity level of continued care (HOWEVER PATIENT AND FAMILY HAVE DECLINED MOD REC ANTICIPATING THAT OPTIMAL PARTICIPATION AND PROGRESS  WOULD BE ACHIEVED IN THE HOME)  Equipment Recommended upon Discharge:  (TBD)  OT Recommended Transfer Status: Moderate assist, Assist of 1  OT - OK to Discharge: Yes    Subjective   Current Problem:  1. Cirrhosis (CMS/HCC)        2. Other abnormal and inconclusive findings on diagnostic imaging of breast  Surgical Pathology Exam    Surgical Pathology Exam        General:  General  Reason for Referral: Cirrhosis of the liver  Past Medical History Relevant to Rehab: Seropositive RA (on infliximab), osteoarthritis, nephrolithiasis, obesity, DLD, PACs, history of tobacco use (quit march 2023); fracture of R lesser trochanter  Family/Caregiver Present: Yes (SON + DTR PRESENT AT BEDSIDE PROVIDING SUPPORT AND INFORMATION R/T PLOF)  Prior to Session Communication: Bedside nurse  Patient Position Received: Bed, 3 rail up, Alarm off, not on at start of session  Preferred Learning Style: verbal, visual  General Comment: RN CLEARED; PATIENT RECEPTIVE TO FULL  PARTICIPATION  Precautions:  Hearing/Visual Limitations: WFL/READING GLASSES  LE Weight Bearing Status: Weight Bearing as Tolerated  Medical Precautions: Fall precautions  Vital Signs:  Heart Rate: 98  Heart Rate Source: Monitor  SpO2: 96 %  BP: 107/73  MAP (mmHg): 84  BP Location: Right arm  BP Method: Automatic  Patient Position: Sitting  Pain:  Pain Assessment  Pain Assessment: 0-10  Pain Score:  (4/10 STOMACH; 8/10 HIP)  Pain Type: Acute pain, Chronic pain  Pain Location: Abdomen (AND R/HIP)  Pain Orientation: Right  Pain Frequency: Constant/continuous  Pain Interventions: Repositioned  Response to Interventions: REPORTS INCREASED COMFORT    Objective   Cognition:  Overall Cognitive Status: Within Functional Limits  Arousal/Alertness: Appropriate responses to stimuli  Orientation Level: Oriented X4  Following Commands: Follows all commands and directions without difficulty  Attention: Within Functional Limits  Memory: Within Funtional Limits  Problem Solving: Within Functional Limits  Safety/Judgement: Within Functional Limits  Insight: Within function limits           Home Living:  Type of Home: House  Lives With: Adult children (SON AND DTR.)  Home Adaptive Equipment: Walker rolling or standard, Cane  Home Layout: Two level  Alternate Level Stairs-Rails: Right  Alternate Level Stairs-Number of Steps: 14  Home Access: Stairs to enter with rails  Entrance Stairs-Rails: Right  Entrance Stairs-Number of Steps: 6  Bathroom Shower/Tub: Tub/shower unit  Bathroom Toilet: Adaptive toilet seating (BSC OVER FRAME)  Bathroom Equipment: Tub transfer bench, Bedside commode  Home Living Comments: DTR. PROVIDES PRN ADL ASSISTANCE AND PERFORMS ALL IADLS EXCEPT COOKING AND MEDICATION MANAGEMENT   Prior Function:  Level of [place]: Independent with ADLs and functional transfers, Needs assistance with homemaking  Homemaking Assistance:  (MOD I FOR MEAL PREP/PATIENT PERFORMS SIMPLE MEAL PREP AND WARMING FOOD PROVIDED BY HER  FAMILY)  Ambulatory Assistance:  (MOD I AT WH W LEVEL)  Vocational: Retired (DENTAL ASSISTANT)  Leisure: [employer] + WORD SEARCH PUZZLES, CARDS/BOARD/COMPUTER GAMES WITH FAMILY  Hand Dominance: Right  IADL History:     ADL:  Eating Assistance: Independent  Grooming Deficit: Setup  Bathing Assistance: Moderate (ANTICIPATED)  UE Dressing Assistance: Stand by  LE Dressing Assistance: Total  Toileting Assistance with Device: Moderate (ANTICIPATED)  Activity Tolerance:  Endurance: Tolerates 10 - 20 min exercise with multiple rests  Bed Mobility/Transfers: Bed Mobility  Bed Mobility: Yes  Bed Mobility 1  Bed Mobility 1:  (MOD A FOR RLE MOBILITY  IN /OOB)    Transfers  Transfer: Yes  Transfer 1  Transfer to 1:  (MIN A SIT > STAND FROM EOB < > WH W)  Transfers 2  Transfer From 2:  (MIN A BED < > CHAIR)      Ambulation/Gait Training:     Sitting Balance:  Static Sitting Balance  Static Sitting-Balance Support: Bilateral upper extremity supported, Feet supported  Static Sitting-Level of Assistance: Distant supervision     Vision: Vision - Basic Assessment  Current Vision: Wears glasses only for reading  Sensation:  Light Touch: No apparent deficits  Strength:  Strength Comments: BUE >/= 3+/5 AS NOTED DURING FUNCTIONAL TASKS  Perception:     Coordination:  Movements are Fluid and Coordinated: Yes   Hand Function:  Hand Function  Gross Grasp: Functional  Coordination: Functional  Extremities: RUE   RUE : Within Functional Limits and LUE   LUE: Within Functional Limits      Outcome Measures: [facility] Daily Activity  Putting on and taking off regular lower body clothing: A lot  Bathing (including washing, rinsing, drying): A lot  Putting on and taking off regular upper body clothing: None  Toileting, which includes using toilet, bedpan or urinal: A lot  Taking care of personal grooming such as brushing teeth: A little  Eating Meals: None  Daily Activity - Total Score: 17         and Brief Confusion Assessment Method (bCAM)  CAM  Result: CAM -  Education Documentation  Precautions, taught by Sophie Schmidt OT at 2/8/2024  3:32 PM.  Learner: Patient  Readiness: Eager  Method: Explanation  Response: Verbalizes Understanding    ADL Training, taught by Sophie Schmidt OT at 2/8/2024  3:32 PM.  Learner: Patient  Readiness: Eager  Method: Explanation  Response: Verbalizes Understanding    Education Comments  No comments found.      Goals:   Encounter Problems       Encounter Problems (Active)       ADLs       Patient with complete lower body dressing with modified independent level of assistance donning and doffing all LE clothes  with PRN adaptive equipment        Start:  02/08/24    Expected End:  02/08/24            Patient will complete toileting including hygiene clothing management/hygiene with modified independent level of assistance       Start:  02/08/24    Expected End:  02/08/24               MOBILITY       Patient will perform Functional mobility min Household distances/Community Distances with modified independent level of assistance and least restrictive device in order to improve safety and functional mobility.       Start:  02/08/24    Expected End:  02/08/24                       TRANSFERS       Patient will perform bed mobility modified independent level of assistance in order to improve safety and independence with mobility       Start:  02/08/24    Expected End:  02/08/24            Patient will complete all functional transfers to least restrictive device with modified independent level of assistance.       Start:  02/08/24    Expected End:  02/08/24

## 2024-02-08 NOTE — NURSING NOTE
Geriatric Nursing Rounds Summary  ABHISHEK Waddell is a 68 year old admitted cirrhosis s/p taps  Age friendly   What matters awaits breast liver biopsies  Mentation cam neg  Mobility up with family assist cont to encourage  Medications lactulose

## 2024-02-09 NOTE — PROGRESS NOTES
Meghan Waddell is a 68 y.o. female on day 6 of admission presenting with Cirrhosis (CMS/HCC).    Subjective   No acute events overnight. Patient eagerly awaiting biopsy results.       Objective     Physical Exam  Constitutional:       Appearance: mildly jaundiced  HENT:      Mouth/Throat:      Mouth: Mucous membranes are moist.   Cardiovascular:      Rate and Rhythm: Normal rate and regular rhythm.      Heart sounds: No murmur heard.  Pulmonary:      Effort: Pulmonary effort is normal. No respiratory distress.      Breath sounds: Normal breath sounds.   Abdominal:      Comments: Abdomen soft, diffusely mildly tender to palpation  No asterixis   Skin:     Comments: L breast with crusted draining lesion without purulence,fullness of the L lateral breast, nipple inversion with black scabbing, no obvious axillary LAD   Neurological:      General: No focal deficit present.      Mental Status: She is alert. A&OX3    Last Recorded Vitals  Vitals:    02/09/24 0921   BP: 122/74   Pulse: 92   Resp:    Temp:    SpO2:      Intake/Output last 3 Shifts:  I/O last 3 completed shifts:  In: 480 (5.3 mL/kg) [P.O.:480]  Out: - (0 mL/kg)   Weight: 90.7 kg     Relevant Results  Scheduled medications  calcium carbonate-vitamin D3, 2 tablet, oral, Daily  enoxaparin, 40 mg, subcutaneous, q24h  furosemide, 20 mg, oral, Daily  lactulose, 20 g, oral, Daily  pantoprazole, 40 mg, oral, Daily before breakfast  polyethylene glycol, 17 g, oral, Daily  spironolactone, 50 mg, oral, Daily      Continuous medications     PRN medications  PRN medications: acetaminophen **OR** acetaminophen **OR** acetaminophen, diclofenac sodium     Results from last 7 days   Lab Units 02/09/24  0519   WBC AUTO x10*3/uL 9.7   HEMOGLOBIN g/dL 13.5   HEMATOCRIT % 37.6   PLATELETS AUTO x10*3/uL 190       Results from last 7 days   Lab Units 02/09/24  0519   SODIUM mmol/L 135*   POTASSIUM mmol/L 3.9   CHLORIDE mmol/L 102   CO2 mmol/L 21   BUN mg/dL 22   CREATININE mg/dL  1.04   CALCIUM mg/dL 8.7   PROTEIN TOTAL g/dL 7.0   BILIRUBIN TOTAL mg/dL 9.6*   ALK PHOS U/L 302*   ALT U/L 55*   AST U/L 200*   GLUCOSE mg/dL 85       Results from last 7 days   Lab Units 02/09/24  0519   ALK PHOS U/L 302*   BILIRUBIN TOTAL mg/dL 9.6*   BILIRUBIN DIRECT mg/dL 5.7*   PROTEIN TOTAL g/dL 7.0   ALT U/L 55*   AST U/L 200*       Results from last 7 days   Lab Units 02/09/24  0519   APTT seconds 30   INR  1.2*       BI mammo bilateral diagnostic tomosynthesis    Result Date: 2/6/2024  Interpreted By:  Tamy Whitley, STUDY: BI MAMMO BILATERAL DIAGNOSTIC TOMOSYNTHESIS; BI US BREAST LIMITED BILATERAL;  2/6/2024 3:20 pm; 2/6/2024 3:57 pm   ACCESSION NUMBER(S): [id number]; [id number]   ORDERING CLINICIAN: MARY BETANCUR   INDICATION: Patient states 1 year ago noticed scabbing and itching around the left nipple. Patient now has a crusted left breast nipple lesion with purulent drainage. Family history of breast cancer with her sister diagnosed. Family history of ovarian carcinoma with her grandmother diagnosed.   COMPARISON: Baseline study   FINDINGS: MAMMOGRAPHY: 2D and tomosynthesis images were reviewed at 1 mm slice thickness.   Density:  The breast tissue is heterogeneously dense, which may obscure small masses.   Right breast In the subareolar right breast there is a spiculated mass with associated calcifications. A circumscribed mass is seen in the upper-outer quadrant of the right breast at middle depth. Coarse heterogeneous grouped calcifications are seen in the upper-outer quadrant of the right breast at middle depth.   Left breast Skin thickening is noted of the left breast. There is skin retraction of the left nipple with an associated spiculated mass. Additional spiculated masses are noted in the subareolar location of the left breast. In the upper-outer quadrant of the left breast at posterior depth there is a spiculated mass measuring 2.1 cm. Fine pleomorphic calcifications are seen in a regional  distribution measuring 17 x 9 cm. An abnormal lymph node is noted in the left axilla.   ULTRASOUND: Right breast In the right breast at the 11 o'clock position 6 cm from the nipple there is an oval circumscribed anechoic mass measuring 2.2 x 1.3 x 2.3 cm. It demonstrates fluid elastography characteristics and it is a cyst. 2 additional small circumscribed anechoic masses are seen at the 10 o'clock position 1 cm from the nipple. These are consistent with cysts.   In the right breast at the 10 o'clock position 1 cm from the nipple there is a mixed echogenic mass with calcifications. This measures 1.6 x 0.8 cm. It is intermediate with elastography.   Targeted ultrasound of the right axilla demonstrates 5 unremarkable right axillary lymph nodes.   Left breast In the left breast at the 1 o'clock position 12 cm from the nipple there is an irregular hypoechoic mass measuring 1.7 x 2.1 x 1.9 cm. It is hard with elastography and there is internal vascularity. This corresponds to the spiculated mass in the upper-outer quadrant of the left breast.   In the left breast 6:00 o'clock subareolar location there is an irregular hypoechoic mass with calcifications associated with the nipple which is retracted. This measures 2.8 x 1.1 cm.   Targeted ultrasound of the left axilla was performed. There is a low lymph node at the 1 o'clock position 14 cm from the nipple measuring 0.5 x 0.4 x 0.6 cm.   The lymph node labeled 1 is unremarkable. The lymph node labeled 2 is irregular and measures 1 x 1 x 0.9 cm. The lymph nodes labeled 3 and 4 are within normal limits. The lymph node labeled 5 demonstrates a thickened cortex with possible calcifications. The cortex measures 0.32 cm.       1. Left breast spiculated masses and suspicious regionally distributed calcifications. The distribution of suspicious calcifications measures 17 cm. There is a spiculated 6:00 o'clock mass with nipple involvement and a spiculated mass in the upper-outer  quadrant at 1:00 o'clock. For extent, biopsies of the 6:00 o'clock subareolar mass and 1:00 o'clock left breast mass are recommended. 2. 3 indeterminate left axillary lymph nodes. An ultrasound-guided core biopsy of 1 of the lymph nodes is recommended. Additionally, there is a large lymph node seen mammographically without a sonographic correlate. 3. Right breast mass at the 10 o'clock position 1 cm from the nipple. An ultrasound-guided core biopsy is recommended. 4. Indeterminate right breast calcifications. A stereotactic biopsy is recommended.   BI-RADS CATEGORY:   BI-RADS CATEGORY:  5 Highly Suggestive of Malignancy. Recommendation:  Surgical Consultation and Biopsy. Recommended Date:  Immediate. Laterality:  Bilateral.   For any future breast imaging appointments, please call [phone]).     MACRO: None   Signed by: Tamy Whitley 2/6/2024 4:17 PM Dictation workstation:   [id number]    BI US breast limited bilateral    Result Date: 2/6/2024  Interpreted By:  Tamy Whitley, STUDY: BI MAMMO BILATERAL DIAGNOSTIC TOMOSYNTHESIS; BI US BREAST LIMITED BILATERAL;  2/6/2024 3:20 pm; 2/6/2024 3:57 pm   ACCESSION NUMBER(S): [id number]; [id number]   ORDERING CLINICIAN: MARY BETANCUR   INDICATION: Patient states 1 year ago noticed scabbing and itching around the left nipple. Patient now has a crusted left breast nipple lesion with purulent drainage. Family history of breast cancer with her sister diagnosed. Family history of ovarian carcinoma with her grandmother diagnosed.   COMPARISON: Baseline study   FINDINGS: MAMMOGRAPHY: 2D and tomosynthesis images were reviewed at 1 mm slice thickness.   Density:  The breast tissue is heterogeneously dense, which may obscure small masses.   Right breast In the subareolar right breast there is a spiculated mass with associated calcifications. A circumscribed mass is seen in the upper-outer quadrant of the right breast at middle depth. Coarse heterogeneous grouped  calcifications are seen in the upper-outer quadrant of the right breast at middle depth.   Left breast Skin thickening is noted of the left breast. There is skin retraction of the left nipple with an associated spiculated mass. Additional spiculated masses are noted in the subareolar location of the left breast. In the upper-outer quadrant of the left breast at posterior depth there is a spiculated mass measuring 2.1 cm. Fine pleomorphic calcifications are seen in a regional distribution measuring 17 x 9 cm. An abnormal lymph node is noted in the left axilla.   ULTRASOUND: Right breast In the right breast at the 11 o'clock position 6 cm from the nipple there is an oval circumscribed anechoic mass measuring 2.2 x 1.3 x 2.3 cm. It demonstrates fluid elastography characteristics and it is a cyst. 2 additional small circumscribed anechoic masses are seen at the 10 o'clock position 1 cm from the nipple. These are consistent with cysts.   In the right breast at the 10 o'clock position 1 cm from the nipple there is a mixed echogenic mass with calcifications. This measures 1.6 x 0.8 cm. It is intermediate with elastography.   Targeted ultrasound of the right axilla demonstrates 5 unremarkable right axillary lymph nodes.   Left breast In the left breast at the 1 o'clock position 12 cm from the nipple there is an irregular hypoechoic mass measuring 1.7 x 2.1 x 1.9 cm. It is hard with elastography and there is internal vascularity. This corresponds to the spiculated mass in the upper-outer quadrant of the left breast.   In the left breast 6:00 o'clock subareolar location there is an irregular hypoechoic mass with calcifications associated with the nipple which is retracted. This measures 2.8 x 1.1 cm.   Targeted ultrasound of the left axilla was performed. There is a low lymph node at the 1 o'clock position 14 cm from the nipple measuring 0.5 x 0.4 x 0.6 cm.   The lymph node labeled 1 is unremarkable. The lymph node labeled 2  is irregular and measures 1 x 1 x 0.9 cm. The lymph nodes labeled 3 and 4 are within normal limits. The lymph node labeled 5 demonstrates a thickened cortex with possible calcifications. The cortex measures 0.32 cm.       1. Left breast spiculated masses and suspicious regionally distributed calcifications. The distribution of suspicious calcifications measures 17 cm. There is a spiculated 6:00 o'clock mass with nipple involvement and a spiculated mass in the upper-outer quadrant at 1:00 o'clock. For extent, biopsies of the 6:00 o'clock subareolar mass and 1:00 o'clock left breast mass are recommended. 2. 3 indeterminate left axillary lymph nodes. An ultrasound-guided core biopsy of 1 of the lymph nodes is recommended. Additionally, there is a large lymph node seen mammographically without a sonographic correlate. 3. Right breast mass at the 10 o'clock position 1 cm from the nipple. An ultrasound-guided core biopsy is recommended. 4. Indeterminate right breast calcifications. A stereotactic biopsy is recommended.   BI-RADS CATEGORY:   BI-RADS CATEGORY:  5 Highly Suggestive of Malignancy. Recommendation:  Surgical Consultation and Biopsy. Recommended Date:  Immediate. Laterality:  Bilateral.   For any future breast imaging appointments, please call [phone]).     MACRO: None   Signed by: Tamy Whitley 2/6/2024 4:17 PM Dictation workstation:   [id number]    MRCP pancreas w and wo IV contrast    Result Date: 2/6/2024  Interpreted By:  Khari Birmingham, STUDY: MRCP PANCREAS W AND WO IV CONTRAST;  2/5/2024 10:00 pm   INDICATION: Signs/Symptoms:new cirrhosis.   COMPARISON: CT abdomen pelvis dated 01/30/2023, 02/01/2024   ACCESSION NUMBER(S): [id number]   ORDERING CLINICIAN: MARIBEL MUNOZ   TECHNIQUE: MRI LIVER; Multiplanar magnetic resonance images of the abdomen were obtained including the following sequences; T2-weighted SSFSE with and without fat saturation, T1-weighted GRE in/opposed phase, DWI, fat  saturated 3D-T1w GRE pre and dynamically post contrast.  18 ml of Gadolinium contrast agent Dotarem were administered intravenously without immediate complication.   FINDINGS: LIVER: The liver is normal in size measuring 17 cm in craniocaudal length. There is a markedly nodular contour to the liver with heterogeneity of the hepatic parenchyma. The appearance is similar to the CT from 02/01/2024 and new from 01/30/2023 where the liver demonstrated a normal contour. There is diffuse nodular appearing delayed enhancement throughout the hepatic parenchyma with a few areas of more confluence delayed enhancement, for example in hepatic segment 6 (series 20, image 38). There is no associated diffusion restriction. No arterially enhancing lesions or lesions with washout are visualized.   BILE DUCTS: There are focally dilated peripheral intrahepatic biliary ducts in segment 4 B measuring up to 4 mm in diameter. There is otherwise no intrahepatic or extrahepatic biliary ductal dilatation.   GALLBLADDER: Cholelithiasis. No gallbladder wall thickening or distention.   PANCREAS: Normal signal intensity. Normal enhancement. No masses. The pancreatic duct is normal.   SPLEEN: The spleen is normal in size without evidence of focal lesions.   ADRENAL GLANDS: Within normal limits.   KIDNEYS: Within normal limits. Bilateral simple renal cysts measuring 0.7 cm.   LYMPH NODES: A prominent portacaval lymph node measures up to 1.4 cm in short axis. There is otherwise no lymphadenopathy.   ABDOMINAL VESSELS: Stable mild aneurysmal dilatation of the infrarenal abdominal aortic measuring up to 3.1 x 2.9 cm in diameter. Superior mesenteric vein, splenic vein, and main, right and left portal vein are patent. Hepatic veins are patent.  No significant collaterals or esophageal varices are present.   BOWEL: Note is made of a small diverticulum arising posteriorly from the gastric cardia.   PERITONEUM/RETROPERITONEUM: A small amount of perihepatic  fluid is noted.   BONES AND LOWER THORAX: There are innumerable subcentimeter enhancing lesions throughout the visualized osseous structures including the spine and pelvis compatible with osseous metastases. In the partially imaged portion of the left breast there is an enhancing nodular mass in the subareolar left breast with associated nipple retraction (series 15, image 15). A satellite enhancing mass is also seen in the lateral left breast at posterior depth (Image 5). A few prominent right cardiophrenic lymph nodes are present measuring up to 1 cm in short axis (series 15, image 8). The visualized lower lung fields are unremarkable. The heart is normal in size.       1. Enhancing left breast masses concerning for a primary breast malignancy. Correlation with dedicated breast imaging is recommended. 2. Diffuse enhancing osseous lesions compatible with osseous metastases. 3. Markedly nodular contour to the liver with diffuse nodular delayed enhancement. Differential includes an atypical appearance of diffuse hepatic metastases versus hepatic fibrosis/cirrhosis. Correlation with tissue sampling is recommended. 4. Prominent right cardiophrenic lymph nodes which could be reactive or metastatic. Correlation with dedicated chest imaging is recommended. 5. Small volume ascites. 6. Additional chronic and incidental findings as described above.   I personally reviewed the images/study and I agree with the findings as stated by resident physician Dr. Steven Birmingham.   MACRO: None     Dictation workstation:   [id number]    US guided abdominal paracentesis    Result Date: 2/5/2024  Interpreted By:  Lasahnda Tian, STUDY: US GUIDED ABDOMINAL PARACENTESIS; 2/5/20244:10 pm   INDICATION: Signs/Symptoms:diagnostic.   COMPARISON: None.   ACCESSION NUMBER(S): [id number]   ORDERING CLINICIAN: LASHANDA TIAN   TECHNIQUE: INTERVENTIONALIST(S): Lashanda Tian   CONSENT: The patient/patient's POA/next of kin was informed of  the nature of the proposed procedure. The purposes, alternatives, risks, and benefits were explained and discussed. All questions were answered and consent was obtained.   SEDATION: None   MEDICATION/CONTRAST:     TIME OUT: A time out was performed immediately prior to procedure start with the interventional team, correctly identifying the patient name, date of birth, MRN, procedure, anatomy (including marking of site and side), patient position, procedure consent form, relevant laboratory and imaging test results, antibiotic administration, safety precautions, and procedure-specific equipment needs.   FINDINGS: The patient was placed in the supine position.   The abdominal space was examined with grey scale ultrasound, and the most accessible fluid identified and marked for paracentesis.   The skin was prepped and draped in usual manner. Local anesthesia with Lidocaine was administered and a right-sided paracentesis was performed.  A 5 Czech One-Step paracentesis needle/catheter was then placed where marked.  Approximately 1750 mL of yellowish colored fluid was removed.  The needle/catheter was then withdrawn.   The patient tolerated the procedure well and there were no immediate complications. Specimen(s) sent to the laboratory and pathology for further evaluation, per the requesting team.       Uneventful paracentesis, as detailed above. Right Hemiabdomen, 1750 mL   I personally performed and/or directly supervised this study and was present for the entire procedure.   Performed and dictated at [facility].   Signed by: Colten Willams 2/5/2024 4:10 PM Dictation workstation:   [id number]      Assessment/Plan   Ms. Waddell is 69 yo F history of Seropositive RA (on infliximab), osteoarthritis, nephrolithiasis, obesity, DLD, PACs, history of tobacco use (quit march 2023), who presented to [facility] on 2/1 for dark red urine, generalized abdominal pain with nausea transferred  to  main campus for further workup of her new cirrhosis and jaundice. Patient also reports 1 episode of melena last week, otherwise had yellow colored stools. Regarding Jaundice, she has generalized abdominal pain, which she states originated in the RLQ. She does have several calcified gallstones on her CT making biliary colic/choledocolithiasis a distinct possibility (no signs of infection), however regarding her new cirrhosis diagnosis - this seems new in the last year as 1/2023 CT scan of her abdomen showed a normal contour liver so this would have had to have progressed in the last year: no alcohol, negative viral serologies, she seems like a setup for an autoimmune hepatitis with her history of RA, less likely a hepatic toxicity from her infliximab, patient does report she previously took methotrexate for RA mgmt. Otherwise she is immunocompromised so opportunistic infection remains on the differential but this also seems less likely. RUQ revealed Liver cirrhosis with diffusely heterogenous hepatic parenchyma, no definite focal lesion identified, although neoplasm can not be excluded. Perihepatic ascites, slow portal venous flow and recanalized paraumbilical vein, likely sequela of portal hypertension. Cholelithiasis without sonographic evidence of acute cholecystitis. Pending further workup with liver biopsy, suspect underlying metastatic disease.    2/9 updates:  - Added on tylenol 650 mg q8 prn for pain  - S/p liver and breast biopsies 2/7, will f/up results  - No change from hepatology standpoint, will continue to follow recs  - C/w lasix 20 mg aldactone 50 mg, lactulose 20 g, will f/up hepatology recs     #new liver cirrhosis  #Cholelithiasis  #mixed hepatocellular and cholestatic liver injury   #mild confusion possible grade 1 hepatic encephalopahthy  :: hepC negative, hepB negative, hepA negative, T spot negative in 11/2023  :: alpha 1 antitrypsin negative  :: ferritin 406 H/transferrin 136 L  :: RUQ  as above in assessment  :: ammonia 79  Plan:  -fu autoimmune workup: JOCELYN, anti smooth muscle, anti mitochondrial  -lactulose 10 TID and can titrate up as needed   - hepatology consult  - diagnostic paracentesis unremarkable, no malignant cells noted  - lactulose 20 g, lasix 20 mg, aldactone 50 mg     #RA - on infliximab  #osteoarthritis  :: hold home naproxen   - voltaren gel  - tylenol 650 mg q8 prn     #nonhealing, draining, L breast wound  - will try for inpatient breast ultrasound, otherwise..  -outpatient mammogram   -outpatient derm biopsy     #subacute avulsion fracture lesser trochanger R proximal femur  - chronic  - follow up with her orthopedic surgeon outpatient     #compression deformity at T12  -vitamin D and calcium     #abdominal aorta atherosclerosis on CT  #smoking history   - follow up with PCP outpatient to discuss potential statin/asa     F: none  E: PRN   N: regular diet  GI: none  P: lovenox  A: PIV  C: full code confirmed on admission   Surrogate:   Cierra her daughter [phone] (first option)  Emmanuel her son [phone]    Marcell Ramírez MD

## 2024-02-09 NOTE — CARE PLAN
The patient's goals for the shift include py will be free from falls.injury    The clinical goals for the shift include patient will free from fall during this shift

## 2024-02-09 NOTE — PROGRESS NOTES
Subjective   Meghan Waddell is a 68 y.o. female on hospital day 6 reports having some increased abdominal discomfort.  This morning it is doing better.  She describes the discomfort as being diffuse across entire abdomen.  Denies any focality over the liver area.    Objective     Exam     Vitals:    02/08/24 1145 02/08/24 2113 02/09/24 0506 02/09/24 0921   BP: 124/72 109/59 121/67 122/74   BP Location: Right arm      Patient Position: Sitting      Pulse: 82 81 84 92   Resp: 18 18 18    Temp: 36.6 °C (97.9 °F) 36.7 °C (98.1 °F) 36.3 °C (97.3 °F)    TempSrc: Tympanic      SpO2: 95% 93% 95%    Weight:       Height:          Intake/Output last 3 shifts:  I/O last 3 completed shifts:  In: 480 (5.3 mL/kg) [P.O.:480]  Out: - (0 mL/kg)   Weight: 90.7 kg     Physical Exam  Vitals reviewed.   Constitutional:       Appearance: She is obese.   HENT:      Head: Normocephalic and atraumatic.   Cardiovascular:      Rate and Rhythm: Normal rate and regular rhythm.      Pulses: Normal pulses.      Heart sounds: No murmur heard.     No friction rub. No gallop.   Pulmonary:      Effort: Pulmonary effort is normal.      Breath sounds: Normal breath sounds. No wheezing, rhonchi or rales.      Comments: Anteriorly  Abdominal:      General: Bowel sounds are normal.      Palpations: Abdomen is soft.      Tenderness: There is abdominal tenderness (Slight diffuse tenderness without rebound). There is no guarding or rebound.   Musculoskeletal:      Right lower leg: No edema.      Left lower leg: No edema.   Skin:     General: Skin is warm and dry.      Comments: Breast not visualized today previous evaluation as follows :  left breast with retracted nipple and scabbing without active drainage or fluctuance.  There is some fullness in the left upper outer quadrant which may represent a mass.  Area is nontender and there is no axillary lymphadenopathy that I can appreciate.  Breast skin itself was not erythematous   Neurological:       "General: No focal deficit present.      Mental Status: She is alert and oriented to person, place, and time.   Psychiatric:         Mood and Affect: Mood normal.         Behavior: Behavior normal.            Medications   calcium carbonate-vitamin D3, 2 tablet, oral, Daily  enoxaparin, 40 mg, subcutaneous, q24h  furosemide, 20 mg, oral, Daily  lactulose, 20 g, oral, Daily  pantoprazole, 40 mg, oral, Daily before breakfast  polyethylene glycol, 17 g, oral, Daily  spironolactone, 50 mg, oral, Daily       PRN medications: acetaminophen **OR** acetaminophen **OR** acetaminophen, diclofenac sodium       Labs     All new labs reviewed:  some of the basic labs as follows -     Results from last 7 days   Lab Units 02/09/24 0519 02/08/24  0534 02/07/24  0820   WBC AUTO x10*3/uL 9.7 9.1 7.1   HEMOGLOBIN g/dL 13.5 12.6 13.1   HEMATOCRIT % 37.6 33.4* 36.7   PLATELETS AUTO x10*3/uL 190 178 158   NEUTROS PCT AUTO % 61.2 58.4 53.9   LYMPHS PCT AUTO % 23.3 25.1 30.8   MONOS PCT AUTO % 13.4 14.9 13.1   EOS PCT AUTO % 0.2 0.2 0.4        Results from last 72 hours   Lab Units 02/09/24 0519 02/08/24  0534 02/07/24  0820   INR  1.2* 1.2* 1.2*   APTT seconds 30 28 29       Results from last 72 hours   Lab Units 02/09/24  0519 02/08/24  0534 02/07/24  0820   SODIUM mmol/L 135* 135* 134*   POTASSIUM mmol/L 3.9 3.9 3.9   CHLORIDE mmol/L 102 103 104   CO2 mmol/L 21 23 22   BUN mg/dL 22 22 17   CREATININE mg/dL 1.04 0.98 0.87       Results from last 72 hours   Lab Units 02/09/24 0519 02/08/24  0534 02/07/24  0820   ALK PHOS U/L 302* 265* 253*   AST U/L 200* 194* 169*   ALT U/L 55* 55* 47*   BILIRUBIN TOTAL mg/dL 9.6* 8.9* 7.9*   BILIRUBIN DIRECT mg/dL 5.7* 5.5* 4.7*   ALBUMIN g/dL 2.3* 2.2* 2.2*   PROTEIN TOTAL g/dL 7.0 6.3* 6.3*       Results from last 72 hours   Lab Units 02/09/24  0519 02/08/24  0534 02/07/24  0820   GLUCOSE mg/dL 85 75 75             No results found for: \"TR1\"  Lab Results   Component Value Date    URINECULTURE No " "significant growth 02/01/2024    BLOODCULT No growth at 3 days 02/05/2024    BLOODCULT No growth at 4 days -  FINAL REPORT 02/04/2024        Liver Failure Workup Labs:    Lab Results   Component Value Date    FERRITIN 406 (H) 02/04/2024    CERULOPLSM 42.6 02/01/2024    JOCELYN Positive (A) 02/04/2024    ANATITER 1:320 02/04/2024    ASMAB Positive 1:20 (A) 02/04/2024    [id number] 170 02/04/2024    MITOAB Negative 02/04/2024       Lab Results   Component Value Date    HEPATOT Nonreactive 02/04/2024    HEPAIGM Nonreactive 02/01/2024    HEPBSAB 5.5 02/03/2023    HEPBSAG Nonreactive 02/01/2024    HEPBCAB NONREACTIVE 02/03/2023    HEPCAB Nonreactive 02/01/2024        No results found for: \"CMVIGGINIT\", \"CMVPCRIU\", \"EBVIN\"     Lab Results   Component Value Date    AFP <4 02/06/2024         Imaging   MRCP pancreas w and wo IV contrast  Narrative: Interpreted By:  Jessenia Abraham,  and Vinnie Lucia   STUDY:  MRCP PANCREAS W AND WO IV CONTRAST;  2/5/2024 10:00 pm      INDICATION:  Signs/Symptoms:new cirrhosis.      COMPARISON:  CT abdomen pelvis dated 01/30/2023, 02/01/2024      ACCESSION NUMBER(S):  [id number]      ORDERING CLINICIAN:  MARIBEL MUNOZ      TECHNIQUE:  MRI LIVER; Multiplanar magnetic resonance images of the abdomen were  obtained including the following sequences; T2-weighted SSFSE with  and without fat saturation, T1-weighted GRE in/opposed phase, DWI,  fat saturated 3D-T1w GRE pre and dynamically post contrast.  18 ml of  Gadolinium contrast agent Dotarem were administered intravenously  without immediate complication.      FINDINGS:  LIVER:  The liver is normal in size measuring 17 cm in craniocaudal length.  There is a markedly nodular contour to the liver with heterogeneity  of the hepatic parenchyma. The appearance is similar to the CT from  02/01/2024 and new from 01/30/2023 where the liver demonstrated a  normal contour. There is diffuse nodular appearing delayed  enhancement throughout the hepatic " parenchyma with a few areas of  more confluence delayed enhancement, for example in hepatic segment 6  (series 20, image 38). There is no associated diffusion restriction.  No arterially enhancing lesions or lesions with washout are  visualized.      BILE DUCTS:  There are focally dilated peripheral intrahepatic biliary ducts in  segment 4 B measuring up to 4 mm in diameter. There is otherwise no  intrahepatic or extrahepatic biliary ductal dilatation.      GALLBLADDER:  Cholelithiasis. No gallbladder wall thickening or distention.      PANCREAS:  Normal signal intensity. Normal enhancement. No masses. The  pancreatic duct is normal.      SPLEEN:  The spleen is normal in size without evidence of focal lesions.      ADRENAL GLANDS:  Within normal limits.      KIDNEYS:  Within normal limits. Bilateral simple renal cysts measuring 0.7 cm.      LYMPH NODES:  A prominent portacaval lymph node measures up to 1.4 cm in short  axis. There is otherwise no lymphadenopathy.      ABDOMINAL VESSELS:  Stable mild aneurysmal dilatation of the infrarenal abdominal aortic  measuring up to 3.1 x 2.9 cm in diameter. Superior mesenteric vein,  splenic vein, and main, right and left portal vein are patent.  Hepatic veins are patent.  No significant collaterals or esophageal  varices are present.      BOWEL:  Note is made of a small diverticulum arising posteriorly from the  gastric cardia.      PERITONEUM/RETROPERITONEUM:  A small amount of perihepatic fluid is noted.      BONES AND LOWER THORAX:  There are innumerable subcentimeter enhancing lesions throughout the  visualized osseous structures including the spine and pelvis  compatible with osseous metastases. In the partially imaged portion  of the left breast there is an enhancing nodular mass in the  subareolar left breast with associated nipple retraction (series 15,  image 15). A satellite enhancing mass is also seen in the lateral  left breast at posterior depth (Image 5). A few  prominent right  cardiophrenic lymph nodes are present measuring up to 1 cm in short  axis (series 15, image 8). The visualized lower lung fields are  unremarkable. The heart is normal in size.      Impression: 1. Enhancing left breast masses concerning for a primary breast  malignancy. Correlation with dedicated breast imaging is recommended.  2. Diffuse enhancing osseous lesions compatible with osseous  metastases.  3. Markedly nodular contour to the liver with diffuse heterogeneous  nodular delayed enhancement, which is new compared to 01/30/2023 CT.  No focal arterially enhancing lesion with washout or diffusion  restriction. Differential includes diffuse necrotic hepatic  metastases versus cirrhotic liver with fibrotic bands and  regenerating nodules. Correlation with tissue sampling is recommended.  4. Prominent right cardiophrenic lymph nodes which could be reactive  or metastatic. Correlation with dedicated chest imaging is  recommended.  5. Mild abdominopelvic ascites.  6. Additional chronic and incidental findings as described above.      I personally reviewed the images/study and I agree with the findings  as stated by resident physician Dr. Steven Birmingham.      MACRO:  None      Signed by: Jessenia Abraham 2/7/2024 9:59 PM  Dictation workstation:   [id number]  BI stereotactic guided breast right localization and biopsy, BI US biopsy of lymph node, BI breast biopsy clip imaging, BI US guided breast localization and biopsy left  Narrative: Interpreted By:  Eron Landers,   STUDY:  BI BREAST BIOPSY CLIP IMAGING; BI STEREOTACTIC GUIDED BREAST RIGHT  LOCALIZATION AND BIOPSY; BI US BIOPSY OF LYMPH NODE; BI US GUIDED  BREAST LOCALIZATION AND BIOPSY LEFT;  2/7/2024 2:05 pm; 2/7/2024 1:31  pm; 2/7/2024 12:09 pm; 2/7/2024 12:02 pm      ACCESSION NUMBER(S):  [id number]; [id number]; [id number]; [id number]      ORDERING CLINICIAN:  MARY BETANCUR      INDICATION:  Abnormal Left breast masses x2, right breast  calcifications x2 and  left axillary lymph node.      FINDINGS:  PREPROCEDURAL CONSULTATION: The history and physical exam pertinent  to the procedure were reviewed and no updates were made.      The procedure was explained to the patient including the risks,  benefits, and alternatives. Medications were discussed, including any  prior use of blood thinning medications by the patient. Patient  allergies were reviewed. The risks, including but not limited to  infection and bleeding, were reviewed by the performing physician and  the patient agreed to undergo the procedure.      Prior to the procedure, an audible timeout was done to verify patient  identification, site and type of procedure. Dr. Eron Landers, a  radiology nurse and an ultrasound technologist were present.      PROCEDURE 1: Scanning of the left axilla redemonstrated the lymph  node of concern. The left axilla was marked under ultrasound guidance  and cleansed.  8 mL of buffered 1% lidocaine was injected  subcutaneously and then into the deeper tissues surrounding the node.  A small incision was made. 2 tissue core specimens were then obtained  with a 12-gauge spring-loaded biopsy needle with minimal bleeding  (estimated blood loss less than 10 mL).  A butterfly Hydromark tissue  marker was then placed into the biopsy site.  Hemostasis was achieved  with manual compression. The patient tolerated the second procedure  without difficulty.      PROCEDURE 2: Scanning of the left breast redemonstrated the mass of  concern in the 1 o'clock position, 12 cm from the nipple. The left  breast was marked under ultrasound guidance and cleansed. 8 mL of  buffered 1% lidocaine was injected subcutaneously and then into the  deeper tissues surrounding the mass. A small incision was made. 3  tissue core specimens were then obtained with a 12-gauge  spring-loaded biopsy needle with minimal bleeding (estimated blood  loss less than 10 mL).  A open coil Hydromark tissue  marker was then  placed into the biopsy site.  Hemostasis was achieved with manual  compression. The patient tolerated the procedure without difficulty.      PROCEDURE 3: Scanning of the left breast redemonstrated the mass of  concern in the subareolar breast at the 6 o'clock position. The left  breast was marked under ultrasound guidance and cleansed. 8 mL of  buffered 1% lidocaine was injected subcutaneously and then into the  deeper tissues surrounding the mass. A small incision was made. 3  tissue core specimens were then obtained with a 12-gauge  spring-loaded biopsy needle with minimal bleeding (estimated blood  loss less than 10 mL).  A butterfly Hydromark tissue marker was then  placed into the biopsy site.  Hemostasis was achieved with manual  compression. The patient tolerated the procedure without difficulty.      Prior to the next procedure, an audible timeout was done to verify  patient identification, site and type of procedure. Dr. Eron Landers, a  radiology nurse and two mammography technologists were present.      PROCEDURE 4: A  view of the right breast localized the  calcifications of concern in the lateral central right breast at  anterior depth. A superior approach was used. The breast was prepped  and draped in a sterile manner.  8 mL of buffered 1% lidocaine was  injected subcutaneously and then into the deeper tissues. A small  incision was made. 6 tissue core specimens were then obtained with a  9-gauge vacuum assisted biopsy needle with minimal bleeding  (estimated blood loss less than 10 mL). The specimen radiograph  demonstrated multiple calcifications in the tissues. A dumbbell tri  himanshu tissue marker was then placed into the biopsy site. Hemostasis  was achieved with manual compression.  The patient tolerated the  procedure without difficulty.      PROCEDURE 5: A  view of the right breast localized the  calcifications of concern in the upper outer breast at middle depth.  A  superior approach was used. The breast was prepped and draped in a  sterile manner.  8 mL of buffered 1% lidocaine was injected  subcutaneously and then into the deeper tissues. A small incision was  made. 6 tissue core specimens were then obtained with a 9-gauge  vacuum assisted biopsy needle with minimal bleeding (estimated blood  loss less than 10 mL). The specimen radiograph demonstrated multiple  calcifications in the tissues. A open coil Hydromark tissue marker  was then placed into the biopsy site. Hemostasis was achieved with  manual compression.  The patient tolerated the procedure without  difficulty.      The postbiopsy mammogram demonstrates satisfactory placement of 2  tissue markers in the left breast. Left axillary tissue marker is not  visualized, likely due to how deep the biopsied lymph node was  located. The post biopsy mammogram demonstrates satisfactory  placement of the right HydroMARK clip with approximally 2 cm of  inferior migration of the right tri himanshu clip.      The patient experienced no complications during the procedure.  Home-going/follow-up instructions were reviewed with the patient  before she left the department.      Impression: Status post ultrasound guided core needle biopsy of a 2 left breast  masses, left axillary lymph node, and 2 groupings of right breast  calcifications followed by tissue marker placement. Pathology is  pending.      Please note that there is 2 cm of tissue marker migration at the  right anterior biopsy site.      POST PROCEDURE MAMMOGRAM FOR MARKER PLACEMENT.          Signed by: Eron Landers 2/7/2024 3:23 PM  Dictation workstation:   [id number]     No results found for this or any previous visit from the past 1095 days.     No results found for this or any previous visit (from the past 4464 hour(s)).       Assessment and Plan     Cirrhosis: Paracentesis negative for SBP.  Does have low total protein in the ascites.  MRI inconclusive for cirrhosis versus  diffuse malignant invasion.  NA noted to be elevated.  MELD 3.0: 22 at 2/9/2024  5:19 AM  MELD-Na: 19 at 2/9/2024  5:19 AM  Calculated from:  Serum Creatinine: 1.04 mg/dL at 2/9/2024  5:19 AM  Serum Sodium: 135 mmol/L at 2/9/2024  5:19 AM  Total Bilirubin: 9.6 mg/dL at 2/9/2024  5:19 AM  Serum Albumin: 2.3 g/dL at 2/9/2024  5:19 AM  INR(ratio): 1.2 at 2/9/2024  5:19 AM  Age at listing (hypothetical): 68 years  Sex: Female at 2/9/2024  5:19 AM     -Continue lactulose.  Titrate to 3-4 bowel movements per day.  -Continue PPI  -Strict I/o and every day wt   -Tend LFTs and Coags  -Monitor CBC.  Hemoglobin so far stable after biopsy  -Low Na diet  -Follow-up serum histoplasma antigen and/or histoplasma urine antigen  -Continue Lasix/Aldactone.  Given rising ascites may need to double dose to 40 and 100 mg respectively.  Monitor ins and outs and renal function.     -Follow-up hepatology recommendations.     -Follow-up liver biopsy.    Breast changes: Exam and specific imaging both highly concerning for malignancy with possible bony involvement now.  Attempted to obtain PET but will have to defer to outpatient  -Follow-up biopsies of various breast lesions/lymphadenopathy    Subacute avulsion fracture:  -PT/OT  -Pain control  -Outpatient follow-up    DVT prophylaxis     Ariel Carver MD     Of note the above was done with Dragon dictation system.  Note was proofread to minimize errors.

## 2024-02-09 NOTE — CARE PLAN
The patient's goals for the shift include Maintain patient safety    The clinical goals for the shift include Answer all questions and go over POC for patient throughout this shift    Over the shift, the patient did not make progress toward the following goals. Barriers to progression include   . Recommendations to address these barriers include   .

## 2024-02-10 NOTE — PROGRESS NOTES
"Meghan Waddell is a 68 y.o. female on day 7 of admission presenting with Cirrhosis (CMS/HCC).    Subjective   Patient endorses continued right hip pain, for which the team ordered lidocaine patch, and will have PRN oxy 5mg.    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        Objective     Physical Exam  Constitutional:       Appearance: mildly jaundiced  HENT:      Mouth/Throat:      Mouth: Mucous membranes are moist.   Cardiovascular:      Rate and Rhythm: Normal rate and regular rhythm.      Heart sounds: No murmur heard.  Pulmonary:      Effort: Pulmonary effort is normal. No respiratory distress.      Breath sounds: Normal breath sounds.   Abdominal:      Comments: Abdomen soft, diffusely mildly tender to palpation  No asterixis   Skin:     Comments: L breast with crusted draining lesion without purulence,fullness of the L lateral breast, nipple inversion with black scabbing, no obvious axillary LAD   Neurological:      General: No focal deficit present.      Mental Status: She is alert. A&OX3    Last Recorded Vitals  Blood pressure 107/66, pulse 88, temperature 36.6 °C (97.9 °F), temperature source Tympanic, resp. rate 16, height 1.727 m (5' 8\"), weight 90.7 kg (200 lb), SpO2 94 %.  Intake/Output last 3 Shifts:  No intake/output data recorded.    Relevant Results  Results for orders placed or performed during the hospital encounter of 02/03/24 (from the past 24 hour(s))   CBC and Auto Differential   Result Value Ref Range    WBC 10.0 4.4 - 11.3 x10*3/uL    nRBC 0.0 0.0 - 0.0 /100 WBCs    RBC 4.14 4.00 - 5.20 x10*6/uL    Hemoglobin 12.6 12.0 - 16.0 g/dL    Hematocrit 34.4 (L) 36.0 - 46.0 %    MCV 83 80 - 100 fL    MCH 30.4 26.0 - 34.0 pg    MCHC 36.6 (H) 32.0 - 36.0 g/dL    RDW 20.8 (H) 11.5 - 14.5 %    Platelets 198 150 - 450 x10*3/uL    Neutrophils % 61.2 40.0 - 80.0 " %    Immature Granulocytes %, Automated 0.3 0.0 - 0.9 %    Lymphocytes % 24.3 13.0 - 44.0 %    Monocytes % 13.0 2.0 - 10.0 %    Eosinophils % 0.3 0.0 - 6.0 %    Basophils % 0.9 0.0 - 2.0 %    Neutrophils Absolute 6.09 1.20 - 7.70 x10*3/uL    Immature Granulocytes Absolute, Automated 0.03 0.00 - 0.70 x10*3/uL    Lymphocytes Absolute 2.42 1.20 - 4.80 x10*3/uL    Monocytes Absolute 1.29 (H) 0.10 - 1.00 x10*3/uL    Eosinophils Absolute 0.03 0.00 - 0.70 x10*3/uL    Basophils Absolute 0.09 0.00 - 0.10 x10*3/uL   Magnesium   Result Value Ref Range    Magnesium 2.31 1.60 - 2.40 mg/dL   Hepatic Function Panel   Result Value Ref Range    Albumin 2.3 (L) 3.4 - 5.0 g/dL    Bilirubin, Total 10.2 (H) 0.0 - 1.2 mg/dL    Bilirubin, Direct 6.1 (H) 0.0 - 0.3 mg/dL    Alkaline Phosphatase 269 (H) 33 - 136 U/L    ALT 53 (H) 7 - 45 U/L     (H) 9 - 39 U/L    Total Protein 6.5 6.4 - 8.2 g/dL   Coagulation Screen   Result Value Ref Range    Protime 13.3 (H) 9.8 - 12.8 seconds    INR 1.2 (H) 0.9 - 1.1    aPTT 30 27 - 38 seconds   Phosphorus   Result Value Ref Range    Phosphorus 3.2 2.5 - 4.9 mg/dL   Basic Metabolic Panel   Result Value Ref Range    Glucose 73 (L) 74 - 99 mg/dL    Sodium 133 (L) 136 - 145 mmol/L    Potassium 4.1 3.5 - 5.3 mmol/L    Chloride 101 98 - 107 mmol/L    Bicarbonate 22 21 - 32 mmol/L    Anion Gap 14 10 - 20 mmol/L    Urea Nitrogen 19 6 - 23 mg/dL    Creatinine 1.03 0.50 - 1.05 mg/dL    eGFR 60 (L) >60 mL/min/1.73m*2    Calcium 8.7 8.6 - 10.6 mg/dL   Morphology   Result Value Ref Range    RBC Morphology See Below     Hypochromia Mild     Target Cells Many              Assessment/Plan   Principal Problem:    Cirrhosis (CMS/HCC)    Ms. Waddell is 67 yo F history of Seropositive RA (on infliximab), osteoarthritis, nephrolithiasis, obesity, DLD, PACs, history of tobacco use (quit march 2023), who presented to [place] ED on 2/1 for dark red urine, generalized abdominal pain with nausea transferred to [facility]  for further workup of her new cirrhosis and jaundice. Patient also reports 1 episode of melena last week, otherwise had yellow colored stools. Regarding Jaundice, she has generalized abdominal pain, which she states originated in the RLQ. She does have several calcified gallstones on her CT making biliary colic/choledocolithiasis a distinct possibility (no signs of infection), however regarding her new cirrhosis diagnosis - this seems new in the last year as 1/2023 CT scan of her abdomen showed a normal contour liver so this would have had to have progressed in the last year: no alcohol, negative viral serologies, she seems like a setup for an autoimmune hepatitis with her history of RA, less likely a hepatic toxicity from her infliximab, patient does report she previously took methotrexate for RA mgmt. Otherwise she is immunocompromised so opportunistic infection remains on the differential but this also seems less likely. RUQ revealed Liver cirrhosis with diffusely heterogenous hepatic parenchyma, no definite focal lesion identified, although neoplasm can not be excluded. Perihepatic ascites, slow portal venous flow and recanalized paraumbilical vein, likely sequela of portal hypertension. Cholelithiasis without sonographic evidence of acute cholecystitis. Pending further workup with liver biopsy, suspect underlying metastatic disease.     2/10 updates:  - Liver biopsy showed poorly differentiated adenocarcinoma, immuno-phenotypically compatible with breast origin   - Consulted medical oncology who saw the patient  - Will start anastrozole 1mg daily given ER+ status  - Further treatment and workup to be done as an outpatient, will order outpatient onc consult, patient would like to initially establish care with [facility]  - MRI brain w/wo contrast ordered  - C/w lasix 20 mg aldactone 50 mg, lactulose 20 g, will f/up hepatology recs     #new liver cirrhosis  #Cholelithiasis  #mixed  hepatocellular and cholestatic liver injury   #mild confusion possible grade 1 hepatic encephalopahthy  :: hepC negative, hepB negative, hepA negative, T spot negative in 11/2023  :: alpha 1 antitrypsin negative  :: ferritin 406 H/transferrin 136 L  :: RUQ as above in assessment  :: ammonia 79  Plan:  -fu autoimmune workup: JOCELYN, anti smooth muscle, anti mitochondrial  -lactulose 10 TID and can titrate up as needed   - hepatology consult  - diagnostic paracentesis unremarkable, no malignant cells noted  - lactulose 20 g, lasix 20 mg, aldactone 50 mg     #RA - on infliximab  #osteoarthritis  :: hold home naproxen   - voltaren gel  - tylenol 650 mg q8 prn     #nonhealing, draining, L breast wound  - will try for inpatient breast ultrasound, otherwise..  -outpatient mammogram   -outpatient derm biopsy     #subacute avulsion fracture lesser trochanger R proximal femur  - chronic  - follow up with her orthopedic surgeon outpatient     #compression deformity at T12  -vitamin D and calcium     #abdominal aorta atherosclerosis on CT  #smoking history   - follow up with PCP outpatient to discuss potential statin/asa     F: none  E: PRN   N: regular diet  GI: none  P: lovenox  A: PIV  C: full code confirmed on admission   Surrogate:   Cierra her daughter [phone] (first option)  Emmanuel her son [phone]           Sukhi Ortiz MD

## 2024-02-10 NOTE — CARE PLAN
Problem: Pain  Goal: My pain/discomfort is manageable  Outcome: Progressing   The patient's goals for the shift include Maintain patient safety    The clinical goals for the shift include Pt will remain free from fall and injury this shift    Pt had pain this shift and taken tylenol and lidocaine patch that helped some of pain. MRI is pending.

## 2024-02-10 NOTE — CARE PLAN
Problem: Pain  Goal: My pain/discomfort is manageable  Outcome: Progressing   The patient's goals for the shift include Maintain patient safety    The clinical goals for the shift include Pt will be free from fall during this shift      Problem: Pain  Goal: My pain/discomfort is manageable  Outcome: Progressing

## 2024-02-11 NOTE — CARE PLAN
Problem: Pain  Goal: My pain/discomfort is manageable  Outcome: Progressing   The patient's goals for the shift include Maintain patient safety    The clinical goals for the shift include Pt will pain rate at 5 or below this shift    Over the shift, the patient did not make progress toward the following goal.

## 2024-02-11 NOTE — CARE PLAN
Problem: Safety  Goal: Patient will be injury free during hospitalization  Outcome: Progressing  Goal: I will remain free of falls  Outcome: Progressing     Problem: Daily Care  Goal: Daily care needs are met  Outcome: Progressing   The patient's goals for the shift include Maintain patient safety    The clinical goals for the shift include Pt will remain free from fall and injury this shift    Over the shift, the patient did not make progress toward the following goals.

## 2024-02-11 NOTE — PROGRESS NOTES
"Meghan Waddell is a 68 y.o. female on day 8 of admission presenting with Cirrhosis (CMS/HCC).    Subjective   Endorses some improvement in pain while in bed but still has issues when moving around. Overall no major complaints.       Objective     Physical Exam    Physical Exam  Constitutional:       Appearance: mildly jaundiced  HENT:      Mouth/Throat:      Mouth: Mucous membranes are moist.   Cardiovascular:      Rate and Rhythm: Normal rate and regular rhythm.      Heart sounds: No murmur heard.  Pulmonary:      Effort: Pulmonary effort is normal. No respiratory distress.      Breath sounds: Normal breath sounds.   Abdominal:      Comments: Abdomen soft, diffusely mildly tender to palpation  No asterixis   Skin:     Comments: L breast with crusted draining lesion without purulence,fullness of the L lateral breast, nipple inversion with black scabbing, no obvious axillary LAD   Neurological:      General: No focal deficit present.      Mental Status: She is alert. A&OX3         Last Recorded Vitals  Blood pressure 115/66, pulse 85, temperature 35.8 °C (96.4 °F), temperature source Tympanic, resp. rate 18, height 1.727 m (5' 8\"), weight 90.7 kg (200 lb), SpO2 96 %.  Intake/Output last 3 Shifts:  No intake/output data recorded.    Relevant Results  Results for orders placed or performed during the hospital encounter of 02/03/24 (from the past 24 hour(s))   CBC and Auto Differential   Result Value Ref Range    WBC 9.3 4.4 - 11.3 x10*3/uL    nRBC 0.0 0.0 - 0.0 /100 WBCs    RBC 4.05 4.00 - 5.20 x10*6/uL    Hemoglobin 12.3 12.0 - 16.0 g/dL    Hematocrit 33.6 (L) 36.0 - 46.0 %    MCV 83 80 - 100 fL    MCH 30.4 26.0 - 34.0 pg    MCHC 36.6 (H) 32.0 - 36.0 g/dL    RDW 20.6 (H) 11.5 - 14.5 %    Platelets 197 150 - 450 x10*3/uL    Neutrophils % 60.7 40.0 - 80.0 %    Immature Granulocytes %, Automated 0.4 0.0 - 0.9 %    Lymphocytes % 25.1 13.0 - 44.0 %    Monocytes % 12.2 2.0 - 10.0 %    Eosinophils % 0.4 0.0 - 6.0 %    " Basophils % 1.2 0.0 - 2.0 %    Neutrophils Absolute 5.64 1.20 - 7.70 x10*3/uL    Immature Granulocytes Absolute, Automated 0.04 0.00 - 0.70 x10*3/uL    Lymphocytes Absolute 2.34 1.20 - 4.80 x10*3/uL    Monocytes Absolute 1.14 (H) 0.10 - 1.00 x10*3/uL    Eosinophils Absolute 0.04 0.00 - 0.70 x10*3/uL    Basophils Absolute 0.11 (H) 0.00 - 0.10 x10*3/uL   Magnesium   Result Value Ref Range    Magnesium 2.31 1.60 - 2.40 mg/dL   Coagulation Screen   Result Value Ref Range    Protime 15.5 (H) 9.8 - 12.8 seconds    INR 1.4 (H) 0.9 - 1.1    aPTT 31 27 - 38 seconds   Comprehensive metabolic panel   Result Value Ref Range    Glucose 74 74 - 99 mg/dL    Sodium 132 (L) 136 - 145 mmol/L    Potassium 4.1 3.5 - 5.3 mmol/L    Chloride 100 98 - 107 mmol/L    Bicarbonate 23 21 - 32 mmol/L    Anion Gap 13 10 - 20 mmol/L    Urea Nitrogen 22 6 - 23 mg/dL    Creatinine 1.00 0.50 - 1.05 mg/dL    eGFR 62 >60 mL/min/1.73m*2    Calcium 8.1 (L) 8.6 - 10.6 mg/dL    Albumin 2.0 (L) 3.4 - 5.0 g/dL    Alkaline Phosphatase 266 (H) 33 - 136 U/L    Total Protein 6.1 (L) 6.4 - 8.2 g/dL     (H) 9 - 39 U/L    Bilirubin, Total 10.6 (H) 0.0 - 1.2 mg/dL    ALT 54 (H) 7 - 45 U/L   Phosphorus   Result Value Ref Range    Phosphorus 3.3 2.5 - 4.9 mg/dL   Bilirubin, Direct   Result Value Ref Range    Bilirubin, Direct 6.4 (H) 0.0 - 0.3 mg/dL   Morphology   Result Value Ref Range    RBC Morphology See Below     Target Cells Many                Assessment/Plan   Principal Problem:    Cirrhosis (CMS/HCC)    Ms. Waddell is 67 yo F history of Seropositive RA (on infliximab), osteoarthritis, nephrolithiasis, obesity, DLD, PACs, history of tobacco use (quit march 2023), who presented to [place] ED on 2/1 for dark red urine, generalized abdominal pain with nausea transferred to  main campus for further workup of her new cirrhosis and jaundice. Patient also reports 1 episode of melena last week, otherwise had yellow colored stools. Regarding Jaundice, she has  generalized abdominal pain, which she states originated in the RLQ. She does have several calcified gallstones on her CT making biliary colic/choledocolithiasis a distinct possibility (no signs of infection), however regarding her new cirrhosis diagnosis - this seems new in the last year as 1/2023 CT scan of her abdomen showed a normal contour liver so this would have had to have progressed in the last year: no alcohol, negative viral serologies, she seems like a setup for an autoimmune hepatitis with her history of RA, less likely a hepatic toxicity from her infliximab, patient does report she previously took methotrexate for RA mgmt. Otherwise she is immunocompromised so opportunistic infection remains on the differential but this also seems less likely. RUQ revealed Liver cirrhosis with diffusely heterogenous hepatic parenchyma, no definite focal lesion identified, although neoplasm can not be excluded. Perihepatic ascites, slow portal venous flow and recanalized paraumbilical vein, likely sequela of portal hypertension. Cholelithiasis without sonographic evidence of acute cholecystitis. Pending further workup with liver biopsy, suspect underlying metastatic disease.     2/11 updates:  - Liver biopsy showed poorly differentiated adenocarcinoma, immuno-phenotypically compatible with breast origin   - Further treatment and workup to be done as an outpatient, will order outpatient onc consult, patient would like to initially establish care with [facility]  - MRI brain w/wo contrast ordered, pending   - C/w lasix 20 mg aldactone 50 mg, lactulose 20 g, will f/up hepatology recs     #new liver cirrhosis  #Cholelithiasis  #mixed hepatocellular and cholestatic liver injury   #mild confusion possible grade 1 hepatic encephalopahthy  :: hepC negative, hepB negative, hepA negative, T spot negative in 11/2023  :: alpha 1 antitrypsin negative  :: ferritin 406 H/transferrin 136 L  :: RUQ as above in  assessment  :: ammonia 79  Plan:  -fu autoimmune workup: JOCELYN, anti smooth muscle, anti mitochondrial  -lactulose 10 TID and can titrate up as needed   - hepatology consult  - diagnostic paracentesis unremarkable, no malignant cells noted  - lactulose 20 g, lasix 20 mg, aldactone 50 mg     #RA - on infliximab  #osteoarthritis  :: hold home naproxen   - voltaren gel  - tylenol 650 mg q8 prn     #nonhealing, draining, L breast wound  - will try for inpatient breast ultrasound, otherwise..  -outpatient mammogram   -outpatient derm biopsy     #subacute avulsion fracture lesser trochanger R proximal femur  - chronic  - follow up with her orthopedic surgeon outpatient     #compression deformity at T12  -vitamin D and calcium     #abdominal aorta atherosclerosis on CT  #smoking history   - follow up with PCP outpatient to discuss potential statin/asa     F: none  E: PRN   N: regular diet  GI: none  P: lovenox  A: PIV  C: full code confirmed on admission   Surrogate:   Cierra her daughter [phone] (first option)  Emmanuel her son [phone]                 Sukhi Ortiz MD

## 2024-02-12 NOTE — CARE PLAN
Problem: Pain  Goal: My pain/discomfort is manageable  Outcome: Progressing   The patient's goals for the shift include Maintain patient safety    The clinical goals for the shift include Pt will be free from fall during this shift

## 2024-02-12 NOTE — PROGRESS NOTES
T[facility] discussed care team reccs for MOD intensity with pt at the bedside and pt's dtr, Linda via phone but pt refused. Pt states she would prefer to discharge with LOW intensity. Pt and pt's dtr states [facility] would be [facility] and pt's correct PCP is Chikis Peralta at . [facility] will send referral and will update medical team. Will continue to follow.

## 2024-02-12 NOTE — CARE PLAN
The patient's goals for the shift include Maintain patient safety    The clinical goals for the shift include pt will be free from falls during this shift    Over the shift, the patient did not make progress toward the following goals. Barriers to progression include . Recommendations to address these barriers include .

## 2024-02-12 NOTE — PROGRESS NOTES
"Meghan Waddell is a 68 y.o. female on day 9 of admission presenting with Cirrhosis (CMS/HCC).    Subjective   Endorses some improvement in pain while in bed but still has issues when moving around. Overall no major complaints.       Objective     Physical Exam  Physical Exam  Constitutional:       Appearance: mildly jaundiced  HENT:      Mouth/Throat:      Mouth: Mucous membranes are moist.   Cardiovascular:      Rate and Rhythm: Normal rate and regular rhythm.      Heart sounds: No murmur heard.  Pulmonary:      Effort: Pulmonary effort is normal. No respiratory distress.      Breath sounds: Normal breath sounds.   Abdominal:      Comments: Abdomen soft, diffusely mildly tender to palpation  No asterixis   Skin:     Comments: L breast with crusted draining lesion without purulence,fullness of the L lateral breast, nipple inversion with black scabbing, no obvious axillary LAD   Neurological:      General: No focal deficit present.      Mental Status: She is alert. A&OX3     Last Recorded Vitals  Blood pressure 108/70, pulse 88, temperature 36.7 °C (98.1 °F), temperature source Temporal, resp. rate 18, height 1.727 m (5' 8\"), weight 90.7 kg (200 lb), SpO2 95 %.  Intake/Output last 3 Shifts:  I/O last 3 completed shifts:  In: 240 (2.6 mL/kg) [P.O.:240]  Out: - (0 mL/kg)   Weight: 90.7 kg     Relevant Results  Results for orders placed or performed during the hospital encounter of 02/03/24 (from the past 24 hour(s))   CBC and Auto Differential   Result Value Ref Range    WBC 11.1 4.4 - 11.3 x10*3/uL    nRBC 0.0 0.0 - 0.0 /100 WBCs    RBC 4.23 4.00 - 5.20 x10*6/uL    Hemoglobin 12.9 12.0 - 16.0 g/dL    Hematocrit 35.0 (L) 36.0 - 46.0 %    MCV 83 80 - 100 fL    MCH 30.5 26.0 - 34.0 pg    MCHC 36.9 (H) 32.0 - 36.0 g/dL    RDW 21.2 (H) 11.5 - 14.5 %    Platelets 196 150 - 450 x10*3/uL    Neutrophils % 67.1 40.0 - 80.0 %    Immature Granulocytes %, Automated 0.5 0.0 - 0.9 %    Lymphocytes % 18.7 13.0 - 44.0 %    Monocytes % " 12.3 2.0 - 10.0 %    Eosinophils % 0.3 0.0 - 6.0 %    Basophils % 1.1 0.0 - 2.0 %    Neutrophils Absolute 7.46 1.20 - 7.70 x10*3/uL    Immature Granulocytes Absolute, Automated 0.05 0.00 - 0.70 x10*3/uL    Lymphocytes Absolute 2.08 1.20 - 4.80 x10*3/uL    Monocytes Absolute 1.36 (H) 0.10 - 1.00 x10*3/uL    Eosinophils Absolute 0.03 0.00 - 0.70 x10*3/uL    Basophils Absolute 0.12 (H) 0.00 - 0.10 x10*3/uL   Comprehensive metabolic panel   Result Value Ref Range    Glucose 89 74 - 99 mg/dL    Sodium 132 (L) 136 - 145 mmol/L    Potassium 4.4 3.5 - 5.3 mmol/L    Chloride 97 (L) 98 - 107 mmol/L    Bicarbonate 26 21 - 32 mmol/L    Anion Gap 13 10 - 20 mmol/L    Urea Nitrogen 29 (H) 6 - 23 mg/dL    Creatinine 1.38 (H) 0.50 - 1.05 mg/dL    eGFR 42 (L) >60 mL/min/1.73m*2    Calcium 8.4 (L) 8.6 - 10.6 mg/dL    Albumin 2.1 (L) 3.4 - 5.0 g/dL    Alkaline Phosphatase 287 (H) 33 - 136 U/L    Total Protein 6.6 6.4 - 8.2 g/dL     (H) 9 - 39 U/L    Bilirubin, Total 11.4 (H) 0.0 - 1.2 mg/dL    ALT 57 (H) 7 - 45 U/L   Morphology   Result Value Ref Range    RBC Morphology See Below     Polychromasia Mild     Target Cells Many     Lindsey Cells Few                Assessment/Plan   Principal Problem:    Cirrhosis (CMS/HCC)    Ms. Waddell is 67 yo F history of Seropositive RA (on infliximab), osteoarthritis, nephrolithiasis, obesity, DLD, PACs, history of tobacco use (quit march 2023), who presented to [place] ED on 2/1 for dark red urine, generalized abdominal pain with nausea transferred to [facility] for further workup of her new cirrhosis and jaundice. Patient also reports 1 episode of melena last week, otherwise had yellow colored stools. Regarding Jaundice, she has generalized abdominal pain, which she states originated in the RLQ. She does have several calcified gallstones on her CT making biliary colic/choledocolithiasis a distinct possibility (no signs of infection), however regarding her new cirrhosis diagnosis - this  seems new in the last year as 1/2023 CT scan of her abdomen showed a normal contour liver so this would have had to have progressed in the last year: no alcohol, negative viral serologies, she seems like a setup for an autoimmune hepatitis with her history of RA, less likely a hepatic toxicity from her infliximab, patient does report she previously took methotrexate for RA mgmt. Otherwise she is immunocompromised so opportunistic infection remains on the differential but this also seems less likely. RUQ revealed Liver cirrhosis with diffusely heterogenous hepatic parenchyma, no definite focal lesion identified, although neoplasm can not be excluded. Perihepatic ascites, slow portal venous flow and recanalized paraumbilical vein, likely sequela of portal hypertension. Cholelithiasis without sonographic evidence of acute cholecystitis. Pending further workup with liver biopsy, suspect underlying metastatic disease.     2/12 updates:  - Likely no further recommendations from hepatology standpoint, can follow up outpatient  - Liver biopsy showed poorly differentiated adenocarcinoma, immuno-phenotypically compatible with breast origin   - Patient to follow up with [facility] oncology  - MRI brain w/wo contrast ordered with lesions suggestive of possible metastatic disease  - Small aneurysm noted on MRI, will make a note on discharge summary for potential follow up with [facility] outpatient  - Outpatient rheumatologist aware, patient to stop receiving infliximab outpatient  - C/w lasix 20 mg aldactone 50 mg, lactulose 20 g, will f/up hepatology recs     #new liver cirrhosis  #Cholelithiasis  #mixed hepatocellular and cholestatic liver injury   #mild confusion possible grade 1 hepatic encephalopahthy  :: hepC negative, hepB negative, hepA negative, T spot negative in 11/2023  :: alpha 1 antitrypsin negative  :: ferritin 406 H/transferrin 136 L  :: RUQ as above in assessment  :: ammonia 79  Plan:  -fu autoimmune workup: JOCELYN, anti smooth  muscle, anti mitochondrial  -lactulose 10 TID and can titrate up as needed   - hepatology consult  - diagnostic paracentesis unremarkable, no malignant cells noted  - lactulose 20 g, lasix 20 mg, aldactone 50 mg     #RA - on infliximab  #osteoarthritis  :: hold home naproxen   - voltaren gel  - tylenol 650 mg q8 prn     #nonhealing, draining, L breast wound  - will try for inpatient breast ultrasound, otherwise..  -outpatient mammogram   -outpatient derm biopsy     #subacute avulsion fracture lesser trochanger R proximal femur  - chronic  - follow up with her orthopedic surgeon outpatient     #compression deformity at T12  -vitamin D and calcium     #abdominal aorta atherosclerosis on CT  #smoking history   - follow up with PCP outpatient to discuss potential statin/asa     F: none  E: PRN   N: regular diet  GI: none  P: lovenox  A: PIV  C: full code confirmed on admission   Surrogate:   Cierra her daughter [phone] (first option)  Emmanuel her son [phone]                 Marcell Ramírez MD

## 2024-02-12 NOTE — PROGRESS NOTES
"[facility]  [facility] [place]  CONSULT FOLLOW-UP     Reason For Consult  Elevated LFTs    History Of Present Illness  Meghan Waddell is a 68 y.o. female with a past medical history of seropositive RA on infliximab since 12/2023, and obesity (BM 30) who presented to [place] ED on 2/1 for dark red urine, abdominal pain with nausea who was found to be jaundiced with elevated LFTs and was transferred to [facility] for further care. Hepatology is consulted for elevated LFTS    SUBJECTIVE     -Liver biopsy showed poorly differentiated adenocarcinoma of breast origin on random liver biopsies  -Has been started anastrozole  -MRI brain was done that showed cerebellar lesions concerning for metastatic disease  -Patient was seen at bedside with son in room and daughter on phone. Expressed frustration that there is not a clear plan in place for her cancer    EXAM     Last Recorded Vitals  Blood pressure 104/63, pulse 81, temperature 36.5 °C (97.7 °F), resp. rate 17, height 1.727 m (5' 8\"), weight 90.7 kg (200 lb), SpO2 95 %.      Intake/Output Summary (Last 24 hours) at 2/12/2024 0647  Last data filed at 2/11/2024 1459  Gross per 24 hour   Intake 240 ml   Output --   Net 240 ml          Physical Exam  General: obese, no acute distress  HEENT: PERRLA, EOM intact, no scleral icterus, moist MM, poor dentition  Respiratory: CTA bilaterally, normal work of breathing  Cardiovascular: RRR, no murmurs/rubs/gallops  Abdomen: Soft, RUQ tenderness, +ve fluid wave, bowel sounds present, no masses palpated, no organomeagly  Extremities: no edema, no asterixis  Neuro: alert and oriented, CNII-XII grossly intact, moves all 4 extremities with no focal deficits      OBJECTIVE                                                                              Medications             Current Facility-Administered Medications:     acetaminophen (Tylenol) tablet 650 mg, 650 mg, oral, q8h PRN, 650 mg at 02/10/24 " 1350 **OR** acetaminophen (Tylenol) oral liquid 650 mg, 650 mg, nasogastric tube, q8h PRN **OR** acetaminophen (Tylenol) suppository 650 mg, 650 mg, rectal, q8h PRN, Marcell Ramírez MD    anastrozole (Arimidex) tablet 1 mg, 1 mg, oral, Daily, Sukhi Ortiz MD, 1 mg at 02/11/24 0843    calcium carbonate-vitamin D3 500 mg-5 mcg (200 unit) per tablet 2 tablet, 2 tablet, oral, Daily, Vero Russell MD, 2 tablet at 02/11/24 0843    diclofenac sodium (Voltaren) 1 % gel 4 g, 4 g, Topical, 4x daily PRN, Vero Russell MD, 4 g at 02/11/24 1208    enoxaparin (Lovenox) syringe 40 mg, 40 mg, subcutaneous, q24h, Vero Russell MD, 40 mg at 02/11/24 2058    furosemide (Lasix) tablet 20 mg, 20 mg, oral, Daily, Dominick Hicks MD, 20 mg at 02/11/24 0843    lactulose 20 gram/30 mL oral solution 20 g, 20 g, oral, Daily, Dominick Hicks MD    lidocaine 4 % patch 1 patch, 1 patch, transdermal, Daily, Devi Ayers MD, 1 patch at 02/11/24 1208    oxyCODONE (Roxicodone) immediate release tablet 5 mg, 5 mg, oral, q6h PRN, Sukhi Ortiz MD, 5 mg at 02/11/24 2106    pantoprazole (ProtoNix) EC tablet 40 mg, 40 mg, oral, Daily before breakfast, Connie Hebert MD, 40 mg at 02/11/24 0842    polyethylene glycol (Glycolax, Miralax) packet 17 g, 17 g, oral, Daily, Vero Russell MD    spironolactone (Aldactone) tablet 50 mg, 50 mg, oral, Daily, Dominick Hicks MD, 50 mg at 02/11/24 0843                                                                            Labs     Results for orders placed or performed during the hospital encounter of 02/03/24 (from the past 24 hour(s))   CBC and Auto Differential   Result Value Ref Range    WBC 9.3 4.4 - 11.3 x10*3/uL    nRBC 0.0 0.0 - 0.0 /100 WBCs    RBC 4.05 4.00 - 5.20 x10*6/uL    Hemoglobin 12.3 12.0 - 16.0 g/dL    Hematocrit 33.6 (L) 36.0 - 46.0 %    MCV 83 80 - 100 fL    MCH 30.4 26.0 - 34.0 pg    MCHC 36.6 (H) 32.0 - 36.0 g/dL    RDW 20.6 (H) 11.5 - 14.5 %    Platelets  197 150 - 450 x10*3/uL    Neutrophils % 60.7 40.0 - 80.0 %    Immature Granulocytes %, Automated 0.4 0.0 - 0.9 %    Lymphocytes % 25.1 13.0 - 44.0 %    Monocytes % 12.2 2.0 - 10.0 %    Eosinophils % 0.4 0.0 - 6.0 %    Basophils % 1.2 0.0 - 2.0 %    Neutrophils Absolute 5.64 1.20 - 7.70 x10*3/uL    Immature Granulocytes Absolute, Automated 0.04 0.00 - 0.70 x10*3/uL    Lymphocytes Absolute 2.34 1.20 - 4.80 x10*3/uL    Monocytes Absolute 1.14 (H) 0.10 - 1.00 x10*3/uL    Eosinophils Absolute 0.04 0.00 - 0.70 x10*3/uL    Basophils Absolute 0.11 (H) 0.00 - 0.10 x10*3/uL   Magnesium   Result Value Ref Range    Magnesium 2.31 1.60 - 2.40 mg/dL   Coagulation Screen   Result Value Ref Range    Protime 15.5 (H) 9.8 - 12.8 seconds    INR 1.4 (H) 0.9 - 1.1    aPTT 31 27 - 38 seconds   Comprehensive metabolic panel   Result Value Ref Range    Glucose 74 74 - 99 mg/dL    Sodium 132 (L) 136 - 145 mmol/L    Potassium 4.1 3.5 - 5.3 mmol/L    Chloride 100 98 - 107 mmol/L    Bicarbonate 23 21 - 32 mmol/L    Anion Gap 13 10 - 20 mmol/L    Urea Nitrogen 22 6 - 23 mg/dL    Creatinine 1.00 0.50 - 1.05 mg/dL    eGFR 62 >60 mL/min/1.73m*2    Calcium 8.1 (L) 8.6 - 10.6 mg/dL    Albumin 2.0 (L) 3.4 - 5.0 g/dL    Alkaline Phosphatase 266 (H) 33 - 136 U/L    Total Protein 6.1 (L) 6.4 - 8.2 g/dL     (H) 9 - 39 U/L    Bilirubin, Total 10.6 (H) 0.0 - 1.2 mg/dL    ALT 54 (H) 7 - 45 U/L   Phosphorus   Result Value Ref Range    Phosphorus 3.3 2.5 - 4.9 mg/dL   Bilirubin, Direct   Result Value Ref Range    Bilirubin, Direct 6.4 (H) 0.0 - 0.3 mg/dL   Morphology   Result Value Ref Range    RBC Morphology See Below     Target Cells Many                                                                           Imaging     1/5/2024 MRI/MRCP Liver with and without contrast  IMPRESSION:  1. Enhancing left breast masses concerning for a primary breast  malignancy. Correlation with dedicated breast imaging is recommended.  2. Diffuse enhancing osseous  lesions compatible with osseous  metastases.  3. Markedly nodular contour to the liver with diffuse nodular delayed  enhancement. Differential includes an atypical appearance of diffuse  hepatic metastases versus hepatic fibrosis/cirrhosis. Correlation  with tissue sampling is recommended.  4. Prominent right cardiophrenic lymph nodes which could be reactive  or metastatic. Correlation with dedicated chest imaging is  recommended.  5. Small volume ascites.  6. Additional chronic and incidental findings as described above.    2/4/2024 US Liver with Doppler  IMPRESSION:  1. Liver cirrhosis with diffusely heterogenous hepatic parenchyma.  Though no definite focal lesion identified, neoplasm can not be  excluded. Further assessment with liver MRI with IV contrast is  recommended.  2. Perihepatic ascites, slow portal venous flow and recanalized  paraumbilical vein, likely sequela of portal hypertension.  3. Cholelithiasis without sonographic evidence of acute cholecystitis.  4. Increased renal cortical echogenicity can be seen the setting of  medical renal disease. No hydronephrosis.      2/1/2024 CT abdomen Pelvis without IV contrast  Abdomen:   There has been significant interval change in the liver since last  year, now diffusely heterogeneous in density with cirrhotic  morphology. In addition, there has been interval development of  diffuse minimal ascites.  Gallbladder remains contracted with calcified stones. Bile ducts are  normal caliber. Spleen maintains normal size. No abnormalities are  detectable in the pancreas or adrenal glands. Non-obstructing small  calculi are again seen in the kidneys. There are no ureteral calculi  and there is no hydronephrosis  There is stable mild dilatation of the mid abdominal aorta. Small  lymph nodes are seen in the abdomen and retroperitoneum. There is no  retroperitoneal hematoma  The stomach is normally distended with a small hiatal hernia. There is  no appreciable small  bowel thickening or obstruction. Appendix is not  localized. Scattered stool is seen throughout the colon. There is no  free air.    IMPRESSION:  1. Since last year, interval development of significant parenchymal  liver disease with cirrhotic morphology.  2. Additional development of diffuse minimal ascites.  3. Unchanged bilateral small non-obstructing renal calculi; no  hydronephrosis.  4. No bowel obstruction or detectable bowel inflammation.  5. Likely subacute avulsion fracture lesser trochanter right proximal  femur.  6. Remainder as above.                                                                         GI Procedures     No prior    ASSESSMENT / PLAN     ASSESSMENT/PLAN:  Meghan Waddell is a 68 y.o. female with a past medical history of seropositive RA on infliximab since 12/2023, and obesity (BM 30) who presented to [place] ED on 2/1 for dark red urine, abdominal pain with nausea who was found to be jaundiced with elevated LFTs and was transferred to [facility] for further care. She has been diagnosed with a new metastatic breast cancer (mets to bones, liver, brain). Hepatology is consulted for elevated LFTS    She has newly elevated LFTs this admission compared to prior. Mostly a cholestatic pattern (R Factor 0.6), but does have AST > ALT. She has cholelithiasis on imaging but MRI/MRCP did not show any cholelithiasis. She has a very sudden change in the appearance of liver in CT compared to 1 year ago. The liver is now very heterogenous in appearance and she has newly elevated LFTs.  While there are no discrete tumors or of the liver on MRI, extensive microscopic metastatic infiltrative disease of the liver. Liver biopsy with random right liver biopsies were done and pathology is consistent with poorly differentiated adenocarcinoma of the liver. 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 Treatment for this would have to be treatment of her underlying  metastatic cancer and otherwise supportive care for the complications from cirrhosis/pseudocirrhosis such as diuretics for ascites/edema and lactulose/rifaxamin for hepatic encephalopathy.    Discussed case with pathology, there is not enough non-cancerous background tissue from the biopsy to comment on whether or not there was underlying cirrhosis or AIH. Noted ASMA was mildly positive but this can be falsely positive in many cirrhotic patients. She does have a history of RA and we have not yet been able to rule out AIH. We consider brief empiric steroid trial (Prednisone 30mg once daily) for possibly underlying AIH that we have not fully ruled out. If LFTs improve on steroid trial, could be indicative of an underlying autoimmune process.    #Newly decompensated cirrhosis  -Etiology uncertain, possible NIETO  -Liver stamp: A1AT 170, ceruloplasmin 42.6, HCV negative,  -Pending labs: AMA, ASMA, JOCELYN, LKM-1, PETH, immunoglobulins, anti-SLA/LP, anti-liver cytosol Ab (ALC-1)  MELD 3.0: 25 at 2/11/2024  7:09 AM  MELD-Na: 22 at 2/11/2024  7:09 AM  Calculated from:  Serum Creatinine: 1.00 mg/dL at 2/11/2024  7:09 AM  Serum Sodium: 132 mmol/L at 2/11/2024  7:09 AM  Total Bilirubin: 10.6 mg/dL at 2/11/2024  7:09 AM  Serum Albumin: 2.0 g/dL at 2/11/2024  7:09 AM  INR(ratio): 1.4 at 2/11/2024  7:09 AM  Age at listing (hypothetical): 68 years  Sex: Female at 2/11/2024  7:09 AM  - Ascites: minimal, perihepatic. Diagnostic paracentesis 1.7L removed 2/5 with SAAG 1.7 and total protein 0.8 consistent with portal HTN. Negative SBP and cytology  - Volume: start lasix 20mg and spironolactone 50mg PO  - EV: unknown, no prior EGD  - HE: present/new. Started on Lactulose this admission  - Immunizations: HBV and HAV nonimmune, defer vaccination to outpatient  - HCC screening: AFP pending, MRI pending  - Transplant:     Recommendations:  -Can consider empiric steroid trial for possible underlying AIH (Prednisone PO 30mg once daily) but this  would have to be a risk-benefit discussion with the patient and oncology given newly diagnosed metastatic breast cancer  -Continue Lasix 20mg PO and Spironolactone 50mg PO   -Consider sending serum histoplasma Ag and/or urine histoplasma Ag  -Decrease lactulose to 20g once daily given bloating/cramping. Goal is 2-3 BM daily, titrate as needed  -Sodium restricted diet, 2g Na daily  -Continue to trend daily MELD labs: CMP and INR daily    Patient was seen and discussed with Dr. Carias    Thank you for the consultation. Hepatology will continue to the follow .  - During weekday hours of 7am- 5pm, please do not hesitate to contact me on Social[employer] Chat or page [phone] if there are any further questions   - After hours, on weekends, and on holidays, please [place] [phone]. Thank you.     Dottie Connors MD  PGY4 Gastroenterology Fellow  [facility] [place]

## 2024-02-13 PROBLEM — C78.7: Status: ACTIVE | Noted: 2024-01-01

## 2024-02-13 PROBLEM — C50.912: Status: ACTIVE | Noted: 2024-01-01

## 2024-02-13 NOTE — PROGRESS NOTES
*SERVICE TRANSFER NOTE*    Meghan Waddell is a 68 y.o. female on day 10 of admission presenting with Cirrhosis (CMS/HCC).    Subjective   Patient doing okay this AM without new complaints. Does endorse that she feels unsure about the path moving forward. Patient and son were reassured that oncology team working towards plan; however, key pathology from breast cancer is still pending. Spoke with oncology and hepatology team this AM, recommend transfer to primary cancer service.       Hospital Course:  Meghan Waddell is a 68 y.o. female history of Seropositive RA (on infliximab), osteoarthritis, nephrolithiasis, obesity, DLD, PACs, history of tobacco use (quit march 2023), who presented to [place] ED on 2/1 for dark red urine, generalized abdominal pain with nausea. Patient also endorses yellow colored stools with 1 episode of melena the week prior to admission. Additionally, patient presents with at least 1 year of a L breast draining lesion, has not noticed any lumps in her armpits. She has lost 20lb since December, endorsing early satiety and denying night sweats. She was transferred to [facility] on 2/3/2024 for further workup of new cirrhosis and jaundice.     At [place]:  Patient was afebrile and vitally stable, labs with CBC and CMP unremarkable. HFP showed , ALT 60, t bili 6.4 Alk phos 346 albumin 2.9 no direct bili, INR 1.1.  hepB nonimmune, hepA nonreactive, hepC nonreactive,  (ULD is 55), Guaiac positive stool, Lactate 3.0 >1.9, Lipase 53, ethanol <10, Urinalysis small blood, moderate bilirubin, 4+ urobilinogen negative nitrites and leuk esterase, microscopy with few squamous cells, 1+ bacteria, hyaline casts, calcium oxalate crystals, no pyuria or hematuria, Urine culture negative, Wound culture of draining breast lesion: mixed skin organisms. CT AP with contrast performed showing, since last year, interval development of significant parenchymal liver disease with cirrhotic morphology.  Additional development of diffuse minimal ascites. Likely subacute avulsion fracture lesser trochanter right proximal femur. Interventions in [place] included: maintenance fluids, naproxen 250mg once, zofran 4mg, potassium 40mEq.    Upon admission to [facility] RUQ with dopplers performed revealing Liver cirrhosis with diffusely heterogenous hepatic parenchyma, no definite focal lesion identified, although neoplasm can not be excluded. Perihepatic ascites, slow portal venous flow and recanalized paraumbilical vein, likely sequela of portal hypertension. Cholelithiasis without sonographic evidence of acute cholecystitis. Alpha-1 antitrypsin returned negative, feritin 406, transferrin 136, ammonia 79. JOCELYN, ASMA, AMA, ceruloplasmin pending. Patient started on lactulose TID given confusion as noted per patient's daughter. Hepatology was consulted and recommended diagnostic paracentesis, MRI liver/ MRCP, anti-LKM, anti- SLA/LP, anti-liver cytosol antibody, CXR, Bcx, and empiric ceftriaxone.     Patient now s/p two paracenteses (~2 L each time), the first of which was negative for SBP and did not yield any malignant cells (did not run studies on second sample). Hepatology has recommended patient be started on lasix 20 mg PO and spironolactone 50 mg PO to help with ascitic fluid buildup (were held on 2/12/2024 iso SYLVIA). Patient started on lactulose 20g once daily as well. Patient's autoimmune workup was largely negative except for anti-SM antibody being positive 1:20. Although this could be suggestive of autoimmune hepatitis, specificity is not high given the weakness of titer and also Hx of RA. However, per GI, would be worth considering trial of steroids to see if it improves liver function. Prednisone 30 mg started 2/13/2024.    Throughout admission, patient's hepatic function has gradually worsened reflected by rising total bilirubin (12.1 on 2/13) and stable but elevated AST/ALT (53/197 non 2/13). Synthetic function also  poor with albumin of 2.2 and INR 1.3. After discussions with oncology and hepatology, it appears that multiple etiologies may be responsible for patient's liver decompensation; however, malignant cells in liver appear to be the culprit. Patient was noted to be ER+ so anastrazole was started at recommendation of oncology team; however, remainder of tumor markers are still pending. Because of this, final oncology plan is still up in the air. Since inpatient chemotherapy is an option, oncology team requested transfer to primary cancer service on 2024. [facility]    Other Pertinent Objective Data:    Mammogram:  While here, patient had mammogram which was significant for the followin. Left breast spiculated masses and suspicious regionally distributed calcifications. The distribution of suspicious calcifications measures 17 cm. There is a spiculated 6:00 o'clock mass with nipple involvement and a spiculated mass in the upper-outer quadrant at 1:00 o'clock. For extent, biopsies of the 6:00 o'clock subareolar mass and 1:00 o'clock left breast mass are recommended.   2. 3 indeterminate left axillary lymph nodes. An ultrasound-guided core biopsy of 1 of the lymph nodes is recommended. Additionally, there is a large lymph node seen mammographically without a sonographic correlate.   3. Right breast mass at the 10 o'clock position 1 cm from the nipple. An ultrasound-guided core biopsy is recommended.   4. Indeterminate right breast calcifications. A stereotactic biopsy is recommended.   BI-RADS CATEGORY:  5 Highly Suggestive of Malignancy.   Recommendation:  Surgical Consultation and Biopsy.   Recommended Date:  Immediate.   Laterality:  Bilateral.     Patient now s/p several biopsies done with breast team. Details as follows:   Status post ultrasound guided core needle biopsy of a 2 left breast masses, left axillary lymph node, and 2 groupings of right breast  calcifications followed by tissue marker placement. Pathology is pending.    Patient had a liver biopsy as well, with pathology significant for the following: Right lobe mass, biopsy: Poorly- differentiated adenocarcinoma, immuno phenotypically compatible with breast origin, see comment.    Brain MRI 2/10/2024:   IMPRESSION:  Small enhancing lesions in the cerebellum are favored to represent  metastatic disease given the patient's history. There is no  associated mass effect.  Nonspecific white matter changes most likely represent small-vessel  ischemic disease in a patient of this age.  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 MRA of the head is  recommended for further evaluation.    MRCP 2/4/2024:  IMPRESSION:  1. Enhancing left breast masses concerning for a primary breast malignancy. Correlation with dedicated breast imaging is recommended.  2. Diffuse enhancing osseous lesions compatible with osseous  metastases.  3. Markedly nodular contour to the liver with diffuse heterogeneous nodular delayed enhancement, which is new compared to 01/30/2023 CT.No focal arterially enhancing lesion with washout or diffusion restriction. Differential includes diffuse necrotic hepatic metastases versus cirrhotic liver with fibrotic bands and regenerating nodules. Correlation with tissue sampling is recommended.  4. Prominent right cardiophrenic lymph nodes which could be reactive  or metastatic. Correlation with dedicated chest imaging is recommended.  5. Mild abdominopelvic ascites.  6. Additional chronic and incidental findings as described above.    Follow up tasks:  [ ] Continue to monitor HFP and PT/INR iso worsening liver function. Watch closely if these lab values improve upon initiation of prednisone 30 mg  [ ] Oncology to determine plan for possible inpatient chemo, pending final pathology from cancer  [ ] Continue to monitor SYLVIA. Cr improving from 1.38 on 2/12 to 1.27 2/13. Can restart lasix and aldactone once  "resolved.  [ ] Patient may need repeated paracenteses, fluid removal has provided relief for patient thus far  [ ] Can consider supportive oncology consult if patient and family change their mind and opt for more palliative approach  [ ] Outpatient: MRI had incidental finding of potential 4 mm right MCA bifurcation aneurysm. MRA of the head is recommended for further evaluation. Per radiology, would be more reasonable to defer this to outpatient.    Objective     Physical Exam  Physical Exam  Constitutional:       Appearance: mildly jaundiced  HENT:      Mouth/Throat:      Mouth: Mucous membranes are moist.   Cardiovascular:      Rate and Rhythm: Normal rate and regular rhythm.      Heart sounds: No murmur heard.  Pulmonary:      Effort: Pulmonary effort is normal. No respiratory distress.      Breath sounds: Normal breath sounds.   Abdominal:      Comments: Abdomen soft, diffusely mildly tender to palpation  No asterixis   Skin:     Comments: L breast with crusted draining lesion without purulence,fullness of the L lateral breast, nipple inversion with black scabbing, no obvious axillary LAD   Neurological:      General: No focal deficit present.      Mental Status: She is alert. A&OX3     Last Recorded Vitals  Blood pressure 119/68, pulse 85, temperature 36.6 °C (97.8 °F), temperature source Temporal, resp. rate 16, height 1.727 m (5' 8\"), weight 90.7 kg (200 lb), SpO2 92 %.  Intake/Output last 3 Shifts:  I/O last 3 completed shifts:  In: 475 (5.2 mL/kg) [P.O.:475]  Out: - (0 mL/kg)   Weight: 90.7 kg     Relevant Results  Results for orders placed or performed during the hospital encounter of 02/03/24 (from the past 24 hour(s))   Comprehensive metabolic panel   Result Value Ref Range    Glucose 69 (L) 74 - 99 mg/dL    Sodium 129 (L) 136 - 145 mmol/L    Potassium 4.5 3.5 - 5.3 mmol/L    Chloride 96 (L) 98 - 107 mmol/L    Bicarbonate 23 21 - 32 mmol/L    Anion Gap 15 10 - 20 mmol/L    Urea Nitrogen 31 (H) 6 - 23 " mg/dL    Creatinine 1.27 (H) 0.50 - 1.05 mg/dL    eGFR 46 (L) >60 mL/min/1.73m*2    Calcium 8.7 8.6 - 10.6 mg/dL    Albumin 2.2 (L) 3.4 - 5.0 g/dL    Alkaline Phosphatase 271 (H) 33 - 136 U/L    Total Protein 6.7 6.4 - 8.2 g/dL     (H) 9 - 39 U/L    Bilirubin, Total 12.1 (H) 0.0 - 1.2 mg/dL    ALT 53 (H) 7 - 45 U/L   Magnesium   Result Value Ref Range    Magnesium 2.50 (H) 1.60 - 2.40 mg/dL   CBC and Auto Differential   Result Value Ref Range    WBC 11.3 4.4 - 11.3 x10*3/uL    nRBC 0.0 0.0 - 0.0 /100 WBCs    RBC 4.34 4.00 - 5.20 x10*6/uL    Hemoglobin 13.2 12.0 - 16.0 g/dL    Hematocrit 35.9 (L) 36.0 - 46.0 %    MCV 83 80 - 100 fL    MCH 30.4 26.0 - 34.0 pg    MCHC 36.8 (H) 32.0 - 36.0 g/dL    RDW 21.6 (H) 11.5 - 14.5 %    Platelets 221 150 - 450 x10*3/uL    Immature Granulocytes %, Automated 0.4 0.0 - 0.9 %    Immature Granulocytes Absolute, Automated 0.04 0.00 - 0.70 x10*3/uL   Protime-INR   Result Value Ref Range    Protime 14.8 (H) 9.8 - 12.8 seconds    INR 1.3 (H) 0.9 - 1.1   Manual Differential   Result Value Ref Range    Neutrophils %, Manual 75.6 40.0 - 80.0 %    Lymphocytes %, Manual 7.8 13.0 - 44.0 %    Monocytes %, Manual 9.6 2.0 - 10.0 %    Eosinophils %, Manual 0.0 0.0 - 6.0 %    Basophils %, Manual 0.9 0.0 - 2.0 %    Atypical Lymphocytes %, Manual 6.1 0.0 - 2.0 %    Seg Neutrophils Absolute, Manual 8.54 (H) 1.20 - 7.00 x10*3/uL    Lymphocytes Absolute, Manual 0.88 (L) 1.20 - 4.80 x10*3/uL    Monocytes Absolute, Manual 1.08 (H) 0.10 - 1.00 x10*3/uL    Eosinophils Absolute, Manual 0.00 0.00 - 0.70 x10*3/uL    Basophils Absolute, Manual 0.10 0.00 - 0.10 x10*3/uL    Atypical Lymphs Absolute, Manual 0.69 (H) 0.00 - 0.50 x10*3/uL    Total Cells Counted 115     RBC Morphology See Below     Target Cells Few                Assessment/Plan   Principal Problem:    Cirrhosis (CMS/HCC)    Ms. Waddell is 67 yo F history of Seropositive RA (on infliximab), osteoarthritis, nephrolithiasis, obesity, DLD, PACs,  history of tobacco use (quit march 2023), who presented to [place] ED on 2/1 for dark red urine, generalized abdominal pain with nausea transferred to  main campus for further workup of her new cirrhosis and jaundice. Since admission, hepatology and oncology have been consulted with recommendations for workup and further management. Per biopsy results, patient has breast cancer with known metastasis to the liver and likely metastasis to her brain. Hepatology suspects that new cirrhosis is related to infiltrative malignancy although slight possibility of autoimmune component. Currently trialing prednisone 30 mg as we await final plan from oncology for possible inpatient chemo vs outpatient follow up, pending final breast cancer pathology.    2/13 updates:  - Lasix and aldactone still held iso SYLVIA. Cr this AM 1.27.  - Oncology recommends transfer to primary oncology service. Will be transferred [facility]. Final plan from oncology standpoint still pending  - Per hepatology, can trial prednisone 30 mg to see if it will salvage any liver function in case there is autoimmune component to liver decompensation. Will trial and see if liver function improves.  - Liver biopsy showed poorly differentiated adenocarcinoma, immuno-phenotypically compatible with breast origin   - Outpatient rheumatologist aware of admission, patient to stop receiving infliximab outpatient     #new liver cirrhosis  #Cholelithiasis  #mixed hepatocellular and cholestatic liver injury   #mild confusion possible grade 1 hepatic encephalopahthy  #liver metastasis (breast primary)  :: hepC negative, hepB negative, hepA negative, T spot negative in 11/2023  :: alpha 1 antitrypsin negative  :: ferritin 406 H/transferrin 136 L  :: RUQ as above in assessment  :: ammonia 79  Plan:  - fu autoimmune workup: JOCELYN, anti smooth muscle, anti mitochondrial  - autoimmune workup for the most part negative. Anti-SM positive 1:20, could be related to autoimmune  hepatitis; however, still suspect malignancy to be driving variable  - liver biopsy significant for poorly differentiated breast cancer  - diagnostic paracentesis unremarkable, no malignant cells noted  - lactulose 20 g, lasix 20 mg and aldactone 50 mg currently held iso SYLVIA  - trialing prednisone 30 mg to see if it may help with possible autoimmune component     #RA - on infliximab  #osteoarthritis  :: hold home naproxen   - voltaren gel  - tylenol 650 mg q8 prn  - outpatient rheum aware to discontinue infliximab     #nonhealing, draining, L breast wound  #primary breast cancer with liver met and likely brain mets  - inpatient mammogram significant for masses and suspicious lymphadenopathy  - s/p biopsies, pending final pathology  - ER+, anastrazole 1 mg     #subacute avulsion fracture lesser trochanger R proximal femur  - chronic  - follow up with her orthopedic surgeon outpatient     #compression deformity at T12  -vitamin D and calcium     #abdominal aorta atherosclerosis on CT  #smoking history   - follow up with PCP outpatient to discuss potential statin/asa     F: none  E: PRN   N: regular diet  GI: none  P: lovenox  A: PIV  C: full code confirmed on admission   Surrogate:   Cierra her daughter [phone] (first option)  Emmanuel her son [phone]                 Marcell Ramírez MD

## 2024-02-13 NOTE — PROGRESS NOTES
Meghan Waddell is a 68 y.o. female on day 10 of admission presenting with Cirrhosis (CMS/HCC).    Subjective   Patient reports low appetite. She will eat 2 meals per day, about 50% of her meals. States she just doesn't feel like eating. Endorses abdominal pain diffusely that is mildly tender to palpation. Denies pain of her left breast but continues to have scabbing and discharge. Had one small, normal bowel movement yesterday but requesting lactulose for tonight. Urine is intermittently red but mostly normal. States she feels nervous but ready to get her treatment started.        Objective     Physical Exam  Constitutional:       General: She is not in acute distress.     Appearance: Normal appearance. She is not toxic-appearing.   HENT:      Mouth/Throat:      Mouth: Mucous membranes are moist.   Eyes:      General: Scleral icterus present.      Extraocular Movements: Extraocular movements intact.      Pupils: Pupils are equal, round, and reactive to light.   Cardiovascular:      Rate and Rhythm: Normal rate and regular rhythm.      Heart sounds: No murmur heard.     No gallop.   Pulmonary:      Effort: Pulmonary effort is normal. No respiratory distress.      Breath sounds: No wheezing.   Chest:   Breasts:     Left: Inverted nipple, mass, nipple discharge and skin change present.      Comments: L breast with crusted draining lesion without purulence, fullness of the L lateral breast noted, nipple inversion with black scabbing  Abdominal:      General: Abdomen is flat. There is no distension.      Palpations: Abdomen is soft.      Tenderness: There is abdominal tenderness (mild tenderness to palpation in all four quadrants).   Musculoskeletal:         General: No swelling.      Right lower leg: No edema.      Left lower leg: No edema.   Skin:     General: Skin is warm and dry.      Coloration: Skin is jaundiced.   Neurological:      General: No focal deficit present.      Mental Status: She is alert.         Last  "Recorded Vitals  Blood pressure 119/68, pulse 85, temperature 36.6 °C (97.8 °F), temperature source Temporal, resp. rate 16, height 1.727 m (5' 8\"), weight 90.7 kg (200 lb), SpO2 92 %.  Intake/Output last 3 Shifts:  I/O last 3 completed shifts:  In: 475 (5.2 mL/kg) [P.O.:475]  Out: - (0 mL/kg)   Weight: 90.7 kg     Relevant Results  Results for orders placed or performed during the hospital encounter of 02/03/24 (from the past 24 hour(s))   Comprehensive metabolic panel   Result Value Ref Range    Glucose 69 (L) 74 - 99 mg/dL    Sodium 129 (L) 136 - 145 mmol/L    Potassium 4.5 3.5 - 5.3 mmol/L    Chloride 96 (L) 98 - 107 mmol/L    Bicarbonate 23 21 - 32 mmol/L    Anion Gap 15 10 - 20 mmol/L    Urea Nitrogen 31 (H) 6 - 23 mg/dL    Creatinine 1.27 (H) 0.50 - 1.05 mg/dL    eGFR 46 (L) >60 mL/min/1.73m*2    Calcium 8.7 8.6 - 10.6 mg/dL    Albumin 2.2 (L) 3.4 - 5.0 g/dL    Alkaline Phosphatase 271 (H) 33 - 136 U/L    Total Protein 6.7 6.4 - 8.2 g/dL     (H) 9 - 39 U/L    Bilirubin, Total 12.1 (H) 0.0 - 1.2 mg/dL    ALT 53 (H) 7 - 45 U/L   Magnesium   Result Value Ref Range    Magnesium 2.50 (H) 1.60 - 2.40 mg/dL   CBC and Auto Differential   Result Value Ref Range    WBC 11.3 4.4 - 11.3 x10*3/uL    nRBC 0.0 0.0 - 0.0 /100 WBCs    RBC 4.34 4.00 - 5.20 x10*6/uL    Hemoglobin 13.2 12.0 - 16.0 g/dL    Hematocrit 35.9 (L) 36.0 - 46.0 %    MCV 83 80 - 100 fL    MCH 30.4 26.0 - 34.0 pg    MCHC 36.8 (H) 32.0 - 36.0 g/dL    RDW 21.6 (H) 11.5 - 14.5 %    Platelets 221 150 - 450 x10*3/uL    Immature Granulocytes %, Automated 0.4 0.0 - 0.9 %    Immature Granulocytes Absolute, Automated 0.04 0.00 - 0.70 x10*3/uL   Protime-INR   Result Value Ref Range    Protime 14.8 (H) 9.8 - 12.8 seconds    INR 1.3 (H) 0.9 - 1.1   Manual Differential   Result Value Ref Range    Neutrophils %, Manual 75.6 40.0 - 80.0 %    Lymphocytes %, Manual 7.8 13.0 - 44.0 %    Monocytes %, Manual 9.6 2.0 - 10.0 %    Eosinophils %, Manual 0.0 0.0 - 6.0 %    " Basophils %, Manual 0.9 0.0 - 2.0 %    Atypical Lymphocytes %, Manual 6.1 0.0 - 2.0 %    Seg Neutrophils Absolute, Manual 8.54 (H) 1.20 - 7.00 x10*3/uL    Lymphocytes Absolute, Manual 0.88 (L) 1.20 - 4.80 x10*3/uL    Monocytes Absolute, Manual 1.08 (H) 0.10 - 1.00 x10*3/uL    Eosinophils Absolute, Manual 0.00 0.00 - 0.70 x10*3/uL    Basophils Absolute, Manual 0.10 0.00 - 0.10 x10*3/uL    Atypical Lymphs Absolute, Manual 0.69 (H) 0.00 - 0.50 x10*3/uL    Total Cells Counted 115     RBC Morphology See Below     Target Cells Few           Assessment/Plan   Principal Problem:    Cirrhosis (CMS/HCC)    Ms. Waddell is 67 yo F history of Seropositive RA (on infliximab), osteoarthritis, nephrolithiasis, obesity, DLD, PACs, history of tobacco use (quit march 2023), who presented to [place] ED on 2/1 for dark red urine, generalized abdominal pain with nausea transferred to [facility] for further workup of her new cirrhosis and jaundice. Since admission, hepatology and oncology have been consulted with recommendations for workup and further management. Per biopsy results, patient has breast cancer with known metastasis to the liver and likely metastasis to her brain. Hepatology suspects that new cirrhosis is related to infiltrative malignancy although slight possibility of autoimmune component given positive anti-smooth muscle antibodies. Currently trialing prednisone 30 mg. Final breast cancer pathology pending. Plan to start inpatient chemotherapy tomorrow.      2/13 updates:  - Lasix and aldactone still held iso SYLVIA. Cr this AM 1.27, will restart once SYLVIA has resolved  - Liver biopsy showed poorly differentiated adenocarcinoma, immuno-phenotypically compatible with breast origin   - Oncology: liver pathology demonstrated ER-/Her2+ de susanne metastatic breast cancer, plan for  inpatient carboplatin (AUC 4) and trastuzumab (loading dose 8 mg/kg) to start tomorrow morning  - Repeat Hep B testing  - Per hepatology, can trial  prednisone 30 mg to see if it will salvage any liver function in case there is autoimmune component to liver decompensation. Will trial and see if liver function improves.  - Outpatient rheumatologist aware of admission, patient to stop receiving infliximab outpatient     #new liver cirrhosis  #Cholelithiasis  #mixed hepatocellular and cholestatic liver injury   #mild confusion possible grade 1 hepatic encephalopahthy  #liver metastasis (breast primary)  :: hepC negative, hepB negative, hepA negative, T spot negative in 11/2023  :: alpha 1 antitrypsin negative  :: ferritin 406 H/transferrin 136 L  :: RUQ as above in assessment  :: ammonia 79  - autoimmune workup for the most part negative. Anti-SM positive 1:20, could be related to autoimmune hepatitis; however, still suspect malignancy to be driving variable  - liver biopsy significant for poorly differentiated breast cancer  - diagnostic paracentesis unremarkable, no malignant cells noted  - lactulose 20 g, lasix 20 mg and aldactone 50 mg currently held iso SYLVIA  - trialing prednisone 30 mg to see if it may help with possible autoimmune component  - Repeat Hep B    #nonhealing, draining, L breast wound  #primary breast cancer with liver met and likely brain mets  :: MRI brain showing small enhancing lesions in the cerebellum without mass effect with c/f metastatic disease, potential 4 mm MRA bifurcation aneurysm  - plan for outpatient MRA   - inpatient mammogram significant for masses and suspicious lymphadenopathy  - s/p biopsies, pending final pathology  - ER+, anastrazole 1 mg  - per oncology: plan for inpatient carboplatin (AUC 4) and trastuzumab (loading dose 8 mg/kg) to start tomorrow morning    #RA - on infliximab  #osteoarthritis  :: hold home naproxen   - voltaren gel  - tylenol 650 mg q8 prn  - oxycodone 5 mg q6 prn  - outpatient rheum aware to discontinue infliximab     #subacute avulsion fracture lesser trochanger R proximal femur  - chronic  - follow  up with her orthopedic surgeon outpatient     #compression deformity at T12  -vitamin D and calcium     #abdominal aorta atherosclerosis on CT  #smoking history   - follow up with PCP outpatient to discuss potential statin/asa     F: none  E: PRN   N: regular diet  GI: none  P: lovenox  A: PIV  C: full code confirmed on admission   Surrogate:   Cierra her daughter [phone] (first option)  Emmanuel her son [phone]     Monica Weber MD

## 2024-02-13 NOTE — PROGRESS NOTES
"[facility]  [facility] [place]  CONSULT FOLLOW-UP     Reason For Consult  Elevated LFTs    History Of Present Illness  Meghan Waddell is a 68 y.o. female with a past medical history of seropositive RA on infliximab since 12/2023, and obesity (BM 30) who presented to [place] [facility] on 2/1 for dark red urine, abdominal pain with nausea who was found to be jaundiced with elevated LFTs and was transferred to [facility] for further care. Hepatology is consulted for elevated LFTS    SUBJECTIVE     -NAEO  -Patient and family remain frustrated at lack of plan for her breast cancer  -We discussed the option to try empiric course of steroids for possible underlying autoimmune hepatitis and they were hesitant given side effects and underlying metastatic breast cancer. They would really like to hear about a plan to treat her cancer    EXAM     Last Recorded Vitals  Blood pressure 106/55, pulse 82, temperature 36.2 °C (97.2 °F), resp. rate 18, height 1.727 m (5' 8\"), weight 90.7 kg (200 lb), SpO2 93 %.      Intake/Output Summary (Last 24 hours) at 2/13/2024 0716  Last data filed at 2/12/2024 1113  Gross per 24 hour   Intake 475 ml   Output --   Net 475 ml            Physical Exam  General: obese, no acute distress  HEENT: PERRLA, EOM intact, no scleral icterus, moist MM, poor dentition  Respiratory: CTA bilaterally, normal work of breathing  Cardiovascular: RRR, no murmurs/rubs/gallops  Abdomen: Soft, +ve fluid wave, bowel sounds present, no masses palpated, no organomeagly  Extremities: no edema, no asterixis  Neuro: alert and oriented, CNII-XII grossly intact, moves all 4 extremities with no focal deficits      OBJECTIVE                                                                              Medications             Current Facility-Administered Medications:     acetaminophen (Tylenol) tablet 650 mg, 650 mg, oral, q8h PRN, 650 mg at 02/12/24 2059 **OR** acetaminophen (Tylenol) oral " liquid 650 mg, 650 mg, nasogastric tube, q8h PRN **OR** acetaminophen (Tylenol) suppository 650 mg, 650 mg, rectal, q8h PRN, Marcell Ramírez MD    anastrozole (Arimidex) tablet 1 mg, 1 mg, oral, Daily, Sukhi Ortiz MD, 1 mg at 02/12/24 0804    calcium carbonate-vitamin D3 500 mg-5 mcg (200 unit) per tablet 2 tablet, 2 tablet, oral, Daily, Vero Russell MD, 2 tablet at 02/12/24 0804    diclofenac sodium (Voltaren) 1 % gel 4 g, 4 g, Topical, 4x daily PRN, Vero Russell MD, 4 g at 02/11/24 1208    enoxaparin (Lovenox) syringe 40 mg, 40 mg, subcutaneous, q24h, Vero Russell MD, 40 mg at 02/12/24 2059    [Held by provider] furosemide (Lasix) tablet 20 mg, 20 mg, oral, Daily, Dominick Hicks MD, 20 mg at 02/12/24 0803    lactulose 20 gram/30 mL oral solution 20 g, 20 g, oral, Daily, Dominick Hicks MD, 20 g at 02/12/24 0803    lidocaine 4 % patch 1 patch, 1 patch, transdermal, Daily, Devi Ayers MD, 1 patch at 02/12/24 1153    oxyCODONE (Roxicodone) immediate release tablet 5 mg, 5 mg, oral, q6h PRN, Sukhi Ortiz MD, 5 mg at 02/12/24 2059    pantoprazole (ProtoNix) EC tablet 40 mg, 40 mg, oral, Daily before breakfast, Connie Hebert MD, 40 mg at 02/12/24 0804    polyethylene glycol (Glycolax, Miralax) packet 17 g, 17 g, oral, Daily, Vero Russell MD, 17 g at 02/12/24 0803    [Held by provider] spironolactone (Aldactone) tablet 50 mg, 50 mg, oral, Daily, Dominick Hicks MD, 50 mg at 02/12/24 0804                                                                            Labs     No results found for this or any previous visit (from the past 24 hour(s)).                                                                         Imaging     1/5/2024 MRI/MRCP Liver with and without contrast  IMPRESSION:  1. Enhancing left breast masses concerning for a primary breast  malignancy. Correlation with dedicated breast imaging is recommended.  2. Diffuse enhancing osseous lesions compatible with  osseous  metastases.  3. Markedly nodular contour to the liver with diffuse nodular delayed  enhancement. Differential includes an atypical appearance of diffuse  hepatic metastases versus hepatic fibrosis/cirrhosis. Correlation  with tissue sampling is recommended.  4. Prominent right cardiophrenic lymph nodes which could be reactive  or metastatic. Correlation with dedicated chest imaging is  recommended.  5. Small volume ascites.  6. Additional chronic and incidental findings as described above.    2/4/2024 US Liver with Doppler  IMPRESSION:  1. Liver cirrhosis with diffusely heterogenous hepatic parenchyma.  Though no definite focal lesion identified, neoplasm can not be  excluded. Further assessment with liver MRI with IV contrast is  recommended.  2. Perihepatic ascites, slow portal venous flow and recanalized  paraumbilical vein, likely sequela of portal hypertension.  3. Cholelithiasis without sonographic evidence of acute cholecystitis.  4. Increased renal cortical echogenicity can be seen the setting of  medical renal disease. No hydronephrosis.      2/1/2024 CT abdomen Pelvis without IV contrast  Abdomen:   There has been significant interval change in the liver since last  year, now diffusely heterogeneous in density with cirrhotic  morphology. In addition, there has been interval development of  diffuse minimal ascites.  Gallbladder remains contracted with calcified stones. Bile ducts are  normal caliber. Spleen maintains normal size. No abnormalities are  detectable in the pancreas or adrenal glands. Non-obstructing small  calculi are again seen in the kidneys. There are no ureteral calculi  and there is no hydronephrosis  There is stable mild dilatation of the mid abdominal aorta. Small  lymph nodes are seen in the abdomen and retroperitoneum. There is no  retroperitoneal hematoma  The stomach is normally distended with a small hiatal hernia. There is  no appreciable small bowel thickening or  obstruction. Appendix is not  localized. Scattered stool is seen throughout the colon. There is no  free air.    IMPRESSION:  1. Since last year, interval development of significant parenchymal  liver disease with cirrhotic morphology.  2. Additional development of diffuse minimal ascites.  3. Unchanged bilateral small non-obstructing renal calculi; no  hydronephrosis.  4. No bowel obstruction or detectable bowel inflammation.  5. Likely subacute avulsion fracture lesser trochanter right proximal  femur.  6. Remainder as above.                                                                         GI Procedures     No prior    ASSESSMENT / PLAN     ASSESSMENT/PLAN:  Meghan Waddell is a 68 y.o. female with a past medical history of seropositive RA on infliximab since 12/2023, and obesity (BM 30) who presented to [place] ED on 2/1 for dark red urine, abdominal pain with nausea who was found to be jaundiced with elevated LFTs and was transferred to [facility] for further care. She has been diagnosed with a new metastatic breast cancer (mets to bones, liver, brain). Hepatology is consulted for elevated LFTS    She has newly elevated LFTs this admission compared to prior. Mostly a cholestatic pattern (R Factor 0.6), but does have AST > ALT. She has cholelithiasis on imaging but MRI/MRCP did not show any cholelithiasis. She has a very sudden change in the appearance of liver in CT compared to 1 year ago. The liver is now very heterogenous in appearance and she has newly elevated LFTs.  While there are no discrete tumors or of the liver on MRI, extensive microscopic metastatic infiltrative disease of the liver. Liver biopsy with random right liver biopsies were done and pathology is consistent with poorly differentiated adenocarcinoma of the liver. 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 Treatment for this would have to be treatment of her underlying metastatic cancer and  otherwise supportive care for the complications from cirrhosis/pseudocirrhosis such as diuretics for ascites/edema and lactulose/rifaxamin for hepatic encephalopathy.     Discussed case with pathology, there is not enough non-cancerous background tissue from the biopsy to comment on whether or not there was underlying cirrhosis or AIH. Noted ASMA was mildly positive but this can be falsely positive in many cirrhotic patients. She does have a history of RA and we have not yet been able to rule out AIH. We consider brief empiric steroid trial (Prednisone 30mg once daily) for possibly underlying AIH that we have not fully ruled out. If LFTs improve on steroid trial, could be indicative of an underlying autoimmune process.    #Newly decompensated cirrhosis  -Etiology uncertain, possible NIETO  -Liver stamp: A1AT 170, ceruloplasmin 42.6, HCV negative, AMA negative, ASMA weak positive 1:20, JOCELYN weak positive 1:320, soluble liver Ag Ab negative, LKM-1 unrevealing, PETH negative,   -Pending labs: anti-SLA/LP, anti-liver cytosol Ab (ALC-1)  MELD 3.0: 28 at 2/12/2024  6:55 AM  MELD-Na: 26 at 2/12/2024  6:55 AM  Calculated from:  Serum Creatinine: 1.38 mg/dL at 2/12/2024  6:55 AM  Serum Sodium: 132 mmol/L at 2/12/2024  6:55 AM  Total Bilirubin: 11.4 mg/dL at 2/12/2024  6:55 AM  Serum Albumin: 2.1 g/dL at 2/12/2024  6:55 AM  INR(ratio): 1.4 at 2/11/2024  7:09 AM  Age at listing (hypothetical): 68 years  Sex: Female at 2/12/2024  6:55 AM  - Ascites: minimal, perihepatic. Diagnostic paracentesis 1.7L removed 2/5 with SAAG 1.7 and total protein 0.8 consistent with portal HTN. Negative SBP and cytology  - Volume: start lasix 20mg and spironolactone 50mg PO  - EV: unknown, no prior EGD  - HE: present/new. Started on Lactulose this admission  - Immunizations: HBV and HAV nonimmune, defer vaccination to outpatient  - HCC screening: AFP pending, MRI pending  - Transplant:     Recommendations:  -Agree with transfer to [facility] for ongoing  oncologic care  -Can consider empiric steroid trial for possible underlying AIH (Prednisone PO 30mg once daily) but this would have to be a risk-benefit discussion with the patient and oncology given newly diagnosed metastatic breast cancer. I discussed with patient on 2/13 and she was hesitant about this, prefers to start  -Restart Lasix 20mg PO and Spironolactone 50mg PO when able  -Decrease lactulose to 20g once daily given bloating/cramping. Goal is 2-3 BM daily, can titrate up as needed  -Sodium restricted diet, 2g Na daily  -Continue to trend daily MELD labs: CMP and INR daily    Patient was seen and discussed with Dr. Carias    Thank you for the consultation. Hepatology will continue to the follow .  - During weekday hours of 7am- 5pm, please do not hesitate to contact me on [employer] Chat or page [phone] if there are any further questions   - After hours, on weekends, and on holidays, please page the on-call GI fellow at [phone]. Thank you.     Dottie Connors MD  PGY4 Gastroenterology Fellow  Digestive [facility]

## 2024-02-13 NOTE — PROGRESS NOTES
"Nutrition Follow Up Assessment:   Nutrition Assessment         Patient is a 68 y.o. female  on day 10 of admission presenting with newly diagnosed Cirrhosis    Hepatology consulted for elevated LFTs  -Etiology uncertain, possible NIETO     Oncology consulted for new metastatic breast cancer (mets to bones, liver, brain).     Nutrition History:  Food and Nutrient History: Patient continues with suboptimal PO intake. States she generally orders 2 meals a day and eats between %. Reports the most Ensure she can drink is 3/4 of a serving. Patient states she takes the Prostat, but declined it yesterday. Was not feeling well. Denies constipation or diarrhea. When asked if she has nausea, patient responded \"always\".     Anthropometrics:  Height: 172.7 cm (5' 8\")   Weight: 90.7 kg (200 lb)   BMI (Calculated): 30.42  IBW/kg (Dietitian Calculated): 63.6 kg  Percent of IBW: 142.6 %  Adjusted Body Weight (kg): 70.4 kg    Weight History:   Weights (last 14 days)    Date/Time Weight   02/04/24 0643 90.7 kg (200 lb)   02/03/24 20:52:52 98.7 kg (217 lb 9.5 oz)     Nutrition Focused Physical Exam Findings:    Subcutaneous Fat Loss:   Orbital Fat Pads: Mild-Moderate (slight dark circles and slight hollowing)  Buccal Fat Pads: Mild-Moderate (flat cheeks, minimal bounce)  Triceps: Well nourished (ample fat tissue)  Ribs: Well nourished (full chest, ribs do not protrude)  Muscle Wasting:  Temporalis: Mild-Moderate (slight depression)  Pectoralis (Clavicular Region): Well nourished (clavicle not visible)  Deltoid/Trapezius: Well nourished (rounded appearance at arm, shoulder, neck)  Edema:  Edema: ascites  Physical Findings:  Hair: Negative  Eyes: Negative  Mouth: Negative  Nails: Negative  Skin:  (nonhealing, draining, L breast wound)    Nutrition Significant Labs:  BMP Trend:   Results from last 7 days   Lab Units 02/13/24  0645 02/12/24  0655 02/11/24  0709 02/10/24  0701   GLUCOSE mg/dL 69* 89 74 73*   CALCIUM mg/dL 8.7 8.4* 8.1* " 8.7   SODIUM mmol/L 129* 132* 132* 133*   POTASSIUM mmol/L 4.5 4.4 4.1 4.1   CO2 mmol/L 23 26 23 22   CHLORIDE mmol/L 96* 97* 100 101   BUN mg/dL 31* 29* 22 19   CREATININE mg/dL 1.27* 1.38* 1.00 1.03    , BG POCT trend:    , Liver Function Trend:   Results from last 7 days   Lab Units 02/13/24 0645 02/12/24  0655 02/11/24  0709 02/10/24  0701   ALK PHOS U/L 271* 287* 266* 269*   AST U/L 197* 196* 181* 186*   ALT U/L 53* 57* 54* 53*   BILIRUBIN TOTAL mg/dL 12.1* 11.4* 10.6* 10.2*    , Renal Lab Trend:   Results from last 7 days   Lab Units 02/13/24 0645 02/12/24  0655 02/11/24  0709 02/10/24  0701   POTASSIUM mmol/L 4.5 4.4 4.1 4.1   PHOSPHORUS mg/dL  --   --  3.3 3.2   SODIUM mmol/L 129* 132* 132* 133*   MAGNESIUM mg/dL 2.50*  --  2.31 2.31   EGFR mL/min/1.73m*2 46* 42* 62 60*   BUN mg/dL 31* 29* 22 19   CREATININE mg/dL 1.27* 1.38* 1.00 1.03    , Vit D:   Lab Results   Component Value Date    VITD25 26 (L) 02/04/2024        Nutrition Specific Medications:  Scheduled medications  anastrozole, 1 mg, oral, Daily  calcium carbonate-vitamin D3, 2 tablet, oral, Daily  enoxaparin, 40 mg, subcutaneous, q24h  [Held by provider] furosemide, 20 mg, oral, Daily  lactulose, 20 g, oral, Daily  lidocaine, 1 patch, transdermal, Daily  pantoprazole, 40 mg, oral, Daily before breakfast  polyethylene glycol, 17 g, oral, Daily  predniSONE, 30 mg, oral, Daily  [Held by provider] spironolactone, 50 mg, oral, Daily    Continuous medications     PRN medications  PRN medications: acetaminophen **OR** acetaminophen **OR** acetaminophen, diclofenac sodium, oxyCODONE    I/O:   Last BM Date: 02/09/24; Stool Appearance: Loose (02/06/24 1030)      Dietary Orders (From admission, onward)       Start     Ordered    02/13/24 1102  Adult diet Regular; 1500 mL fluid  Diet effective now        Comments: Send String Cheese with Dinner. THANKS!   Question Answer Comment   Diet type Regular    Dietary fluid restriction / 24h: 1500 mL fluid         02/13/24 1102    02/06/24 1205  Oral nutritional supplements  Until discontinued        Comments: Chocolate Only. Thanks!   Question Answer Comment   Deliver with Breakfast    Select supplement: Ensure Plus        02/06/24 1204    02/05/24 1651  Oral nutritional supplements  Until discontinued        Question Answer Comment   Deliver with Lunch    Select supplement: Pro-Stat AWC        02/05/24 1651                     Estimated Needs:   Total Energy Estimated Needs (kCal): 1900 kCal  Method for Estimating Needs: 70.4kg / 27kcal  Total Protein Estimated Needs (g): 90 g  Method for Estimating Needs: 70.4kg / 1.3g            Nutrition Diagnosis   Malnutrition Diagnosis  Patient has Malnutrition Diagnosis: Yes  Diagnosis Status: Ongoing  Malnutrition Diagnosis: Severe malnutrition related to chronic disease or condition  As Evidenced by: report of decreased PO intake x 1 month, 11.9% wt loss x 6 months and ascites.     Nutrition Interventions/Recommendations         Nutrition Prescription:  Individualized Nutrition Prescription Provided for : Continue Regular diet with Ensure Plus and Prostat. Order daily MVI along with 2000 IU of Vitamin D3 daily.          Nutrition Education:   Encourage Ensure. Suggested to patient that she try chocolate [place] custard.        Nutrition Monitoring and Evaluation   Food/Nutrient Related History Monitoring  Monitoring and Evaluation Plan: Energy intake  Criteria: >/= 75% of meals/supplements    Body Composition/Growth/Weight History  Monitoring and Evaluation Plan: Weight  Criteria: Stable weight    Biochemical Data, Medical Tests and Procedures  Monitoring and Evaluation Plan: Electrolyte/renal panel, Glucose/endocrine profile  Criteria: Labs wnl        Time Spent/Follow-up Reminder:   Time Spent (min): 30 minutes  Last Date of Nutrition Visit: 02/13/24  Nutrition Follow-Up Needed?: Dietitian to reassess per policy

## 2024-02-13 NOTE — CARE PLAN
The patient's goals for the shift include Pain management    The clinical goals for the shift include Maintain comfort and collaborate with team to transfer to Oncology service    Over the shift, the patient did not make progress toward the following goals. Barriers to progression include   . Recommendations to address these barriers include   .

## 2024-02-14 NOTE — PROGRESS NOTES
Spiritual Care Visit    Clinical Encounter Type  Visited With: Patient  Routine Visit: Introduction     introduced self and Spiritual Care services to patient Meghan Waddell. Patient expressed she was feeling tired and did not have any needs at this time. She was appreciative of care. Spiritual Care remains available as needed/requested.    Rev. Emilia Leyva MDiv, [facility]

## 2024-02-14 NOTE — PROGRESS NOTES
"Meghan Waddell is a 68 y.o. female on day 11 of admission presenting with Cirrhosis (CMS/HCC).    Subjective   No acute events overnight. Patient reports continued right sided hip pain that radiates into the groin as well as diffuse abdominal pain with palpation. Denies shortness or breath or chest pain. Continue to have low appetite but feels very thirsty.        Objective     Physical Exam  Constitutional:       General: She is not in acute distress.     Appearance: Normal appearance. She is not toxic-appearing.   HENT:      Mouth/Throat:      Mouth: Mucous membranes are moist.   Eyes:      General: Scleral icterus (mild) present.      Extraocular Movements: Extraocular movements intact.      Pupils: Pupils are equal, round, and reactive to light.   Cardiovascular:      Rate and Rhythm: Normal rate and regular rhythm.      Heart sounds: No murmur heard.     No gallop.   Pulmonary:      Effort: Pulmonary effort is normal. No respiratory distress.      Breath sounds: No wheezing.   Chest:   Breasts:     Left: Inverted nipple, mass, nipple discharge and skin change present.      Comments: L breast with crusted draining lesion without purulence, fullness of the L lateral breast noted, nipple inversion with black scabbing  Abdominal:      General: Abdomen is flat. There is no distension.      Palpations: Abdomen is soft.      Tenderness: There is abdominal tenderness (mild tenderness to palpation in all four quadrants).   Musculoskeletal:         General: No swelling.      Right lower leg: No edema.      Left lower leg: No edema.   Skin:     General: Skin is warm and dry.      Coloration: Skin is jaundiced.   Neurological:      General: No focal deficit present.      Mental Status: She is alert.       Last Recorded Vitals  Blood pressure 129/85, pulse 92, temperature 36.3 °C (97.3 °F), temperature source Temporal, resp. rate 16, height 1.695 m (5' 6.73\"), weight 90.5 kg (199 lb 8.3 oz), SpO2 95 %.  Intake/Output last 3 " Shifts:  I/O last 3 completed shifts:  In: 200 (2.2 mL/kg) [P.O.:200]  Out: 400 (4.4 mL/kg) [Urine:400 (0.1 mL/kg/hr)]  Weight: 90.7 kg     Relevant Results  No results found for this or any previous visit (from the past 24 hour(s)).    Labs currently collected and will be reviewed once resulted.       Assessment/Plan   Principal Problem:    Cirrhosis (CMS/HCC)    Ms. Waddell is 69 yo F history of Seropositive RA (on infliximab), osteoarthritis, nephrolithiasis, obesity, DLD, PACs, history of tobacco use (quit march 2023), who presented to [place] ED on 2/1 for dark red urine, generalized abdominal pain with nausea transferred to [facility] for further workup of her new cirrhosis and jaundice. Since admission, hepatology and oncology have been consulted with recommendations for workup and further management. Per biopsy results, patient has breast cancer with metastasis to the liver and likely metastasis to her brain. Hepatology suspects that new cirrhosis is related to infiltrative malignancy although slight possibility of autoimmune component given positive anti-smooth muscle antibodies. Currently trialing prednisone 30 mg. Final breast cancer pathology shows invasive ductal carcinoma grade 3, ER-/MO-/HER2+. Plan to start inpatient chemotherapy today after Echocardiogram is read. Will monitor for tumor lysis syndrome.     2/14 updates:  - Lasix and aldactone still held iso SYLVIA. Will restart once SYLVIA has resolved (need to check AM labs once they result)  - Oncology: final breast cancer pathology shows invasive ductal carcinoma grade 3, ER-/MO-/HER2+, plan for inpatient carboplatin (AUC 4) and trastuzumab (loading dose 8 mg/kg) to start today after Echocardiogram has been read  - Obtain EKG to assess QTc for Zofran initiation  - BID CMP, LDH, and uric acid to monitor for tumor lysis syndrome, will obtain these labs today prior to initiating chemotherapy  - Discontinuing anastrozole given patient is ER-  - Repeat  Hep B testing pending  - Per hepatology, can trial prednisone 30 mg to see if it will salvage any liver function in case there is autoimmune component to liver decompensation. Started on 2/13 and will monitor LFTs and PT/INR.  - Plan for NM bone scan tomorrow to assess for mets to the bone    #new liver cirrhosis  #Cholelithiasis  #mixed hepatocellular and cholestatic liver injury   #mild confusion possible grade 1 hepatic encephalopahthy  #liver metastasis (breast primary)  :: hepC negative, hepB negative, hepA negative, T spot negative in 11/2023  :: alpha 1 antitrypsin negative  :: ferritin 406 H/transferrin 136 L  :: RUQ as above in assessment  :: ammonia 79  - autoimmune workup for the most part negative. Anti-SM positive 1:20, could be related to autoimmune hepatitis; however, still suspect malignancy to be driving variable  - liver biopsy significant for poorly differentiated breast cancer  - diagnostic paracentesis unremarkable, no malignant cells noted  - lactulose 20 g, lasix 20 mg and aldactone 50 mg currently held iso SYLVIA  - trialing prednisone 30 mg to see if it may help with possible autoimmune component (2/13 -)  - Repeat Hep B pending    #nonhealing, draining, L breast wound  #primary breast cancer with liver met and likely brain mets  :: MRI brain showing small enhancing lesions in the cerebellum without mass effect with c/f metastatic disease, potential 4 mm MRA bifurcation aneurysm  - plan for outpatient MRA   - inpatient mammogram significant for masses and suspicious lymphadenopathy  - s/p biopsies, final pathology shows invasive ductal carcinoma grade 3, ER-/AK-/HER2+  - initial pathology showed ER+ so patient was started on anastrazole 1 mg, but this read was modified to ER- so anastrozole was stopped on 2/13  - per oncology: plan for inpatient carboplatin (AUC 4) and trastuzumab (loading dose 8 mg/kg) to start today after Echocardiogram  - follow-up TTE  - Obtain EKG to assess Qtc for  possibly zofran  - Plan for NM bone scan tomorrow to assess for mets to the bone    #RA - on infliximab  #osteoarthritis  :: hold home naproxen   - voltaren gel  - tylenol 650 mg q8 prn  - oxycodone 5 mg q6 prn  - outpatient rheum aware to discontinue infliximab     #subacute avulsion fracture lesser trochanger R proximal femur  - chronic  - follow up with her orthopedic surgeon outpatient  - obtaining NM bone scan tomorrow      #compression deformity at T12  -vitamin D and calcium     #abdominal aorta atherosclerosis on CT  #smoking history   - follow up with PCP outpatient to discuss potential statin/asa     F: none  E: PRN   N: regular diet  GI: none  P: lovenox  A: PIV  C: full code confirmed on admission   Surrogate:   Cierra her daughter [phone] (first option)  Emmanuel her son [phone]     Monica Weber MD

## 2024-02-14 NOTE — PROGRESS NOTES
Physical Therapy                 Therapy Communication Note    Patient Name: Meghan Waddell  MRN: [id number]  Today's Date: 2/14/2024     Discipline: Physical Therapy    Missed Visit Reason: Missed Visit Reason: Patient refused (Reporting that she already walked today, then she requested a White Russian drink (jokingly).) Communicated with Dr. Monica Weber to clarify Right LE WB- for now NWB Right LE (pending bone scan tomorrow to rule out mets)    Missed Time: Attempt    Comment: 14:13pm

## 2024-02-14 NOTE — NURSING NOTE
Pt received carboplatin 302 mg in sodium chloride for a TV of 130.2 mL's. Pt premedicated with dexamethasone, zofran and fosaprepitant. +BBR obtained via syringe aspiration through PIV 20G in the RFA pre and post infusion. Chemo, rate and patient verified at the bedside and against protocol with 2nd RN, Hossein Watkins.

## 2024-02-14 NOTE — SIGNIFICANT EVENT
02/14/24 1328   Prechemo Checklist   Has the patient been in the hospital, ED, or urgent care since last date of service No   Chemo/Immuno Consent Signed Yes   Protocol/Indications Verified Yes   Confirmed to previous date/time of medication Yes   Compared to previous dose Yes   All medications are dated accurately Yes   Pregnancy Test Negative Not applicable   Parameters Met Yes   BSA/Weight-Height Verified Yes   Dose Calculations Verified Yes

## 2024-02-14 NOTE — CARE PLAN
Problem: Pain  Goal: My pain/discomfort is manageable  Outcome: Progressing     Problem: Safety  Goal: Patient will be injury free during hospitalization  Outcome: Progressing  Goal: I will remain free of falls  Outcome: Progressing     Problem: Daily Care  Goal: Daily care needs are met  Outcome: Progressing   The patient's goals for the shift include Pain management    The clinical goals for the shift include pt will tolerate chemo with no adverse signs or symptoms by 2/14 at 1900    Pt remained safe and free of injury throughout shift. VSS. Currently getting chemo, no complications. Tolerating well.

## 2024-02-15 NOTE — CARE PLAN
Problem: Pain  Goal: My pain/discomfort is manageable  Outcome: Progressing     Problem: Safety  Goal: Patient will be injury free during hospitalization  Outcome: Progressing  Goal: I will remain free of falls  Outcome: Progressing     Problem: Daily Care  Goal: Daily care needs are met  Outcome: Progressing     Problem: Safety - Adult  Goal: Free from fall injury  Outcome: Progressing   The patient's goals for the shift include Pain management    The clinical goals for the shift include patient will remain safe and free of injury through 2/15 at 1900    Pt remained safe and free of injury. VSS. NS infusing at 50 mL's/hr per orders. Pt weaned back to room air. Getting up with assist x1 and walker to the commode.

## 2024-02-15 NOTE — CONSULTS
SUPPORTIVE AND PALLIATIVE ONCOLOGY CONSULT    Inpatient consult to [facility] Adult Supportive Oncology  Consult performed by: Nilda Millan, APRN-CNP  Consult ordered by: Selvin Stovall MD      SERVICE DATE: 2/15/2024      PALLIATIVE MEDICINE OUTPATIENT PROVIDER:  None  CURRENT ATTENDING PROVIDER: Selvin Stovall MD     Medical Oncologist: Selvin Stovall MD   Radiation Oncologist: No care team member to display  Primary Physician: Chikis Peralta  [phone]    REASON FOR CONSULT/CHIEF CONSULT COMPLAINT: pain management and Introduction to Supportive and Palliative Oncology Services    Subjective   HISTORY OF PRESENT ILLNESS: Meghan Waddell is a 68 y.o. female diagnosed with poorly-differentiated adenocarcinoma and ER positivity who presented on 2/3 as a transfer from [place] for new cirrhosis and jaundice workup (patient had been having RUQ abdominal pain for a few months), 1 year of left breast drainage, and 20-lb weight loss since Dec 2023 with right lobe liver mass biopsy. PMH significant for Seropositive RA (on infliximab), osteoarthritis, nephrolithiasis, obesity, DLD, PACs, history of tobacco use (quit march 2023), . Admitted 2/3/2024 for further evaluation and management. Supportive and Palliative Oncology is consulted for pain management and Introduction to Supportive and Palliative Oncology Services.     Pain Assessment:  Onset: 3 months ago   Location: Right hip  Duration: Intermittent  Characteristics:   Rating: 3   Descriptors: aching and throbbing   Aggravating: movement    Relieving: Analgesics Oxycodone 5 mg po q hs, Positioning, and Modifying activity   Treatment/Home Regimen:  Naproxen   Intolerances:Meghan Waddell is allergic to latex.   Personal Pain Goal: 2    Interference with Function: Somewhat   Coping Strategies: Relaxation and Distraction   Emotional Response:  none   Barriers to Pain Management:  none    Opioid Use  Past 24 h prn opioid use: Oxycodone 5 mg po x 2 doses = 10 mg = 12  OME  Total 24h OME use:  12 OME    Note: OME calculations based on equianalgesic table below. Please note this table is based on best available evidence but conversions are still approximate. These are NOT opioid DOSES for individual patient use; this is equivalency information.  Drug Parenteral Enteral   Morphine 10 25   Oxycodone N/A 20   Hydromorphone 2 5   Fentanyl 0.15 N/A   Tramadol N/A 120   Citation: Nikko OVALLE. Demystifying opioid conversion calculations: A guide for effective dosing, Second edition. MD Debbie: American Society of Health-System Pharmacists, 2018.    OARRS/PDMP reviewed no aberrant behavior noted.    Symptom Assessment:  Pain:somewhat  Headache: none  Dizziness:none  Lack of energy: none  Difficulty sleeping: somewhat  Worrying: a little  Anxiety: none  Depression: none  Pain in mouth/swallowing: none  Dry mouth: none  Taste changes: none  Shortness of breath: none  Lack of appetite: somewhat   Nausea: somewhat  Vomiting: none  Constipation: none  Diarrhea: none  Sore muscles: none  Numbness or tingling in hands/feet/other: none  Weight loss: very much  Other: none    Wt Readings from Last 10 Encounters:   02/14/24 90.5 kg (199 lb 8.3 oz)   02/01/24 93.4 kg (206 lb)   12/27/23 94.2 kg (207 lb 9 oz)   11/29/23 93.9 kg (207 lb 0.2 oz)   11/15/23 92.3 kg (203 lb 7.8 oz)   11/01/23 93.4 kg (206 lb)   07/11/23 103 kg (226 lb 8 oz)   05/30/23 104 kg (228 lb 8 oz)   04/18/23 104 kg (228 lb 6 oz)   03/31/23 104 kg (230 lb 2.6 oz)           Information obtained from: chart review, interview of patient, and discussion with primary team  ______________________________________________________________________     Oncology History   Carcinoma of left breast metastatic to liver (CMS/HCC)   2/13/2024 Initial Diagnosis    Carcinoma of left breast metastatic to liver (CMS/HCC)     2/14/2024 -  Chemotherapy    Carboplatin, Pertuzumab + Trastuzumab, 21 Day Cycles         Past Medical History:   Diagnosis  Date    Arthritis, rheumatoid (CMS/HCC)     Cellulitis of right lower limb     Cellulitis of right lower extremity    Personal history of other diseases of the nervous system and sense organs     History of Bell's palsy     Past Surgical History:   Procedure Laterality Date    JOINT REPLACEMENT Bilateral     Knees    OTHER SURGICAL HISTORY  02/10/2021     section     Family History   Problem Relation Name Age of Onset    Diabetes type II Mother      Heart disease Father      Breast cancer Sister  60        60-70    Ovarian cancer Maternal Grandmother          SOCIAL HISTORY:  Marital Status , Children 3 adult children (2 here 1 in [employer] stationed in [place]; lives with daughter), Grandchildren 7, and Support system very supportive siblings (pt is 1 of 9 children)   Social History:  reports that she has quit smoking. Her smoking use included cigarettes. She has a 2.00 pack-year smoking history. She has never used smokeless tobacco. She reports that she does not currently use alcohol. She reports that she does not use drugs.    Adventist and Importance of Adventist:  Catholicism     REVIEW OF SYSTEMS:  Review of systems negative unless noted in HPI.       Objective       Lab Results   Component Value Date    WBC 12.8 (H) 02/15/2024    HGB 12.1 02/15/2024    HCT 33.4 (L) 02/15/2024    MCV 84 02/15/2024     02/15/2024      Lab Results   Component Value Date    GLUCOSE 117 (H) 02/15/2024    CALCIUM 8.0 (L) 02/15/2024     (L) 02/15/2024    K 4.1 02/15/2024    CO2 19 (L) 02/15/2024    CL 99 02/15/2024    BUN 32 (H) 02/15/2024    CREATININE 1.28 (H) 02/15/2024     Lab Results   Component Value Date    ALT 58 (H) 02/15/2024     (H) 02/15/2024     (H) 2024    ALKPHOS 226 (H) 02/15/2024    BILITOT 11.9 (H) 02/15/2024     Estimated Creatinine Clearance: 49 mL/min (A) (by C-G formula based on SCr of 1.28 mg/dL (H)).     Current Outpatient Medications   Medication Instructions     naproxen sodium (Aleve) 220 mg tablet 1 tablet, oral, 3 times daily PRN     Scheduled medications   allopurinol, 300 mg, oral, Daily  [Held by provider] anastrozole, 1 mg, oral, Daily  calcium carbonate-vitamin D3, 2 tablet, oral, Daily  enoxaparin, 40 mg, subcutaneous, q24h  [Held by provider] furosemide, 20 mg, oral, Daily  lactulose, 20 g, oral, Daily  lidocaine, 1 patch, transdermal, Daily  pantoprazole, 40 mg, oral, Daily before breakfast  [Held by provider] polyethylene glycol, 17 g, oral, Daily  predniSONE, 30 mg, oral, Daily  [Held by provider] spironolactone, 50 mg, oral, Daily      Continuous medications  sodium chloride 0.9%, 50 mL/hr, Last Rate: 50 mL/hr (02/15/24 0043)      PRN medications  PRN medications: acetaminophen **OR** acetaminophen **OR** acetaminophen, albuterol, dextrose, diclofenac sodium, diphenhydrAMINE, EPINEPHrine, famotidine, melatonin, methylPREDNISolone sodium succinate (PF), oxyCODONE, prochlorperazine, prochlorperazine, sodium chloride     Allergies:   Allergies   Allergen Reactions    Latex Hives, Itching, Other and Swelling                PHYSICAL EXAMINATION:  Vital Signs:   Vital signs reviewed  Vitals:    02/15/24 0812   BP: 118/70   Pulse: 77   Resp: 18   Temp: 36.1 °C (97 °F)   SpO2: 93%     Pain Score: 3     Physical Exam  Vitals reviewed.   Constitutional:       Appearance: Normal appearance.   HENT:      Head: Normocephalic.   Cardiovascular:      Rate and Rhythm: Normal rate.      Pulses: Normal pulses.      Heart sounds: Normal heart sounds.   Pulmonary:      Effort: Pulmonary effort is normal.   Abdominal:      General: Bowel sounds are normal. There is distension.      Palpations: Abdomen is soft.      Tenderness: There is abdominal tenderness.   Musculoskeletal:      Right lower leg: No edema.      Left lower leg: No edema.   Skin:     General: Skin is warm.      Capillary Refill: Capillary refill takes less than 2 seconds.   Neurological:      General: No focal  "deficit present.      Mental Status: She is alert and oriented to person, place, and time.   Psychiatric:         Mood and Affect: Mood normal.         Thought Content: Thought content normal.         Judgment: Judgment normal.         ASSESSMENT/PLAN:  Meghan Waddell is a 68 y.o. female diagnosed with poorly-differentiated adenocarcinoma and ER positivity who presented on 2/3 as a transfer from [place] for new cirrhosis and jaundice workup (patient had been having RUQ abdominal pain for a few months), 1 year of left breast drainage, and 20-lb weight loss since Dec 2023 with right lobe liver mass biopsy. PMH significant for Seropositive RA (on infliximab), osteoarthritis, nephrolithiasis, obesity, DLD, PACs, history of tobacco use (quit march 2023), . Admitted 2/3/2024 for further evaluation and management. Supportive and Palliative Oncology is consulted for pain management and Introduction to Supportive and Palliative Oncology Services.       Primary team note from today states\"  \"Since admission, hepatology and oncology have been consulted with recommendations for workup and further management. Per biopsy results, patient has breast cancer with metastasis to the liver and likely metastasis to her brain. Hepatology suspects that new cirrhosis is related to infiltrative malignancy although slight possibility of autoimmune component given positive anti-smooth muscle antibodies. Currently trialing prednisone 30 mg. Final breast cancer pathology shows invasive ductal carcinoma grade 3, ER-/[place]-/HER2+. Started on chemotherapy and targeted therapy yesterday (2/14/24) and tolerated this well. TTE done prior to initiation of treatment showed EF 70% and mild impaired diastolic relaxation. Started on allopurinol for TLS prophylaxis. Will continue to monitor her closely for signs of severe hepatic dysfunction and TLS.\"    Pt had NM bone scan today to assess for bone mets-awaiting results      EKG w/ Jiq=[id number]     Pain:  Pain " related to bone mets vs fracture  Pain is: acute on chronic  Type: somatic  Pain control: well-controlled  Home regimen:   Naproxen  Currently using Voltaren topical gel, Tylenol 650 mg po q 8 hrs prn, and oxycodone 5 mg po q 4 hrs  Intolerances/previously tried: none  Personalized pain goal: 2  Total OME usage for the past 24 hours:  12 OME  Continue Oxycodone 5 mg po q 4 hrs prn  Continue Lidocaine 4% topical patch to right hip  Continue Voltaren gel 1 % topically to right hip qid prn  Continue Acetaminophen 650 mg po q 8 hrs prn  Continue to monitor pain scores and administer PRN medications as appropriate  Continue/initiate nonpharmacologic pain management strategies including ice/heat therapy, distraction techniques, deep breathing/relaxation techniques, calming music, and repositioning  Continue to monitor for signs of opioid efficacy (pain scores, improved functionality) and toxicity (pinpoint pupils, excess sedation/drowsiness/confusion, respiratory depression, etc.)    Nausea:  Intermittent nausea without vomiting related to chemotherapy and opioids   Home regimen:  none  suboptimally controlled  Started chemo yesterday; has not noticed that nausea has worsened since  Ondansetron 4 mg IVP q 6 hrs prn  Prochlorperazine IV 10 mg q 6 hrs prn  Recommend Olanzapine 5 mg po q hs (will also help with sleep, appetite as well)    Constipation  At risk for constipation related to opioids, currently not constipated  Usual bowel pattern: every other day  Home regimen: none  LBM 2/14/24  Monitor BM frequency, adjust regimen as needed  Goal to have BM without straining q48-72h  Lactulose per primary team       Sleeping Difficulty:  Impaired sleep related to hospital environment and new diagnosis  Home regimen:  none  Has improved with adequate pain control but remains sub-optimal as pt continues to wake frequently  Continue Melatonin 5 mg po q hs  Olanzapine as listed above    Decreased appetite:  Appetite loss related  "to malignancy, chemotherapy, and disease process  Nutrition consult  Weight loss 20 lbs since December  Home regimen:  none  Olanzapine as listed above    Medical Decision Making/Goals of Care/Advance Care Planning:  Patient's current clinical condition, including diagnosis, prognosis, and management plan, and goals of care were discussed.   Life limiting disease: metastatic malignancy  Family: Supportive children, siblings  Performance status: Major  limitations due to pain and disease process  Joys/meaning/strength: Family and [place]  Understanding of health: Demonstrates good prognostic understanding of disease process, understands plan for further workup, awaiting results of bone scan  Information:Wants full disclosure  Goals: symptom control and cancer directed therapy  Worries and fears now and future: ongoing symptoms and inability to receive cancer treatment   Minimum acceptable outcome/QOL:  to be able to care for self, to have adequate pain relief  Code status discussion:  FULL    Advance Directives  Existence of Advance Directives:No - has interest  Decision maker: Surrogate decision maker is 1) daughter Cierra [phone], 2) son Emmanuel [phone] [in that order]  Code Status: Full code  Pt states she wants to be resuscitated but not to remain on ventilator or \"life support\" for more than 3 days.     Introduction to Supportive and Palliative Oncology:  Spoke with patient at bedside  Introduced the role and philosophy of Supportive and Palliative oncology in the evaluation and management of symptoms during cancer treatment  Palliative care was introduced as a service for patients with serious illness to help with symptoms, assist with goals of care conversations, navigate complex decision making, improve quality of life for patients, and provide support both patients and families.  Patient seemed to appreciate the extra layer of support.     Supportive Interventions: Interventions: Music Therapy: " offered    Disposition:  Please  start the process of having prior authorization with meds to beds deliver medications to patient prior to discharge via [facility] pharmacy. Prescriptions will need to be sent 48-72 hours prior to discharge so that a prior authorization can be completed.     Discharge date: unknown pending acute issues, pain control, and symptom control  Will assess if patient needs an appointment with Outpatient Supportive Oncology as appropriate      Signature and billing:  Thank you for allowing us to participate in the care of this patient. Recommendations will be communicated back to the consulting service by way of shared electronic medical record or face-to-face.    Medical complexity was high level due to due to complexity of problems, extensive data review, and high risk of management/treatment.    I spent 75 minutes in the care of this patient which included chart review, interviewing patient/family, discussion with primary team, coordination of care, and documentation.      DATA   Diagnostic tests and information reviewed for today's visit:  Conversation with primary team, Most recent labs and imaging results, Medications       Some elements copied from oncology consult (Dr. Mendoza) note on 2/10/24, the elements have been updated and all reflect current decision making from today, 2/15/2024.    Plan of Care discussed with: Provider, RN, Patient    Thank you for asking Supportive and Palliative Oncology to assist with care of this patient.  We will continue to follow.  Please contact us for additional questions or concerns.      SIGNATURE: HAROON Arciniega  PAGER/CONTACT:  Contact information:  Supportive and Palliative Oncology  Monday-Friday 8 AM-5 PM  Epic Secure chat or pager [phone].  After hours and weekends:  pager [phone]

## 2024-02-15 NOTE — PROGRESS NOTES
Meghan Waddell is a 68 y.o. female on day 12 of admission presenting with Cirrhosis (CMS/HCC).    Subjective   No acute events overnight. Tolerated chemotherapy well yesterday, denies nausea or vomiting. Feels her appetite is improved. Slept very well. She was placed on 2L NC overnight due to a desaturation to mid 80s in her sleep, but she is now back on room air saturating in mid-90s this morning. Denies shortness of breath, chest pain, or leg swelling. Continues to have mild abdominal pain and right hip pain.      Objective     Physical Exam  Constitutional:       General: She is not in acute distress.     Appearance: Normal appearance. She is not toxic-appearing.   HENT:      Mouth/Throat:      Mouth: Mucous membranes are moist.   Eyes:      General: Scleral icterus (mild) present.      Extraocular Movements: Extraocular movements intact.      Pupils: Pupils are equal, round, and reactive to light.   Cardiovascular:      Rate and Rhythm: Normal rate and regular rhythm.      Heart sounds: No murmur heard.     No gallop.   Pulmonary:      Effort: Pulmonary effort is normal. No respiratory distress.      Breath sounds: No wheezing.   Chest:   Breasts:     Left: Inverted nipple, mass, nipple discharge and skin change present.      Comments: L breast with crusted draining lesion without purulence, fullness of the L lateral breast noted, nipple inversion with black scabbing  Abdominal:      General: Abdomen is flat. There is no distension.      Palpations: Abdomen is soft.      Tenderness: There is abdominal tenderness (mild tenderness to palpation in all four quadrants).   Musculoskeletal:         General: No swelling.      Right lower leg: No edema.      Left lower leg: No edema.   Skin:     General: Skin is warm and dry.      Coloration: Skin is jaundiced.   Neurological:      General: No focal deficit present.      Mental Status: She is alert.       Last Recorded Vitals  Blood pressure 118/70, pulse 77, temperature  "36.1 °C (97 °F), temperature source Temporal, resp. rate 18, height 1.695 m (5' 6.73\"), weight 90.5 kg (199 lb 8.3 oz), SpO2 93 %.  Intake/Output last 3 Shifts:  I/O last 3 completed shifts:  In: 2723.4 (30.1 mL/kg) [P.O.:460; I.V.:484.2 (5.4 mL/kg); IV Piggyback:1779.3]  Out: 1700 (18.8 mL/kg) [Urine:1700 (0.5 mL/kg/hr)]  Weight: 90.5 kg     Relevant Results  Results for orders placed or performed during the hospital encounter of 02/03/24 (from the past 24 hour(s))   Transthoracic Echo (TTE) Limited   Result Value Ref Range    LV biplane EF 71 %    MV avg E/e' ratio 8.35     MV E/A ratio 0.67     LA vol index A/L 29.9 ml/m2    Tricuspid annular plane systolic excursion 2.4 cm    RV free wall pk S' 17.20 cm/s    LVIDd 4.40 cm    RVSP 30.0 mmHg    LV A4C EF 75.2    Urine electrolytes   Result Value Ref Range    Sodium, Urine Random 10 mmol/L    Sodium/Creatinine Ratio 15 Not established. mmol/g Creat    Potassium, Urine Random 29 mmol/L    Potassium/Creatinine Ratio 44 Not established mmol/g Creat    Chloride, Urine Random <15 mmol/L    Chloride/Creatinine Ratio      Creatinine, Urine Random 66.0 20.0 - 320.0 mg/dL   Osmolality, urine   Result Value Ref Range    Osmolality, Urine Random 339 200 - 1,200 mOsm/kg   Comprehensive metabolic panel   Result Value Ref Range    Glucose 156 (H) 74 - 99 mg/dL    Sodium 128 (L) 136 - 145 mmol/L    Potassium 4.3 3.5 - 5.3 mmol/L    Chloride 100 98 - 107 mmol/L    Bicarbonate 21 21 - 32 mmol/L    Anion Gap 11 10 - 20 mmol/L    Urea Nitrogen 29 (H) 6 - 23 mg/dL    Creatinine 1.15 (H) 0.50 - 1.05 mg/dL    eGFR 52 (L) >60 mL/min/1.73m*2    Calcium 7.8 (L) 8.6 - 10.6 mg/dL    Albumin 2.1 (L) 3.4 - 5.0 g/dL    Alkaline Phosphatase 208 (H) 33 - 136 U/L    Total Protein 5.7 (L) 6.4 - 8.2 g/dL     (H) 9 - 39 U/L    Bilirubin, Total 11.1 (H) 0.0 - 1.2 mg/dL    ALT 48 (H) 7 - 45 U/L   Uric Acid   Result Value Ref Range    Uric Acid 7.1 (H) 2.3 - 6.7 mg/dL   Lactate dehydrogenase "   Result Value Ref Range     (H) 84 - 246 U/L   Phosphorus   Result Value Ref Range    Phosphorus 3.8 2.5 - 4.9 mg/dL   CBC and Auto Differential   Result Value Ref Range    WBC 12.8 (H) 4.4 - 11.3 x10*3/uL    nRBC 0.0 0.0 - 0.0 /100 WBCs    RBC 3.99 (L) 4.00 - 5.20 x10*6/uL    Hemoglobin 12.1 12.0 - 16.0 g/dL    Hematocrit 33.4 (L) 36.0 - 46.0 %    MCV 84 80 - 100 fL    MCH 30.3 26.0 - 34.0 pg    MCHC 36.2 (H) 32.0 - 36.0 g/dL    RDW 21.7 (H) 11.5 - 14.5 %    Platelets 205 150 - 450 x10*3/uL    Neutrophils % 84.8 40.0 - 80.0 %    Immature Granulocytes %, Automated 0.7 0.0 - 0.9 %    Lymphocytes % 7.4 13.0 - 44.0 %    Monocytes % 7.0 2.0 - 10.0 %    Eosinophils % 0.0 0.0 - 6.0 %    Basophils % 0.1 0.0 - 2.0 %    Neutrophils Absolute 10.82 (H) 1.20 - 7.70 x10*3/uL    Immature Granulocytes Absolute, Automated 0.09 0.00 - 0.70 x10*3/uL    Lymphocytes Absolute 0.95 (L) 1.20 - 4.80 x10*3/uL    Monocytes Absolute 0.89 0.10 - 1.00 x10*3/uL    Eosinophils Absolute 0.00 0.00 - 0.70 x10*3/uL    Basophils Absolute 0.01 0.00 - 0.10 x10*3/uL   Comprehensive metabolic panel   Result Value Ref Range    Glucose 117 (H) 74 - 99 mg/dL    Sodium 127 (L) 136 - 145 mmol/L    Potassium 4.1 3.5 - 5.3 mmol/L    Chloride 99 98 - 107 mmol/L    Bicarbonate 19 (L) 21 - 32 mmol/L    Anion Gap 13 10 - 20 mmol/L    Urea Nitrogen 32 (H) 6 - 23 mg/dL    Creatinine 1.28 (H) 0.50 - 1.05 mg/dL    eGFR 46 (L) >60 mL/min/1.73m*2    Calcium 8.0 (L) 8.6 - 10.6 mg/dL    Albumin 2.2 (L) 3.4 - 5.0 g/dL    Alkaline Phosphatase 226 (H) 33 - 136 U/L    Total Protein 6.2 (L) 6.4 - 8.2 g/dL     (H) 9 - 39 U/L    Bilirubin, Total 11.9 (H) 0.0 - 1.2 mg/dL    ALT 58 (H) 7 - 45 U/L   Magnesium   Result Value Ref Range    Magnesium 2.32 1.60 - 2.40 mg/dL   Protime-INR   Result Value Ref Range    Protime 16.4 (H) 9.8 - 12.8 seconds    INR 1.5 (H) 0.9 - 1.1   Uric Acid   Result Value Ref Range    Uric Acid 6.6 2.3 - 6.7 mg/dL   Lactate dehydrogenase    Result Value Ref Range     (H) 84 - 246 U/L   Phosphorus   Result Value Ref Range    Phosphorus 3.5 2.5 - 4.9 mg/dL     Labs currently collected and will be reviewed once resulted.    Imaging:    Transthoracic Echo (TTE) Limited    Result Date: 2/14/2024   [facility], [address] [place] [address]                Tel [phone] and Fax [phone] TRANSTHORACIC ECHOCARDIOGRAM REPORT  Patient Name:      NADIRA Ash Physician:    [id number] Mian Martinez MD Study Date:        2/14/2024            Ordering Provider:    [id number] SANDRA CORTEZ MRN/PID:           [id number]             Fellow: Accession#:        [id number]         Nurse: Date of Birth/Age: 1955 / 68 years Sonographer:          Ruth Iraheta RDCS Gender:            F                    Additional Staff: Height:            170.18 cm            Admit Date:           2/3/2024 Weight:            90.27 kg             Admission Status:     Inpatient -                                                               Routine BSA / BMI:         2.02 m2 / 31.17      Encounter#:           [id number]                    kg/m2                                         Department Location:  [facility] Non                                                               Invasive Blood Pressure: 128 /85 mmHg Study Type:    TRANSTHORACIC ECHO (TTE) LIMITED Diagnosis/ICD: Other long term (current) drug therapy-Z79.899 Indication:    Encounter for monitoring cardiotoxic drug therapy CPT Code:      Echo Limited-86229; Color Doppler-33604; Doppler Limited-62869 Patient History: Pertinent History: Dyslipidemia. breast cancer mets to liver and possibly brain,                    new cirrhosis, PACs, obesity, pre-chemo. Study Detail: The following Echo studies were performed: 2D, M-Mode, Doppler and                color flow. Technically challenging study due to patient lying in               supine position and prominent lung artifact.  PHYSICIAN INTERPRETATION: Left Ventricle: The left ventricular systolic function is hyperdynamic, with an estimated ejection fraction of 70%. There are no regional wall motion abnormalities. The left ventricular cavity size is normal. Spectral Doppler shows an impaired relaxation pattern of left ventricular diastolic filling. Left Atrium: The left atrium is upper limits of normal in size. Right Ventricle: The right ventricle is normal in size. There is normal right ventricular global systolic function. Right Atrium: The right atrium is upper limits of normal in size. Aortic Valve: The aortic valve is probably trileaflet. There is trivial aortic valve regurgitation. Mitral Valve: The mitral valve is normal in structure. There is trace mitral valve regurgitation. Tricuspid Valve: The tricuspid valve is structurally normal. There is trace tricuspid regurgitation. Pulmonic Valve: The pulmonic valve is not well visualized. Pulmonic valve regurgitation was not assessed. Pericardium: There is a trivial pericardial effusion. Aorta: The aortic root is normal. Pulmonary Artery: The tricuspid regurgitant velocity is 2.60 m/s, and with an estimated right atrial pressure of 3 mmHg, the estimated pulmonary artery pressure is borderline elevated with the RVSP at 30.0 mmHg. Systemic Veins: The inferior vena cava appears to be of normal size.  CONCLUSIONS:  1. Left ventricular systolic function is hyperdynamic with a 70% estimated ejection fraction.  2. Spectral Doppler shows an impaired relaxation pattern of left ventricular diastolic filling. QUANTITATIVE DATA SUMMARY: 2D MEASUREMENTS:                          Normal Ranges: LAs:           4.10 cm   (2.7-4.0cm) IVSd:          0.80 cm   (0.6-1.1cm) LVPWd:         0.90 cm   (0.6-1.1cm) LVIDd:         4.40 cm   (3.9-5.9cm) LVIDs:         2.90 cm LV Mass Index:  58.8 g/m2 LV % FS        34.1 % LA VOLUME:                               Normal Ranges: LA Vol A4C:        58.0 ml    (22+/-6mL/m2) LA Vol A2C:        60.0 ml LA Vol BP:         60.3 ml LA Vol Index A4C:  28.7ml/m2 LA Vol Index A2C:  29.7 ml/m2 LA Vol Index BP:   29.9 ml/m2 LA Area A4C:       20.1 cm2 LA Area A2C:       20.9 cm2 LA Major Axis A4C: 5.9 cm LA Major Axis A2C: 6.2 cm RA VOLUME BY A/L METHOD:                       Normal Ranges: RA Area A4C: 18.0 cm2 AORTA MEASUREMENTS:                      Normal Ranges: Ao Sinus, d: 3.30 cm (2.1-3.5cm) LV SYSTOLIC FUNCTION BY 2D PLANIMETRY (MOD):                     Normal Ranges: EF-A4C View: 75.2 % (>=55%) EF-A2C View: 68.8 % EF-Biplane:  71.3 % LV DIASTOLIC FUNCTION:                           Normal Ranges: MV Peak E:    0.64 m/s    (0.7-1.2 m/s) MV Peak A:    0.96 m/s    (0.42-0.7 m/s) E/A Ratio:    0.67        (1.0-2.2) MV e'         0.08 m/s    (>8.0) MV lateral e' 0.09 m/s MV medial e'  0.07 m/s MV A Dur:     119.00 msec E/e' Ratio:   8.35        (<8.0) a'            0.13 m/s MV DT:        365 msec    (150-240 msec) MITRAL VALVE:                 Normal Ranges: MV DT: 365 msec (150-240msec)  RIGHT VENTRICLE: RV Basal 4.30 cm RV Mid   2.60 cm RV Major 5.9 cm TAPSE:   23.6 mm RV s'    0.17 m/s TRICUSPID VALVE/RVSP:                             Normal Ranges: Peak TR Velocity: 2.60 m/s RV Syst Pressure: 30.0 mmHg (< 30mmHg) IVC Diam:         1.50 cm  [id number] Mian Martinez MD Electronically signed on 2/14/2024 at 1:01:40 PM  ** Final **          Assessment/Plan   Principal Problem:    Cirrhosis (CMS/HCC)    Ms. Waddell is 67 yo F history of Seropositive RA (on infliximab), osteoarthritis, nephrolithiasis, obesity, DLD, PACs, history of tobacco use (quit march 2023), who presented to [place] ED on 2/1 for dark red urine, generalized abdominal pain with nausea transferred to  main campus for further workup of her new cirrhosis and jaundice. Since admission, hepatology and  oncology have been consulted with recommendations for workup and further management. Per biopsy results, patient has breast cancer with metastasis to the liver and likely metastasis to her brain. Hepatology suspects that new cirrhosis is related to infiltrative malignancy although slight possibility of autoimmune component given positive anti-smooth muscle antibodies. Currently trialing prednisone 30 mg. Final breast cancer pathology shows invasive ductal carcinoma grade 3, ER-/AL-/HER2+. Started on chemotherapy and targeted therapy yesterday and tolerated this well. TTE done prior to initiation of treatment showed EF 70% and mild impaired diastolic relaxation. Started on allopurinol for TLS prophylaxis. Will continue to monitor her closely for signs of severe hepatic dysfunction and TLS.     2/15 updates:  - Lasix and aldactone still held iso SYLVIA. Will restart once SYLVIA has resolved, now up trending today  - Oncology: started on carboplatin (AUC 4) and trastuzumab (loading dose 8 mg/kg) yesterday, tolerated this well  - EKG Qtc 467  - BID CMP, LDH, and uric acid to monitor for tumor lysis syndrome, continue allopurinol 300 mg for prophylaxis  - Compared to last night's labwork, LDH, PT/INR, Alkphos, AST, ALT, and total bili are rising, potassium, calcium, and phos wnl, leukocytosis secondary to steroids, will continue to monitor   - Hep B Ag negative, Ab positive   - Continue prednisone 30 mg daily (started 2/13) and will monitor LFTs and PT/INR for improvement  - Plan for NM bone scan today to assess for mets to the bone  - Consult orthopedic surgery to assess subacute avulsion fracture of R femur found on CT on 2/1: nothing to do, follow-up outpatient, WBAT  - G6PD testing negative  - Consult supportive onc today    #new liver cirrhosis  #Cholelithiasis  #mixed hepatocellular and cholestatic liver injury   #mild confusion possible grade 1 hepatic encephalopahthy  #liver metastasis (breast primary)  :: hepC  negative, hepB negative, hepA negative, T spot negative in 11/2023  :: alpha 1 antitrypsin negative  :: ferritin 406 H/transferrin 136 L  :: RUQ as above in assessment  :: ammonia 79  - autoimmune workup for the most part negative. Anti-SM positive 1:20, could be related to autoimmune hepatitis; however, still suspect malignancy to be driving variable  - liver biopsy significant for poorly differentiated breast cancer  - diagnostic paracentesis unremarkable, no malignant cells noted  - lactulose 20 g, lasix 20 mg and aldactone 50 mg currently held iso SYLVIA  - trialing prednisone 30 mg to see if it may help with possible autoimmune component (2/13 -)  - Hep B surface AG negative, Hep B surface AB positive at 580.6    #nonhealing, draining, L breast wound  #primary breast cancer with liver met and likely brain mets  :: MRI brain showing small enhancing lesions in the cerebellum without mass effect with c/f metastatic disease, potential 4 mm MRA bifurcation aneurysm  :: TTE shows EF 70% and impaired diastolic relaxation  - plan for outpatient MRA   - inpatient mammogram significant for masses and suspicious lymphadenopathy  - s/p biopsies, final pathology shows invasive ductal carcinoma grade 3, ER-/HI-/HER2+  - initial pathology showed ER+ so patient was started on anastrazole 1 mg, but this read was modified to ER- so anastrozole was stopped on 2/13  - per oncology: plan for inpatient carboplatin (AUC 4) and trastuzumab (loading dose 8 mg/kg) completed on 2/14 with patient tolerating this well  - BID CMP, LDH, and uric acid to monitor for tumor lysis syndrome  - Continue allopurinol 300 mg daily for TLS prophylaxis  - G6PD testing negative  - Consult supportive onc today  - EKG with Qtc 467  - Plan for NM bone scan today to assess for mets to the bone    #RA - on infliximab  #osteoarthritis  :: hold home naproxen   - voltaren gel  - tylenol 650 mg q8 prn  - oxycodone 5 mg q4 prn  - outpatient rheum aware to  discontinue infliximab     #subacute avulsion fracture lesser trochanter R proximal femur  - chronic?  - consult orthopedic surgery to assess subacute avulsion fracture of R femur found on CT on 2/1: nothing to do, patient can follow-up outpatient, WBAT  - obtaining NM bone scan today     #compression deformity at T12  -vitamin D and calcium     #abdominal aorta atherosclerosis on CT  #smoking history   - follow up with PCP outpatient to discuss potential statin/asa     F: none  E: PRN   N: regular diet  GI: none  P: lovenox  A: PIV  C: full code confirmed on admission   Surrogate:   Cierra her daughter [phone] (first option)  Emmanuel her son [phone]     Monica Weber MD

## 2024-02-15 NOTE — CARE PLAN
Problem: Pain  Goal: My pain/discomfort is manageable  Outcome: Progressing     Problem: Safety  Goal: Patient will be injury free during hospitalization  Outcome: Progressing  Goal: I will remain free of falls  Outcome: Progressing     Problem: Daily Care  Goal: Daily care needs are met  Outcome: Progressing     Problem: Psychosocial Needs  Goal: Demonstrates ability to cope with hospitalization/illness  Outcome: Progressing  Goal: Collaborate with me, my family, and caregiver to identify my specific goals  Outcome: Progressing     Problem: Discharge Barriers  Goal: My discharge needs are met  Outcome: Progressing     Problem: Safety - Adult  Goal: Free from fall injury  Outcome: Progressing     Problem: Discharge Planning  Goal: Discharge to home or other facility with appropriate resources  Outcome: Progressing     Problem: Chronic Conditions and Co-morbidities  Goal: Patient's chronic conditions and co-morbidity symptoms are monitored and maintained or improved  Outcome: Progressing       The patient's goals for the shift include Pain management    The clinical goals for the shift include pt will tolerate chemo with no adverse signs or symptoms by 2/14 at 1900    Over the shift, the patient did make progress toward the following goals.

## 2024-02-15 NOTE — NURSING NOTE
Pt receiving trastuzumab-anns 720 mg in sodium chloride for a TV of 284.29. +BBR obtained via syringe aspiration prior to infusion. Chemo infusing through PIV #20 G in the RFA. Patient, chemo, and rate verified at the bedside and against protocol with 2nd RN.

## 2024-02-15 NOTE — NURSING NOTE
Assumed care of patient. Patient laying in bed with eyes open on room air no signs of distress. Pt voiced no questions or concerns at the time. BSSR given on patient. Pt carboplatin 302 mg in sodium chloride ended, blood return noted      Pt O2 decreased, pt placed on 2L via NC.      Pt monitored throughout the shift. VVS with minor complaints of pain. Pts bed remain low with alarm on for safety. Safety checks completed and charted for patient.  Call light and personals within reach. Will continue to monitor

## 2024-02-15 NOTE — PROGRESS NOTES
"[facility]  [facility]  CONSULT FOLLOW-UP     Reason For Consult  Elevated LFTs    History Of Present Illness  Meghan Waddell is a 68 y.o. female with a past medical history of seropositive RA on infliximab since 12/2023, and obesity (BM 30) who presented to [place] ED on 2/1 for dark red urine, abdominal pain with nausea who was found to be jaundiced with elevated LFTs and was transferred to [facility] for further care. Hepatology is consulted for elevated LFTS    SUBJECTIVE     -Has started trial of prednisone  -Got chemo yesterday  -Feels okay today    EXAM     Last Recorded Vitals  Blood pressure 118/70, pulse 77, temperature 36.1 °C (97 °F), temperature source Temporal, resp. rate 18, height 1.695 m (5' 6.73\"), weight 90.5 kg (199 lb 8.3 oz), SpO2 93 %.      Intake/Output Summary (Last 24 hours) at 2/15/2024 1334  Last data filed at 2/15/2024 0918  Gross per 24 hour   Intake 2463.43 ml   Output 1300 ml   Net 1163.43 ml            Physical Exam  General: obese, no acute distress  HEENT: PERRLA, EOM intact, no scleral icterus, moist MM, poor dentition  Respiratory: CTA bilaterally, normal work of breathing  Cardiovascular: RRR, no murmurs/rubs/gallops  Abdomen: Soft, +ve fluid wave, bowel sounds present, no masses palpated, no organomeagly  Extremities: no edema, no asterixis  Neuro: alert and oriented, CNII-XII grossly intact, moves all 4 extremities with no focal deficits      OBJECTIVE                                                                              Medications             Current Facility-Administered Medications:     acetaminophen (Tylenol) tablet 650 mg, 650 mg, oral, q8h PRN, 650 mg at 02/12/24 2059 **OR** acetaminophen (Tylenol) oral liquid 650 mg, 650 mg, nasogastric tube, q8h PRN **OR** acetaminophen (Tylenol) suppository 650 mg, 650 mg, rectal, q8h PRN, Marcell Ramírez MD    albuterol 2.5 mg /3 mL (0.083 %) nebulizer solution 3 mL, 3 mL, " nebulization, PRN, Selvin Stovall MD    allopurinol (Zyloprim) tablet 300 mg, 300 mg, oral, Daily, Mabel Sweet MD, 300 mg at 02/15/24 0806    [Held by provider] anastrozole (Arimidex) tablet 1 mg, 1 mg, oral, Daily, Sukhi Ortiz MD, 1 mg at 02/13/24 0842    calcium carbonate-vitamin D3 500 mg-5 mcg (200 unit) per tablet 2 tablet, 2 tablet, oral, Daily, Vero Russell MD, 2 tablet at 02/15/24 0806    dextrose 5 % in water (D5W) bolus, 500 mL, intravenous, PRN, Selvin Stovall MD    diclofenac sodium (Voltaren) 1 % gel 4 g, 4 g, Topical, 4x daily PRN, Vero Russell MD, 4 g at 02/11/24 1208    diphenhydrAMINE (BENADryl) injection 50 mg, 50 mg, intravenous, PRN, Selvin Stovall MD    enoxaparin (Lovenox) syringe 40 mg, 40 mg, subcutaneous, q24h, Vero Russell MD, 40 mg at 02/14/24 2052    EPINEPHrine (Adrenalin) 1 mg/10mL injection 0.3 mg, 0.3 mg, intramuscular, q5 min PRN, Selvin Stovall MD    famotidine PF (Pepcid) injection 20 mg, 20 mg, intravenous, Once PRN, Selvin Stovall MD    [Held by provider] furosemide (Lasix) tablet 20 mg, 20 mg, oral, Daily, Dominick Hicks MD, 20 mg at 02/12/24 0803    lactulose 20 gram/30 mL oral solution 20 g, 20 g, oral, Daily, Dominick Hicks MD, 20 g at 02/15/24 0806    lidocaine 4 % patch 1 patch, 1 patch, transdermal, Daily, Devi Ayers MD, 1 patch at 02/15/24 0806    melatonin tablet 5 mg, 5 mg, oral, Nightly PRN, Aldo Ochoa DO, 5 mg at 02/14/24 2052    methylPREDNISolone sod succinate (PF) (SOLU-Medrol) 40 mg/mL injection 40 mg, 40 mg, intravenous, PRN, Selvin Stovall MD    oxyCODONE (Roxicodone) immediate release tablet 5 mg, 5 mg, oral, q4h PRN, Monica Weber MD, 5 mg at 02/14/24 2051    pantoprazole (ProtoNix) EC tablet 40 mg, 40 mg, oral, Daily before breakfast, Connie Hebert MD, 40 mg at 02/15/24 0805    [Held by provider] polyethylene glycol (Glycolax, Miralax) packet 17 g, 17 g, oral, Daily, Vero Russell MD, 17 g at  02/13/24 1721    predniSONE (Deltasone) tablet 30 mg, 30 mg, oral, Daily, Marcell Ramírez MD, 30 mg at 02/15/24 0805    prochlorperazine (Compazine) injection 10 mg, 10 mg, intravenous, q6h PRN, Selvin Stovall MD    prochlorperazine (Compazine) tablet 10 mg, 10 mg, oral, q6h PRN, Selvin Stovall MD    sodium chloride 0.9 % bolus 500 mL, 500 mL, intravenous, PRN, Selvin Stovall MD    sodium chloride 0.9% infusion, 50 mL/hr, intravenous, Continuous, Mabel Sweet MD, Last Rate: 50 mL/hr at 02/15/24 0043, 50 mL/hr at 02/15/24 0043    [Held by provider] spironolactone (Aldactone) tablet 50 mg, 50 mg, oral, Daily, Dominick Hicks MD, 50 mg at 02/12/24 0804                                                                            Labs     Results for orders placed or performed during the hospital encounter of 02/03/24 (from the past 24 hour(s))   Urine electrolytes   Result Value Ref Range    Sodium, Urine Random 10 mmol/L    Sodium/Creatinine Ratio 15 Not established. mmol/g Creat    Potassium, Urine Random 29 mmol/L    Potassium/Creatinine Ratio 44 Not established mmol/g Creat    Chloride, Urine Random <15 mmol/L    Chloride/Creatinine Ratio      Creatinine, Urine Random 66.0 20.0 - 320.0 mg/dL   Osmolality, urine   Result Value Ref Range    Osmolality, Urine Random 339 200 - 1,200 mOsm/kg   Comprehensive metabolic panel   Result Value Ref Range    Glucose 156 (H) 74 - 99 mg/dL    Sodium 128 (L) 136 - 145 mmol/L    Potassium 4.3 3.5 - 5.3 mmol/L    Chloride 100 98 - 107 mmol/L    Bicarbonate 21 21 - 32 mmol/L    Anion Gap 11 10 - 20 mmol/L    Urea Nitrogen 29 (H) 6 - 23 mg/dL    Creatinine 1.15 (H) 0.50 - 1.05 mg/dL    eGFR 52 (L) >60 mL/min/1.73m*2    Calcium 7.8 (L) 8.6 - 10.6 mg/dL    Albumin 2.1 (L) 3.4 - 5.0 g/dL    Alkaline Phosphatase 208 (H) 33 - 136 U/L    Total Protein 5.7 (L) 6.4 - 8.2 g/dL     (H) 9 - 39 U/L    Bilirubin, Total 11.1 (H) 0.0 - 1.2 mg/dL    ALT 48 (H) 7 - 45 U/L   Uric Acid    Result Value Ref Range    Uric Acid 7.1 (H) 2.3 - 6.7 mg/dL   Lactate dehydrogenase   Result Value Ref Range     (H) 84 - 246 U/L   Phosphorus   Result Value Ref Range    Phosphorus 3.8 2.5 - 4.9 mg/dL   CBC and Auto Differential   Result Value Ref Range    WBC 12.8 (H) 4.4 - 11.3 x10*3/uL    nRBC 0.0 0.0 - 0.0 /100 WBCs    RBC 3.99 (L) 4.00 - 5.20 x10*6/uL    Hemoglobin 12.1 12.0 - 16.0 g/dL    Hematocrit 33.4 (L) 36.0 - 46.0 %    MCV 84 80 - 100 fL    MCH 30.3 26.0 - 34.0 pg    MCHC 36.2 (H) 32.0 - 36.0 g/dL    RDW 21.7 (H) 11.5 - 14.5 %    Platelets 205 150 - 450 x10*3/uL    Neutrophils % 84.8 40.0 - 80.0 %    Immature Granulocytes %, Automated 0.7 0.0 - 0.9 %    Lymphocytes % 7.4 13.0 - 44.0 %    Monocytes % 7.0 2.0 - 10.0 %    Eosinophils % 0.0 0.0 - 6.0 %    Basophils % 0.1 0.0 - 2.0 %    Neutrophils Absolute 10.82 (H) 1.20 - 7.70 x10*3/uL    Immature Granulocytes Absolute, Automated 0.09 0.00 - 0.70 x10*3/uL    Lymphocytes Absolute 0.95 (L) 1.20 - 4.80 x10*3/uL    Monocytes Absolute 0.89 0.10 - 1.00 x10*3/uL    Eosinophils Absolute 0.00 0.00 - 0.70 x10*3/uL    Basophils Absolute 0.01 0.00 - 0.10 x10*3/uL   Comprehensive metabolic panel   Result Value Ref Range    Glucose 117 (H) 74 - 99 mg/dL    Sodium 127 (L) 136 - 145 mmol/L    Potassium 4.1 3.5 - 5.3 mmol/L    Chloride 99 98 - 107 mmol/L    Bicarbonate 19 (L) 21 - 32 mmol/L    Anion Gap 13 10 - 20 mmol/L    Urea Nitrogen 32 (H) 6 - 23 mg/dL    Creatinine 1.28 (H) 0.50 - 1.05 mg/dL    eGFR 46 (L) >60 mL/min/1.73m*2    Calcium 8.0 (L) 8.6 - 10.6 mg/dL    Albumin 2.2 (L) 3.4 - 5.0 g/dL    Alkaline Phosphatase 226 (H) 33 - 136 U/L    Total Protein 6.2 (L) 6.4 - 8.2 g/dL     (H) 9 - 39 U/L    Bilirubin, Total 11.9 (H) 0.0 - 1.2 mg/dL    ALT 58 (H) 7 - 45 U/L   Magnesium   Result Value Ref Range    Magnesium 2.32 1.60 - 2.40 mg/dL   Protime-INR   Result Value Ref Range    Protime 16.4 (H) 9.8 - 12.8 seconds    INR 1.5 (H) 0.9 - 1.1   Uric Acid    Result Value Ref Range    Uric Acid 6.6 2.3 - 6.7 mg/dL   Lactate dehydrogenase   Result Value Ref Range     (H) 84 - 246 U/L   Phosphorus   Result Value Ref Range    Phosphorus 3.5 2.5 - 4.9 mg/dL                                                                            Imaging     1/5/2024 MRI/MRCP Liver with and without contrast  IMPRESSION:  1. Enhancing left breast masses concerning for a primary breast  malignancy. Correlation with dedicated breast imaging is recommended.  2. Diffuse enhancing osseous lesions compatible with osseous  metastases.  3. Markedly nodular contour to the liver with diffuse nodular delayed  enhancement. Differential includes an atypical appearance of diffuse  hepatic metastases versus hepatic fibrosis/cirrhosis. Correlation  with tissue sampling is recommended.  4. Prominent right cardiophrenic lymph nodes which could be reactive  or metastatic. Correlation with dedicated chest imaging is  recommended.  5. Small volume ascites.  6. Additional chronic and incidental findings as described above.    2/4/2024 US Liver with Doppler  IMPRESSION:  1. Liver cirrhosis with diffusely heterogenous hepatic parenchyma.  Though no definite focal lesion identified, neoplasm can not be  excluded. Further assessment with liver MRI with IV contrast is  recommended.  2. Perihepatic ascites, slow portal venous flow and recanalized  paraumbilical vein, likely sequela of portal hypertension.  3. Cholelithiasis without sonographic evidence of acute cholecystitis.  4. Increased renal cortical echogenicity can be seen the setting of  medical renal disease. No hydronephrosis.      2/1/2024 CT abdomen Pelvis without IV contrast  Abdomen:   There has been significant interval change in the liver since last  year, now diffusely heterogeneous in density with cirrhotic  morphology. In addition, there has been interval development of  diffuse minimal ascites.  Gallbladder remains contracted with calcified  stones. Bile ducts are  normal caliber. Spleen maintains normal size. No abnormalities are  detectable in the pancreas or adrenal glands. Non-obstructing small  calculi are again seen in the kidneys. There are no ureteral calculi  and there is no hydronephrosis  There is stable mild dilatation of the mid abdominal aorta. Small  lymph nodes are seen in the abdomen and retroperitoneum. There is no  retroperitoneal hematoma  The stomach is normally distended with a small hiatal hernia. There is  no appreciable small bowel thickening or obstruction. Appendix is not  localized. Scattered stool is seen throughout the colon. There is no  free air.    IMPRESSION:  1. Since last year, interval development of significant parenchymal  liver disease with cirrhotic morphology.  2. Additional development of diffuse minimal ascites.  3. Unchanged bilateral small non-obstructing renal calculi; no  hydronephrosis.  4. No bowel obstruction or detectable bowel inflammation.  5. Likely subacute avulsion fracture lesser trochanter right proximal  femur.  6. Remainder as above.                                                                         GI Procedures     No prior    ASSESSMENT / PLAN     ASSESSMENT/PLAN:  Meghan Waddell is a 68 y.o. female with a past medical history of seropositive RA on infliximab since 12/2023, and obesity (BM 30) who presented to [facility] ED on 2/1 for dark red urine, abdominal pain with nausea who was found to be jaundiced with elevated LFTs and was transferred to [facility] for further care. She has been diagnosed with a new metastatic breast cancer (mets to bones, liver, brain). Hepatology is consulted for elevated LFTS    She has newly elevated LFTs this admission compared to prior. Mostly a cholestatic pattern (R Factor 0.6), but does have AST > ALT. She has cholelithiasis on imaging but MRI/MRCP did not show any cholelithiasis. She has a very sudden change in the appearance of liver in CT compared to 1  year ago. The liver is now very heterogenous in appearance and she has newly elevated LFTs.  While there are no discrete tumors or of the liver on MRI, extensive microscopic metastatic infiltrative disease of the liver. Liver biopsy with random right liver biopsies were done and pathology is consistent with poorly differentiated adenocarcinoma of the liver. 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 Treatment for this would have to be treatment of her underlying metastatic cancer and otherwise supportive care for the complications from cirrhosis/pseudocirrhosis such as diuretics for ascites/edema and lactulose/rifaxamin for hepatic encephalopathy.     Discussed case with pathology, there is not enough non-cancerous background tissue from the biopsy to comment on whether or not there was underlying cirrhosis or AIH. Noted ASMA was mildly positive but this can be falsely positive in many cirrhotic patients. She does have a history of RA and we have not yet been able to rule out AIH. We consider brief empiric steroid trial (Prednisone 30mg once daily) for possibly underlying AIH that we have not fully ruled out. If LFTs improve on steroid trial, could be indicative of an underlying autoimmune process.    #Newly decompensated cirrhosis  -Etiology uncertain, possible NIETO  -Liver stamp: A1AT 170, ceruloplasmin 42.6, HCV negative, AMA negative, ASMA weak positive 1:20, JOCELYN weak positive 1:320, soluble liver Ag Ab negative, LKM-1 unrevealing, PETH negative,   -Pending labs: anti-SLA/LP, anti-liver cytosol Ab (ALC-1)  MELD 3.0: 29 at 2/15/2024  9:00 AM  MELD-Na: 29 at 2/15/2024  9:00 AM  Calculated from:  Serum Creatinine: 1.28 mg/dL at 2/15/2024  9:00 AM  Serum Sodium: 127 mmol/L at 2/15/2024  9:00 AM  Total Bilirubin: 11.9 mg/dL at 2/15/2024  9:00 AM  Serum Albumin: 2.2 g/dL at 2/15/2024  9:00 AM  INR(ratio): 1.5 at 2/15/2024  9:00 AM  Age at listing (hypothetical): 68  years  Sex: Female at 2/15/2024  9:00 AM  - Ascites: minimal, perihepatic. Diagnostic paracentesis 1.7L removed 2/5 with SAAG 1.7 and total protein 0.8 consistent with portal HTN. Negative SBP and cytology  - Volume: start lasix 20mg and spironolactone 50mg PO  - EV: unknown, no prior EGD  - HE: present/new. Started on Lactulose this admission  - Immunizations: HBV and HAV nonimmune, defer vaccination to outpatient  - HCC screening: AFP pending, MRI pending  - Transplant:     Recommendations:  -Recommend total 5 day trial of Prednisone 30mg. If no improvement in LFTs by then, would stop.  -If has drastic improvement in LFTs while on prednisone, pleae call Hepatology back  -Consider restarting Lasix 20mg PO and Spironolactone 50mg PO when able   -Continue lactulose to 20g once daily given bloating/cramping. Goal is 2-3 BM daily, can titrate up as needed  -If develops hepatic encephalopathy, can add Rifaxamin 550mg BID  -Sodium restricted diet, 2g Na daily  -Continue to trend daily MELD labs: CMP and INR daily  -Hepatology will sign off  -Can refer to Hepatology clinic on discharge     Patient was seen and discussed with Dr. Carias    Thank you for the consultation. Hepatology will continue to the follow .  - During weekday hours of 7am- 5pm, please do not hesitate to contact me on [facility] [facility] Chat or page [phone] if there are any further questions   - After hours, on weekends, and on holidays, please page the on-call GI fellow at [phone]. Thank you.     Dottie Connors MD  PGY4 Gastroenterology Fellow  [facility]

## 2024-02-15 NOTE — PROGRESS NOTES
"Music Therapy Note    Meghan WEISS Nunnelly     Therapy Session  Referral Type: New referral this admission  Visit Type: New visit  Session Start Time: 1140  Session End Time: 1145  Intervention Delivery: In-person  Conflict of Service: None  Number of family members present: 1  Family Present for Session: Child  Family Participation: Supportive     Pre-assessment  Unable to Assess Reason: Outcomes not applicable  Mood/Affect: Appropriate, Calm, Cooperative         Treatment/Interventions  Music Therapy Interventions: Assessment  Interruption: No  Patient Fell Asleep at End of Session: No    Post-assessment  Unable to Assess Reason: Did not provide expressive therapy intervention  Mood/Affect: Appropriate, Calm, Cooperative, Distracted  Continue Visiting: Yes  Total Session Time (min): 5 minutes    Narrative  Assessment Detail: Patient presented awake and alert, in bed with HOB raised, displaying calm affect. Pt was present and supportive of pt. MT introduced self and role and pt was receptive to music therapy education, though somewhat tangential and distractible. Pt shared that she enjoys big band music, drums, piano, and saxophone. Pt described herself as a \"homebody\" who prefers silence and solitude. Pt son encouraged pt to consider participating in music therapy at some point, as pt does enjoy music, but pt declined a session at this time, stating that she feels well-supported and does not want additional people in the room at this time. Patient was agreeable to potential follow-up and MT provided pt with their contact information.  Follow-up: MT to follow if needed/requested.    Education Documentation  No documentation found.          "

## 2024-02-15 NOTE — PROGRESS NOTES
Physical Therapy                 Therapy Communication Note    Patient Name: Meghan Waddell  MRN: [id number]  Today's Date: 2/15/2024     Discipline: Physical Therapy    Missed Visit Reason: Missed Visit Reason:  (pt off floor at [facility])    Missed Time: Attempt    Comment: 13:11pm

## 2024-02-15 NOTE — PROGRESS NOTES
02/15/24 1100   Discharge Planning   Living Arrangements Children   Support Systems Children   Type of Residence Private residence   Patient expects to be discharged to: Home   Financial Resource Strain   How hard is it for you to pay for the very basics like food, housing, medical care, and heating? Not hard     SW met with the patient to complete assessment and her daughter, Cierra, was on the phone. Patient lives with her daughter and her family in a single home. Patient shared overall she is independent with her care but has been using a walker since she had knee surgery. Patient has a new diagnosis of breast cancer and currently coping with the news but shares she has a good support system. Patient shred that her family will provide transportation to her medical appointments. Patient has no issues getting her medications. Pt. Has a new PCP and has an appointment in May but her daughter shared they are trying to get the patient an earlier appointment. Will continue to follow during patient's hospitalization and assist with planning for her care at discharge.   MARGUERITE Tovar

## 2024-02-16 NOTE — CARE PLAN
Problem: Pain  Goal: My pain/discomfort is manageable  Outcome: Progressing     Problem: Safety  Goal: Patient will be injury free during hospitalization  Outcome: Progressing  Goal: I will remain free of falls  Outcome: Progressing     Problem: Daily Care  Goal: Daily care needs are met  Outcome: Progressing     Problem: Psychosocial Needs  Goal: Demonstrates ability to cope with hospitalization/illness  Outcome: Progressing  Goal: Collaborate with me, my family, and caregiver to identify my specific goals  Outcome: Progressing   The patient's goals for the shift include Pain management    The clinical goals for the shift include pt will remain safe and free of injury through 2/16 at 1900    Patient remained safe and free of injury. VSS. Has been up out of bed with walker and standby assistance. Patient had 2 bowel movements today.

## 2024-02-16 NOTE — PROGRESS NOTES
Meghan Waddell is a 68 y.o. female on day 13 of admission presenting with Cirrhosis (CMS/HCC).    2/16- TCC met with patient to discuss home health care options as patient refused SNF. Patient requested TCC to contact her daughter Linda. TCC contacted Linda with home care choices 1. [facility] 2. [facility]. Referral in [facility] for [facility] who was unable to accept patient. Internal home care order needed for [facility]. TCC will continue to follow patient for discharge needs.     MARCELINO SAMPSON RN

## 2024-02-16 NOTE — PROGRESS NOTES
"Meghan Waddell is a 68 y.o. female on day 13 of admission presenting with Cirrhosis (CMS/HCC).    Subjective   No acute events overnight. Patient reports she feels well this morning. Ate almost half of her dinner yesterday which is more than she has been eating recently. Denies nausea, vomiting, chest pain, or leg swelling. Feels her pain level in her back and hip is a 2/10. Slept very well.        Objective   Physical Exam  Constitutional:       General: She is not in acute distress.     Appearance: Normal appearance. She is not toxic-appearing.   HENT:      Mouth/Throat:      Mouth: Mucous membranes are moist.   Eyes:      General: Scleral icterus (mild) present.      Extraocular Movements: Extraocular movements intact.      Pupils: Pupils are equal, round, and reactive to light.   Cardiovascular:      Rate and Rhythm: Normal rate and regular rhythm.      Heart sounds: No murmur heard.     No gallop.   Pulmonary:      Effort: Pulmonary effort is normal. No respiratory distress.      Breath sounds: No wheezing.   Chest:   Breasts:     Left: Inverted nipple, mass, nipple discharge and skin change present.      Comments: L breast with crusted draining lesion without purulence, fullness of the L lateral breast noted, nipple inversion with black scabbing  Abdominal:      General: Abdomen is distended.      Palpations: Abdomen is soft.      Tenderness: There is abdominal tenderness (mild tenderness to palpation in all four quadrants).   Musculoskeletal:         General: No swelling.      Right lower leg: No edema.      Left lower leg: No edema.   Skin:     General: Skin is warm and dry.      Coloration: Skin is jaundiced.   Neurological:      General: No focal deficit present.      Mental Status: She is alert.        Last Recorded Vitals  Blood pressure 121/89, pulse 63, temperature 36.4 °C (97.5 °F), temperature source Temporal, resp. rate 18, height 1.695 m (5' 6.73\"), weight 90.5 kg (199 lb 8.3 oz), SpO2 97 " %.  Intake/Output last 3 Shifts:  I/O last 3 completed shifts:  In: 992.6 (11 mL/kg) [P.O.:444; I.V.:311.7 (3.4 mL/kg); IV Piggyback:236.9]  Out: 850 (9.4 mL/kg) [Urine:850 (0.3 mL/kg/hr)]  Weight: 90.5 kg     Relevant Results  Results for orders placed or performed during the hospital encounter of 02/03/24 (from the past 24 hour(s))   Comprehensive metabolic panel   Result Value Ref Range    Glucose 115 (H) 74 - 99 mg/dL    Sodium 128 (L) 136 - 145 mmol/L    Potassium 4.3 3.5 - 5.3 mmol/L    Chloride 97 (L) 98 - 107 mmol/L    Bicarbonate 20 (L) 21 - 32 mmol/L    Anion Gap 15 10 - 20 mmol/L    Urea Nitrogen 36 (H) 6 - 23 mg/dL    Creatinine 1.41 (H) 0.50 - 1.05 mg/dL    eGFR 41 (L) >60 mL/min/1.73m*2    Calcium 8.5 (L) 8.6 - 10.6 mg/dL    Albumin 2.4 (L) 3.4 - 5.0 g/dL    Alkaline Phosphatase 238 (H) 33 - 136 U/L    Total Protein 6.5 6.4 - 8.2 g/dL     (H) 9 - 39 U/L    Bilirubin, Total 12.7 (H) 0.0 - 1.2 mg/dL    ALT 64 (H) 7 - 45 U/L   Uric Acid   Result Value Ref Range    Uric Acid 6.5 2.3 - 6.7 mg/dL   Lactate dehydrogenase   Result Value Ref Range     (H) 84 - 246 U/L   Phosphorus   Result Value Ref Range    Phosphorus 3.6 2.5 - 4.9 mg/dL   CBC and Auto Differential   Result Value Ref Range    WBC 11.1 4.4 - 11.3 x10*3/uL    nRBC 0.0 0.0 - 0.0 /100 WBCs    RBC 3.85 (L) 4.00 - 5.20 x10*6/uL    Hemoglobin 11.8 (L) 12.0 - 16.0 g/dL    Hematocrit 32.3 (L) 36.0 - 46.0 %    MCV 84 80 - 100 fL    MCH 30.6 26.0 - 34.0 pg    MCHC 36.5 (H) 32.0 - 36.0 g/dL    RDW 22.1 (H) 11.5 - 14.5 %    Platelets 206 150 - 450 x10*3/uL    Neutrophils % 82.0 40.0 - 80.0 %    Immature Granulocytes %, Automated 1.1 (H) 0.0 - 0.9 %    Lymphocytes % 7.1 13.0 - 44.0 %    Monocytes % 9.7 2.0 - 10.0 %    Eosinophils % 0.0 0.0 - 6.0 %    Basophils % 0.1 0.0 - 2.0 %    Neutrophils Absolute 9.09 (H) 1.20 - 7.70 x10*3/uL    Immature Granulocytes Absolute, Automated 0.12 0.00 - 0.70 x10*3/uL    Lymphocytes Absolute 0.79 (L) 1.20 -  4.80 x10*3/uL    Monocytes Absolute 1.08 (H) 0.10 - 1.00 x10*3/uL    Eosinophils Absolute 0.00 0.00 - 0.70 x10*3/uL    Basophils Absolute 0.01 0.00 - 0.10 x10*3/uL   Comprehensive metabolic panel   Result Value Ref Range    Glucose 91 74 - 99 mg/dL    Sodium 130 (L) 136 - 145 mmol/L    Potassium 4.9 3.5 - 5.3 mmol/L    Chloride 100 98 - 107 mmol/L    Bicarbonate 20 (L) 21 - 32 mmol/L    Anion Gap 15 10 - 20 mmol/L    Urea Nitrogen 45 (H) 6 - 23 mg/dL    Creatinine 1.71 (H) 0.50 - 1.05 mg/dL    eGFR 33 (L) >60 mL/min/1.73m*2    Calcium 8.2 (L) 8.6 - 10.6 mg/dL    Albumin 2.1 (L) 3.4 - 5.0 g/dL    Alkaline Phosphatase 218 (H) 33 - 136 U/L    Total Protein 6.0 (L) 6.4 - 8.2 g/dL     (H) 9 - 39 U/L    Bilirubin, Total 11.3 (H) 0.0 - 1.2 mg/dL    ALT 57 (H) 7 - 45 U/L   Magnesium   Result Value Ref Range    Magnesium 2.45 (H) 1.60 - 2.40 mg/dL   Protime-INR   Result Value Ref Range    Protime 14.5 (H) 9.8 - 12.8 seconds    INR 1.3 (H) 0.9 - 1.1   Uric Acid   Result Value Ref Range    Uric Acid 6.4 2.3 - 6.7 mg/dL   Lactate dehydrogenase   Result Value Ref Range     (H) 84 - 246 U/L   Phosphorus   Result Value Ref Range    Phosphorus 4.3 2.5 - 4.9 mg/dL   Morphology   Result Value Ref Range    RBC Morphology See Below     Target Cells Many             Imaging:    NM bone whole body    Result Date: 2/15/2024  Interpreted By:  Jorge Gaspar,  and Roman Uriostegui STUDY: NM BONE WHOLE BODY;  2/15/2024 2:16 pm   INDICATION: Signs/Symptoms:Assess for bone metastasis. History of ER positive poorly differentiated adenocarcinoma diagnosed on liver biopsy. The patient also has a history of rheumatoid arthritis.   COMPARISON: CT abdomen and pelvis, 02/01/2024   ACCESSION NUMBER(S): [id number]   ORDERING CLINICIAN: VICTOR MANUEL POOLE   TECHNIQUE: DIVISION OF NUCLEAR MEDICINE BONE SCAN, WHOLE BODY plus REGIONAL VIEWS   The patient received an intravenous dose of  25 mCi of Tc-99m MDP. Anterior and posterior images of the  skeleton from skull vertex to feet were then acquired.  Additional regional skeletal images were also obtained.   FINDINGS: There is faintly increased radiotracer accumulation within the proximal right femoral diaphysis.   Expected, excreted activity is noted in the kidneys and bladder.   Increased radiotracer uptake is seen in the cervical spine, right femoroacetabular joint, and bilateral ankles and mid feet, most consistent with an arthritic pattern.   Bilateral knee arthroplasties without abnormal radiotracer deposition to suggest hardware complication.       1. No evidence of osseous metastatic disease. 2. Nonspecific mild radiotracer deposition in the proximal right femoral diaphysis and lesser trochanter which corresponds to a partially visualized possible avulsion fracture deformity seen on prior CT from 02/01/2024. Further evaluation with dedicated imaging of the right femur can be considered. 3. Degenerative changes of the axial and appendicular skeleton as above.     I personally reviewed the images/study and I agree with the findings as stated by radiology resident Dr. Gila Owens. This study was interpreted at [facility], [place], [place].   MACRO: None   Signed by: Jorge Gaspar 2/15/2024 3:30 PM Dictation workstation:   [id number]    ECG 12 lead    Result Date: 2/15/2024  Normal sinus rhythm Normal ECG When compared with ECG of 14-FEB-2024 10:40, (unconfirmed) No significant change was found    Transthoracic Echo (TTE) Limited    Result Date: 2/14/2024   [facility], [address] [place] [address]                Tel [phone] and Fax [phone] TRANSTHORACIC ECHOCARDIOGRAM REPORT  Patient Name:      NADIRA Ash Physician:    [id number] Mian Martinez MD Study Date:        2/14/2024            Ordering Provider:    [id number] SANDRA WEISS                                                                [place] MRN/PID:           [id number]             Fellow: Accession#:        [id number]         Nurse: Date of Birth/Age: 1955 / 68 years Sonographer:          Ruth Iraheta RDCS Gender:            F                    Additional Staff: Height:            170.18 cm            Admit Date:           2/3/2024 Weight:            90.27 kg             Admission Status:     Inpatient -                                                               Routine BSA / BMI:         2.02 m2 / 31.17      Encounter#:           [id number]                    kg/m2                                         Department Location:  [facility] Non                                                               Invasive Blood Pressure: 128 /85 mmHg Study Type:    TRANSTHORACIC ECHO (TTE) LIMITED Diagnosis/ICD: Other long term (current) drug therapy-Z79.899 Indication:    Encounter for monitoring cardiotoxic drug therapy CPT Code:      Echo Limited-13336; Color Doppler-99067; Doppler Limited-85943 Patient History: Pertinent History: Dyslipidemia. breast cancer mets to liver and possibly brain,                    new cirrhosis, PACs, obesity, pre-chemo. Study Detail: The following Echo studies were performed: 2D, M-Mode, Doppler and               color flow. Technically challenging study due to patient lying in               supine position and prominent lung artifact.  PHYSICIAN INTERPRETATION: Left Ventricle: The left ventricular systolic function is hyperdynamic, with an estimated ejection fraction of 70%. There are no regional wall motion abnormalities. The left ventricular cavity size is normal. Spectral Doppler shows an impaired relaxation pattern of left ventricular diastolic filling. Left Atrium: The left atrium is upper limits of normal in size. Right Ventricle: The right ventricle is normal in size. There is normal right ventricular global systolic function. Right Atrium: The right atrium is upper limits of  normal in size. Aortic Valve: The aortic valve is probably trileaflet. There is trivial aortic valve regurgitation. Mitral Valve: The mitral valve is normal in structure. There is trace mitral valve regurgitation. Tricuspid Valve: The tricuspid valve is structurally normal. There is trace tricuspid regurgitation. Pulmonic Valve: The pulmonic valve is not well visualized. Pulmonic valve regurgitation was not assessed. Pericardium: There is a trivial pericardial effusion. Aorta: The aortic root is normal. Pulmonary Artery: The tricuspid regurgitant velocity is 2.60 m/s, and with an estimated right atrial pressure of 3 mmHg, the estimated pulmonary artery pressure is borderline elevated with the RVSP at 30.0 mmHg. Systemic Veins: The inferior vena cava appears to be of normal size.  CONCLUSIONS:  1. Left ventricular systolic function is hyperdynamic with a 70% estimated ejection fraction.  2. Spectral Doppler shows an impaired relaxation pattern of left ventricular diastolic filling. QUANTITATIVE DATA SUMMARY: 2D MEASUREMENTS:                          Normal Ranges: LAs:           4.10 cm   (2.7-4.0cm) IVSd:          0.80 cm   (0.6-1.1cm) LVPWd:         0.90 cm   (0.6-1.1cm) LVIDd:         4.40 cm   (3.9-5.9cm) LVIDs:         2.90 cm LV Mass Index: 58.8 g/m2 LV % FS        34.1 % LA VOLUME:                               Normal Ranges: LA Vol A4C:        58.0 ml    (22+/-6mL/m2) LA Vol A2C:        60.0 ml LA Vol BP:         60.3 ml LA Vol Index A4C:  28.7ml/m2 LA Vol Index A2C:  29.7 ml/m2 LA Vol Index BP:   29.9 ml/m2 LA Area A4C:       20.1 cm2 LA Area A2C:       20.9 cm2 LA Major Axis A4C: 5.9 cm LA Major Axis A2C: 6.2 cm RA VOLUME BY A/L METHOD:                       Normal Ranges: RA Area A4C: 18.0 cm2 AORTA MEASUREMENTS:                      Normal Ranges: Ao Sinus, d: 3.30 cm (2.1-3.5cm) LV SYSTOLIC FUNCTION BY 2D PLANIMETRY (MOD):                     Normal Ranges: EF-A4C View: 75.2 % (>=55%) EF-A2C View: 68.8 %  EF-Biplane:  71.3 % LV DIASTOLIC FUNCTION:                           Normal Ranges: MV Peak E:    0.64 m/s    (0.7-1.2 m/s) MV Peak A:    0.96 m/s    (0.42-0.7 m/s) E/A Ratio:    0.67        (1.0-2.2) MV e'         0.08 m/s    (>8.0) MV lateral e' 0.09 m/s MV medial e'  0.07 m/s MV A Dur:     119.00 msec E/e' Ratio:   8.35        (<8.0) a'            0.13 m/s MV DT:        365 msec    (150-240 msec) MITRAL VALVE:                 Normal Ranges: MV DT: 365 msec (150-240msec)  RIGHT VENTRICLE: RV Basal 4.30 cm RV Mid   2.60 cm RV Major 5.9 cm TAPSE:   23.6 mm RV s'    0.17 m/s TRICUSPID VALVE/RVSP:                             Normal Ranges: Peak TR Velocity: 2.60 m/s RV Syst Pressure: 30.0 mmHg (< 30mmHg) IVC Diam:         1.50 cm  [id number] Mian Martinez MD Electronically signed on 2/14/2024 at 1:01:40 PM  ** Final **            Assessment/Plan   Principal Problem:    Cirrhosis (CMS/HCC)    Ms. Waddell is 67 yo F history of Seropositive RA (on infliximab), osteoarthritis, nephrolithiasis, obesity, DLD, PACs, history of tobacco use (quit march 2023), who presented to [facility] ED on 2/1 for dark red urine, generalized abdominal pain with nausea transferred to [facility] for further workup of her new cirrhosis and jaundice. Since admission, hepatology and oncology have been consulted with recommendations for workup and further management. Per biopsy results, patient has breast cancer with metastasis to the liver and likely metastasis to her brain. Hepatology suspects that new cirrhosis is related to infiltrative malignancy although slight possibility of autoimmune component given positive anti-smooth muscle antibodies. Currently trialing prednisone 30 mg. Final breast cancer pathology shows invasive ductal carcinoma grade 3, ER-/[place]-/HER2+. Started on chemotherapy and targeted therapy yesterday and tolerated this well. TTE done prior to initiation of treatment showed EF 70% and mild impaired diastolic relaxation. Started on  allopurinol for TLS prophylaxis. NM bone scan completed shows no osseous metastatic disease. Will continue to monitor her closely for signs of severe hepatic dysfunction and TLS. Worsening SYLVIA may be hepatorenal so will plan to bolus today. Per daughter, patient was more confused yesterday. Increased lactulose to BID now. Possible discharge on Monday.      2/16 updates:  - Lasix and aldactone still held iso SYLVIA. Will restart once SYLVIA has resolved, now uptrending (1.71), will bolus as needed and monitor for now  - BID CMP, LDH, and uric acid to monitor for tumor lysis syndrome, continue allopurinol 300 mg for prophylaxis; G6PD testing negative  - Hepatology: continue prednisone 30 mg daily (started 2/13 - 2/17) for 5 days and then stop if no improvement in LFTs, if pt develops HE can add rifaxamin 550 mg BID, now signed-off  - Increase lactulose to BID now given confusion yesterday  - NM bone scan without osseous metastatic disease: patient is WBAT in both extremities   - Consult supportive onc: start olanzapine 5 mg po qhs     #new liver cirrhosis  #Cholelithiasis  #mixed hepatocellular and cholestatic liver injury   #mild confusion possible grade 1 hepatic encephalopahthy  #liver metastasis (breast primary)  :: hepC negative, hepB negative, hepA negative, T spot negative in 11/2023  :: alpha 1 antitrypsin negative  :: ferritin 406 H/transferrin 136 L  :: RUQ as above in assessment  :: ammonia 79  - autoimmune workup for the most part negative. Anti-SM positive 1:20, could be related to autoimmune hepatitis; however, still suspect malignancy to be driving variable  - liver biopsy significant for poorly differentiated breast cancer  - diagnostic paracentesis unremarkable, no malignant cells noted  - lactulose 20 g BID, lasix 20 mg and aldactone 50 mg currently held iso SYLVIA  - trialing prednisone 30 mg to see if it may help with possible autoimmune component per hepatology (2/13 - 2/17), can start rifaxamin 550 mg BID  if pt develops HE  - Hep B surface AG negative, Hep B surface AB positive at 580.6     #nonhealing, draining, L breast wound  #primary breast cancer with liver met and likely brain mets  :: MRI brain showing small enhancing lesions in the cerebellum without mass effect with c/f metastatic disease, potential 4 mm MRA bifurcation aneurysm  :: TTE shows EF 70% and impaired diastolic relaxation  :: NM bone scan without osseous metastatic disease  - plan for outpatient MRA   - inpatient mammogram significant for masses and suspicious lymphadenopathy  - s/p biopsies, final pathology shows invasive ductal carcinoma grade 3, ER-/[place]-/HER2+  - initial pathology showed ER+ so patient was started on anastrazole 1 mg, but this read was modified to ER- so anastrozole was stopped on 2/13  - per oncology: plan for inpatient carboplatin (AUC 4) and trastuzumab (loading dose 8 mg/kg) completed on 2/14 with patient tolerating this well  - BID CMP, LDH, and uric acid to monitor for tumor lysis syndrome  - Continue allopurinol 300 mg daily for TLS prophylaxis  - G6PD testing negative  - Consult supportive onc: olanzapine 5 mg qhs  - EKG with Qtc 467    #SYLVIA  #Pre-renal based on FENa, possibly hepatorenal   - Cr 1.71, baseline Cr normal   - FENa 0.1-0.2% indicating pre-renal etiology  - Holding lasix 20 mg and aldactone 50 mg   - Discontinue IV fluids at 50 ml/hr, bolus as needed  - Consider albumin and nephrology consult if worsening     #RA - on infliximab  #osteoarthritis  :: hold home naproxen   - voltaren gel  - tylenol 650 mg q8 prn  - oxycodone 5 mg q4 prn  - outpatient rheum aware to discontinue infliximab     #subacute avulsion fracture lesser trochanter R proximal femur  - chronic?  - consult orthopedic surgery to assess subacute avulsion fracture of R femur found on CT on 2/1: nothing to do, patient can follow-up outpatient, WBAT  - NM bone scan without osseous metastatic disease     #compression deformity at T12  -vitamin D  and calcium     #abdominal aorta atherosclerosis on CT  #smoking history   - follow up with PCP outpatient to discuss potential statin/asa     F: none  E: PRN   N: regular diet  GI: none  P: lovenox  A: PIV  C: full code confirmed on admission   Surrogate:   Cierra her daughter [phone] (first option)  Emmanuel her son [phone]    Monica Weber MD

## 2024-02-16 NOTE — CARE PLAN
The patient's goals for the shift include Pain management    The clinical goals for the shift include Patient will remain free from falls and injury throughout shift.      Problem: Pain  Goal: My pain/discomfort is manageable  Outcome: Progressing     Problem: Safety  Goal: Patient will be injury free during hospitalization  Outcome: Progressing  Goal: I will remain free of falls  Outcome: Progressing     Problem: Daily Care  Goal: Daily care needs are met  Outcome: Progressing     Problem: Psychosocial Needs  Goal: Demonstrates ability to cope with hospitalization/illness  Outcome: Progressing  Goal: Collaborate with me, my family, and caregiver to identify my specific goals  Outcome: Progressing     Problem: Discharge Barriers  Goal: My discharge needs are met  Outcome: Progressing     Problem: Safety - Adult  Goal: Free from fall injury  Outcome: Progressing     Problem: Discharge Planning  Goal: Discharge to home or other facility with appropriate resources  Outcome: Progressing     Problem: Chronic Conditions and Co-morbidities  Goal: Patient's chronic conditions and co-morbidity symptoms are monitored and maintained or improved  Outcome: Progressing     Problem: Pain  Goal: Takes deep breaths with improved pain control throughout the shift  Outcome: Progressing  Goal: Turns in bed with improved pain control throughout the shift  Outcome: Progressing  Goal: Walks with improved pain control throughout the shift  Outcome: Progressing  Goal: Performs ADL's with improved pain control throughout shift  Outcome: Progressing  Goal: Participates in PT with improved pain control throughout the shift  Outcome: Progressing  Goal: Free from opioid side effects throughout the shift  Outcome: Progressing  Goal: Free from acute confusion related to pain meds throughout the shift  Outcome: Progressing

## 2024-02-16 NOTE — PROGRESS NOTES
"SUPPORTIVE AND PALLIATIVE ONCOLOGY INPATIENT FOLLOW-UP      SERVICE DATE: 02/16/24     SUBJECTIVE:  Interval Events:  Pt reports she slept \"very well\" last night and has noted that nausea has improved; Up in chair eating lunch during bedside visit    NM bone scan results from yesterday 2/15/24:  1. No evidence of osseous metastatic disease.  2. Nonspecific mild radiotracer deposition in the proximal right  femoral diaphysis and lesser trochanter which corresponds to a  partially visualized possible avulsion fracture deformity seen on  prior CT from 02/01/2024. Further evaluation with dedicated imaging  of the right femur can be considered.  3. Degenerative changes of the axial and appendicular skeleton as  above.      Pain Assessment:  Location: Right hip  Duration: Intermittent  Characteristics:   Rating: denies presently   Descriptors: aching and throbbing   Aggravating: movement, walking, and standing    Relieving: Analgesics Oxycodone 5 mg po q 4 hrs prn, Positioning, and Modifying activity   Interference with Function: Somewhat    Opioid Use  Past 24 h prn opioid use: Oxycodone 5 mg po x 2 doses = 10 mg = 12 OME  Total 24h OME use:  12    Note: OME calculations based on equianalgesic table below. Please note this table is based on best available evidence but conversions are still approximate. These are NOT opioid DOSES for individual patient use; this is equivalency information.  Drug Parenteral Enteral   Morphine 10 25   Oxycodone N/A 20   Hydromorphone 2 5   Fentanyl 0.15 N/A   Tramadol N/A 120   Citation: Nikko OVALLE. Demystifying opioid conversion calculations: A guide for effective dosing, Second edition. MD Debbie: [employer]System Pharmac[employer], 2018.    Symptom Assessment:  Lack of appetite a little  Nausea a little  Difficulty Sleeping none    Information obtained from: chart review, interview of patient, and discussion with primary " team  ______________________________________________________________________        OBJECTIVE:    Lab Results   Component Value Date    WBC 11.1 02/16/2024    HGB 11.8 (L) 02/16/2024    HCT 32.3 (L) 02/16/2024    MCV 84 02/16/2024     02/16/2024      Lab Results   Component Value Date    GLUCOSE 91 02/16/2024    CALCIUM 8.2 (L) 02/16/2024     (L) 02/16/2024    K 4.9 02/16/2024    CO2 20 (L) 02/16/2024     02/16/2024    BUN 45 (H) 02/16/2024    CREATININE 1.71 (H) 02/16/2024     Lab Results   Component Value Date    ALT 57 (H) 02/16/2024     (H) 02/16/2024     (H) 02/01/2024    ALKPHOS 218 (H) 02/16/2024    BILITOT 11.3 (H) 02/16/2024     Estimated Creatinine Clearance: 36.7 mL/min (A) (by C-G formula based on SCr of 1.71 mg/dL (H)).     Scheduled medications  allopurinol, 300 mg, oral, Daily  calcium carbonate-vitamin D3, 2 tablet, oral, Daily  enoxaparin, 40 mg, subcutaneous, q24h  [Held by provider] furosemide, 20 mg, oral, Daily  lactated Ringer's, 1,000 mL, intravenous, Once  lactulose, 20 g, oral, BID  lidocaine, 1 patch, transdermal, Daily  OLANZapine, 5 mg, oral, Nightly  pantoprazole, 40 mg, oral, Daily before breakfast  predniSONE, 30 mg, oral, Daily  [Held by provider] spironolactone, 50 mg, oral, Daily      Continuous medications     PRN medications  PRN medications: acetaminophen **OR** acetaminophen **OR** acetaminophen, albuterol, dextrose, diclofenac sodium, diphenhydrAMINE, EPINEPHrine, famotidine, melatonin, methylPREDNISolone sodium succinate (PF), oxyCODONE, prochlorperazine, prochlorperazine, sodium chloride   }     PHYSICAL EXAMINATION:    Vital Signs:   Vital signs reviewed  Visit Vitals  /89 (BP Location: Right arm, Patient Position: Sitting)   Pulse 63   Temp 36.4 °C (97.5 °F) (Temporal)   Resp 18        Pain Score: 0 - No pain       Physical Exam    Physical Exam  Vitals reviewed.   Constitutional:       Appearance: Normal appearance.   HENT:      Head:  "Normocephalic.     Eyes: Icteric sclera bilaterally   Cardiovascular:      Rate and Rhythm: Normal rate.      Pulses: Normal pulses.      Heart sounds: Normal heart sounds.   Pulmonary:      Effort: Pulmonary effort is normal.   Abdominal:      General: Bowel sounds are normal. There is distension.      Palpations: Abdomen is soft.   Musculoskeletal:      Right lower leg: No edema.      Left lower leg: No edema.   Skin:     General: Skin is warm.      Capillary Refill: Capillary refill takes less than 2 seconds.   Neurological:      General: No focal deficit present.      Mental Status: She is alert and oriented to person, place, and time.   Psychiatric:         Mood and Affect: Mood normal.         Thought Content: Thought content normal.         Judgment: Judgment normal.        ASSESSMENT/PLAN:  Meghan Waddell is a 68 y.o. female diagnosed with poorly-differentiated adenocarcinoma and ER positivity who presented on 2/3 as a transfer from [place] for new cirrhosis and jaundice workup (patient had been having RUQ abdominal pain for a few months), 1 year of left breast drainage, and 20-lb weight loss since Dec 2023 with right lobe liver mass biopsy. PMH significant for Seropositive RA (on infliximab), osteoarthritis, nephrolithiasis, obesity, DLD, PACs, history of tobacco use (quit march 2023), . Admitted 2/3/2024 for further evaluation and management. Supportive and Palliative Oncology is consulted for pain management and Introduction to Supportive and Palliative Oncology Services.      Primary team note from 2/15/24 states\"  \"Since admission, hepatology and oncology have been consulted with recommendations for workup and further management. Per biopsy results, patient has breast cancer with metastasis to the liver and likely metastasis to her brain. Hepatology suspects that new cirrhosis is related to infiltrative malignancy although slight possibility of autoimmune component given positive anti-smooth muscle " "antibodies. Currently trialing prednisone 30 mg. Final breast cancer pathology shows invasive ductal carcinoma grade 3, ER-/WA-/HER2+. Started on chemotherapy and targeted therapy yesterday (2/14/24) and tolerated this well. TTE done prior to initiation of treatment showed EF 70% and mild impaired diastolic relaxation. Started on allopurinol for TLS prophylaxis. Will continue to monitor her closely for signs of severe hepatic dysfunction and TLS.\"     EKG w/ Zzc=430 (performed 2/14/24)     Pain:  Pain related to bone mets vs fracture  Pain is: acute on chronic  Type: somatic  Pain control: well-controlled  Home regimen:   Naproxen  Currently using Voltaren topical gel, Tylenol 650 mg po q 8 hrs prn, and oxycodone 5 mg po q 4 hrs  Intolerances/previously tried: none  Personalized pain goal: 2  Total OME usage for the past 24 hours:  12 OME  Continue Oxycodone 5 mg po q 4 hrs prn  Continue Lidocaine 4% topical patch to right hip  Continue Voltaren gel 1 % topically to right hip qid prn  Continue Acetaminophen 650 mg po q 8 hrs prn  Continue to monitor pain scores and administer PRN medications as appropriate  Continue/initiate nonpharmacologic pain management strategies including ice/heat therapy, distraction techniques, deep breathing/relaxation techniques, calming music, and repositioning  Continue to monitor for signs of opioid efficacy (pain scores, improved functionality) and toxicity (pinpoint pupils, excess sedation/drowsiness/confusion, respiratory depression, etc.)     Nausea:  Intermittent nausea without vomiting related to chemotherapy and opioids   Home regimen:  none  Improved and well controlled  Started chemo 2/14/24; has not noticed that nausea has worsened since  Ondansetron 4 mg IVP q 6 hrs prn  Prochlorperazine IV 10 mg q 6 hrs prn  Continue Olanzapine 5 mg po q hs (will also help with sleep, appetite as well)     Constipation  At risk for constipation related to opioids, currently not " "constipated  Usual bowel pattern: every other day  Home regimen: none  LBM 2/15/24  Monitor BM frequency, adjust regimen as needed  Goal to have BM without straining q48-72h  Lactulose per primary team      Sleeping Difficulty:  Impaired sleep related to hospital environment and new diagnosis  Home regimen:  none  Improved and well controlled  Has improved with adequate pain control but remains sub-optimal as pt continues to wake frequently  Continue Melatonin 5 mg po q hs  Olanzapine as listed above     Decreased appetite:  Appetite loss related to malignancy, chemotherapy, and disease process  Nutrition consult  Weight loss 20 lbs since December  Home regimen:  none  Olanzapine as listed above     Medical Decision Making/Goals of Care/Advance Care Planning:  Patient's current clinical condition, including diagnosis, prognosis, and management plan, and goals of care were discussed.   Life limiting disease: metastatic malignancy  Family: Supportive children, siblings  Performance status: Major  limitations due to pain and disease process  Joys/meaning/strength: Family and Page  Understanding of health: Demonstrates good prognostic understanding of disease process, understands plan for further workup, awaiting results of bone scan  Information:Wants full disclosure  Goals: symptom control and cancer directed therapy  Worries and fears now and future: ongoing symptoms and inability to receive cancer treatment   Minimum acceptable outcome/QOL:  to be able to care for self, to have adequate pain relief  Code status discussion:  FULL     Advance Directives  Existence of Advance Directives:No - has interest  Decision maker: Surrogate decision maker is 1) daughter Cierra [phone], 2) son Emmanuel [phone] [in that order]  Code Status: Full code  *Pt states she wants to be resuscitated but not to remain on ventilator or \"life support\" for more than 3 days.      Introduction to Supportive and Palliative " Oncology:  Spoke with patient at bedside  Introduced the role and philosophy of Supportive and Palliative oncology in the evaluation and management of symptoms during cancer treatment  Palliative care was introduced as a service for patients with serious illness to help with symptoms, assist with goals of care conversations, navigate complex decision making, improve quality of life for patients, and provide support both patients and families.  Patient seemed to appreciate the extra layer of support.      Supportive Interventions: Interventions: Music Therapy: offered     Disposition:  Please start the process of having prior authorization with meds to beds deliver medications to patient prior to discharge via [facility] pharmacy. Prescriptions will need to be sent 48-72 hours prior to discharge so that a prior authorization can be completed.      Discharge date: unknown pending acute issues, pain control, and symptom control  Will assess if patient needs an appointment with Outpatient Supportive Oncology as appropriate     Signature and billing:  Thank you for allowing us to participate in the care of this patient. Recommendations will be communicated back to the consulting service by way of shared electronic medical record or face-to-face.    Medical complexity was high level due to due to complexity of problems, extensive data review, and high risk of management/treatment.    I spent 50 minutes in the care of this patient which included chart review, interviewing patient/family, discussion with primary team, coordination of care, and documentation.    Data:   Diagnostic tests and information reviewed for today's visit:  Conversation with primary team, Most recent labs and imaging results, Medications     Some elements copied from my consult note on 2/15/24, the elements have been updated and all reflect current decision making from today, 02/16/24     Plan of Care discussed with: Provider, RN, Patient    Thank you  for asking Supportive and Palliative Oncology to assist with care of this patient.  We will continue to follow  Please contact us for additional questions or concerns.      SIGNATURE: JOSE Arciniega-CNP   PAGER/CONTACT:  Contact information:  Supportive and Palliative Oncology  Monday-Friday 8 AM-5 PM  Epic Secure chat or pager [phone].  After hours and weekends:  pager [phone]

## 2024-02-16 NOTE — PROGRESS NOTES
"Physical Therapy    Physical Therapy Treatment    Patient Name: Meghan Waddell  MRN: [id number]  Today's Date: 2/16/2024  Time Calculation  Start Time: 1101  Stop Time: 1128  Time Calculation (min): 27 min       Assessment/Plan   PT Assessment  End of Session Communication: PCT/NA/CTA  End of Session Patient Position: Bed, 3 rail up, Alarm off, not on at start of session  PT Plan  Inpatient/Swing Bed or Outpatient: Inpatient  PT Plan  Treatment/Interventions: Bed mobility, Transfer training, Gait training, Stair training, Balance training, Strengthening, Endurance training, Therapeutic exercise, Therapeutic activity  PT Plan: Skilled PT  PT Frequency: 3 times per week  PT Discharge Recommendations:  (Pt.'s daughter reports pt. is going home when medically ready however recommendation is mod intensity)  PT Recommended Transfer Status: Contact guard  PT - OK to Discharge: Yes      General Visit Information:   PT  Visit  PT Received On: 02/16/24  General  Prior to Session Communication: Bedside nurse  Patient Position Received: Bed, 3 rail up, Alarm off, not on at start of session  General Comment: pt's sopne present upon entering. pt agreeble to session and states \" joking asaide, I understand this is important\" pt now WBAT per MD instruction post bone scan. pt ambualtes 40' using wheeled walker and demosntrates decreased limitations from pain    Subjective   Precautions:  Precautions  Medical Precautions: Fall precautions  Vital Signs:       Objective   Pain:  Pain Assessment  Pain Score: 0 - No pain  Cognition:  Cognition  Orientation Level: Oriented X4       Treatments:  Therapeutic Exercise  Therapeutic Exercise Activity 1: instructed pt in sitting AP, LAQ, and hip flexion x15 reps AROm    Bed Mobility 1  Level of Assistance 1: Distant supervision  Bed Mobility Comments 1: additional time to complete, cues for use of UEs to assist    Ambulation/Gait Training 1  Surface 1: Level tile  Device 1: Rolling " walker  Assistance 1: Contact guard  Quality of Gait 1: Antalgic, Decreased step length, Forward flexed posture  Comments/Distance (ft) 1: 2x30, cues for walker sequencing  Transfer 1  Technique 1: Sit to stand  Transfer Device 1: Walker  Transfer Level of Assistance 1: Minimum assistance    Outcome Measures:  [facility] Basic Mobility  Turning from your back to your side while in a flat bed without using bedrails: A little  Moving from lying on your back to sitting on the side of a flat bed without using bedrails: A little  Moving to and from bed to chair (including a wheelchair): A little  Standing up from a chair using your arms (e.g. wheelchair or bedside chair): A little  To walk in hospital room: A little  Climbing 3-5 steps with railing: Total  Basic Mobility - Total Score: 16    Education Documentation  Mobility Training, taught by Franklyn Gardner PTA at 2/16/2024  1:16 PM.  Learner: Patient  Readiness: Acceptance  Method: Explanation  Response: Verbalizes Understanding    Education Comments  No comments found.        OP EDUCATION:       Encounter Problems       Encounter Problems (Active)       Mobility       Pt will ambulate >/=200 ft with supervision and use of LRAD (Progressing)       Start:  02/06/24    Expected End:  02/23/24            Pt will safely navigate >/=14 steps with use of 1 hand rail and supervision (Progressing)       Start:  02/06/24    Expected End:  02/23/24            Pt will complete all bed mobility independently with use of bed rails for support as needed  (Progressing)       Start:  02/06/24    Expected End:  02/23/24               Transfers       Pt will perform STS independently with use of LRAD  (Progressing)       Start:  02/06/24    Expected End:  02/23/24            Pt will demonstrate appropriate use of LRAD when complete all transfers with no cues  (Progressing)       Start:  02/06/24    Expected End:  02/23/24

## 2024-02-17 NOTE — CARE PLAN
The patient's goals for the shift include Pain management    The clinical goals for the shift include Patien will remain free from falls and injury throughout shift.      Problem: Pain  Goal: My pain/discomfort is manageable  Outcome: Progressing     Problem: Safety  Goal: Patient will be injury free during hospitalization  Outcome: Progressing  Goal: I will remain free of falls  Outcome: Progressing     Problem: Daily Care  Goal: Daily care needs are met  Outcome: Progressing     Problem: Psychosocial Needs  Goal: Demonstrates ability to cope with hospitalization/illness  Outcome: Progressing  Goal: Collaborate with me, my family, and caregiver to identify my specific goals  Outcome: Progressing     Problem: Discharge Barriers  Goal: My discharge needs are met  Outcome: Progressing     Problem: Safety - Adult  Goal: Free from fall injury  Outcome: Progressing     Problem: Discharge Planning  Goal: Discharge to home or other facility with appropriate resources  Outcome: Progressing     Problem: Chronic Conditions and Co-morbidities  Goal: Patient's chronic conditions and co-morbidity symptoms are monitored and maintained or improved  Outcome: Progressing     Problem: Pain  Goal: Takes deep breaths with improved pain control throughout the shift  Outcome: Progressing  Goal: Turns in bed with improved pain control throughout the shift  Outcome: Progressing  Goal: Walks with improved pain control throughout the shift  Outcome: Progressing  Goal: Performs ADL's with improved pain control throughout shift  Outcome: Progressing  Goal: Participates in PT with improved pain control throughout the shift  Outcome: Progressing  Goal: Free from opioid side effects throughout the shift  Outcome: Progressing  Goal: Free from acute confusion related to pain meds throughout the shift  Outcome: Progressing

## 2024-02-17 NOTE — PROGRESS NOTES
"Meghan Waddell is a 68 y.o. female on day 14 of admission presenting with Cirrhosis (CMS/HCC).    Subjective   No acute events overnight. Patient again slept well. States she feels very tired. Had two bowel movements yesterday. Continues to have abdominal pain worse in RLQ that radiated to LLQ. Denies chest pain, palpitations, worsening abdominal distention, or leg swelling.       Objective   Physical Exam  Constitutional:       General: She is not in acute distress.     Appearance: Normal appearance. She is not toxic-appearing.   HENT:      Mouth/Throat:      Mouth: Mucous membranes are moist.   Eyes:      General: Scleral icterus (mild) present.      Extraocular Movements: Extraocular movements intact.      Pupils: Pupils are equal, round, and reactive to light.   Cardiovascular:      Rate and Rhythm: Normal rate and regular rhythm.      Heart sounds: No murmur heard.     No gallop.   Pulmonary:      Effort: Pulmonary effort is normal. No respiratory distress.      Breath sounds: No wheezing.   Chest:   Breasts:     Left: Inverted nipple, mass, nipple discharge and skin change present.      Comments: L breast with crusted draining lesion without purulence, fullness of the L lateral breast noted, nipple inversion with black scabbing  Abdominal:      General: Abdomen is mildly distended.      Palpations: Abdomen is soft.      Tenderness: There is abdominal tenderness (mild tenderness to palpation in bilateral lower quadrants).   Musculoskeletal:         General: No swelling.      Right lower leg: No edema.      Left lower leg: No edema.   Skin:     General: Skin is warm and dry.      Coloration: Skin is jaundiced.   Neurological:      General: No focal deficit present.      Mental Status: She is alert.        Last Recorded Vitals  Blood pressure 132/76, pulse 95, temperature 36 °C (96.8 °F), temperature source Temporal, resp. rate 18, height 1.695 m (5' 6.73\"), weight 90.5 kg (199 lb 8.3 oz), SpO2 96 " %.  Intake/Output last 3 Shifts:  I/O last 3 completed shifts:  In: 1000 (11 mL/kg) [IV Piggyback:1000]  Out: - (0 mL/kg)   Weight: 90.5 kg     Relevant Results  Results for orders placed or performed during the hospital encounter of 02/03/24 (from the past 24 hour(s))   Comprehensive metabolic panel   Result Value Ref Range    Glucose 101 (H) 74 - 99 mg/dL    Sodium 130 (L) 136 - 145 mmol/L    Potassium 4.6 3.5 - 5.3 mmol/L    Chloride 100 98 - 107 mmol/L    Bicarbonate 19 (L) 21 - 32 mmol/L    Anion Gap 16 10 - 20 mmol/L    Urea Nitrogen 46 (H) 6 - 23 mg/dL    Creatinine 1.71 (H) 0.50 - 1.05 mg/dL    eGFR 33 (L) >60 mL/min/1.73m*2    Calcium 8.4 (L) 8.6 - 10.6 mg/dL    Albumin 2.3 (L) 3.4 - 5.0 g/dL    Alkaline Phosphatase 229 (H) 33 - 136 U/L    Total Protein 6.4 6.4 - 8.2 g/dL     (H) 9 - 39 U/L    Bilirubin, Total 13.6 (H) 0.0 - 1.2 mg/dL    ALT 71 (H) 7 - 45 U/L   Uric Acid   Result Value Ref Range    Uric Acid 5.7 2.3 - 6.7 mg/dL   Lactate dehydrogenase   Result Value Ref Range     (H) 84 - 246 U/L   Phosphorus   Result Value Ref Range    Phosphorus 4.3 2.5 - 4.9 mg/dL   CBC and Auto Differential   Result Value Ref Range    WBC 11.1 4.4 - 11.3 x10*3/uL    nRBC 0.0 0.0 - 0.0 /100 WBCs    RBC 4.21 4.00 - 5.20 x10*6/uL    Hemoglobin 12.8 12.0 - 16.0 g/dL    Hematocrit 35.6 (L) 36.0 - 46.0 %    MCV 85 80 - 100 fL    MCH 30.4 26.0 - 34.0 pg    MCHC 36.0 32.0 - 36.0 g/dL    RDW 22.5 (H) 11.5 - 14.5 %    Platelets 212 150 - 450 x10*3/uL    Neutrophils % 77.4 40.0 - 80.0 %    Immature Granulocytes %, Automated 0.6 0.0 - 0.9 %    Lymphocytes % 7.9 13.0 - 44.0 %    Monocytes % 14.0 2.0 - 10.0 %    Eosinophils % 0.0 0.0 - 6.0 %    Basophils % 0.1 0.0 - 2.0 %    Neutrophils Absolute 8.58 (H) 1.20 - 7.70 x10*3/uL    Immature Granulocytes Absolute, Automated 0.07 0.00 - 0.70 x10*3/uL    Lymphocytes Absolute 0.87 (L) 1.20 - 4.80 x10*3/uL    Monocytes Absolute 1.55 (H) 0.10 - 1.00 x10*3/uL    Eosinophils  Absolute 0.00 0.00 - 0.70 x10*3/uL    Basophils Absolute 0.01 0.00 - 0.10 x10*3/uL   Comprehensive metabolic panel   Result Value Ref Range    Glucose 104 (H) 74 - 99 mg/dL    Sodium 131 (L) 136 - 145 mmol/L    Potassium 4.5 3.5 - 5.3 mmol/L    Chloride 102 98 - 107 mmol/L    Bicarbonate 21 21 - 32 mmol/L    Anion Gap 13 10 - 20 mmol/L    Urea Nitrogen 44 (H) 6 - 23 mg/dL    Creatinine 1.42 (H) 0.50 - 1.05 mg/dL    eGFR 41 (L) >60 mL/min/1.73m*2    Calcium 8.5 (L) 8.6 - 10.6 mg/dL    Albumin 2.2 (L) 3.4 - 5.0 g/dL    Alkaline Phosphatase 246 (H) 33 - 136 U/L    Total Protein 6.6 6.4 - 8.2 g/dL     (H) 9 - 39 U/L    Bilirubin, Total 13.4 (H) 0.0 - 1.2 mg/dL    ALT 67 (H) 7 - 45 U/L   Magnesium   Result Value Ref Range    Magnesium 2.60 (H) 1.60 - 2.40 mg/dL   Protime-INR   Result Value Ref Range    Protime 13.6 (H) 9.8 - 12.8 seconds    INR 1.2 (H) 0.9 - 1.1   Uric Acid   Result Value Ref Range    Uric Acid 5.4 2.3 - 6.7 mg/dL   Lactate dehydrogenase   Result Value Ref Range     (H) 84 - 246 U/L   Phosphorus   Result Value Ref Range    Phosphorus 3.4 2.5 - 4.9 mg/dL   Morphology   Result Value Ref Range    RBC Morphology See Below     Target Cells Few            Imaging:    NM bone whole body    Result Date: 2/15/2024  Interpreted By:  Jroge Gaspar,  and Roman Uriostegui STUDY: NM BONE WHOLE BODY;  2/15/2024 2:16 pm   INDICATION: Signs/Symptoms:Assess for bone metastasis. History of ER positive poorly differentiated adenocarcinoma diagnosed on liver biopsy. The patient also has a history of rheumatoid arthritis.   COMPARISON: CT abdomen and pelvis, 02/01/2024   ACCESSION NUMBER(S): [id number]   ORDERING CLINICIAN: VICTOR MANUEL POOLE   TECHNIQUE: DIVISION OF NUCLEAR MEDICINE BONE SCAN, WHOLE BODY plus REGIONAL VIEWS   The patient received an intravenous dose of  25 mCi of Tc-99m MDP. Anterior and posterior images of the skeleton from skull vertex to feet were then acquired.  Additional regional skeletal  images were also obtained.   FINDINGS: There is faintly increased radiotracer accumulation within the proximal right femoral diaphysis.   Expected, excreted activity is noted in the kidneys and bladder.   Increased radiotracer uptake is seen in the cervical spine, right femoroacetabular joint, and bilateral ankles and mid feet, most consistent with an arthritic pattern.   Bilateral knee arthroplasties without abnormal radiotracer deposition to suggest hardware complication.       1. No evidence of osseous metastatic disease. 2. Nonspecific mild radiotracer deposition in the proximal right femoral diaphysis and lesser trochanter which corresponds to a partially visualized possible avulsion fracture deformity seen on prior CT from 02/01/2024. Further evaluation with dedicated imaging of the right femur can be considered. 3. Degenerative changes of the axial and appendicular skeleton as above.     I personally reviewed the images/study and I agree with the findings as stated by radiology resident Dr. Gila Owens. This study was interpreted at [place], [place].   MACRO: None   Signed by: Jorge Gaspar 2/15/2024 3:30 PM Dictation workstation:   [id number]    ECG 12 lead    Result Date: 2/15/2024  Normal sinus rhythm Normal ECG When compared with ECG of 14-FEB-2024 10:40, (unconfirmed) No significant change was found    Transthoracic Echo (TTE) Limited    Result Date: 2/14/2024   [facility], [address] [place] [address]                Tel [phone] and Fax [phone] TRANSTHORACIC ECHOCARDIOGRAM REPORT  Patient Name:      NADIRA Ash Physician:    [id number] Mian Martinez MD Study Date:        2/14/2024            Ordering Provider:    [id number] SANDRA CORTEZ MRN/PID:           [id number]             Fellow:  Accession#:        [id number]         Nurse: Date of Birth/Age: 1955 / 68 years Sonographer:          Ruth Iraheta RDCS Gender:            F                    Additional Staff: Height:            170.18 cm            Admit Date:           2/3/2024 Weight:            90.27 kg             Admission Status:     Inpatient -                                                               Routine BSA / BMI:         2.02 m2 / 31.17      Encounter#:           [id number]                    kg/m2                                         Department Location:  [facility] Non                                                               Invasive Blood Pressure: 128 /85 mmHg Study Type:    TRANSTHORACIC ECHO (TTE) LIMITED Diagnosis/ICD: Other long term (current) drug therapy-Z79.899 Indication:    Encounter for monitoring cardiotoxic drug therapy CPT Code:      Echo Limited-78141; Color Doppler-97702; Doppler Limited-04747 Patient History: Pertinent History: Dyslipidemia. breast cancer mets to liver and possibly brain,                    new cirrhosis, PACs, obesity, pre-chemo. Study Detail: The following Echo studies were performed: 2D, M-Mode, Doppler and               color flow. Technically challenging study due to patient lying in               supine position and prominent lung artifact.  PHYSICIAN INTERPRETATION: Left Ventricle: The left ventricular systolic function is hyperdynamic, with an estimated ejection fraction of 70%. There are no regional wall motion abnormalities. The left ventricular cavity size is normal. Spectral Doppler shows an impaired relaxation pattern of left ventricular diastolic filling. Left Atrium: The left atrium is upper limits of normal in size. Right Ventricle: The right ventricle is normal in size. There is normal right ventricular global systolic function. Right Atrium: The right atrium is upper limits of normal in size. Aortic Valve: The aortic valve is probably trileaflet. There is  trivial aortic valve regurgitation. Mitral Valve: The mitral valve is normal in structure. There is trace mitral valve regurgitation. Tricuspid Valve: The tricuspid valve is structurally normal. There is trace tricuspid regurgitation. Pulmonic Valve: The pulmonic valve is not well visualized. Pulmonic valve regurgitation was not assessed. Pericardium: There is a trivial pericardial effusion. Aorta: The aortic root is normal. Pulmonary Artery: The tricuspid regurgitant velocity is 2.60 m/s, and with an estimated right atrial pressure of 3 mmHg, the estimated pulmonary artery pressure is borderline elevated with the RVSP at 30.0 mmHg. Systemic Veins: The inferior vena cava appears to be of normal size.  CONCLUSIONS:  1. Left ventricular systolic function is hyperdynamic with a 70% estimated ejection fraction.  2. Spectral Doppler shows an impaired relaxation pattern of left ventricular diastolic filling. QUANTITATIVE DATA SUMMARY: 2D MEASUREMENTS:                          Normal Ranges: LAs:           4.10 cm   (2.7-4.0cm) IVSd:          0.80 cm   (0.6-1.1cm) LVPWd:         0.90 cm   (0.6-1.1cm) LVIDd:         4.40 cm   (3.9-5.9cm) LVIDs:         2.90 cm LV Mass Index: 58.8 g/m2 LV % FS        34.1 % LA VOLUME:                               Normal Ranges: LA Vol A4C:        58.0 ml    (22+/-6mL/m2) LA Vol A2C:        60.0 ml LA Vol BP:         60.3 ml LA Vol Index A4C:  28.7ml/m2 LA Vol Index A2C:  29.7 ml/m2 LA Vol Index BP:   29.9 ml/m2 LA Area A4C:       20.1 cm2 LA Area A2C:       20.9 cm2 LA Major Axis A4C: 5.9 cm LA Major Axis A2C: 6.2 cm RA VOLUME BY A/L METHOD:                       Normal Ranges: RA Area A4C: 18.0 cm2 AORTA MEASUREMENTS:                      Normal Ranges: Ao Sinus, d: 3.30 cm (2.1-3.5cm) LV SYSTOLIC FUNCTION BY 2D PLANIMETRY (MOD):                     Normal Ranges: EF-A4C View: 75.2 % (>=55%) EF-A2C View: 68.8 % EF-Biplane:  71.3 % LV DIASTOLIC FUNCTION:                           Normal  Ranges: MV Peak E:    0.64 m/s    (0.7-1.2 m/s) MV Peak A:    0.96 m/s    (0.42-0.7 m/s) E/A Ratio:    0.67        (1.0-2.2) MV e'         0.08 m/s    (>8.0) MV lateral e' 0.09 m/s MV medial e'  0.07 m/s MV A Dur:     119.00 msec E/e' Ratio:   8.35        (<8.0) a'            0.13 m/s MV DT:        365 msec    (150-240 msec) MITRAL VALVE:                 Normal Ranges: MV DT: 365 msec (150-240msec)  RIGHT VENTRICLE: RV Basal 4.30 cm RV Mid   2.60 cm RV Major 5.9 cm TAPSE:   23.6 mm RV s'    0.17 m/s TRICUSPID VALVE/RVSP:                             Normal Ranges: Peak TR Velocity: 2.60 m/s RV Syst Pressure: 30.0 mmHg (< 30mmHg) IVC Diam:         1.50 cm  [id number] Mian Martinez MD Electronically signed on 2/14/2024 at 1:01:40 PM  ** Final **            Assessment/Plan   Principal Problem:    Cirrhosis (CMS/HCC)    Ms. Waddell is 67 yo F history of Seropositive RA (on infliximab), osteoarthritis, nephrolithiasis, obesity, DLD, PACs, history of tobacco use (quit march 2023), who presented to [place] ED on 2/1 for dark red urine, generalized abdominal pain with nausea transferred to [facility] for further workup of her new cirrhosis and jaundice. Since admission, hepatology and oncology have been consulted with recommendations for workup and further management. Per biopsy results, patient has breast cancer with metastasis to the liver and likely metastasis to her brain. Hepatology suspects that new cirrhosis is related to infiltrative malignancy although slight possibility of autoimmune component given positive anti-smooth muscle antibodies. Patient now s/p prednisone 30 mg 5 day trial without improvement in LFTs. Final breast cancer pathology shows invasive ductal carcinoma grade 3, ER-/MA-/HER2+. Started on chemotherapy and targeted therapy on 2/14 and tolerated this well. TTE done prior to initiation of treatment showed EF 70% and mild impaired diastolic relaxation. Started on allopurinol for TLS prophylaxis. NM bone  scan completed shows no osseous metastatic disease. Will continue to monitor her closely for signs of severe hepatic dysfunction and TLS. Worsening SYLVIA may be hepatorenal, s/p 1L LR bolus yesterday without improvement in SYLVIA, however now improved this AM. Increased lactulose to BID now. Possible discharge on Tuesday.     2/17 updates:  - Lasix and aldactone still held iso SYLVIA. Will restart once SYLVIA has resolved, Cr now down trending (1.42), will keep maintenance fluids  - LFTs fairly stable, worsened yesterday PM but improved this AM: AlkPhos 218>229>246, >198>179, Bili 11.3>13.6>13.4, and ALT 57>71>67  - BID CMP, LDH, and uric acid to monitor for tumor lysis syndrome, continue allopurinol 300 mg for prophylaxis; G6PD testing negative in the event that rasburicase is needed  - Final day for prednisone 30 mg daily (started 2/13 - 2/17)   - PT/OT recommended SNF but patient and daughter want to go home, will plan to set up home care for possible discharge early next week     #new liver cirrhosis  #Cholelithiasis  #mixed hepatocellular and cholestatic liver injury   #mild confusion possible grade 1 hepatic encephalopahthy  #liver metastasis (breast primary)  :: hepC negative, hepB negative, hepA negative, T spot negative in 11/2023  :: alpha 1 antitrypsin negative  :: ferritin 406 H/transferrin 136 L  :: RUQ as above in assessment  :: ammonia 79  - autoimmune workup for the most part negative. Anti-SM positive 1:20, could be related to autoimmune hepatitis; however, still suspect malignancy to be driving variable  - liver biopsy significant for poorly differentiated breast cancer  - diagnostic paracentesis unremarkable, no malignant cells noted  - lactulose 20 g BID, lasix 20 mg and aldactone 50 mg currently held iso SYLVIA  - trialing prednisone 30 mg to see if it may help with possible autoimmune component per hepatology (2/13 - 2/17), can start rifaxamin 550 mg BID if pt develops HE  - Hep B surface AG negative,  Hep B surface AB positive at 580.6     #nonhealing, draining, L breast wound  #primary breast cancer with liver met and likely brain mets  :: MRI brain showing small enhancing lesions in the cerebellum without mass effect with c/f metastatic disease, potential 4 mm MRA bifurcation aneurysm  :: TTE shows EF 70% and impaired diastolic relaxation  :: NM bone scan without osseous metastatic disease  - plan for outpatient MRA   - inpatient mammogram significant for masses and suspicious lymphadenopathy  - s/p biopsies, final pathology shows invasive ductal carcinoma grade 3, ER-/CT-/HER2+  - initial pathology showed ER+ so patient was started on anastrazole 1 mg, but this read was modified to ER- so anastrozole was stopped on 2/13  - per oncology: plan for inpatient carboplatin (AUC 4) and trastuzumab (loading dose 8 mg/kg) completed on 2/14 with patient tolerating this well  - BID CMP, LDH, and uric acid to monitor for tumor lysis syndrome  - Continue allopurinol 300 mg daily for TLS prophylaxis  - G6PD testing negative  - Consult supportive onc: olanzapine 5 mg qhs  - EKG with Qtc 467    #SYLVIA  #Pre-renal based on FENa, possibly hepatorenal   - Cr 1.71, baseline Cr normal, now down trending   - FENa 0.1-0.2% indicating pre-renal etiology  - Holding lasix 20 mg and aldactone 50 mg   - Continue IV fluids at 50 ml/hr, s/p 1L yesterday  - Consider albumin and nephrology consult if worsening     #RA - on infliximab  #osteoarthritis  :: hold home naproxen   - voltaren gel  - tylenol 650 mg q8 prn  - oxycodone 5 mg q4 prn  - outpatient rheum aware to discontinue infliximab     #subacute avulsion fracture lesser trochanter R proximal femur  - chronic?  - consult orthopedic surgery to assess subacute avulsion fracture of R femur found on CT on 2/1: nothing to do, patient can follow-up outpatient, WBAT  - NM bone scan without osseous metastatic disease     #compression deformity at T12  -vitamin D and calcium     #abdominal aorta  atherosclerosis on CT  #smoking history   - follow up with PCP outpatient to discuss potential statin/asa     F: none  E: PRN   N: regular diet  GI: none  P: lovenox  A: PIV  C: full code confirmed on admission   Surrogate:   Cierra her daughter [phone] (first option)  Emmanuel her son [phone]    Monica Weber MD

## 2024-02-17 NOTE — CARE PLAN
Problem: Pain  Goal: My pain/discomfort is manageable  Outcome: Progressing     Problem: Safety  Goal: Patient will be injury free during hospitalization  Outcome: Progressing  Goal: I will remain free of falls  Outcome: Progressing     Problem: Daily Care  Goal: Daily care needs are met  Outcome: Progressing     Problem: Psychosocial Needs  Goal: Demonstrates ability to cope with hospitalization/illness  Outcome: Progressing  Goal: Collaborate with me, my family, and caregiver to identify my specific goals  Outcome: Progressing     Problem: Discharge Barriers  Goal: My discharge needs are met  Outcome: Progressing     The patient's goals for the shift include Pain management    The clinical goals for the shift include Patient will remain safe, free of injuries and falls throughout shift

## 2024-02-18 NOTE — CARE PLAN
The patient's goals for the shift include Pain management    The clinical goals for the shift include Patient will remain free from falls and injury throughout shift.      Problem: Pain  Goal: My pain/discomfort is manageable  Outcome: Progressing     Problem: Safety  Goal: Patient will be injury free during hospitalization  Outcome: Progressing     Problem: Daily Care  Goal: Daily care needs are met  Outcome: Progressing     Problem: Psychosocial Needs  Goal: Demonstrates ability to cope with hospitalization/illness  Outcome: Progressing

## 2024-02-18 NOTE — CARE PLAN
Problem: Pain  Goal: My pain/discomfort is manageable  Outcome: Progressing     Problem: Safety  Goal: Patient will be injury free during hospitalization  Outcome: Progressing  Goal: I will remain free of falls  Outcome: Progressing     Problem: Daily Care  Goal: Daily care needs are met  Outcome: Progressing     Problem: Safety - Adult  Goal: Free from fall injury  Outcome: Progressing   The patient's goals for the shift include Pain management    The clinical goals for the shift include patient will remain safe and free of injury through 2/18 at 1900    Patient remained safe and free of injury. VSS. Patient states she feels frustrated and tired. Has been getting up to the commode with assistance and a walker. Continues to have discomfort at right leg.

## 2024-02-18 NOTE — PROGRESS NOTES
"Meghan Waddell is a 68 y.o. female on day 15 of admission presenting with Cirrhosis (CMS/HCC).    Subjective   No acute events overnight. Patient feels very tired today and has very little appetite, just wants to rest. Continues to have abdominal pain.      Objective   Physical Exam  Constitutional:       General: She is not in acute distress.     Appearance: Normal appearance. She is not toxic-appearing.   HENT:      Mouth/Throat:      Mouth: Mucous membranes are moist.   Eyes:      General: Scleral icterus present.      Extraocular Movements: Extraocular movements intact.      Pupils: Pupils are equal, round, and reactive to light.   Cardiovascular:      Rate and Rhythm: Normal rate and regular rhythm.      Heart sounds: No murmur heard.     No gallop.   Pulmonary:      Effort: Pulmonary effort is normal. No respiratory distress.      Breath sounds: No wheezing.   Chest:   Breasts:     Left: Inverted nipple, mass, nipple discharge and skin change present.      Comments: L breast with crusted draining lesion without purulence, fullness of the L lateral breast noted, nipple inversion with black scabbing  Abdominal:      General: Abdomen is distended.      Palpations: Abdomen is soft.      Tenderness: There is abdominal tenderness (tenderness to palpation in bilateral lower quadrants).   Musculoskeletal:         General: No swelling.      Right lower le+ edema      Left lower le+ edema  Skin:     General: Skin is warm and dry.      Coloration: Skin is jaundiced.   Neurological:      General: No focal deficit present.      Mental Status: She is alert.        Last Recorded Vitals  Blood pressure 100/64, pulse 69, temperature 36.2 °C (97.2 °F), temperature source Temporal, resp. rate 14, height 1.695 m (5' 6.73\"), weight 90.5 kg (199 lb 8.3 oz), SpO2 91 %.  Intake/Output last 3 Shifts:  I/O last 3 completed shifts:  In: 311.7 (3.4 mL/kg) [I.V.:311.7 (3.4 mL/kg)]  Out: 475 (5.2 mL/kg) [Urine:475 (0.1 " ----- Message from Delfino Castellon sent at 11/2/2017  9:12 AM EDT -----  Regarding: RE:Shoulder pain  Contact: [phone]  Good morning  I am having increased headaches and   shoulder pain for the last 4 days and some  Scalp tenderness. I am on the 7 mg of   Prednisone daily and same dose of  Methotrexate    What is your recommendation? Thank you  Tyson Pack  ----- Message -----  From: Yenifer Viramontes MD  Sent: 10/9/2017 10:45 AM EDT  To: Delfino Castellon  Subject: RE:Shoulder pain    Please return to 7 mg daily today. Please let me know if symptoms overall quiescent at that dose. If so, we can restart taper from there. Please let me know if you have any questions. Thank you    ----- Message -----     From: Delfino Castellon     Sent: 10/9/2017  9:53 AM EDT       To: Yenifer Viramontes MD  Subject: RE:Shoulder pain    Good morning  I had started to alternate 4 and 5 mg   I have bilateral shoulder pain, jaw pain,   Headaches and knee pain and severe fatigue  On 5 mg I had some knee and shoulder pain   But very mild  Tyson Pack  ----- Message -----  From: Yenifer Viramontes MD  Sent: 10/9/2017  7:52 AM EDT  To: Delfino Castellon  Subject: RE:Shoulder pain    Good morning. What is the current dose and current symptoms? What was the lowest effective dose? Thank you.    ----- Message -----     From: Delfino Castellon     Sent: 10/7/2017  2:23 PM EDT       To: Yenifer Viramontes MD  Subject: RE:Shoulder pain    Hello  I attempted the alternating dosage of prednisone   but have had bilateral shoulder pain and   headaches starting a week ago    What do you recommend that I do with   Prednisone dose? I have only been   trying to alternate for about 10 days    Thanks and hope you have a good day  Tyson Pack  ----- Message -----  From: Yenifer Viramontes MD  Sent: 9/25/2017  4:27 PM EDT  To: Delfino Castellon  Subject: DXA    I hope you are well. The bone density showed mild osteopenia (low bone density) at one site.  Though there may have been a slight decline at the back, sites at the hips mL/kg/hr)]  Weight: 90.5 kg     Relevant Results  Results for orders placed or performed during the hospital encounter of 02/03/24 (from the past 24 hour(s))   Comprehensive metabolic panel   Result Value Ref Range    Glucose 120 (H) 74 - 99 mg/dL    Sodium 131 (L) 136 - 145 mmol/L    Potassium 4.5 3.5 - 5.3 mmol/L    Chloride 101 98 - 107 mmol/L    Bicarbonate 21 21 - 32 mmol/L    Anion Gap 14 10 - 20 mmol/L    Urea Nitrogen 43 (H) 6 - 23 mg/dL    Creatinine 1.40 (H) 0.50 - 1.05 mg/dL    eGFR 41 (L) >60 mL/min/1.73m*2    Calcium 8.5 (L) 8.6 - 10.6 mg/dL    Albumin 2.3 (L) 3.4 - 5.0 g/dL    Alkaline Phosphatase 250 (H) 33 - 136 U/L    Total Protein 6.3 (L) 6.4 - 8.2 g/dL     (H) 9 - 39 U/L    Bilirubin, Total 14.2 (H) 0.0 - 1.2 mg/dL    ALT 66 (H) 7 - 45 U/L   Uric Acid   Result Value Ref Range    Uric Acid 5.1 2.3 - 6.7 mg/dL   Lactate dehydrogenase   Result Value Ref Range     (H) 84 - 246 U/L   Phosphorus   Result Value Ref Range    Phosphorus 3.3 2.5 - 4.9 mg/dL   CBC and Auto Differential   Result Value Ref Range    WBC 13.3 (H) 4.4 - 11.3 x10*3/uL    nRBC 0.2 (H) 0.0 - 0.0 /100 WBCs    RBC 4.12 4.00 - 5.20 x10*6/uL    Hemoglobin 12.5 12.0 - 16.0 g/dL    Hematocrit 34.9 (L) 36.0 - 46.0 %    MCV 85 80 - 100 fL    MCH 30.3 26.0 - 34.0 pg    MCHC 35.8 32.0 - 36.0 g/dL    RDW 22.9 (H) 11.5 - 14.5 %    Platelets 191 150 - 450 x10*3/uL    Neutrophils % 82.8 40.0 - 80.0 %    Immature Granulocytes %, Automated 0.5 0.0 - 0.9 %    Lymphocytes % 9.9 13.0 - 44.0 %    Monocytes % 6.6 2.0 - 10.0 %    Eosinophils % 0.0 0.0 - 6.0 %    Basophils % 0.2 0.0 - 2.0 %    Neutrophils Absolute 11.01 (H) 1.20 - 7.70 x10*3/uL    Immature Granulocytes Absolute, Automated 0.06 0.00 - 0.70 x10*3/uL    Lymphocytes Absolute 1.31 1.20 - 4.80 x10*3/uL    Monocytes Absolute 0.87 0.10 - 1.00 x10*3/uL    Eosinophils Absolute 0.00 0.00 - 0.70 x10*3/uL    Basophils Absolute 0.03 0.00 - 0.10 x10*3/uL   Protime-INR   Result Value Ref  suggest an increase. After review, I suspect the bone density is at least overall stable, which is comforting with persistent prednisone use. I would continue therapy for now only if tolerated (Actonel). If not, please let me know. Please let me know if you have any questions.  Thank you,  Neetu Washburn Range    Protime 15.6 (H) 9.8 - 12.8 seconds    INR 1.4 (H) 0.9 - 1.1   Morphology   Result Value Ref Range    RBC Morphology See Below     Target Cells Few     Russell Cells Few    Comprehensive metabolic panel   Result Value Ref Range    Glucose 91 74 - 99 mg/dL    Sodium 131 (L) 136 - 145 mmol/L    Potassium 4.9 3.5 - 5.3 mmol/L    Chloride 101 98 - 107 mmol/L    Bicarbonate 21 21 - 32 mmol/L    Anion Gap 14 10 - 20 mmol/L    Urea Nitrogen 46 (H) 6 - 23 mg/dL    Creatinine 1.45 (H) 0.50 - 1.05 mg/dL    eGFR 40 (L) >60 mL/min/1.73m*2    Calcium 8.3 (L) 8.6 - 10.6 mg/dL    Albumin 2.2 (L) 3.4 - 5.0 g/dL    Alkaline Phosphatase 192 (H) 33 - 136 U/L    Total Protein 5.9 (L) 6.4 - 8.2 g/dL     (H) 9 - 39 U/L    Bilirubin, Total 15.3 (H) 0.0 - 1.2 mg/dL    ALT 62 (H) 7 - 45 U/L   Magnesium   Result Value Ref Range    Magnesium 2.60 (H) 1.60 - 2.40 mg/dL   Uric Acid   Result Value Ref Range    Uric Acid 4.7 2.3 - 6.7 mg/dL   Lactate dehydrogenase   Result Value Ref Range     (H) 84 - 246 U/L   Phosphorus   Result Value Ref Range    Phosphorus 3.6 2.5 - 4.9 mg/dL                  Assessment/Plan   Principal Problem:    Cirrhosis (CMS/HCC)    Ms. Waddell is 67 yo F history of Seropositive RA (on infliximab), osteoarthritis, nephrolithiasis, obesity, DLD, PACs, history of tobacco use (quit march 2023), who presented to [place] ED on 2/1 for dark red urine, generalized abdominal pain with nausea transferred to  main campus for further workup of her new cirrhosis and jaundice. Since admission, hepatology and oncology have been consulted with recommendations for workup and further management. Per biopsy results, patient has breast cancer with metastasis to the liver and likely metastasis to her brain. Hepatology suspects that new cirrhosis is related to infiltrative malignancy although slight possibility of autoimmune component given positive anti-smooth muscle antibodies. Patient now s/p prednisone 30 mg 5 day  trial without improvement in LFTs. Final breast cancer pathology shows invasive ductal carcinoma grade 3, ER-/VT-/HER2+. Started on chemotherapy and targeted therapy on 2/14 and tolerated this well. TTE done prior to initiation of treatment showed EF 70% and mild impaired diastolic relaxation. Started on allopurinol for TLS prophylaxis. NM bone scan completed shows no osseous metastatic disease. Will continue to monitor her closely for signs of severe hepatic dysfunction and TLS. Worsening SYLVIA may be hepatorenal, will consider albumin if Cr worsens again. Increased lactulose to BID. Patient now experiencing significant fatigue likely secondary to chemotherapy as well as some increased swelling of her lower extremities and abdomen. LFTs stable for now.     2/18 updates:  - Lasix and aldactone still held iso SYLVIA. Will restart once SYLVIA has resolved, Cr stable, discontinuing fluids today due to signs of volume overload exam  - LFTs fairly stable, AlkPhos 218>229>246>192, >198>179>129, Bili 11.3>13.6>13.4>15.3, and ALT 57>71>67>62  - Discontinue BID CMP, LDH, and uric acid to monitor for tumor lysis syndrome and will do once daily labs for now, continue allopurinol 300 mg for prophylaxis; G6PD testing negative in the event that rasburicase is needed  - Start dexamethasone 4 mg qAM for appetite and energy stimulation     #new liver cirrhosis  #Cholelithiasis  #mixed hepatocellular and cholestatic liver injury   #mild confusion possible grade 1 hepatic encephalopahthy  #liver metastasis (breast primary)  :: hepC negative, hepB negative, hepA negative, T spot negative in 11/2023  :: alpha 1 antitrypsin negative  :: ferritin 406 H/transferrin 136 L  :: RUQ as above in assessment  :: ammonia 79  - autoimmune workup for the most part negative. Anti-SM positive 1:20, could be related to autoimmune hepatitis; however, still suspect malignancy to be driving variable  - liver biopsy significant for poorly differentiated  breast cancer  - diagnostic paracentesis unremarkable, no malignant cells noted  - lactulose 20 g BID, lasix 20 mg and aldactone 50 mg currently held iso SYLVIA  - trialing prednisone 30 mg to see if it may help with possible autoimmune component per hepatology (2/13 - 2/17), can start rifaxamin 550 mg BID if pt develops HE  - Hep B surface AG negative, Hep B surface AB positive at 580.6     #nonhealing, draining, L breast wound  #primary breast cancer with liver met and likely brain mets  :: MRI brain showing small enhancing lesions in the cerebellum without mass effect with c/f metastatic disease, potential 4 mm MRA bifurcation aneurysm  :: TTE shows EF 70% and impaired diastolic relaxation  :: NM bone scan without osseous metastatic disease  - plan for outpatient MRA   - inpatient mammogram significant for masses and suspicious lymphadenopathy  - s/p biopsies, final pathology shows invasive ductal carcinoma grade 3, ER-/MA-/HER2+  - initial pathology showed ER+ so patient was started on anastrazole 1 mg, but this read was modified to ER- so anastrozole was stopped on 2/13  - per oncology: plan for inpatient carboplatin (AUC 4) and trastuzumab (loading dose 8 mg/kg) completed on 2/14 with patient tolerating this well  - Daily CMP, LDH, and uric acid to monitor for tumor lysis syndrome  - Continue allopurinol 300 mg daily for TLS prophylaxis  - G6PD testing negative  - Consult supportive onc: olanzapine 5 mg qhs  - EKG with Qtc 467    #SYLVIA  #Pre-renal based on FENa, possibly hepatorenal   - Cr 1.71 now 1.42, baseline Cr normal   - FENa 0.1-0.2% indicating pre-renal etiology  - Holding lasix 20 mg and aldactone 50 mg   - Discontinue IV fluids at 50 ml/hr given signs of volume overload  - Consider albumin and nephrology consult if worsening     #RA - on infliximab  #osteoarthritis  :: hold home naproxen   - voltaren gel  - tylenol 650 mg q8 prn  - oxycodone 5 mg q4 prn  - outpatient rheum aware to discontinue  infliximab     #subacute avulsion fracture lesser trochanter R proximal femur  - chronic?  - consult orthopedic surgery to assess subacute avulsion fracture of R femur found on CT on 2/1: nothing to do, patient can follow-up outpatient, WBAT  - NM bone scan without osseous metastatic disease     #compression deformity at T12  -vitamin D and calcium     #abdominal aorta atherosclerosis on CT  #smoking history   - follow up with PCP outpatient to discuss potential statin/asa     F: none  E: PRN   N: regular diet  GI: none  P: lovenox  A: PIV  C: full code confirmed on admission   Surrogate:   Cierra her daughter [phone] (first option)  Emmanuel her son [phone]    Monica Weber MD

## 2024-02-19 NOTE — CARE PLAN
The patient's goals for the shift include Pain management    The clinical goals for the shift include pt will remain free of injury      Problem: Pain  Goal: My pain/discomfort is manageable  Outcome: Progressing     Problem: Safety  Goal: Patient will be injury free during hospitalization  Outcome: Progressing  Goal: I will remain free of falls  Outcome: Progressing     Problem: Daily Care  Goal: Daily care needs are met  Outcome: Progressing     Problem: Psychosocial Needs  Goal: Demonstrates ability to cope with hospitalization/illness  Outcome: Progressing  Goal: Collaborate with me, my family, and caregiver to identify my specific goals  Outcome: Progressing     Problem: Discharge Barriers  Goal: My discharge needs are met  Outcome: Progressing     Problem: Safety - Adult  Goal: Free from fall injury  Outcome: Progressing     Problem: Discharge Planning  Goal: Discharge to home or other facility with appropriate resources  Outcome: Progressing     Problem: Chronic Conditions and Co-morbidities  Goal: Patient's chronic conditions and co-morbidity symptoms are monitored and maintained or improved  Outcome: Progressing     Problem: Pain  Goal: Takes deep breaths with improved pain control throughout the shift  Outcome: Progressing  Goal: Turns in bed with improved pain control throughout the shift  Outcome: Progressing  Goal: Walks with improved pain control throughout the shift  Outcome: Progressing  Goal: Performs ADL's with improved pain control throughout shift  Outcome: Progressing  Goal: Participates in PT with improved pain control throughout the shift  Outcome: Progressing  Goal: Free from opioid side effects throughout the shift  Outcome: Progressing  Goal: Free from acute confusion related to pain meds throughout the shift  Outcome: Progressing     Problem: Skin  Goal: Decreased wound size/increased tissue granulation at next dressing change  Outcome: Progressing  Flowsheets (Taken 2/19/2024  0717)  Decreased wound size/increased tissue granulation at next dressing change: Promote sleep for wound healing  Goal: Participates in plan/prevention/treatment measures  Outcome: Progressing  Flowsheets (Taken 2/19/2024 0717)  Participates in plan/prevention/treatment measures: Increase activity/out of bed for meals  Goal: Prevent/manage excess moisture  Outcome: Progressing  Flowsheets (Taken 2/19/2024 0717)  Prevent/manage excess moisture: Moisturize dry skin  Goal: Prevent/minimize sheer/friction injuries  Outcome: Progressing  Flowsheets (Taken 2/19/2024 0717)  Prevent/minimize sheer/friction injuries: Increase activity/out of bed for meals  Goal: Promote/optimize nutrition  Outcome: Progressing  Flowsheets (Taken 2/19/2024 0717)  Promote/optimize nutrition: Offer water/supplements/favorite foods  Goal: Promote skin healing  Outcome: Progressing  Flowsheets (Taken 2/19/2024 0717)  Promote skin healing: Protective dressings over bony prominences

## 2024-02-19 NOTE — PROGRESS NOTES
"Meghan Waddell is a 68 y.o. female on day 16 of admission presenting with Cirrhosis (CMS/HCC).    Subjective   No acute events overnight. Patient feels very tired today and has very little appetite, just wants to rest. Continues to have mild abdominal distention. Not noticing any changes or side effects with dexamethasone 4mg daily (started yesterday).     Objective   Physical Exam  Constitutional:       General: She is not in acute distress.     Appearance: Normal appearance. She is not toxic-appearing.   HENT:      Mouth/Throat:      Mouth: Mucous membranes are moist.   Eyes:      General: Scleral icterus present.      Extraocular Movements: Extraocular movements intact.      Pupils: Pupils are equal, round, and reactive to light.   Cardiovascular:      Rate and Rhythm: Normal rate and regular rhythm.      Heart sounds: No murmur heard.     No gallop.   Pulmonary:      Effort: Pulmonary effort is normal. No respiratory distress.      Breath sounds: No wheezing.   Chest:   Breasts:     Left: Inverted nipple, mass, nipple discharge and skin change present.      Comments: L breast with crusted draining lesion without purulence, fullness of the L lateral breast noted, nipple inversion with black scabbing  Abdominal:      General: Abdomen is distended.      Palpations: Abdomen is soft.      Tenderness: There is abdominal tenderness (tenderness to palpation in bilateral lower quadrants).   Musculoskeletal:         General: No swelling.      Right lower le+ edema      Left lower le+ edema  Skin:     General: Skin is warm and dry.      Coloration: Skin is jaundiced.   Neurological:      General: No focal deficit present.      Mental Status: She is alert.        Last Recorded Vitals  Blood pressure 120/75, pulse 89, temperature 36.9 °C (98.4 °F), temperature source Temporal, resp. rate 16, height 1.695 m (5' 6.73\"), weight 90.5 kg (199 lb 8.3 oz), SpO2 93 %.  Intake/Output last 3 Shifts:  I/O last 3 completed " shifts:  In: 180 (2 mL/kg) [I.V.:180 (2 mL/kg)]  Out: 725 (8 mL/kg) [Urine:725 (0.2 mL/kg/hr)]  Weight: 90.5 kg     Relevant Results  Results for orders placed or performed during the hospital encounter of 02/03/24 (from the past 24 hour(s))   CBC and Auto Differential   Result Value Ref Range    WBC 13.5 (H) 4.4 - 11.3 x10*3/uL    nRBC 0.1 (H) 0.0 - 0.0 /100 WBCs    RBC 4.14 4.00 - 5.20 x10*6/uL    Hemoglobin 12.7 12.0 - 16.0 g/dL    Hematocrit 35.0 (L) 36.0 - 46.0 %    MCV 85 80 - 100 fL    MCH 30.7 26.0 - 34.0 pg    MCHC 36.3 (H) 32.0 - 36.0 g/dL    RDW 23.0 (H) 11.5 - 14.5 %    Platelets 204 150 - 450 x10*3/uL    Neutrophils % 85.0 40.0 - 80.0 %    Immature Granulocytes %, Automated 0.7 0.0 - 0.9 %    Lymphocytes % 7.1 13.0 - 44.0 %    Monocytes % 6.7 2.0 - 10.0 %    Eosinophils % 0.3 0.0 - 6.0 %    Basophils % 0.2 0.0 - 2.0 %    Neutrophils Absolute 11.50 (H) 1.20 - 7.70 x10*3/uL    Immature Granulocytes Absolute, Automated 0.10 0.00 - 0.70 x10*3/uL    Lymphocytes Absolute 0.96 (L) 1.20 - 4.80 x10*3/uL    Monocytes Absolute 0.90 0.10 - 1.00 x10*3/uL    Eosinophils Absolute 0.04 0.00 - 0.70 x10*3/uL    Basophils Absolute 0.03 0.00 - 0.10 x10*3/uL   Comprehensive metabolic panel   Result Value Ref Range    Glucose 107 (H) 74 - 99 mg/dL    Sodium 132 (L) 136 - 145 mmol/L    Potassium 4.9 3.5 - 5.3 mmol/L    Chloride 104 98 - 107 mmol/L    Bicarbonate 20 (L) 21 - 32 mmol/L    Anion Gap 13 10 - 20 mmol/L    Urea Nitrogen 51 (H) 6 - 23 mg/dL    Creatinine 1.49 (H) 0.50 - 1.05 mg/dL    eGFR 38 (L) >60 mL/min/1.73m*2    Calcium 8.7 8.6 - 10.6 mg/dL    Albumin 2.1 (L) 3.4 - 5.0 g/dL    Alkaline Phosphatase 198 (H) 33 - 136 U/L    Total Protein 6.0 (L) 6.4 - 8.2 g/dL    AST 92 (H) 9 - 39 U/L    Bilirubin, Total 15.5 (H) 0.0 - 1.2 mg/dL    ALT 51 (H) 7 - 45 U/L   Magnesium   Result Value Ref Range    Magnesium 2.81 (H) 1.60 - 2.40 mg/dL   Protime-INR   Result Value Ref Range    Protime 17.1 (H) 9.8 - 12.8 seconds    INR  1.5 (H) 0.9 - 1.1   Uric Acid   Result Value Ref Range    Uric Acid 3.8 2.3 - 6.7 mg/dL   Lactate dehydrogenase   Result Value Ref Range     (H) 84 - 246 U/L   Phosphorus   Result Value Ref Range    Phosphorus 3.3 2.5 - 4.9 mg/dL   Morphology   Result Value Ref Range    RBC Morphology See Below     Polychromasia Mild     Target Cells Few     Russell Cells Few                   Assessment/Plan   Principal Problem:    Cirrhosis (CMS/HCC)    Ms. Waddell is 67 yo F history of Seropositive RA (on infliximab), osteoarthritis, nephrolithiasis, obesity, DLD, PACs, history of tobacco use (quit march 2023), who presented to [facility] ED on 2/1 for dark red urine, generalized abdominal pain with nausea transferred to [facility] for further workup of her new cirrhosis and jaundice. Since admission, hepatology and oncology have been consulted with recommendations for workup and further management. Per biopsy results, patient has breast cancer with metastasis to the liver and likely metastasis to her brain. Hepatology suspects that new cirrhosis is related to infiltrative malignancy although slight possibility of autoimmune component given positive anti-smooth muscle antibodies. Patient now s/p prednisone 30 mg 5 day trial without improvement in LFTs. Final breast cancer pathology shows invasive ductal carcinoma grade 3, ER-/NH-/HER2+. Started on chemotherapy and targeted therapy on 2/14 and tolerated this well. TTE done prior to initiation of treatment showed EF 70% and mild impaired diastolic relaxation. Started on allopurinol for TLS prophylaxis. NM bone scan completed shows no osseous metastatic disease. Will continue to monitor her closely for signs of severe hepatic dysfunction and TLS. Worsening SYLVIA may be hepatorenal, will consider albumin if Cr worsens again. Increased lactulose to BID. Patient now experiencing significant fatigue likely secondary to chemotherapy as well as some increased swelling of her lower  extremities and abdomen. LFTs stable for now.     2/19 updates:  - US liver ordered as LFTs still slightly elevated / not continuing to downtrend. Continue to trend LFTs  - LFTs fairly stable, AlkPhos 218>229>246>192>198, >198>179>129>92, Bili 11.3>13.6>13.4>15.3>15.5, and ALT 57>71>67>62>51  - Lasix and aldactone still held iso SYLVIA. Will restart once SYLVIA has resolved, Cr stable, discontinuing fluids today due to signs of volume overload exam  - Discontinue allopurinol for TLS  - Continue dexamethasone 4 mg qAM for appetite and energy stimulation     #new liver cirrhosis  #Cholelithiasis  #mixed hepatocellular and cholestatic liver injury   #mild confusion possible grade 1 hepatic encephalopahthy  #liver metastasis (breast primary)  :: hepC negative, hepB negative, hepA negative, T spot negative in 11/2023  :: alpha 1 antitrypsin negative  :: ferritin 406 H/transferrin 136 L  :: RUQ as above in assessment  :: ammonia 79  - autoimmune workup for the most part negative. Anti-SM positive 1:20, could be related to autoimmune hepatitis; however, still suspect malignancy to be driving variable  - liver biopsy significant for poorly differentiated breast cancer  - diagnostic paracentesis unremarkable, no malignant cells noted  - lactulose 20 g BID, lasix 20 mg and aldactone 50 mg currently held iso SYLVIA  - trialing prednisone 30 mg to see if it may help with possible autoimmune component per hepatology (2/13 - 2/17), can start rifaxamin 550 mg BID if pt develops HE  - Hep B surface AG negative, Hep B surface AB positive at 580.6     #nonhealing, draining, L breast wound  #primary breast cancer with liver met and likely brain mets  :: MRI brain showing small enhancing lesions in the cerebellum without mass effect with c/f metastatic disease, potential 4 mm MRA bifurcation aneurysm  :: TTE shows EF 70% and impaired diastolic relaxation  :: NM bone scan without osseous metastatic disease  - plan for outpatient MRA   -  inpatient mammogram significant for masses and suspicious lymphadenopathy  - s/p biopsies, final pathology shows invasive ductal carcinoma grade 3, ER-/VA-/HER2+  - initial pathology showed ER+ so patient was started on anastrazole 1 mg, but this read was modified to ER- so anastrozole was stopped on 2/13  - per oncology: plan for inpatient carboplatin (AUC 4) and trastuzumab (loading dose 8 mg/kg) completed on 2/14 with patient tolerating this well  - Daily CMP, LDH, and uric acid to monitor for tumor lysis syndrome  - Continue allopurinol 300 mg daily for TLS prophylaxis  - G6PD testing negative  - Consult supportive onc: olanzapine 5 mg qhs  - EKG with Qtc 467    #SYLVIA  #Pre-renal based on FENa, possibly hepatorenal   - Cr 1.71 now 1.42, baseline Cr normal   - FENa 0.1-0.2% indicating pre-renal etiology  - Holding lasix 20 mg and aldactone 50 mg   - Discontinue IV fluids at 50 ml/hr given signs of volume overload  - Consider albumin and nephrology consult if worsening     #RA - on infliximab  #osteoarthritis  :: hold home naproxen   - voltaren gel  - tylenol 650 mg q8 prn  - oxycodone 5 mg q4 prn  - outpatient rheum aware to discontinue infliximab     #subacute avulsion fracture lesser trochanter R proximal femur  - chronic?  - consult orthopedic surgery to assess subacute avulsion fracture of R femur found on CT on 2/1: nothing to do, patient can follow-up outpatient, WBAT  - NM bone scan without osseous metastatic disease     #compression deformity at T12  -vitamin D and calcium     #abdominal aorta atherosclerosis on CT  #smoking history   - follow up with PCP outpatient to discuss potential statin/asa     F: none  E: PRN   N: regular diet  GI: none  P: lovenox  A: PIV  C: full code confirmed on admission   Surrogate:   Cierra her daughter [phone] (first option)  Emmanuel her son [phone]    Aidan Uriarte MD

## 2024-02-19 NOTE — PROGRESS NOTES
Art Therapy Note    Meghan Alarconsh     Therapy Session  Referral Type: New referral this admission  Visit Type: New visit  Session Start Time: 1532  Session End Time: 1702  Intervention Delivery: In-person  Conflict of Service: None  Number of family members present: 1  Family Present for Session: Sibling(s)  Family Participation: Supportive  Number of staff members present: 0              Treatment/Interventions  Art Therapy Interventions: Assessment, Empathic listening/validating emotions  Interruption: No  Patient Fell Asleep at End of Session: No    Post-assessment  Total Session Time (min): 90 minutes    Narrative  Assessment Detail: ATR made a visi to Pt.'s room to follow up on a referral made, introduce, educate and assess AT needs ans wants.  Pt sitting up right in bed eating some lunch and visting with her older brother.  Brother encouraging of AT discussion and both parties welcomed visit.  Intervention: Provided active listening, valdiation of feelings, exploration of AT cart options and interventions.  Evaluation: Pt and her brother very open in sharing stories,of family, their history and family health with ATR.  Depsite Pt not wanting AT she expressed wanting to talk. However, Pt.'s brother loves coloring and took some seasonal mandalas and colored pencils. Most ot session the two openly discussed Pt';s lack of taking care health issues her whole, and much grief and loss discussion surrounding thier other siblings (9 of them in total).  The brother espceially, seems to be the sibling who supports all siblings and attends to their health needs on going.  It seemd they both needed support and valdition for al they have been through,with theior siblings and inilaws.  Many health issues of cancer, MS and RA within the family unit of siblings.  Pt and brother very happy and appreicative of ATR's visit  and support.  Follow-up: Pt is open to having ATR come again.  Shec may change her mind about services but  welcomes emotiponal support visits. ATR willcontinue to follow and offer AT services.    Education Documentation  No documentation found.

## 2024-02-19 NOTE — PROGRESS NOTES
Physical Therapy    Physical Therapy Treatment    Patient Name: Meghan Waddell  MRN: [id number]  Today's Date: 2/19/2024  Time Calculation  Start Time: 0959  Stop Time: 1027  Time Calculation (min): 28 min       Assessment/Plan   PT Assessment  PT Assessment Results: Decreased strength, Decreased endurance, Impaired balance, Decreased mobility, Decreased safety awareness, Pain  Rehab Prognosis: Fair  Evaluation/Treatment Tolerance: Patient limited by fatigue, Patient limited by pain  Medical Staff Made Aware: Yes  Strengths: Support of Caregivers  Barriers to Participation:  (pain, fatigue, complex medical issues)  End of Session Communication: Bedside nurse  Assessment Comment: 67 yo female  with breast CA (mets to liver and brain), liver cirrhosis, Left breast wound, subacute Right lesser trochanter avulsion fx, T12 compression fx and RA presents with overall fatigue and significant Right hip pain limiting mobilization. Recommend moderate intensity therapy as pt needs PT & OT, is not at baseline and has complext medical and rehab needs.  End of Session Patient Position: Bed, 3 rail up, Alarm on  PT Plan  Inpatient/Swing Bed or Outpatient: Inpatient  PT Plan  Treatment/Interventions: Bed mobility, Transfer training, Gait training, Balance training, Neuromuscular re-education, Strengthening, Endurance training, Therapeutic activity, Home exercise program, Postural re-education  PT Plan: Skilled PT  PT Frequency: 3 times per week  PT Discharge Recommendations: Moderate intensity level of continued care  Equipment Recommended upon Discharge:  (TBD at rehab)  PT Recommended Transfer Status: Assist x1, Assistive device (mod A +1, WBAT Right LE)  PT - OK to Discharge: Yes (PT evaluation completed and DC recommendations made)      General Visit Information:   PT  Visit  PT Received On: 02/19/24  General  Reason for Referral: Presents to OSH 2/1 abd pain, nausea, melena,cirrhosis, jaundice; Dx: primary breast adenocarcinoma  with liver mets (possible brain mets), confusion, liver cirrhosis, Left breast wound, subacute avulsion fx Lesser trochanter Right proximal femur, compression deformity T12  Past Medical History Relevant to Rehab: OA, nephrolithiasis, obesity, DLD, Seropositive RA (on infliximab), osteoarthritis, nephrolithiasis, obesity, DLD, PACs, history of tobacco use (quit march 2023); fracture of R lesser trochanter  Missed Visit: No  Family/Caregiver Present: No  Co-Treatment:  (N/A)  Prior to Session Communication: Bedside nurse  Patient Position Received: Bed, 3 rail up, Alarm on  Preferred Learning Style: verbal, visual  General Comment: Pt supine alert, willing to participate a little with therapy but limited by overall fatigue and Right hip pain; did not want to get up into chair but did get on/off commode with walker as noted.    Subjective   Precautions:  Precautions  Hearing/Visual Limitations: WFL/READING GLASSES  LE Weight Bearing Status:  (WBAT lary LEs)  Medical Precautions: Fall precautions  Post-Surgical Precautions:  (RA, T12 compression deformity, Left breast wound, Right LE WBAT (avulsion fx))  Vital Signs:  Vital Signs  Heart Rate:  (sitting post transfers: HR 77   O2 95%)    Objective   Pain:  Pain Assessment  Pain Assessment: 0-10  Pain Score:  (Right hip 7/10 with movement/standing, no pain end of session)  Pain Interventions: Repositioned, Rest (given pain meds prior; didn't want ice pack)  Response to Interventions: no pain per pt end of session supine  Cognition:  Cognition  Overall Cognitive Status: Impaired  Arousal/Alertness: Delayed responses to stimuli (partially due to apprehension about pain)  Orientation Level: Oriented X4  Following Commands:  (follows 100% of commands with repetition/cues for safety (hand placement with transfers))  Safety Judgment: Decreased awareness of need for assistance  Problem Solving: Assistance required to identify errors made  Cognition Comments: alert, engaged,  hessitant to participate/move due to apprehension about pain  Problem Solving: Exceptions to WFL  Insight: Mild  Postural Control:  Postural Control  Postural Control: Impaired  Trunk Control: in standing as noted  Posture Comment: flexed upper trunk/waist in supported standing with walker  Static Sitting Balance  Static Sitting-Balance Support: Feet supported, Bilateral upper extremity supported  Static Sitting-Level of Assistance: Close supervision  Static Standing Balance  Static Standing-Balance Support: Bilateral upper extremity supported (on WW)  Static Standing-Level of Assistance: Contact guard  Dynamic Standing Balance  Dynamic Standing-Balance Support: Bilateral upper extremity supported (on WW)  Dynamic Standing-Balance:  (transfer bed to/from commode with walker as noted)  Dynamic Standing-Comments: min A, max cues for hand placement/safety/sequencing  Extremity/Trunk Assessments:        RLE   RLE : Exceptions to WFL  Strength RLE  RLE Overall Strength:  (ankle DF >3; LAQ 3- (limited by pain))  LLE   LLE : Exceptions to WFL  Strength LLE  LLE Overall Strength:  (ankle DF and LAQ >3)  Activity Tolerance:  Activity Tolerance  Endurance: Tolerates 10 - 20 min exercise with multiple rests (limited by overall fatigue and Right hip pain)  Treatments:  Therapeutic Exercise  Therapeutic Exercise Performed: Yes  Therapeutic Exercise Activity 1: sitting ankle pumps and LAQs (right side limited by pain)    Therapeutic Activity  Therapeutic Activity 1: supine to/from sit as noted (assist with Right LE due to pain)  Therapeutic Activity 2: sit to/from stand transfer training with walker with focus on hand placement/safety/posture  Therapeutic Activity 3: transfers on/off commode with walker with focus on hand placement    Bed Mobility  Bed Mobility: Yes  Bed Mobility 1  Bed Mobility 1: Supine to sitting, Sitting to supine  Level of Assistance 1: Minimum assistance, Minimal verbal cues, Minimal tactile cues  Bed  Mobility Comments 1: min/mod A with Right LE due to pain; HOB elevated 55 degrees, in/out on Right side  Bed Mobility 2  Bed Mobility  2: Scooting  Level of Assistance 2: Dependent (+2)    Ambulation/Gait Training  Ambulation/Gait Training Performed: No (pt politely refused- too tired and in too much pain)  Transfers  Transfer: Yes  Transfer 1  Transfer From 1: Sit to, Stand to  Transfer to 1: Sit, Stand  Technique 1: Sit to stand, Stand to sit  Transfer Device 1: Walker  Transfer Level of Assistance 1: Moderate assistance, Minimal verbal cues, Minimal tactile cues (min/mod A, max cues for hand placement/safety/sequencing)  Transfers 2  Transfer From 2: Commode-standard to, Bed to  Transfer to 2: Commode-standard, Bed  Technique 2: Stand pivot  Transfer Device 2: Walker  Transfer Level of Assistance 2: Minimum assistance, Moderate verbal cues, Minimal tactile cues  Trials/Comments 2: repetitive cues for hand placement/safety/sequencing/Right LE position    Stairs  Stairs: No    Outcome Measures:  [facility] Basic Mobility  Turning from your back to your side while in a flat bed without using bedrails: A little  Moving from lying on your back to sitting on the side of a flat bed without using bedrails: A little  Moving to and from bed to chair (including a wheelchair): A little  Standing up from a chair using your arms (e.g. wheelchair or bedside chair): A lot  To walk in hospital room: A little  Climbing 3-5 steps with railing: Total  Basic Mobility - Total Score: 15    Education Documentation  Mobility Training, taught by Poonam Robert PT at 2/19/2024 10:36 AM.  Learner: Patient  Readiness: Acceptance  Method: Explanation, Demonstration  Response: Verbalizes Understanding, Needs Reinforcement  Comment: PT purpose/POC, importance of mobility    Education Comments  No comments found.        Encounter Problems       Encounter Problems (Active)       Mobility       Pt will ambulate >/=200 ft with supervision and use of  LRAD (Not met)       Start:  02/06/24    Expected End:  02/20/24    Resolved:  02/19/24    Updated to: ambulate 50' with WW and CGA WBAT Right LE, no LOB, pain <2/10 Right LE    Update reason: time for update         Pt will safely navigate >/=14 steps with use of 1 hand rail and supervision (Not met)       Start:  02/06/24    Expected End:  02/20/24    Resolved:  02/19/24    Updated to: supervision with LE HEP sitting and supine    Update reason: time for update         Pt will complete all bed mobility independently with use of bed rails for support as needed  (Not met)       Start:  02/06/24    Expected End:  02/20/24    Resolved:  02/19/24    Updated to: supine to/from sit (HOB elevated 45 degrees, use of rail) with SBA (in/out on Right side)    Update reason: time for update         ambulate 50' with WW and CGA WBAT Right LE, no LOB, pain <2/10 Right LE (Progressing)       Start:  02/19/24    Expected End:  03/04/24                supervision with LE HEP sitting and supine (Progressing)       Start:  02/19/24    Expected End:  03/04/24                supine to/from sit (HOB elevated 45 degrees, use of rail) with SBA (in/out on Right side) (Progressing)       Start:  02/19/24    Expected End:  03/04/24                   Transfers       Pt will perform STS independently with use of LRAD  (Not met)       Start:  02/06/24    Expected End:  02/20/24    Resolved:  02/19/24    Updated to: sit to/from stand, bed to/from chair or commode with WW (WBAT Right LE) with SBA safely, no additional cues needed for hand placement    Update reason: time for update         Pt will demonstrate appropriate use of LRAD when complete all transfers with no cues  (Not met)       Start:  02/06/24    Expected End:  02/20/24    Resolved:  02/19/24    Updated to: static sit EOB doing LE ex >10 min with supervision, pain Right hip <2    Update reason: time for update         sit to/from stand, bed to/from chair or commode with WW (WBAT  Right LE) with SBA safely, no additional cues needed for hand placement (Progressing)       Start:  02/19/24    Expected End:  03/04/24                static sit EOB doing LE ex >10 min with supervision, pain Right hip <2 (Progressing)       Start:  02/19/24    Expected End:  03/04/24

## 2024-02-19 NOTE — PROGRESS NOTES
Music Therapy Note    Meghan Waddell     Therapy Session  Referral Type: New referral this admission  Visit Type: Follow-up visit  Session Start Time: 1409  Session End Time: 1412  Intervention Delivery: In-person  Conflict of Service: Declined treatment  Number of family members present: 1  Family Present for Session: Child  Family Participation: Supportive               Treatment/Interventions       Post-assessment  Total Session Time (min): 3 minutes    Narrative  Assessment Detail: Patient presented awake and alert, supine in bed, displaying flat affect. Patient declined music intervention at this time, expressing gratitude for MT's attempt. Patient daughter Cierra present and greeted MT, denied additional needs at this time.  Follow-up: MT to continue to follow.    Education Documentation  No documentation found.

## 2024-02-19 NOTE — CARE PLAN
The patient's goals for the shift include Pain management    The clinical goals for the shift include Patient will remain free from falls and injury throughout shift.      Problem: Pain  Goal: My pain/discomfort is manageable  Outcome: Progressing     Problem: Safety  Goal: Patient will be injury free during hospitalization  Outcome: Progressing  Goal: I will remain free of falls  Outcome: Progressing     Problem: Daily Care  Goal: Daily care needs are met  Outcome: Progressing     Problem: Psychosocial Needs  Goal: Demonstrates ability to cope with hospitalization/illness  Outcome: Progressing  Goal: Collaborate with me, my family, and caregiver to identify my specific goals  Outcome: Progressing     Problem: Discharge Barriers  Goal: My discharge needs are met  Outcome: Progressing     Problem: Safety - Adult  Goal: Free from fall injury  Outcome: Progressing     Problem: Discharge Planning  Goal: Discharge to home or other facility with appropriate resources  Outcome: Progressing     Problem: Chronic Conditions and Co-morbidities  Goal: Patient's chronic conditions and co-morbidity symptoms are monitored and maintained or improved  Outcome: Progressing     Problem: Pain  Goal: Takes deep breaths with improved pain control throughout the shift  Outcome: Progressing  Goal: Turns in bed with improved pain control throughout the shift  Outcome: Progressing  Goal: Walks with improved pain control throughout the shift  Outcome: Progressing  Goal: Performs ADL's with improved pain control throughout shift  Outcome: Progressing  Goal: Participates in PT with improved pain control throughout the shift  Outcome: Progressing  Goal: Free from opioid side effects throughout the shift  Outcome: Progressing  Goal: Free from acute confusion related to pain meds throughout the shift  Outcome: Progressing     Problem: Skin  Goal: Decreased wound size/increased tissue granulation at next dressing change  Outcome:  Progressing  Flowsheets (Taken 2/18/2024 2312)  Decreased wound size/increased tissue granulation at next dressing change: Promote sleep for wound healing  Goal: Participates in plan/prevention/treatment measures  Outcome: Progressing  Flowsheets (Taken 2/18/2024 2312)  Participates in plan/prevention/treatment measures: Elevate heels  Goal: Prevent/manage excess moisture  Outcome: Progressing  Flowsheets (Taken 2/18/2024 2312)  Prevent/manage excess moisture: Moisturize dry skin  Goal: Prevent/minimize sheer/friction injuries  Outcome: Progressing  Flowsheets (Taken 2/18/2024 2312)  Prevent/minimize sheer/friction injuries:   Use pull sheet   Increase activity/out of bed for meals  Goal: Promote/optimize nutrition  Outcome: Progressing  Goal: Promote skin healing  Outcome: Progressing

## 2024-02-20 NOTE — PROGRESS NOTES
"Nutrition Follow Up Assessment:   Nutrition Assessment    The patient is a 68 y.o. female who is hospital day #17.  Pt started on chemotherapy and targeted treatment on 02/14. Tolerating well. Worsening SYLVIA.     Nutrition History:  Food and Nutrient History: Pt reports that she wants food, orders it, but when it arrives she no longer wants it. Nausea playing a role in not wanting the food. Today was able to have a full bowl of cereal. Have found some food items in the menu she likes - eating those. Family bringing foods/snacks but patient not eating them. Also c/o pain in her abdomen that comes and goes. Pt aware she has diet restrictions - unsure if her PO intake would increase if her diet was liberalized. Not getting the Ensure Plus or Prostat despite being ordered. Does not want to drink Ensure Plus but agreeable to trying Prostat again. Encouraged pt to mix Prostat w/ other drinks like water, juice, gatorade, to make it a thinner consistency. Currently NPO for procedure. Pt appeared overwhelmed at time of visit - stating everything is so new for her.  Vitamin/Herbal Supplement Use: vit D started inpatient  Food Allergies/Intolerances:  None  GI Symptoms: Nausea and abdominal pain   Oral Problems: None         Anthropometrics:  Start of admission anthropometrics:  Height: 172.7 cm (5' 8\")  Weight: 98.7 kg (217 lb 9.5 oz)  BMI (Calculated): 33.09    IBW/kg (Dietitian Calculated): 63.6 kg  Percent of IBW: 142.6 %  Adjusted Body Weight (kg): 70.4 kg    Weight         2/3/2024  2052 2/4/2024  0643 2/14/2024  0855 2/14/2024  0903    Weight: 98.7 kg (217 lb 9.5 oz) 90.7 kg (200 lb) 90.5 kg (199 lb 8.3 oz) 90.5 kg (199 lb 8.3 oz)            Weight Change %:  Weight History / % Weight Change: no new wt within the past week to assess for changes. Wt stable from 02/04 - 02/14. Significant wt loss PTA.    Nutrition Focused Physical Exam Findings:  defer: Refer to note from 02/06 - moderate losses     Edema:  Edema: +2 " mild  Edema Location: BLE / generalized  Physical Findings:  Skin: Negative    Objective Data:    Last BM Date: 02/20/24    Nutrition Significant Labs:    Lab Units 02/20/24  0827 02/19/24  0928 02/18/24  0855   HEMOGLOBIN g/dL 12.7 12.7 12.5   MCV fL 85 85 85   GLUCOSE mg/dL 99 107*  --    POTASSIUM mmol/L 5.5* 4.9  --    SODIUM mmol/L 133* 132*  --    PHOSPHORUS mg/dL 3.7 3.3  --    MAGNESIUM mg/dL 2.88* 2.81*  --    CREATININE mg/dL 1.34* 1.49*  --    BUN mg/dL 53* 51*  --    ALT U/L 46* 51*  --    AST U/L 86* 92*  --    ALK PHOS U/L 191* 198*  --         Lab Results   Component Value Date    VITD25 26 (L) 02/04/2024         Nutrition Specific Medications:  calcium carbonate-vitamin D3, 2 tablet, oral, Daily  dexAMETHasone, 4 mg, oral, BID  [Held by provider] furosemide, 20 mg, oral, Daily  lactulose, 20 g, oral, BID  OLANZapine, 2.5 mg, oral, Nightly  pantoprazole, 40 mg, oral, Daily before breakfast  [Held by provider] spironolactone, 50 mg, oral, Daily    I/O:   No intake/output data recorded.    Dietary Orders (From admission, onward)       Start     Ordered    02/20/24 1109  NPO Diet; Effective now  Diet effective now         02/20/24 1109    02/06/24 1205  Oral nutritional supplements  Until discontinued        Comments: Chocolate Only. Thanks!   Question Answer Comment   Deliver with Breakfast    Select supplement: Ensure Plus        02/06/24 1204    02/05/24 1651  Oral nutritional supplements  Until discontinued        Question Answer Comment   Deliver with Lunch    Select supplement: Pro-Stat AWC        02/05/24 1651                     Estimated Needs:   Total Energy Estimated Needs (kCal): 1900 kCal  Method for Estimating Needs: 70.4kg / 27kcal    Total Protein Estimated Needs (g): 90 g  Method for Estimating Needs: 70.4kg / 1.3g               Nutrition Diagnosis   Malnutrition Diagnosis  Patient has Malnutrition Diagnosis: Yes  Diagnosis Status: Ongoing  Malnutrition Diagnosis: Severe malnutrition  related to chronic disease or condition  As Evidenced by: report of decreased PO intake x 1 month, 11.9% wt loss x 6 months and ascites.  Additional Assessment Information: Will follow with physical exam.            Nutrition Interventions/Recommendations   Individualized Nutrition Prescription Provided for : 1900 kcal and 90g protein via oral diet        Nutrition Interventions:   Medical Food Supplement: Commercial beverage (Prostat)  Goal: Drink at least 1 ONS/d  Vitamin Supplement Therapy:  Order daily MVI   Goal: prevent deficiencies      Nutrition Education:   Discussed with pt:  - trying small frequent meals/snacks  - ONS use and how to consume Prostat      Nutrition Monitoring and Evaluation   Monitoring and Evaluation Plan: Energy intake  Criteria: >/= 75% of meals/supplements    Monitoring and Evaluation Plan: Weight  Criteria: Stable weight    Monitoring and Evaluation Plan: Electrolyte/renal panel, Glucose/endocrine profile  Criteria: Labs wnl            Time Spent/Follow-up Reminder:   Time Spent (min): 60 minutes  Last Date of Nutrition Visit: 02/20/24

## 2024-02-20 NOTE — CARE PLAN
The patient's goals for the shift include Pain management    The clinical goals for the shift include Patient will remain free from falls and injury throughout shift.    Problem: Skin  Goal: Decreased wound size/increased tissue granulation at next dressing change  Flowsheets (Taken 2/19/2024 2115)  Decreased wound size/increased tissue granulation at next dressing change: Protective dressings over bony prominences  Goal: Participates in plan/prevention/treatment measures  Flowsheets (Taken 2/19/2024 2115)  Participates in plan/prevention/treatment measures:   Elevate heels   Increase activity/out of bed for meals  Goal: Prevent/manage excess moisture  Flowsheets (Taken 2/19/2024 2115)  Prevent/manage excess moisture: Moisturize dry skin  Goal: Prevent/minimize sheer/friction injuries  Flowsheets (Taken 2/19/2024 2115)  Prevent/minimize sheer/friction injuries: Increase activity/out of bed for meals  Goal: Promote/optimize nutrition  Flowsheets (Taken 2/19/2024 2115)  Promote/optimize nutrition:   Offer water/supplements/favorite foods   Monitor/record intake including meals  Goal: Promote skin healing  Flowsheets (Taken 2/19/2024 2115)  Promote skin healing: Protective dressings over bony prominences

## 2024-02-20 NOTE — CARE PLAN
The patient's goals for the shift include Pain management        Problem: Pain  Goal: My pain/discomfort is manageable  Outcome: Progressing     Problem: Safety  Goal: Patient will be injury free during hospitalization  Outcome: Progressing  Goal: I will remain free of falls  Outcome: Progressing     Problem: Daily Care  Goal: Daily care needs are met  Outcome: Progressing     Problem: Psychosocial Needs  Goal: Demonstrates ability to cope with hospitalization/illness  Outcome: Progressing  Goal: Collaborate with me, my family, and caregiver to identify my specific goals  Outcome: Progressing     Problem: Discharge Barriers  Goal: My discharge needs are met  Outcome: Progressing     Problem: Safety - Adult  Goal: Free from fall injury  Outcome: Progressing     Problem: Discharge Planning  Goal: Discharge to home or other facility with appropriate resources  Outcome: Progressing     Problem: Chronic Conditions and Co-morbidities  Goal: Patient's chronic conditions and co-morbidity symptoms are monitored and maintained or improved  Outcome: Progressing     Problem: Pain  Goal: Takes deep breaths with improved pain control throughout the shift  Outcome: Progressing  Goal: Turns in bed with improved pain control throughout the shift  Outcome: Progressing  Goal: Walks with improved pain control throughout the shift  Outcome: Progressing  Goal: Performs ADL's with improved pain control throughout shift  Outcome: Progressing  Goal: Participates in PT with improved pain control throughout the shift  Outcome: Progressing  Goal: Free from opioid side effects throughout the shift  Outcome: Progressing  Goal: Free from acute confusion related to pain meds throughout the shift  Outcome: Progressing     Problem: Skin  Goal: Decreased wound size/increased tissue granulation at next dressing change  Outcome: Progressing  Flowsheets (Taken 2/20/2024 9153)  Decreased wound size/increased tissue granulation at next dressing change:    Promote sleep for wound healing   Protective dressings over bony prominences  Goal: Participates in plan/prevention/treatment measures  Outcome: Progressing  Flowsheets (Taken 2/20/2024 0924)  Participates in plan/prevention/treatment measures:   Elevate heels   Increase activity/out of bed for meals  Goal: Prevent/manage excess moisture  Outcome: Progressing  Flowsheets (Taken 2/20/2024 0924)  Prevent/manage excess moisture: Moisturize dry skin  Goal: Prevent/minimize sheer/friction injuries  Outcome: Progressing  Flowsheets (Taken 2/20/2024 0924)  Prevent/minimize sheer/friction injuries:   Increase activity/out of bed for meals   HOB 30 degrees or less   Turn/reposition every 2 hours/use positioning/transfer devices  Goal: Promote/optimize nutrition  Outcome: Progressing  Flowsheets (Taken 2/20/2024 0924)  Promote/optimize nutrition:   Offer water/supplements/favorite foods   Monitor/record intake including meals  Goal: Promote skin healing  Outcome: Progressing  Flowsheets (Taken 2/20/2024 0924)  Promote skin healing:   Protective dressings over bony prominences   Assess skin/pad under line(s)/device(s)     Problem: Fall/Injury  Goal: Not fall by end of shift  Outcome: Progressing  Goal: Be free from injury by end of the shift  Outcome: Progressing  Goal: Verbalize understanding of personal risk factors for fall in the hospital  Outcome: Progressing  Goal: Verbalize understanding of risk factor reduction measures to prevent injury from fall in the home  Outcome: Progressing  Goal: Use assistive devices by end of the shift  Outcome: Progressing  Goal: Pace activities to prevent fatigue by end of the shift  Outcome: Progressing

## 2024-02-20 NOTE — PROGRESS NOTES
SUPPORTIVE AND PALLIATIVE ONCOLOGY INPATIENT FOLLOW-UP      SERVICE DATE: 02/20/24     SUBJECTIVE:  Interval Events:  Started on Dexamethasone 4 mg po daily on 2/18/24; has now received 3 doses    Pt reporting feeling fatigued over the last few days but adds that she is sleeping straight through the night since starting Olanzapine    Reports that appetite is still fair to poor    Pain Assessment:  Location: Right hip, abdominal  Duration: Intermittent  Characteristics:   Rating: 3   Descriptors: aching and throbbing, squeezing   Aggravating: movement, walking, and standing    Relieving: Analgesics Oxycodone 5 mg po q 4 hrs prn, Positioning, and Modifying activity   Interference with Function: Somewhat    Opioid Use  Past 24 h prn opioid use: Oxycodone 5 mg po x 2 doses = 10 mg = 12 OME  Total 24h OME use:  12    Note: OME calculations based on equianalgesic table below. Please note this table is based on best available evidence but conversions are still approximate. These are NOT opioid DOSES for individual patient use; this is equivalency information.  Drug Parenteral Enteral   Morphine 10 25   Oxycodone N/A 20   Hydromorphone 2 5   Fentanyl 0.15 N/A   Tramadol N/A 120   Citation: Nikko OVALLE. Demystifying opioid conversion calculations: A guide for effective dosing, Second edition. MD Debbie: [employer] of [employer]-System Pharmacists, 2018.    Symptom Assessment:  Lack of appetite somewhat  Nausea a little  Difficulty Sleeping none  Fatigue somewhat    Information obtained from: chart review, interview of patient, and discussion with primary team  ______________________________________________________________________        OBJECTIVE:    Lab Results   Component Value Date    WBC 12.1 (H) 02/20/2024    HGB 12.7 02/20/2024    HCT 35.4 (L) 02/20/2024    MCV 85 02/20/2024     02/20/2024      Lab Results   Component Value Date    GLUCOSE 107 (H) 02/19/2024    CALCIUM 8.7 02/19/2024     (L)  2024    K 4.9 2024    CO2 20 (L) 2024     2024    BUN 51 (H) 2024    CREATININE 1.49 (H) 2024     Lab Results   Component Value Date    ALT 51 (H) 2024    AST 92 (H) 2024     (H) 2024    ALKPHOS 198 (H) 2024    BILITOT 15.5 (H) 2024     Estimated Creatinine Clearance: 42.1 mL/min (A) (by C-G formula based on SCr of 1.49 mg/dL (H)).     Scheduled medications  calcium carbonate-vitamin D3, 2 tablet, oral, Daily  dexAMETHasone, 4 mg, oral, Daily  enoxaparin, 40 mg, subcutaneous, q24h  [Held by provider] furosemide, 20 mg, oral, Daily  lactulose, 20 g, oral, BID  lidocaine, 1 patch, transdermal, Daily  OLANZapine, 2.5 mg, oral, Nightly  pantoprazole, 40 mg, oral, Daily before breakfast  [Held by provider] spironolactone, 50 mg, oral, Daily      Continuous medications     PRN medications  PRN medications: acetaminophen **OR** acetaminophen **OR** acetaminophen, albuterol, dextrose, diclofenac sodium, diphenhydrAMINE, EPINEPHrine, famotidine, melatonin, methylPREDNISolone sodium succinate (PF), oxyCODONE, prochlorperazine, prochlorperazine, sodium chloride   }     PHYSICAL EXAMINATION:    Vital Signs:   Vital signs reviewed  Visit Vitals  /67 (BP Location: Right arm, Patient Position: Lying)   Pulse 66   Temp 36.4 °C (97.5 °F) (Temporal)   Resp 16        Pain score: 3       Physical Exam    Physical Exam  Vitals reviewed.   Constitutional:       Appearance: Normal appearance; appears fatigued; A&O x 3  HENT:      Head: Normocephalic.     Eyes: Icteric sclera bilaterally   Cardiovascular:      Rate and Rhythm: Normal rate.      Pulses: Normal pulses.      Heart sounds: Normal heart sounds.   Pulmonary:      Effort: Pulmonary effort is normal.   Abdominal:      General: Bowel sounds are normal. There is distension, mild tenderness      Palpations: Abdomen is soft.   Musculoskeletal:      Right lower le-2+ pitting edema.      Left lower  "le-1+ pitting edema   Skin:     General: Skin is warm.      Capillary Refill: Capillary refill takes less than 2 seconds.   Neurological:      General: No focal deficit present.      Mental Status: She is alert and oriented to person, place, and time.   Psychiatric:         Mood and Affect: Mood normal.         Thought Content: Thought content normal.         Judgment: Judgment normal.        ASSESSMENT/PLAN:  Meghan Waddell is a 68 y.o. female diagnosed with poorly-differentiated adenocarcinoma and ER positivity who presented on 2/3 as a transfer from [place] for new cirrhosis and jaundice workup (patient had been having RUQ abdominal pain for a few months), 1 year of left breast drainage, and 20-lb weight loss since Dec 2023 with right lobe liver mass biopsy. PMH significant for Seropositive RA (on infliximab), osteoarthritis, nephrolithiasis, obesity, DLD, PACs, history of tobacco use (quit 2023), . Admitted 2/3/2024 for further evaluation and management. Supportive and Palliative Oncology is consulted for pain management and Introduction to Supportive and Palliative Oncology Services.      Primary team note from 2/15/24 states:  \"Since admission, hepatology and oncology have been consulted with recommendations for workup and further management. Per biopsy results, patient has breast cancer with metastasis to the liver and likely metastasis to her brain. Hepatology suspects that new cirrhosis is related to infiltrative malignancy although slight possibility of autoimmune component given positive anti-smooth muscle antibodies. Currently trialing prednisone 30 mg. Final breast cancer pathology shows invasive ductal carcinoma grade 3, ER-/UT-/HER2+. Started on chemotherapy and targeted therapy yesterday (24) and tolerated this well. TTE done prior to initiation of treatment showed EF 70% and mild impaired diastolic relaxation. Started on allopurinol for TLS prophylaxis. Will continue to monitor her " "closely for signs of severe hepatic dysfunction and TLS.\"     EKG w/ Tgn=186 (performed 2/14/24)     Pain:  Pain related to bone mets vs fracture  Pain is: acute on chronic  Type: somatic  Pain control: well-controlled  Home regimen:   Naproxen  Currently using Voltaren topical gel, Tylenol 650 mg po q 8 hrs prn, and oxycodone 5 mg po q 4 hrs  Intolerances/previously tried: none  Personalized pain goal: 2  Total OME usage for the past 24 hours:  12 OME  Continue Oxycodone 5 mg po q 4 hrs prn  Continue Lidocaine 4% topical patch to right hip  Continue Voltaren gel 1 % topically to right hip qid prn  Continue Acetaminophen 650 mg po q 8 hrs prn  Continue to monitor pain scores and administer PRN medications as appropriate  Continue/initiate nonpharmacologic pain management strategies including ice/heat therapy, distraction techniques, deep breathing/relaxation techniques, calming music, and repositioning  Continue to monitor for signs of opioid efficacy (pain scores, improved functionality) and toxicity (pinpoint pupils, excess sedation/drowsiness/confusion, respiratory depression, etc.)     Nausea:  Intermittent nausea without vomiting related to chemotherapy and opioids   Home regimen:  none  Improved and well controlled  Started chemo 2/14/24; has not noticed that nausea has worsened since  Ondansetron 4 mg IVP q 6 hrs prn  Prochlorperazine IV 10 mg q 6 hrs prn  Continue Olanzapine 5 mg po q hs (will also help with sleep, appetite as well)     Constipation  At risk for constipation related to opioids, currently not constipated  Usual bowel pattern: every other day  Home regimen: none  LBM 2/19/24  Monitor BM frequency, adjust regimen as needed  Goal to have BM without straining q48-72h  Lactulose per primary team      Sleeping Difficulty:  Impaired sleep related to hospital environment and new diagnosis  Home regimen:  none  Improved and well controlled  Has improved with adequate pain control but remains " "sub-optimal as pt continues to wake frequently  Continue Melatonin 5 mg po q hs  Olanzapine as listed above     Decreased appetite:  Appetite loss related to malignancy, chemotherapy, and disease process  Nutrition consult  Weight loss 20 lbs since December  Sub-optimally controlled  Home regimen:  none  Olanzapine as listed above  Recommend increasing Dexamethasone to 4 mg po bid (0800 and 1200) if not contraindicated-WBC today slightly elevated at 12.1; currently once daily at 0800     Medical Decision Making/Goals of Care/Advance Care Planning:  Patient's current clinical condition, including diagnosis, prognosis, and management plan, and goals of care were discussed.   Life limiting disease: metastatic malignancy  Family: Supportive children, siblings  Performance status: Major  limitations due to pain and disease process  Joys/meaning/strength: Family and [place]  Understanding of health: Demonstrates good prognostic understanding of disease process, understands plan for further workup, awaiting results of bone scan  Information:Wants full disclosure  Goals: symptom control and cancer directed therapy  Worries and fears now and future: ongoing symptoms and inability to receive cancer treatment   Minimum acceptable outcome/QOL:  to be able to care for self, to have adequate pain relief  Code status discussion:  FULL     Advance Directives  Existence of Advance Directives:No - has interest  Decision maker: Surrogate decision maker is 1) daughter Cierra [phone], 2) son Emmanuel [phone] [in that order]  Code Status: Full code  *Pt states she wants to be resuscitated but not to remain on ventilator or \"life support\" for more than 3 days.      Introduction to Supportive and Palliative Oncology:  Spoke with patient at bedside  Introduced the role and philosophy of Supportive and Palliative oncology in the evaluation and management of symptoms during cancer treatment  Palliative care was introduced as a " service for patients with serious illness to help with symptoms, assist with goals of care conversations, navigate complex decision making, improve quality of life for patients, and provide support both patients and families.  Patient seemed to appreciate the extra layer of support.      Supportive Interventions: Interventions: Music Therapy: offered     Disposition:  Please start the process of having prior authorization with meds to beds deliver medications to patient prior to discharge via [facility] pharmacy. Prescriptions will need to be sent 48-72 hours prior to discharge so that a prior authorization can be completed.      Discharge date: unknown pending acute issues, pain control, and symptom control  Will assess if patient needs an appointment with Outpatient Supportive Oncology as appropriate     Signature and billing:  Thank you for allowing us to participate in the care of this patient. Recommendations will be communicated back to the consulting service by way of shared electronic medical record or face-to-face.    Medical complexity was high level due to due to complexity of problems, extensive data review, and high risk of management/treatment.    I spent 50 minutes in the care of this patient which included chart review, interviewing patient/family, discussion with primary team, coordination of care, and documentation.    Data:   Diagnostic tests and information reviewed for today's visit:  Conversation with primary team, Most recent labs and imaging results, Medications     Some elements copied from my note on 2/16/24, the elements have been updated and all reflect current decision making from today, 02/20/24     Plan of Care discussed with: Provider, RN, Patient    Thank you for asking Supportive and Palliative Oncology to assist with care of this patient.  We will continue to follow  Please contact us for additional questions or concerns.      SIGNATURE: Nilda Millan, APRN-CNP    PAGER/CONTACT:  Contact information:  Supportive and Palliative Oncology  Monday-Friday 8 AM-5 PM  Epic Secure chat or pager [phone].  After hours and weekends:  pager [phone]

## 2024-02-20 NOTE — PROGRESS NOTES
"Meghan Waddell is a 68 y.o. female on day 17 of admission presenting with Cirrhosis (CMS/HCC).    Subjective   Meghan Waddell is a 68 y.o. female on day 17 of admission presenting with Cirrhosis (CMS/HCC).    Subjective   No acute events overnight. This morning patient feels tired still, no new complaints. Is optimistic about getting better. Still having mild abdominal distension.     Objective   Physical Exam  Constitutional:       General: She is not in acute distress.     Appearance: Normal appearance. She is not toxic-appearing.   HENT:      Mouth/Throat:      Mouth: Mucous membranes are moist.   Eyes:      General: Scleral icterus present.      Extraocular Movements: Extraocular movements intact.      Pupils: Pupils are equal, round, and reactive to light.   Cardiovascular:      Rate and Rhythm: Normal rate and regular rhythm.      Heart sounds: No murmur heard.     No gallop.   Pulmonary:      Effort: Pulmonary effort is normal. No respiratory distress.      Breath sounds: No wheezing.   Chest:   Breasts:     Left: Inverted nipple, mass, nipple discharge and skin change present.      Comments: L breast with crusted draining lesion without purulence, fullness of the L lateral breast noted, nipple inversion with black scabbing  Abdominal:      General: Abdomen is distended.      Palpations: Abdomen is soft.      Tenderness: There is abdominal tenderness (tenderness to palpation in bilateral lower quadrants).   Musculoskeletal:         General: No swelling.      Right lower le+ edema      Left lower le+ edema  Skin:     General: Skin is warm and dry.      Coloration: Skin is jaundiced.   Neurological:      General: No focal deficit present.      Mental Status: She is alert.        Last Recorded Vitals  Blood pressure 108/66, pulse 62, temperature 36.6 °C (97.9 °F), temperature source Temporal, resp. rate 16, height 1.695 m (5' 6.73\"), weight 90.5 kg (199 lb 8.3 oz), SpO2 95 %.  Intake/Output last 3 " Shifts:  No intake/output data recorded.    Relevant Results  Results for orders placed or performed during the hospital encounter of 02/03/24 (from the past 24 hour(s))   CBC and Auto Differential   Result Value Ref Range    WBC 13.5 (H) 4.4 - 11.3 x10*3/uL    nRBC 0.1 (H) 0.0 - 0.0 /100 WBCs    RBC 4.14 4.00 - 5.20 x10*6/uL    Hemoglobin 12.7 12.0 - 16.0 g/dL    Hematocrit 35.0 (L) 36.0 - 46.0 %    MCV 85 80 - 100 fL    MCH 30.7 26.0 - 34.0 pg    MCHC 36.3 (H) 32.0 - 36.0 g/dL    RDW 23.0 (H) 11.5 - 14.5 %    Platelets 204 150 - 450 x10*3/uL    Neutrophils % 85.0 40.0 - 80.0 %    Immature Granulocytes %, Automated 0.7 0.0 - 0.9 %    Lymphocytes % 7.1 13.0 - 44.0 %    Monocytes % 6.7 2.0 - 10.0 %    Eosinophils % 0.3 0.0 - 6.0 %    Basophils % 0.2 0.0 - 2.0 %    Neutrophils Absolute 11.50 (H) 1.20 - 7.70 x10*3/uL    Immature Granulocytes Absolute, Automated 0.10 0.00 - 0.70 x10*3/uL    Lymphocytes Absolute 0.96 (L) 1.20 - 4.80 x10*3/uL    Monocytes Absolute 0.90 0.10 - 1.00 x10*3/uL    Eosinophils Absolute 0.04 0.00 - 0.70 x10*3/uL    Basophils Absolute 0.03 0.00 - 0.10 x10*3/uL   Comprehensive metabolic panel   Result Value Ref Range    Glucose 107 (H) 74 - 99 mg/dL    Sodium 132 (L) 136 - 145 mmol/L    Potassium 4.9 3.5 - 5.3 mmol/L    Chloride 104 98 - 107 mmol/L    Bicarbonate 20 (L) 21 - 32 mmol/L    Anion Gap 13 10 - 20 mmol/L    Urea Nitrogen 51 (H) 6 - 23 mg/dL    Creatinine 1.49 (H) 0.50 - 1.05 mg/dL    eGFR 38 (L) >60 mL/min/1.73m*2    Calcium 8.7 8.6 - 10.6 mg/dL    Albumin 2.1 (L) 3.4 - 5.0 g/dL    Alkaline Phosphatase 198 (H) 33 - 136 U/L    Total Protein 6.0 (L) 6.4 - 8.2 g/dL    AST 92 (H) 9 - 39 U/L    Bilirubin, Total 15.5 (H) 0.0 - 1.2 mg/dL    ALT 51 (H) 7 - 45 U/L   Magnesium   Result Value Ref Range    Magnesium 2.81 (H) 1.60 - 2.40 mg/dL   Protime-INR   Result Value Ref Range    Protime 17.1 (H) 9.8 - 12.8 seconds    INR 1.5 (H) 0.9 - 1.1   Uric Acid   Result Value Ref Range    Uric Acid 3.8  2.3 - 6.7 mg/dL   Lactate dehydrogenase   Result Value Ref Range     (H) 84 - 246 U/L   Phosphorus   Result Value Ref Range    Phosphorus 3.3 2.5 - 4.9 mg/dL   Morphology   Result Value Ref Range    RBC Morphology See Below     Polychromasia Mild     Target Cells Few     [place] Cells Few                   Assessment/Plan   Principal Problem:    Cirrhosis (CMS/HCC)    Ms. Waddell is 67 yo F history of Seropositive RA (on infliximab), osteoarthritis, nephrolithiasis, obesity, DLD, PACs, history of tobacco use (quit march 2023), who presented to [place] ED on 2/1 for dark red urine, generalized abdominal pain with nausea transferred to [facility] for further workup of her new cirrhosis and jaundice. Since admission, hepatology and oncology have been consulted with recommendations for workup and further management. Per biopsy results, patient has breast cancer with metastasis to the liver and likely metastasis to her brain. Hepatology suspects that new cirrhosis is related to infiltrative malignancy although slight possibility of autoimmune component given positive anti-smooth muscle antibodies. Patient now s/p prednisone 30 mg 5 day trial without improvement in LFTs. Final breast cancer pathology shows invasive ductal carcinoma grade 3, ER-/AK-/HER2+. Started on chemotherapy and targeted therapy on 2/14 and tolerated this well. TTE done prior to initiation of treatment showed EF 70% and mild impaired diastolic relaxation. Started on allopurinol for TLS prophylaxis. NM bone scan completed shows no osseous metastatic disease. Will continue to monitor her closely for signs of severe hepatic dysfunction and TLS. Worsening SYLVIA may be hepatorenal, will consider albumin if Cr worsens again. Increased lactulose to BID. Patient now experiencing significant fatigue likely secondary to chemotherapy as well as some increased swelling of her lower extremities and abdomen. LFTs stable for now.     2/20 updates:  - Patient  will see Dr. Stovall at [facility] for follow up and for chemotherapy  - US liver pending, patient NPO right now and will go today  - LFTs downtrending, K 5.5  - LFTs fairly stable, AlkPhos 218>229>246>192>198, >198>179>129>92, Bili 11.3>13.6>13.4>15.3>15.5, and ALT 57>71>67>62>51  - Lasix and aldactone still held iso SYLVIA. Will restart once SYLVIA has resolved, Cr stable, discontinuing fluids today due to signs of volume overload exam  - Increased to dexamethasone 4 mg BID (from daily) for appetite and energy stimulation     #new liver cirrhosis  #Cholelithiasis  #mixed hepatocellular and cholestatic liver injury   #mild confusion possible grade 1 hepatic encephalopahthy  #liver metastasis (breast primary)  :: hepC negative, hepB negative, hepA negative, T spot negative in 11/2023  :: alpha 1 antitrypsin negative  :: ferritin 406 H/transferrin 136 L  :: RUQ as above in assessment  :: ammonia 79  - autoimmune workup for the most part negative. Anti-SM positive 1:20, could be related to autoimmune hepatitis; however, still suspect malignancy to be driving variable  - liver biopsy significant for poorly differentiated breast cancer  - diagnostic paracentesis unremarkable, no malignant cells noted  - lactulose 20 g BID, lasix 20 mg and aldactone 50 mg currently held iso SYLVIA  - trialing prednisone 30 mg to see if it may help with possible autoimmune component per hepatology (2/13 - 2/17), can start rifaxamin 550 mg BID if pt develops HE  - Hep B surface AG negative, Hep B surface AB positive at 580.6     #nonhealing, draining, L breast wound  #primary breast cancer with liver met and likely brain mets  :: MRI brain showing small enhancing lesions in the cerebellum without mass effect with c/f metastatic disease, potential 4 mm MRA bifurcation aneurysm  :: TTE shows EF 70% and impaired diastolic relaxation  :: NM bone scan without osseous metastatic disease  - plan for outpatient MRA   - inpatient mammogram significant for  masses and suspicious lymphadenopathy  - s/p biopsies, final pathology shows invasive ductal carcinoma grade 3, ER-/WV-/HER2+  - initial pathology showed ER+ so patient was started on anastrazole 1 mg, but this read was modified to ER- so anastrozole was stopped on 2/13  - per oncology: plan for inpatient carboplatin (AUC 4) and trastuzumab (loading dose 8 mg/kg) completed on 2/14 with patient tolerating this well  - Daily CMP, LDH, and uric acid to monitor for tumor lysis syndrome  - Continue allopurinol 300 mg daily for TLS prophylaxis  - G6PD testing negative  - Consult supportive onc: olanzapine 5 mg qhs  - EKG with Qtc 467    #SYLVIA  #Pre-renal based on FENa, possibly hepatorenal   - Cr 1.71 now 1.42, baseline Cr normal   - FENa 0.1-0.2% indicating pre-renal etiology  - Holding lasix 20 mg and aldactone 50 mg   - Discontinue IV fluids at 50 ml/hr given signs of volume overload  - Consider albumin and nephrology consult if worsening     #RA - on infliximab  #osteoarthritis  :: hold home naproxen   - voltaren gel  - tylenol 650 mg q8 prn  - oxycodone 5 mg q4 prn  - outpatient rheum aware to discontinue infliximab     #subacute avulsion fracture lesser trochanter R proximal femur  - chronic?  - consult orthopedic surgery to assess subacute avulsion fracture of R femur found on CT on 2/1: nothing to do, patient can follow-up outpatient, WBAT  - NM bone scan without osseous metastatic disease     #compression deformity at T12  -vitamin D and calcium     #abdominal aorta atherosclerosis on CT  #smoking history   - follow up with PCP outpatient to discuss potential statin/asa     F: none  E: PRN   N: regular diet  GI: none  P: lovenox  A: PIV  C: full code confirmed on admission   Surrogate:   Cierra her daughter [phone] (first option)  Emmanuel her son [phone]    Aidan Uriarte MD

## 2024-02-20 NOTE — PROGRESS NOTES
Meghan Waddell is a 68 y.o. female on day 17 of admission presenting with Cirrhosis (CMS/HCC).    2/16- [facility] met with patient to discuss home health care options as patient refused SNF. Patient requested TCC to contact her daughter Linda. TCC contacted Linda with home care choices 1. [facility] 2. [facility]. Referral in [facility] for [facility] who was unable to accept patient. Internal home care order needed for [facility]. [facility] will continue to follow patient for discharge needs.  Meli Dixon RN [facility]    2/20/24 @ 1150  [facility] referral placed for PT/OT, RN. [facility] was made aware of patient's anticipated date of discharge. Will continue to follow patient for any additional needs.  Tanesha Pandya RN [facility]

## 2024-02-21 NOTE — PROGRESS NOTES
Meghan Waddell is a 68 y.o. female on day 18 of admission presenting with Cirrhosis (CMS/HCC).    2/16- TCC met with patient to discuss home health care options as patient refused SNF. Patient requested TCC to contact her daughter Linda. TCC contacted Linda with home care choices 1. [facility] 2. [facility]. Referral in [facility] for [facility] who was unable to accept patient. Internal home care order needed for [facility]. TCC will continue to follow patient for discharge needs.  Meli Dixon RN TCC    2/20/24 @ 1150  [facility] referral placed for PT/OT, RN. [facility] was made aware of patient's anticipated date of discharge. Will continue to follow patient for any additional needs.  Tanesha Pandya RN TCC    2/22/24@12:55   SW spoke with the patient briefly this morning about recommendation for SNF but she was very tired and requested that SW call her daughter. SW spoke with pt.'s daughter at length and related that PT shared the patient is very weak and would not be able to do the stairs. SW shared that  [facility] has a Swing bed and they would be able to provide short term rehab but the daughter declined. According to the pt.'s daughter she believes her mother will do mush better emotionally and physically if she returns home. Pt.'s daughter shared that the patient will have 24 hour care and they have many family members that will provide assistance. The medical team has been updated.  Referral has been made for home care and pt's daughter requesting a hospital bed and a wheelchair and TCC has been updated.   MARGUERITE Tovar

## 2024-02-21 NOTE — CARE PLAN
The patient's goals for the shift include Pain management    The clinical goals for the shift include pt will remain injury free      Problem: Pain  Goal: My pain/discomfort is manageable  Outcome: Progressing     Problem: Safety  Goal: Patient will be injury free during hospitalization  Outcome: Progressing  Goal: I will remain free of falls  Outcome: Progressing     Problem: Daily Care  Goal: Daily care needs are met  Outcome: Progressing     Problem: Psychosocial Needs  Goal: Demonstrates ability to cope with hospitalization/illness  Outcome: Progressing  Goal: Collaborate with me, my family, and caregiver to identify my specific goals  Outcome: Progressing     Problem: Discharge Barriers  Goal: My discharge needs are met  Outcome: Progressing     Problem: Safety - Adult  Goal: Free from fall injury  Outcome: Progressing     Problem: Discharge Planning  Goal: Discharge to home or other facility with appropriate resources  Outcome: Progressing     Problem: Chronic Conditions and Co-morbidities  Goal: Patient's chronic conditions and co-morbidity symptoms are monitored and maintained or improved  Outcome: Progressing     Problem: Pain  Goal: Takes deep breaths with improved pain control throughout the shift  Outcome: Progressing  Goal: Turns in bed with improved pain control throughout the shift  Outcome: Progressing  Goal: Walks with improved pain control throughout the shift  Outcome: Progressing  Goal: Performs ADL's with improved pain control throughout shift  Outcome: Progressing  Goal: Participates in PT with improved pain control throughout the shift  Outcome: Progressing  Goal: Free from opioid side effects throughout the shift  Outcome: Progressing  Goal: Free from acute confusion related to pain meds throughout the shift  Outcome: Progressing     Problem: Skin  Goal: Decreased wound size/increased tissue granulation at next dressing change  Outcome: Progressing  Flowsheets (Taken 2/20/2024  1914)  Decreased wound size/increased tissue granulation at next dressing change: Promote sleep for wound healing  Goal: Participates in plan/prevention/treatment measures  Outcome: Progressing  Flowsheets (Taken 2/20/2024 1914)  Participates in plan/prevention/treatment measures: Elevate heels  Goal: Prevent/manage excess moisture  Outcome: Progressing  Flowsheets (Taken 2/20/2024 1914)  Prevent/manage excess moisture: Moisturize dry skin  Goal: Prevent/minimize sheer/friction injuries  Outcome: Progressing  Flowsheets (Taken 2/20/2024 1914)  Prevent/minimize sheer/friction injuries: Increase activity/out of bed for meals  Goal: Promote/optimize nutrition  Outcome: Progressing  Flowsheets (Taken 2/20/2024 1914)  Promote/optimize nutrition: Offer water/supplements/favorite foods  Goal: Promote skin healing  Outcome: Progressing  Flowsheets (Taken 2/20/2024 1914)  Promote skin healing: Protective dressings over bony prominences     Problem: Fall/Injury  Goal: Not fall by end of shift  Outcome: Progressing  Goal: Be free from injury by end of the shift  Outcome: Progressing  Goal: Verbalize understanding of personal risk factors for fall in the hospital  Outcome: Progressing  Goal: Verbalize understanding of risk factor reduction measures to prevent injury from fall in the home  Outcome: Progressing  Goal: Use assistive devices by end of the shift  Outcome: Progressing  Goal: Pace activities to prevent fatigue by end of the shift  Outcome: Progressing

## 2024-02-21 NOTE — PROGRESS NOTES
Physical Therapy    Physical Therapy Treatment    Patient Name: Meghan Waddell  MRN: [id number]  Today's Date: 2/21/2024  Time Calculation  Start Time: 1620  Stop Time: 1647  Time Calculation (min): 27 min       Assessment/Plan   PT Assessment  PT Assessment Results: Decreased strength, Decreased endurance, Impaired balance, Decreased mobility, Decreased safety awareness, Pain  Rehab Prognosis: Fair  Evaluation/Treatment Tolerance: Patient limited by fatigue, Patient limited by pain  Medical Staff Made Aware: Yes  Strengths: Support of Caregivers  Barriers to Participation:  (pain, weakness, does not want to mobilize much)  End of Session Communication: Bedside nurse  Assessment Comment: 69 yo female  with breast CA (mets to liver and brain), liver cirrhosis, Left breast wound, subacute Right lesser trochanter avulsion fx, T12 compression fx and RA presents with overall fatigue and significant Right hip pain limiting mobilization. Recommend moderate intensity therapy as pt needs PT & OT, is not at baseline and has complext medical and rehab needs. If family decides to take her home instead, recommend 24/7 assist, low intensity home therapy and equipment (rental ramp, wheelchair, commode, hospital bed, wheeled walker, shower chair).  End of Session Patient Position: Up in chair, Alarm off, not on at start of session (Rn went to put alarm on, family present)  PT Plan  Inpatient/Swing Bed or Outpatient: Inpatient  PT Plan  Treatment/Interventions: Bed mobility, Transfer training, Gait training, Balance training, Neuromuscular re-education, Strengthening, Endurance training, Therapeutic exercise, Therapeutic activity, Home exercise program, Postural re-education  PT Plan: Skilled PT  PT Frequency: 3 times per week  PT Discharge Recommendations: Moderate intensity level of continued care  Equipment Recommended upon Discharge:  (if they decide to take her home she needs: rental ramp, WC, commode, hospital bed, wheeled  walker, shower chair)  PT Recommended Transfer Status: Assist x2, Assistive device (WW min/mod A)  PT - OK to Discharge: Yes (PT evaluation complete and DC recs made)      General Visit Information:   PT  Visit  PT Received On: 02/21/24  General  Reason for Referral: Presents to OSH 2/1 abd pain, nausea, melena,cirrhosis, jaundice; Dx: primary breast adenocarcinoma with liver mets (possible brain mets), confusion, liver cirrhosis, Left breast wound, subacute avulsion fx Lesser trochanter Right proximal femur, compression deformity T12  Past Medical History Relevant to Rehab: OA, nephrolithiasis, obesity, DLD, Seropositive RA (on infliximab), osteoarthritis, nephrolithiasis, obesity, DLD, PACs, history of tobacco use (quit march 2023); fracture of R lesser trochanter  Missed Visit: No  Family/Caregiver Present: Yes  Caregiver Feedback: Sister and son present and engaged  Co-Treatment:  (N/A)  Prior to Session Communication: Bedside nurse  Patient Position Received: Up in chair, Alarm off, not on at start of session (RN going in room to check alarm, family present)  Preferred Learning Style: verbal, visual  General Comment: Pt supine alert, willing to participate a little with therapy with a lot of encouragement but limited by overall fatigue and Right hip pain;didn't want to walk but willin to get up in chair for dinner; discussed DC equipment needs with family.    Subjective   Precautions:  Precautions  Medical Precautions: Fall precautions (RA, T12 compression deformity, Left breast wound, Right LE WBAT (avulsion fx))  Vital Signs:  Vital Signs  Heart Rate:  (sitting after transfer to chair: HR 81  O2 96%)    Objective   Pain:  Pain Assessment  Pain Assessment: 0-10  Pain Score:  (pain bilateral lower abdominal region (not rated), intermittent right hip pain with movement (not rated))  Pain Interventions: Repositioned, Ambulation/increased activity, Rest  Response to Interventions: comfortable sitting up in  chair  Cognition:  Cognition  Overall Cognitive Status: Impaired  Arousal/Alertness: Delayed responses to stimuli  Orientation Level:  (oriented to hospital, general situation, place, month, year (not day/date))  Following Commands:  (follows 100% of simple 1 step commands with repetition)  Safety Judgment: Decreased awareness of need for assistance  Problem Solving: Assistance required to identify errors made  Cognition Comments: alert, agitated at times  Problem Solving: Exceptions to WFL  Safety/Judgement: Exceptions to WFL  Complex Functional Tasks: Moderate  Impulsive: Mildly  Processing Speed: Delayed  Postural Control:  Postural Control  Postural Control: Impaired  Trunk Control: in standing due to Right LE pain, weakness overall  Posture Comment: flexed upper trunk/waist in supported standing with walker  Static Sitting Balance  Static Sitting-Balance Support: Feet supported, Bilateral upper extremity supported  Static Sitting-Level of Assistance: Contact guard  Static Standing Balance  Static Standing-Balance Support: Bilateral upper extremity supported (on WW)  Static Standing-Level of Assistance: Minimum assistance  Static Standing-Comment/Number of Minutes: cues for safety  Dynamic Standing Balance  Dynamic Standing-Balance Support: Bilateral upper extremity supported (on WW)  Dynamic Standing-Balance:  (transfer bed to chair)  Dynamic Standing-Comments: min A, cues for safety/sequencing    Activity Tolerance:  Activity Tolerance  Endurance: Tolerates 10 - 20 min exercise with multiple rests (pt did not want to mobilize more than that timeframe)  Treatments:  Therapeutic Activity  Therapeutic Activity Performed: Yes  Therapeutic Activity 1: bed mobility training as noted, assist with Right LE  Therapeutic Activity 2: transfer training as noted    Bed Mobility  Bed Mobility: Yes  Bed Mobility 1  Bed Mobility 1: Supine to sitting  Level of Assistance 1: Moderate assistance, Moderate verbal cues, Moderate  tactile cues (assist with Right LE (pain))  Bed Mobility Comments 1: HOB elevatd 70 degrees, use of rail    Ambulation/Gait Training  Ambulation/Gait Training Performed: No (pt in too much pain to walk, didn't want to/fatigued)  Transfers  Transfer: Yes  Transfer 1  Transfer From 1: Sit to, Stand to  Transfer to 1: Sit, Stand  Technique 1: Sit to stand, Stand to sit  Transfer Device 1: Walker  Transfer Level of Assistance 1: Moderate assistance, Minimal verbal cues, Minimal tactile cues (cues for safety/sequencing)  Transfers 2  Transfer From 2: Stand to  Transfer to 2: Chair with arms  Technique 2: Stand pivot  Transfer Device 2: Walker  Transfer Level of Assistance 2: Minimum assistance, Moderate verbal cues, Minimal tactile cues  Trials/Comments 2: cues for safety/sequencing    Stairs  Stairs: No    Other Activity  Other Activity Performed: Yes  Other Activity 1: gave pt waterless shampoo cap and assisted with it    Outcome Measures:  [facility] Basic Mobility  Turning from your back to your side while in a flat bed without using bedrails: A lot  Moving from lying on your back to sitting on the side of a flat bed without using bedrails: A lot  Moving to and from bed to chair (including a wheelchair): A little  Standing up from a chair using your arms (e.g. wheelchair or bedside chair): A lot  To walk in hospital room: A lot  Climbing 3-5 steps with railing: Total  Basic Mobility - Total Score: 12    Education Documentation  Mobility Training, taught by Poonam Robert PT at 2/21/2024  5:05 PM.  Learner: Family, Patient  Readiness: Acceptance  Method: Explanation, Demonstration  Response: Needs Reinforcement, Demonstrated Understanding, Verbalizes Understanding  Comment: PT purpose/POC, benefits of mobilizing, need for equipment (ramp, WC, hospital bed, commode, walker, shower chair) upon DC    Education Comments  No comments found.          Encounter Problems       Encounter Problems (Active)       Mobility       Pt  will ambulate >/=200 ft with supervision and use of LRAD (Not met)       Start:  02/06/24    Expected End:  02/20/24    Resolved:  02/19/24    Updated to: ambulate 50' with WW and CGA WBAT Right LE, no LOB, pain <2/10 Right LE    Update reason: time for update         Pt will safely navigate >/=14 steps with use of 1 hand rail and supervision (Not met)       Start:  02/06/24    Expected End:  02/20/24    Resolved:  02/19/24    Updated to: supervision with LE HEP sitting and supine    Update reason: time for update         Pt will complete all bed mobility independently with use of bed rails for support as needed  (Not met)       Start:  02/06/24    Expected End:  02/20/24    Resolved:  02/19/24    Updated to: supine to/from sit (HOB elevated 45 degrees, use of rail) with SBA (in/out on Right side)    Update reason: time for update         ambulate 50' with WW and CGA WBAT Right LE, no LOB, pain <2/10 Right LE (Not Progressing)       Start:  02/19/24    Expected End:  03/06/24                supervision with LE HEP sitting and supine (Progressing)       Start:  02/19/24    Expected End:  03/06/24                supine to/from sit (HOB elevated 45 degrees, use of rail) with SBA (in/out on Right side) (Progressing)       Start:  02/19/24    Expected End:  03/06/24                   Transfers       Pt will perform STS independently with use of LRAD  (Not met)       Start:  02/06/24    Expected End:  02/20/24    Resolved:  02/19/24    Updated to: sit to/from stand, bed to/from chair or commode with WW (WBAT Right LE) with SBA safely, no additional cues needed for hand placement    Update reason: time for update         Pt will demonstrate appropriate use of LRAD when complete all transfers with no cues  (Not met)       Start:  02/06/24    Expected End:  02/20/24    Resolved:  02/19/24    Updated to: static sit EOB doing LE ex >10 min with supervision, pain Right hip <2    Update reason: time for update         sit  to/from stand, bed to/from chair or commode with WW (WBAT Right LE) with SBA safely, no additional cues needed for hand placement (Progressing)       Start:  02/19/24    Expected End:  03/06/24                static sit EOB doing LE ex >10 min with supervision, pain Right hip <2 (Progressing)       Start:  02/19/24    Expected End:  03/06/24

## 2024-02-21 NOTE — CARE PLAN
The patient's goals for the shift include   Problem: Pain  Goal: My pain/discomfort is manageable  Outcome: Progressing     Problem: Safety  Goal: Patient will be injury free during hospitalization  Outcome: Progressing  Goal: I will remain free of falls  Outcome: Progressing     Problem: Daily Care  Goal: Daily care needs are met  Outcome: Progressing     Problem: Psychosocial Needs  Goal: Demonstrates ability to cope with hospitalization/illness  Outcome: Progressing  Goal: Collaborate with me, my family, and caregiver to identify my specific goals  Outcome: Progressing     Problem: Discharge Barriers  Goal: My discharge needs are met  Outcome: Progressing     Problem: Safety - Adult  Goal: Free from fall injury  Outcome: Progressing     Problem: Discharge Planning  Goal: Discharge to home or other facility with appropriate resources  Outcome: Progressing     Problem: Chronic Conditions and Co-morbidities  Goal: Patient's chronic conditions and co-morbidity symptoms are monitored and maintained or improved  Outcome: Progressing     Problem: Pain  Goal: Takes deep breaths with improved pain control throughout the shift  Outcome: Progressing  Goal: Turns in bed with improved pain control throughout the shift  Outcome: Progressing  Goal: Walks with improved pain control throughout the shift  Outcome: Progressing  Goal: Performs ADL's with improved pain control throughout shift  Outcome: Progressing  Goal: Participates in PT with improved pain control throughout the shift  Outcome: Progressing  Goal: Free from opioid side effects throughout the shift  Outcome: Progressing  Goal: Free from acute confusion related to pain meds throughout the shift  Outcome: Progressing     Problem: Skin  Goal: Decreased wound size/increased tissue granulation at next dressing change  Outcome: Progressing  Flowsheets (Taken 2/21/2024 0952)  Decreased wound size/increased tissue granulation at next dressing change: Promote sleep for wound  healing  Goal: Participates in plan/prevention/treatment measures  Outcome: Progressing  Flowsheets (Taken 2/21/2024 0952)  Participates in plan/prevention/treatment measures: Elevate heels  Goal: Prevent/manage excess moisture  Outcome: Progressing  Flowsheets (Taken 2/21/2024 0952)  Prevent/manage excess moisture:   Moisturize dry skin   Cleanse incontinence/protect with barrier cream  Goal: Prevent/minimize sheer/friction injuries  Outcome: Progressing  Flowsheets (Taken 2/21/2024 0952)  Prevent/minimize sheer/friction injuries:   Increase activity/out of bed for meals   Turn/reposition every 2 hours/use positioning/transfer devices  Goal: Promote/optimize nutrition  Outcome: Progressing  Flowsheets (Taken 2/21/2024 0952)  Promote/optimize nutrition: Offer water/supplements/favorite foods  Goal: Promote skin healing  Outcome: Progressing  Flowsheets (Taken 2/21/2024 0952)  Promote skin healing:   Assess skin/pad under line(s)/device(s)   Protective dressings over bony prominences     Problem: Fall/Injury  Goal: Not fall by end of shift  Outcome: Progressing  Goal: Be free from injury by end of the shift  Outcome: Progressing  Goal: Verbalize understanding of personal risk factors for fall in the hospital  Outcome: Progressing  Goal: Verbalize understanding of risk factor reduction measures to prevent injury from fall in the home  Outcome: Progressing  Goal: Use assistive devices by end of the shift  Outcome: Progressing  Goal: Pace activities to prevent fatigue by end of the shift  Outcome: Progressing

## 2024-02-21 NOTE — PROGRESS NOTES
Meghan Waddell is a 68 y.o. female on day 18 of admission presenting with Cirrhosis (CMS/HCC).    Subjective   Meghan Waddell is a 68 y.o. female on day 18 of admission presenting with Cirrhosis (CMS/HCC).    Subjective   No acute events overnight. This morning patient feels tired still, no new complaints. Still having mild abdominal distension, no changes. Discussed with patient that we will talk with Dr. Stovall regarding her care, and will address going home with home health vs SNF.     Objective   Physical Exam  Constitutional:       General: She is not in acute distress.     Appearance: Normal appearance. She is not toxic-appearing.   HENT:      Mouth/Throat:      Mouth: Mucous membranes are moist.   Eyes:      General: Scleral icterus present.      Extraocular Movements: Extraocular movements intact.      Pupils: Pupils are equal, round, and reactive to light.   Cardiovascular:      Rate and Rhythm: Normal rate and regular rhythm.      Heart sounds: No murmur heard.     No gallop.   Pulmonary:      Effort: Pulmonary effort is normal. No respiratory distress.      Breath sounds: No wheezing.   Chest:   Breasts:     Left: Inverted nipple, mass, nipple discharge and skin change present.      Comments: L breast with crusted draining lesion without purulence, fullness of the L lateral breast noted, nipple inversion with black scabbing  Abdominal:      General: Abdomen is distended.      Palpations: Abdomen is soft.      Tenderness: There is abdominal tenderness (tenderness to palpation in bilateral lower quadrants).   Musculoskeletal:         General: No swelling.      Right lower le+ edema      Left lower le+ edema  Skin:     General: Skin is warm and dry.      Coloration: Skin is jaundiced.   Neurological:      General: No focal deficit present.      Mental Status: She is alert.        Last Recorded Vitals  Blood pressure 129/76, pulse 71, temperature 36.3 °C (97.3 °F), temperature source Temporal, resp.  "rate 16, height 1.695 m (5' 6.73\"), weight 90.5 kg (199 lb 8.3 oz), SpO2 94 %.  Intake/Output last 3 Shifts:  I/O last 3 completed shifts:  In: - (0 mL/kg)   Out: 1225 (13.5 mL/kg) [Urine:1225 (0.4 mL/kg/hr)]  Weight: 90.5 kg     Relevant Results  Results for orders placed or performed during the hospital encounter of 02/03/24 (from the past 24 hour(s))   Renal function panel   Result Value Ref Range    Glucose 108 (H) 74 - 99 mg/dL    Sodium 132 (L) 136 - 145 mmol/L    Potassium 5.3 3.5 - 5.3 mmol/L    Chloride 102 98 - 107 mmol/L    Bicarbonate 23 21 - 32 mmol/L    Anion Gap 12 10 - 20 mmol/L    Urea Nitrogen 56 (H) 6 - 23 mg/dL    Creatinine 1.36 (H) 0.50 - 1.05 mg/dL    eGFR 43 (L) >60 mL/min/1.73m*2    Calcium 8.7 8.6 - 10.6 mg/dL    Phosphorus 3.2 2.5 - 4.9 mg/dL    Albumin 2.1 (L) 3.4 - 5.0 g/dL   CBC and Auto Differential   Result Value Ref Range    WBC 12.3 (H) 4.4 - 11.3 x10*3/uL    nRBC 0.6 (H) 0.0 - 0.0 /100 WBCs    RBC 4.24 4.00 - 5.20 x10*6/uL    Hemoglobin 13.2 12.0 - 16.0 g/dL    Hematocrit 35.5 (L) 36.0 - 46.0 %    MCV 84 80 - 100 fL    MCH 31.1 26.0 - 34.0 pg    MCHC 37.2 (H) 32.0 - 36.0 g/dL    RDW 23.2 (H) 11.5 - 14.5 %    Platelets 189 150 - 450 x10*3/uL    Neutrophils %      Immature Granulocytes %, Automated      Lymphocytes %      Monocytes %      Eosinophils %      Basophils %      Neutrophils Absolute      Lymphocytes Absolute      Monocytes Absolute      Eosinophils Absolute      Basophils Absolute     Comprehensive metabolic panel   Result Value Ref Range    Glucose 97 74 - 99 mg/dL    Sodium 133 (L) 136 - 145 mmol/L    Potassium 5.2 3.5 - 5.3 mmol/L    Chloride 104 98 - 107 mmol/L    Bicarbonate 22 21 - 32 mmol/L    Anion Gap 12 10 - 20 mmol/L    Urea Nitrogen 57 (H) 6 - 23 mg/dL    Creatinine 1.32 (H) 0.50 - 1.05 mg/dL    eGFR 44 (L) >60 mL/min/1.73m*2    Calcium 8.7 8.6 - 10.6 mg/dL    Albumin 2.1 (L) 3.4 - 5.0 g/dL    Alkaline Phosphatase 212 (H) 33 - 136 U/L    Total Protein 6.2 (L) " 6.4 - 8.2 g/dL    AST 95 (H) 9 - 39 U/L    Bilirubin, Total 15.7 (H) 0.0 - 1.2 mg/dL    ALT 48 (H) 7 - 45 U/L   Magnesium   Result Value Ref Range    Magnesium 2.83 (H) 1.60 - 2.40 mg/dL   Protime-INR   Result Value Ref Range    Protime 15.2 (H) 9.8 - 12.8 seconds    INR 1.3 (H) 0.9 - 1.1   Uric Acid   Result Value Ref Range    Uric Acid 3.4 2.3 - 6.7 mg/dL   Lactate dehydrogenase   Result Value Ref Range     (H) 84 - 246 U/L   Phosphorus   Result Value Ref Range    Phosphorus 3.3 2.5 - 4.9 mg/dL                  Assessment/Plan   Principal Problem:    Cirrhosis (CMS/HCC)    Ms. Waddell is 69 yo F history of Seropositive RA (on infliximab), osteoarthritis, nephrolithiasis, obesity, DLD, PACs, history of tobacco use (quit march 2023), who presented to [place] ED on 2/1 for dark red urine, generalized abdominal pain with nausea transferred to [facility] for further workup of her new cirrhosis and jaundice. Since admission, hepatology and oncology have been consulted with recommendations for workup and further management. Per biopsy results, patient has breast cancer with metastasis to the liver and likely metastasis to her brain. Hepatology suspects that new cirrhosis is related to infiltrative malignancy although slight possibility of autoimmune component given positive anti-smooth muscle antibodies. Patient now s/p prednisone 30 mg 5 day trial without improvement in LFTs. Final breast cancer pathology shows invasive ductal carcinoma grade 3, ER-/[place]-/HER2+. Started on chemotherapy and targeted therapy on 2/14 and tolerated this well. TTE done prior to initiation of treatment showed EF 70% and mild impaired diastolic relaxation. Started on allopurinol for TLS prophylaxis. NM bone scan completed shows no osseous metastatic disease. Will continue to monitor her closely for signs of severe hepatic dysfunction and TLS. Worsening SYLVIA may be hepatorenal, will consider albumin if Cr worsens again. Increased  lactulose to BID. Patient now experiencing significant fatigue likely secondary to chemotherapy as well as some increased swelling of her lower extremities and abdomen. LFTs stable for now.     2/21 updates:  - Patient will see Dr. Stovall at [place] for follow up and for chemotherapy  - US liver showed markedly heterogenous hepatic parenchyma with cirrhosis, gallstones (no cholecystitis) and moderate volume perihepatic ascites  - Potential therapeutic paracentesis, will talk more with patient and patient's daughter. Will also discuss home vs SNF and discuss with primary oncologist Dr. Stovall.  - LFTs downtrending overall with day to day minor fluctuations  - S/p one dose of lasix 20mg on 2/20. Urine output 1200 over last day, Cr stable. Can consider another dose today pending urine output. Aldactone still held with K 5.2.  - Continue dexamethasone 4 mg BID for appetite and energy stimulation  - Continue lactulose and rifaxamin for HE  - Resumed lasix 20mg daily     #new liver cirrhosis  #Cholelithiasis  #mixed hepatocellular and cholestatic liver injury   #mild confusion possible grade 1 hepatic encephalopahthy  #liver metastasis (breast primary)  :: hepC negative, hepB negative, hepA negative, T spot negative in 11/2023  :: alpha 1 antitrypsin negative  :: ferritin 406 H/transferrin 136 L  :: RUQ as above in assessment  :: ammonia 79  - autoimmune workup for the most part negative. Anti-SM positive 1:20, could be related to autoimmune hepatitis; however, still suspect malignancy to be driving variable  - liver biopsy significant for poorly differentiated breast cancer  - diagnostic paracentesis unremarkable, no malignant cells noted  - lactulose 20 g BID, lasix 20 mg and aldactone 50 mg currently held iso SYLVIA  - trialing prednisone 30 mg to see if it may help with possible autoimmune component per hepatology (2/13 - 2/17), can start rifaxamin 550 mg BID if pt develops HE  - Hep B surface AG negative, Hep B surface  AB positive at 580.6     #nonhealing, draining, L breast wound  #primary breast cancer with liver met and likely brain mets  :: MRI brain showing small enhancing lesions in the cerebellum without mass effect with c/f metastatic disease, potential 4 mm MRA bifurcation aneurysm  :: TTE shows EF 70% and impaired diastolic relaxation  :: NM bone scan without osseous metastatic disease  - plan for outpatient MRA   - inpatient mammogram significant for masses and suspicious lymphadenopathy  - s/p biopsies, final pathology shows invasive ductal carcinoma grade 3, ER-/[place]-/HER2+  - initial pathology showed ER+ so patient was started on anastrazole 1 mg, but this read was modified to ER- so anastrozole was stopped on 2/13  - per oncology: plan for inpatient carboplatin (AUC 4) and trastuzumab (loading dose 8 mg/kg) completed on 2/14 with patient tolerating this well  - Daily CMP, LDH, and uric acid to monitor for tumor lysis syndrome  - Continue allopurinol 300 mg daily for TLS prophylaxis  - G6PD testing negative  - Consult supportive onc: olanzapine 5 mg qhs  - EKG with Qtc 467    #SYLVIA  #Pre-renal based on FENa, possibly hepatorenal   - Cr 1.71 now 1.42, baseline Cr normal   - FENa 0.1-0.2% indicating pre-renal etiology  - Holding lasix 20 mg and aldactone 50 mg   - Discontinue IV fluids at 50 ml/hr given signs of volume overload  - Consider albumin and nephrology consult if worsening     #RA - on infliximab  #osteoarthritis  :: hold home naproxen   - voltaren gel  - tylenol 650 mg q8 prn  - oxycodone 5 mg q4 prn  - outpatient rheum aware to discontinue infliximab     #subacute avulsion fracture lesser trochanter R proximal femur  - chronic?  - consult orthopedic surgery to assess subacute avulsion fracture of R femur found on CT on 2/1: nothing to do, patient can follow-up outpatient, WBAT  - NM bone scan without osseous metastatic disease     #compression deformity at T12  -vitamin D and calcium     #abdominal aorta  atherosclerosis on CT  #smoking history   - follow up with PCP outpatient to discuss potential statin/asa     F: none  E: PRN   N: regular diet  GI: none  P: lovenox  A: PIV  C: full code confirmed on admission   Surrogate:   Cierra her daughter [phone] (first option)  Emmanuel her son [phone]    Aidan Uriarte MD

## 2024-02-22 NOTE — PROGRESS NOTES
Physical Therapy    Physical Therapy Treatment    Patient Name: Meghan Waddell  MRN: [id number]  Today's Date: 2/22/2024    General Visit Information:      General  Missed Visit: Yes  Missed Visit Reason:  (Conflict of services. Team in speaking with patient. Will re-attempt as able.)

## 2024-02-22 NOTE — PROGRESS NOTES
Meghan Waddell is a 68 y.o. female on day 19 of admission presenting with Cirrhosis (CMS/HCC).    Subjective   Meghan Waddell is a 68 y.o. female on day 19 of admission presenting with Cirrhosis (CMS/HCC).    Subjective   No acute events overnight. This morning patient feels tired still, no new complaints. Still having mild abdominal distension, no changes. Will have discussion today with patient, patient's son, patient's daughter, and providers regarding her future care.     Objective   Physical Exam  Constitutional:       General: She is not in acute distress.     Appearance: Normal appearance. She is not toxic-appearing.   HENT:      Mouth/Throat:      Mouth: Mucous membranes are moist.   Eyes:      General: Scleral icterus present.      Extraocular Movements: Extraocular movements intact.      Pupils: Pupils are equal, round, and reactive to light.   Cardiovascular:      Rate and Rhythm: Normal rate and regular rhythm.      Heart sounds: No murmur heard.     No gallop.   Pulmonary:      Effort: Pulmonary effort is normal. No respiratory distress.      Breath sounds: No wheezing.   Chest:   Breasts:     Left: Inverted nipple, mass, nipple discharge and skin change present.      Comments: L breast with crusted draining lesion without purulence, fullness of the L lateral breast noted, nipple inversion with black scabbing  Abdominal:      General: Abdomen is distended.      Palpations: Abdomen is soft.      Tenderness: There is abdominal tenderness (tenderness to palpation in bilateral lower quadrants).   Musculoskeletal:         General: No swelling.      Right lower le+ edema      Left lower le+ edema  Skin:     General: Skin is warm and dry.      Coloration: Skin is jaundiced.   Neurological:      General: No focal deficit present.      Mental Status: She is alert.        Last Recorded Vitals  Blood pressure 129/77, pulse 85, temperature 36 °C (96.8 °F), temperature source Temporal, resp. rate 18, height  "1.695 m (5' 6.73\"), weight 90.5 kg (199 lb 8.3 oz), SpO2 92 %.  Intake/Output last 3 Shifts:  I/O last 3 completed shifts:  In: - (0 mL/kg)   Out: 1500 (16.6 mL/kg) [Urine:1500 (0.5 mL/kg/hr)]  Weight: 90.5 kg     Relevant Results  Results for orders placed or performed during the hospital encounter of 02/03/24 (from the past 24 hour(s))   CBC and Auto Differential   Result Value Ref Range    WBC 12.1 (H) 4.4 - 11.3 x10*3/uL    nRBC 0.2 (H) 0.0 - 0.0 /100 WBCs    RBC 4.21 4.00 - 5.20 x10*6/uL    Hemoglobin 13.0 12.0 - 16.0 g/dL    Hematocrit 34.6 (L) 36.0 - 46.0 %    MCV 82 80 - 100 fL    MCH 30.9 26.0 - 34.0 pg    MCHC 37.6 (H) 32.0 - 36.0 g/dL    RDW 23.3 (H) 11.5 - 14.5 %    Platelets 158 150 - 450 x10*3/uL    Neutrophils % 85.4 40.0 - 80.0 %    Immature Granulocytes %, Automated 0.9 0.0 - 0.9 %    Lymphocytes % 7.0 13.0 - 44.0 %    Monocytes % 6.2 2.0 - 10.0 %    Eosinophils % 0.0 0.0 - 6.0 %    Basophils % 0.5 0.0 - 2.0 %    Neutrophils Absolute 10.37 (H) 1.20 - 7.70 x10*3/uL    Immature Granulocytes Absolute, Automated 0.11 0.00 - 0.70 x10*3/uL    Lymphocytes Absolute 0.85 (L) 1.20 - 4.80 x10*3/uL    Monocytes Absolute 0.75 0.10 - 1.00 x10*3/uL    Eosinophils Absolute 0.00 0.00 - 0.70 x10*3/uL    Basophils Absolute 0.06 0.00 - 0.10 x10*3/uL   Comprehensive metabolic panel   Result Value Ref Range    Glucose 93 74 - 99 mg/dL    Sodium 135 (L) 136 - 145 mmol/L    Potassium 5.1 3.5 - 5.3 mmol/L    Chloride 104 98 - 107 mmol/L    Bicarbonate 23 21 - 32 mmol/L    Anion Gap 13 10 - 20 mmol/L    Urea Nitrogen 61 (H) 6 - 23 mg/dL    Creatinine 1.37 (H) 0.50 - 1.05 mg/dL    eGFR 42 (L) >60 mL/min/1.73m*2    Calcium 8.5 (L) 8.6 - 10.6 mg/dL    Albumin 2.0 (L) 3.4 - 5.0 g/dL    Alkaline Phosphatase 199 (H) 33 - 136 U/L    Total Protein 6.0 (L) 6.4 - 8.2 g/dL    AST 83 (H) 9 - 39 U/L    Bilirubin, Total 15.8 (H) 0.0 - 1.2 mg/dL    ALT 46 (H) 7 - 45 U/L   Magnesium   Result Value Ref Range    Magnesium 2.85 (H) 1.60 - 2.40 " mg/dL   Protime-INR   Result Value Ref Range    Protime 14.9 (H) 9.8 - 12.8 seconds    INR 1.3 (H) 0.9 - 1.1   Uric Acid   Result Value Ref Range    Uric Acid 3.5 2.3 - 6.7 mg/dL   Lactate dehydrogenase   Result Value Ref Range     (H) 84 - 246 U/L   Phosphorus   Result Value Ref Range    Phosphorus 3.7 2.5 - 4.9 mg/dL   Morphology   Result Value Ref Range    RBC Morphology See Below     Target Cells Few     [place] Cells Many     Acanthocytes Few                   Assessment/Plan   Principal Problem:    Cirrhosis (CMS/HCC)    Ms. Waddell is 67 yo F history of Seropositive RA (on infliximab), osteoarthritis, nephrolithiasis, obesity, DLD, PACs, history of tobacco use (quit march 2023), who presented to [place] ED on 2/1 for dark red urine, generalized abdominal pain with nausea transferred to [facility] for further workup of her new cirrhosis and jaundice. Since admission, hepatology and oncology have been consulted with recommendations for workup and further management. Per biopsy results, patient has breast cancer with metastasis to the liver and likely metastasis to her brain. Hepatology suspects that new cirrhosis is related to infiltrative malignancy although slight possibility of autoimmune component given positive anti-smooth muscle antibodies. Patient now s/p prednisone 30 mg 5 day trial without improvement in LFTs. Final breast cancer pathology shows invasive ductal carcinoma grade 3, ER-/CT-/HER2+. Started on chemotherapy and targeted therapy on 2/14 and tolerated this well. TTE done prior to initiation of treatment showed EF 70% and mild impaired diastolic relaxation. Started on allopurinol for TLS prophylaxis. NM bone scan completed shows no osseous metastatic disease. Will continue to monitor her closely for signs of severe hepatic dysfunction and TLS. Worsening SYLVIA may be hepatorenal, will consider albumin if Cr worsens again. Increased lactulose to BID. Patient now experiencing significant  fatigue likely secondary to chemotherapy as well as some increased swelling of her lower extremities and abdomen. LFTs stable for now.     2/22 updates:  - Meeting today with patient, patient's son, patient's daughter, and providers regarding her future care.   - LFTs downtrending overall with day to day minor fluctuations  - Lasix again yesterday, urine output 1200.  - Continue dexamethasone 4 mg BID for appetite and energy stimulation  - Continue lactulose and rifaxamin for HE  - Continue lasix 20mg daily     #new liver cirrhosis  #Cholelithiasis  #mixed hepatocellular and cholestatic liver injury   #mild confusion possible grade 1 hepatic encephalopahthy  #liver metastasis (breast primary)  :: hepC negative, hepB negative, hepA negative, T spot negative in 11/2023  :: alpha 1 antitrypsin negative  :: ferritin 406 H/transferrin 136 L  :: RUQ as above in assessment  :: ammonia 79  - autoimmune workup for the most part negative. Anti-SM positive 1:20, could be related to autoimmune hepatitis; however, still suspect malignancy to be driving variable  - liver biopsy significant for poorly differentiated breast cancer  - diagnostic paracentesis unremarkable, no malignant cells noted  - lactulose 20 g BID, lasix 20 mg and aldactone 50 mg currently held iso SYLVIA  - trialing prednisone 30 mg to see if it may help with possible autoimmune component per hepatology (2/13 - 2/17), can start rifaxamin 550 mg BID if pt develops HE  - Hep B surface AG negative, Hep B surface AB positive at 580.6     #nonhealing, draining, L breast wound  #primary breast cancer with liver met and likely brain mets  :: MRI brain showing small enhancing lesions in the cerebellum without mass effect with c/f metastatic disease, potential 4 mm MRA bifurcation aneurysm  :: TTE shows EF 70% and impaired diastolic relaxation  :: NM bone scan without osseous metastatic disease  - plan for outpatient MRA   - inpatient mammogram significant for masses and  suspicious lymphadenopathy  - s/p biopsies, final pathology shows invasive ductal carcinoma grade 3, ER-/[place]-/HER2+  - initial pathology showed ER+ so patient was started on anastrazole 1 mg, but this read was modified to ER- so anastrozole was stopped on 2/13  - per oncology: plan for inpatient carboplatin (AUC 4) and trastuzumab (loading dose 8 mg/kg) completed on 2/14 with patient tolerating this well  - Daily CMP, LDH, and uric acid to monitor for tumor lysis syndrome  - Continue allopurinol 300 mg daily for TLS prophylaxis  - G6PD testing negative  - Consult supportive onc: olanzapine 5 mg qhs  - EKG with Qtc 467    #SYLVIA  #Pre-renal based on FENa, possibly hepatorenal   - Cr 1.71 now 1.42, baseline Cr normal   - FENa 0.1-0.2% indicating pre-renal etiology  - Holding lasix 20 mg and aldactone 50 mg   - Discontinue IV fluids at 50 ml/hr given signs of volume overload  - Consider albumin and nephrology consult if worsening     #RA - on infliximab  #osteoarthritis  :: hold home naproxen   - voltaren gel  - tylenol 650 mg q8 prn  - oxycodone 5 mg q4 prn  - outpatient rheum aware to discontinue infliximab     #subacute avulsion fracture lesser trochanter R proximal femur  - chronic?  - consult orthopedic surgery to assess subacute avulsion fracture of R femur found on CT on 2/1: nothing to do, patient can follow-up outpatient, WBAT  - NM bone scan without osseous metastatic disease     #compression deformity at T12  -vitamin D and calcium     #abdominal aorta atherosclerosis on CT  #smoking history   - follow up with PCP outpatient to discuss potential statin/asa     F: none  E: PRN   N: regular diet  GI: none  P: lovenox  A: PIV  C: full code confirmed on admission   Surrogate:   Cierra her daughter [phone] (first option)  Emmanuel her son [phone]    Aidan Uriarte MD

## 2024-02-22 NOTE — CARE PLAN
The patient's goals for the shift include Pain management    The clinical goals for the shift include pt will remain injury free      Problem: Pain  Goal: My pain/discomfort is manageable  Outcome: Progressing     Problem: Safety  Goal: Patient will be injury free during hospitalization  Outcome: Progressing  Goal: I will remain free of falls  Outcome: Progressing     Problem: Daily Care  Goal: Daily care needs are met  Outcome: Progressing     Problem: Psychosocial Needs  Goal: Demonstrates ability to cope with hospitalization/illness  Outcome: Progressing  Goal: Collaborate with me, my family, and caregiver to identify my specific goals  Outcome: Progressing     Problem: Discharge Barriers  Goal: My discharge needs are met  Outcome: Progressing     Problem: Safety - Adult  Goal: Free from fall injury  Outcome: Progressing     Problem: Discharge Planning  Goal: Discharge to home or other facility with appropriate resources  Outcome: Progressing     Problem: Chronic Conditions and Co-morbidities  Goal: Patient's chronic conditions and co-morbidity symptoms are monitored and maintained or improved  Outcome: Progressing     Problem: Pain  Goal: Takes deep breaths with improved pain control throughout the shift  Outcome: Progressing  Goal: Turns in bed with improved pain control throughout the shift  Outcome: Progressing  Goal: Walks with improved pain control throughout the shift  Outcome: Progressing  Goal: Performs ADL's with improved pain control throughout shift  Outcome: Progressing  Goal: Participates in PT with improved pain control throughout the shift  Outcome: Progressing  Goal: Free from opioid side effects throughout the shift  Outcome: Progressing  Goal: Free from acute confusion related to pain meds throughout the shift  Outcome: Progressing     Problem: Skin  Goal: Decreased wound size/increased tissue granulation at next dressing change  Outcome: Progressing  Flowsheets (Taken 2/21/2024  1936)  Decreased wound size/increased tissue granulation at next dressing change: Promote sleep for wound healing  Goal: Participates in plan/prevention/treatment measures  Outcome: Progressing  Flowsheets (Taken 2/21/2024 1936)  Participates in plan/prevention/treatment measures: Discuss with provider PT/OT consult  Goal: Prevent/manage excess moisture  Outcome: Progressing  Flowsheets (Taken 2/21/2024 1936)  Prevent/manage excess moisture: Moisturize dry skin  Goal: Prevent/minimize sheer/friction injuries  Outcome: Progressing  Flowsheets (Taken 2/21/2024 1936)  Prevent/minimize sheer/friction injuries: Increase activity/out of bed for meals  Goal: Promote/optimize nutrition  Outcome: Progressing  Flowsheets (Taken 2/21/2024 1936)  Promote/optimize nutrition: Offer water/supplements/favorite foods  Goal: Promote skin healing  Outcome: Progressing  Flowsheets (Taken 2/21/2024 1936)  Promote skin healing: Protective dressings over bony prominences     Problem: Fall/Injury  Goal: Not fall by end of shift  Outcome: Progressing  Goal: Be free from injury by end of the shift  Outcome: Progressing  Goal: Verbalize understanding of personal risk factors for fall in the hospital  Outcome: Progressing  Goal: Verbalize understanding of risk factor reduction measures to prevent injury from fall in the home  Outcome: Progressing  Goal: Use assistive devices by end of the shift  Outcome: Progressing  Goal: Pace activities to prevent fatigue by end of the shift  Outcome: Progressing

## 2024-02-22 NOTE — PROGRESS NOTES
Music Therapy Note        Therapy Session  Referral Type: New referral this admission  Visit Type: Follow-up visit  Session Start Time: 1434  Session End Time: 1434  Intervention Delivery: In-person  Conflict of Service: Asleep  Family Present for Session: None               Treatment/Interventions       Post-assessment  Total Session Time (min): 0 minutes    Narrative  Assessment Detail: Patient somnolent and no family present at time of MT's attempt. MT did not attempt to wake pt.  Follow-up: MT to reattempt at another time as applicable.    Education Documentation  No documentation found.

## 2024-02-22 NOTE — PROGRESS NOTES
Meghan Waddell is a 68 y.o. female on day 19 of admission presenting with Cirrhosis (CMS/HCC).    2/16- [facility] met with patient to discuss home health care options as patient refused SNF. Patient requested TCC to contact her daughter Linda. TCC contacted Linda with home care choices 1. [facility] 2. [facility]. Referral in [facility] for [facility] who was unable to accept patient. Internal home care order needed for [facility]. TCC will continue to follow patient for discharge needs.  Meli Dixon RN TCC    2/20/24 @ 1150  [facility] referral placed for PT/OT, RN. [facility] was made aware of patient's anticipated date of discharge. Will continue to follow patient for any additional needs.  Tanesha Pandya RN [facility]    2/22/24@12:55   SW spoke with the patient briefly this morning about recommendation for SNF but she was very tired and requested that SW call her daughter. SW spoke with pt.'s daughter at length and related that PT shared the patient is very weak and would not be able to do the stairs. SW shared that [facility] has a Swing bed and they would be able to provide short term rehab but the daughter declined. According to the pt.'s daughter she believes her mother will do mush better emotionally and physically if she returns home. Pt.'s daughter shared that the patient will have 24 hour care and they have many family members that will provide assistance. The medical team has been updated.  Referral has been made for home care and pt's daughter requesting a hospital bed and a wheelchair and TCC has been updated.   MARGUERITE Tovar    2/22/24 @ 1420  Spoke with patient's daughter via phone. They would still like to go home with home care. Told her referral was placed for [facility] for PT/OT, RN. They have previously used [facility] for PT, but state [facility] is fine for now. She already has two walkers, a cane, BSC, shower chair, bedside table, and a wheelchair ramp at home.  Team to place order for a hospital  bed and wheelchair. Will send to [employer] via [employer] when entered. She would like to speak with dietician. SHERIF Bradley made aware and will touch base with the daughter tomorrow.  Tanesha Pandya RN TCC

## 2024-02-23 NOTE — PROGRESS NOTES
"Vancomycin Dosing by Pharmacy- INITIAL    Meghan Waddell is a 68 y.o. year old female who Pharmacy has been consulted for vancomycin dosing for cellulitis, skin and soft tissue. Based on the patient's indication and renal status this patient will be dosed based on a goal trough/random level of 15-20.     Renal function is currently declining.    Visit Vitals  BP 93/64   Pulse 107   Temp 36.2 °C (97.2 °F) (Temporal)   Resp 14        Lab Results   Component Value Date    CREATININE 2.28 (H) 02/23/2024    CREATININE 1.92 (H) 02/23/2024    CREATININE 1.37 (H) 02/22/2024    CREATININE 1.32 (H) 02/21/2024        Patient weight is No results found for: \"PTWEIGHT\"    No results found for: \"CULTURE\"     I/O last 3 completed shifts:  In: - (0 mL/kg)   Out: 1150 (12.7 mL/kg) [Urine:1150 (0.4 mL/kg/hr)]  Weight: 90.5 kg   [unfilled]    Lab Results   Component Value Date    PATIENTTEMP 37.0 02/23/2024          Assessment/Plan     Patient will be given a loading dose of 2000 mg.  Will initiate vancomycin maintenance, dosing off levels.    Follow-up level will be ordered on 2/24 at 1800 unless clinically indicated sooner.  Will continue to monitor renal function daily while on vancomycin and order serum creatinine at least every 48 hours if not already ordered.  Follow for continued vancomycin needs, clinical response, and signs/symptoms of toxicity.       Aundrea Mirza, PharmD       "

## 2024-02-23 NOTE — PROGRESS NOTES
Meghan Waddell is a 68 y.o. female on day 20 of admission presenting with Cirrhosis (CMS/HCC).    Subjective   Meghan Waddell is a 68 y.o. female on day 20 of admission presenting with Cirrhosis (CMS/HCC).    Subjective   Hospital Course  Meghan Waddell is a 68 y.o. female history of Seropositive RA (on infliximab), osteoarthritis, nephrolithiasis, obesity, DLD, PACs, history of tobacco use (quit march 2023), who presented to [place] ED on 2/1 for dark red urine, generalized abdominal pain with nausea. Patient also endorses yellow colored stools with 1 episode of melena the week prior to admission. Additionally, patient presents with at least 1 year of a L breast draining lesion, has not noticed any lumps in her armpits. She has lost 20lb since December, endorsing early satiety and denying night sweats. She was transferred to [facility] on 2/3/2024 for further workup of new cirrhosis and jaundice.     At [place]:  Patient was afebrile and vitally stable, labs with CBC and CMP unremarkable. HFP showed , ALT 60, t bili 6.4 Alk phos 346 albumin 2.9 no direct bili, INR 1.1.  hepB nonimmune, hepA nonreactive, hepC nonreactive,  (ULD is 55), Guaiac positive stool, Lactate 3.0 >1.9, Lipase 53, ethanol <10, Urinalysis small blood, moderate bilirubin, 4+ urobilinogen negative nitrites and leuk esterase, microscopy with few squamous cells, 1+ bacteria, hyaline casts, calcium oxalate crystals, no pyuria or hematuria, Urine culture negative, Wound culture of draining breast lesion: mixed skin organisms. CT AP with contrast performed showing, since last year, interval development of significant parenchymal liver disease with cirrhotic morphology. Additional development of diffuse minimal ascites. Likely subacute avulsion fracture lesser trochanter right proximal femur. Interventions in [place] included: maintenance fluids, naproxen 250mg once, zofran 4mg, potassium 40mEq.    Upon admission to [facility] RUQ with  dopplers performed revealing Liver cirrhosis with diffusely heterogenous hepatic parenchyma, no definite focal lesion identified, although neoplasm can not be excluded. Perihepatic ascites, slow portal venous flow and recanalized paraumbilical vein, likely sequela of portal hypertension. Cholelithiasis without sonographic evidence of acute cholecystitis. Alpha-1 antitrypsin returned negative, feritin 406, transferrin 136, ammonia 79. JOCELYN, ASMA, AMA, ceruloplasmin pending. Patient started on lactulose TID given confusion as noted per patient's daughter. Hepatology was consulted and recommended diagnostic paracentesis, MRI liver/ MRCP, anti-LKM, anti- SLA/LP, anti-liver cytosol antibody, CXR, Bcx, and empiric ceftriaxone.     Patient now s/p two paracenteses (~2 L each time), the first of which was negative for SBP and did not yield any malignant cells (did not run studies on second sample). Hepatology has recommended patient be started on lasix 20 mg PO and spironolactone 50 mg PO to help with ascitic fluid buildup (were held on 2/12/2024 iso SYLVIA). Patient started on lactulose 20g once daily as well. Patient's autoimmune workup was largely negative except for anti-SM antibody being positive 1:20. Although this could be suggestive of autoimmune hepatitis, specificity is not high given the weakness of titer and also Hx of RA. However, per GI, would be worth considering trial of steroids to see if it improves liver function. Prednisone 30 mg started 2/13/2024.    Throughout admission, patient's hepatic function has gradually worsened reflected by rising total bilirubin (12.1 on 2/13) and stable but elevated AST/ALT (53/197 non 2/13). Synthetic function also poor with albumin of 2.2 and INR 1.3. After discussions with oncology and hepatology, it appears that multiple etiologies may be responsible for patient's liver decompensation; however, malignant cells in liver appear to be the culprit. Patient was noted to be ER+ so  anastrazole was started at recommendation of oncology team; however, remainder of tumor markers are still pending. Because of this, final oncology plan is still up in the air. Since inpatient chemotherapy is an option, oncology team requested transfer to primary cancer service on 2/13/2024. [facility].    Final breast cancer pathology shows invasive ductal carcinoma grade 3, ER-/MT-/HER2+. Given ER-, anastrozole stopped. Patient was planned to start inpatient chemotherapy per discussion with Dr. Stovall on 2/14. TTE completed that morning prior to therapy showed EF 70% with impairment of diastolic relaxation. Plan to start carboplatin (AUC 4) and trastuzumab (loading dose 8 mg/kg). Baseline CMP, uric acid, and LDH obtained to monitor for tumor lysis syndrome. Repeat Hep B testing showed elevated antibody and non-reactive antigen. Patient tolerated chemotherapy well without nausea or vomiting. NM bone scan completed on 2/15 did not show any osseous metastatic disease. Patient cleared by ortho to WBAT. Supportive oncology consulted for pain management of hip and abdominal pain as well as social support with recommendations for olanzapine for sleep and appetite stimulation and continuing prn oxycodone for pain. Hepatology signed-off; pt had little improvement in LFTs on prednisone, 5 day course completed with the idea that her hepatic impairment was likely not autoimmune in nature. Lactulose was up titrated to BID as patient experienced intermittent confusion on 2/15 and 2/16. Obtained BID CMP, LDH, and uric acid to continue to monitor for TLS but these remained stable or improved. Allopurinol was discontinued for TLS. Patient began to experience significant fatigue on 12/17 likely a delayed response from chemotherapy. PT worked with her and recommended SNF but daughter and pt declined but are agreeable to home care. Started dexamethasone 4 mg qAM on 2/18 due to continued  severe fatigue and low appetite, increased to BID .      US liver ordered to rule out compression in the liver as LFTs were slightly elevated. US showed markedly heterogenous hepatic parenchyma with cirrhosis, gallstones (no cholecystitis) and moderate volume perihepatic ascites . Rifaxamin started for HE. Lasix 20mg daily started , held  given rise in Cr.  US of RLE ordered for DVT rule out and patient started on zosyn due to suspicion of cellulitis and Bcx with gram negative bacilil. Preliminary read of DVT US negative.    RR called  around 4:30pm for hypotension. Patient given 1.5L fluid bolus, 25g albumin. Vancomycin started in addition to zosyn due to concern for LE cellulitis.  VBG: pH 7.23, pCO2 36, pO2 39, lactate 10. CMP with glucose 49, K 6.2, HCO3 12, BUN 88, Cr 2.28. Calcium gluconate given for hyperkalemia. WBC decreased from 9>4.3, Plt 128>92 from AM labs. Hgb stable 14. Ammonia pending.  CXR with Mild pulmonary interstitial edema without focal consolidation, effusion, or pneumothorax.  EKG showed sinus tachycardia, no peaked T waves.  Patient transferred to MICU d/t concern for sepsis with potential sources including cellulitis of the LE vs gram negative bacteremia.       Other Pertinent Objective Data:    While here, patient had mammogram which was significant for the followin. Left breast spiculated masses and suspicious regionally distributed calcifications. The distribution of suspicious calcifications measures 17 cm. There is a spiculated 6:00 o'clock mass with nipple involvement and a spiculated mass in the upper-outer quadrant at 1:00 o'clock. For extent, biopsies of the 6:00 o'clock subareolar mass and 1:00 o'clock left breast mass are recommended.   2. 3 indeterminate left axillary lymph nodes. An ultrasound-guided core biopsy of 1 of the lymph nodes is recommended. Additionally, there is a large lymph node seen mammographically without a sonographic correlate.    3. Right breast mass at the 10 o'clock position 1 cm from the nipple. An ultrasound-guided core biopsy is recommended.   4. Indeterminate right breast calcifications. A stereotactic biopsy is recommended.   BI-RADS CATEGORY:  5 Highly Suggestive of Malignancy.   Recommendation:  Surgical Consultation and Biopsy.   Recommended Date:  Immediate.   Laterality:  Bilateral.     Patient now s/p several biopsies done with breast team. Details as follows:   Status post ultrasound guided core needle biopsy of a 2 left breast masses, left axillary lymph node, and 2 groupings of right breast calcifications followed by tissue marker placement. Pathology is pending.    Patient had a liver biopsy as well, with pathology significant for the following: Right lobe mass, biopsy: Poorly- differentiated adenocarcinoma, immuno phenotypically compatible with breast origin, see comment.    Brain MRI 2/10/2024:   IMPRESSION:  Small enhancing lesions in the cerebellum are favored to represent  metastatic disease given the patient's history. There is no  associated mass effect.  Nonspecific white matter changes most likely represent small-vessel  ischemic disease in a patient of this age.  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 MRA of the head is  recommended for further evaluation.    MRCP 2/4/2024:  IMPRESSION:  1. Enhancing left breast masses concerning for a primary breast malignancy. Correlation with dedicated breast imaging is recommended.  2. Diffuse enhancing osseous lesions compatible with osseous  metastases.  3. Markedly nodular contour to the liver with diffuse heterogeneous nodular delayed enhancement, which is new compared to 01/30/2023 CT.No focal arterially enhancing lesion with washout or diffusion restriction. Differential includes diffuse necrotic hepatic metastases versus cirrhotic liver with fibrotic bands and regenerating nodules. Correlation with tissue sampling is recommended.  4. Prominent right  cardiophrenic lymph nodes which could be reactive  or metastatic. Correlation with dedicated chest imaging is recommended.  5. Mild abdominopelvic ascites.  6. Additional chronic and incidental findings as described above.    TTE 2024:  CONCLUSIONS:   1. Left ventricular systolic function is hyperdynamic with a 70% estimated ejection fraction.   2. Spectral Doppler shows an impaired relaxation pattern of left ventricular diastolic filling.    NM Bone Scan 2/15/24:  IMPRESSION:  1. No evidence of osseous metastatic disease.  2. Nonspecific mild radiotracer deposition in the proximal right  femoral diaphysis and lesser trochanter which corresponds to a  partially visualized possible avulsion fracture deformity seen on  prior CT from 2024. Further evaluation with dedicated imaging  of the right femur can be considered.  3. Degenerative changes of the axial and appendicular skeleton as  above.      Objective   Physical Exam  Constitutional:       General: Patient in acute distress  Eyes:      General: Scleral icterus present.   Cardiovascular:      Rate and Rhythm: tachycardia and regular rhythm.      Heart sounds: No murmur heard.     No gallop.   Pulmonary:      Effort: No tachypnea. On supplemental O2 via NC  Chest:   Breasts:     Left: Inverted nipple, mass, nipple discharge and skin change present.      Comments: L breast with crusted draining lesion without purulence, fullness of the L lateral breast noted, nipple inversion with black scabbing  Abdominal:      General: Abdomen is distended.      Palpations: Abdomen is soft.      Tenderness: There is diffuse abdominal tenderness   Musculoskeletal:         General: No swelling.      Right lower le+ edema. Redness and crusting along posterior right calf, see media tab for images.     Left lower le+ edema  Skin:     General: Skin is warm and dry.      Coloration: Skin is jaundiced.   Neurological:      General: No focal deficit present.      Mental  "Status: She is alert.        Last Recorded Vitals  Blood pressure 100/64, pulse 107, temperature 36.2 °C (97.2 °F), temperature source Temporal, resp. rate 14, height 1.695 m (5' 6.73\"), weight 90.5 kg (199 lb 8.3 oz), SpO2 99 %.  Intake/Output last 3 Shifts:  I/O last 3 completed shifts:  In: - (0 mL/kg)   Out: 1150 (12.7 mL/kg) [Urine:1150 (0.4 mL/kg/hr)]  Weight: 90.5 kg     Relevant Results  Results for orders placed or performed during the hospital encounter of 02/03/24 (from the past 24 hour(s))   Sedimentation rate, automated   Result Value Ref Range    Sedimentation Rate 53 (H) 0 - 30 mm/h   C-reactive protein   Result Value Ref Range    C-Reactive Protein 11.62 (H) <1.00 mg/dL   Blood Culture    Specimen: Peripheral Venipuncture; Blood culture   Result Value Ref Range    Gram Stain Gram negative bacilli (AA)    CBC and Auto Differential   Result Value Ref Range    WBC 9.1 4.4 - 11.3 x10*3/uL    nRBC 0.5 (H) 0.0 - 0.0 /100 WBCs    RBC 4.44 4.00 - 5.20 x10*6/uL    Hemoglobin 13.5 12.0 - 16.0 g/dL    Hematocrit 37.3 36.0 - 46.0 %    MCV 84 80 - 100 fL    MCH 30.4 26.0 - 34.0 pg    MCHC 36.2 (H) 32.0 - 36.0 g/dL    RDW 24.2 (H) 11.5 - 14.5 %    Platelets 128 (L) 150 - 450 x10*3/uL    Immature Granulocytes %, Automated 0.7 0.0 - 0.9 %    Immature Granulocytes Absolute, Automated 0.06 0.00 - 0.70 x10*3/uL   Comprehensive metabolic panel   Result Value Ref Range    Glucose 84 74 - 99 mg/dL    Sodium 134 (L) 136 - 145 mmol/L    Potassium 6.0 (H) 3.5 - 5.3 mmol/L    Chloride 102 98 - 107 mmol/L    Bicarbonate 16 (L) 21 - 32 mmol/L    Anion Gap 22 (H) 10 - 20 mmol/L    Urea Nitrogen 82 (H) 6 - 23 mg/dL    Creatinine 1.92 (H) 0.50 - 1.05 mg/dL    eGFR 28 (L) >60 mL/min/1.73m*2    Calcium 8.7 8.6 - 10.6 mg/dL    Albumin 2.0 (L) 3.4 - 5.0 g/dL    Alkaline Phosphatase 177 (H) 33 - 136 U/L    Total Protein 5.8 (L) 6.4 - 8.2 g/dL    AST 68 (H) 9 - 39 U/L    Bilirubin, Total 15.4 (H) 0.0 - 1.2 mg/dL    ALT 39 7 - 45 U/L "   Magnesium   Result Value Ref Range    Magnesium 2.76 (H) 1.60 - 2.40 mg/dL   Protime-INR   Result Value Ref Range    Protime 18.8 (H) 9.8 - 12.8 seconds    INR 1.7 (H) 0.9 - 1.1   Uric Acid   Result Value Ref Range    Uric Acid 4.2 2.3 - 6.7 mg/dL   Lactate dehydrogenase   Result Value Ref Range     (H) 84 - 246 U/L   Phosphorus   Result Value Ref Range    Phosphorus 4.6 2.5 - 4.9 mg/dL   Manual Differential   Result Value Ref Range    Neutrophils %, Manual 68.0 40.0 - 80.0 %    Bands %, Manual 26.7 0.0 - 5.0 %    Lymphocytes %, Manual 2.7 13.0 - 44.0 %    Monocytes %, Manual 0.6 2.0 - 10.0 %    Eosinophils %, Manual 0.0 0.0 - 6.0 %    Basophils %, Manual 0.0 0.0 - 2.0 %    Atypical Lymphocytes %, Manual 0.7 0.0 - 2.0 %    Myelocytes %, Manual 1.3 0.0 - 0.0 %    Seg Neutrophils Absolute, Manual 6.19 1.20 - 7.00 x10*3/uL    Bands Absolute, Manual 2.43 (H) 0.00 - 0.70 x10*3/uL    Lymphocytes Absolute, Manual 0.25 (L) 1.20 - 4.80 x10*3/uL    Monocytes Absolute, Manual 0.05 (L) 0.10 - 1.00 x10*3/uL    Eosinophils Absolute, Manual 0.00 0.00 - 0.70 x10*3/uL    Basophils Absolute, Manual 0.00 0.00 - 0.10 x10*3/uL    Atypical Lymphs Absolute, Manual 0.06 0.00 - 0.50 x10*3/uL    Myelocytes Absolute, Manual 0.12 0.00 - 0.00 x10*3/uL    Total Cells Counted 150     Neutrophils Absolute, Manual 8.62 (H) 1.20 - 7.70 x10*3/uL    RBC Morphology See Below     Target Cells Few     Russell Cells Few     Clumped Platelets Present    Comprehensive metabolic panel   Result Value Ref Range    Glucose 49 (LL) 74 - 99 mg/dL    Sodium 134 (L) 136 - 145 mmol/L    Potassium 6.2 (HH) 3.5 - 5.3 mmol/L    Chloride 102 98 - 107 mmol/L    Bicarbonate 13 (L) 21 - 32 mmol/L    Anion Gap 25 (H) 10 - 20 mmol/L    Urea Nitrogen 88 (H) 6 - 23 mg/dL    Creatinine 2.28 (H) 0.50 - 1.05 mg/dL    eGFR 23 (L) >60 mL/min/1.73m*2    Calcium 8.7 8.6 - 10.6 mg/dL    Albumin 2.0 (L) 3.4 - 5.0 g/dL    Alkaline Phosphatase 187 (H) 33 - 136 U/L    Total Protein  5.7 (L) 6.4 - 8.2 g/dL    AST 64 (H) 9 - 39 U/L    Bilirubin, Total 15.9 (H) 0.0 - 1.2 mg/dL    ALT 39 7 - 45 U/L   Potassium   Result Value Ref Range    Potassium 6.2 (HH) 3.5 - 5.3 mmol/L   Blood Gas Venous Full Panel   Result Value Ref Range    POCT pH, Venous 7.23 (LL) 7.33 - 7.43 pH    POCT pCO2, Venous 36 (L) 41 - 51 mm Hg    POCT pO2, Venous 39 35 - 45 mm Hg    POCT SO2, Venous 55 45 - 75 %    POCT Oxy Hemoglobin, Venous 53.4 45.0 - 75.0 %    POCT Hematocrit Calculated, Venous 44.0 36.0 - 46.0 %    POCT Sodium, Venous 131 (L) 136 - 145 mmol/L    POCT Potassium, Venous 6.0 (H) 3.5 - 5.3 mmol/L    POCT Chloride, Venous 103 98 - 107 mmol/L    POCT Ionized Calicum, Venous 1.11 1.10 - 1.33 mmol/L    POCT Glucose, Venous 51 (LL) 74 - 99 mg/dL    POCT Lactate, Venous 10.1 (HH) 0.4 - 2.0 mmol/L    POCT Base Excess, Venous -11.6 (L) -2.0 - 3.0 mmol/L    POCT HCO3 Calculated, Venous 15.1 (L) 22.0 - 26.0 mmol/L    POCT Hemoglobin, Venous 14.7 12.0 - 16.0 g/dL    POCT Anion Gap, Venous 19.0 10.0 - 25.0 mmol/L    Patient Temperature 37.0 degrees Celsius    FiO2 32 %   Lactate   Result Value Ref Range    Lactate 9.0 (HH) 0.4 - 2.0 mmol/L   CBC and Auto Differential   Result Value Ref Range    WBC 4.3 (L) 4.4 - 11.3 x10*3/uL    nRBC 2.1 (H) 0.0 - 0.0 /100 WBCs    RBC 4.39 4.00 - 5.20 x10*6/uL    Hemoglobin 14.0 12.0 - 16.0 g/dL    Hematocrit 38.6 36.0 - 46.0 %    MCV 88 80 - 100 fL    MCH 31.9 26.0 - 34.0 pg    MCHC 36.3 (H) 32.0 - 36.0 g/dL    RDW 25.2 (H) 11.5 - 14.5 %    Platelets 92 (L) 150 - 450 x10*3/uL    Neutrophils %      Immature Granulocytes %, Automated      Lymphocytes %      Monocytes %      Eosinophils %      Basophils %      Neutrophils Absolute      Lymphocytes Absolute      Monocytes Absolute      Eosinophils Absolute      Basophils Absolute     POCT GLUCOSE   Result Value Ref Range    POCT Glucose 43 (L) 74 - 99 mg/dL   POCT GLUCOSE   Result Value Ref Range    POCT Glucose 128 (H) 74 - 99 mg/dL                   Assessment/Plan   Principal Problem:    Cirrhosis (CMS/HCC)    Ms. Waddell is 67 yo F history of Seropositive RA (on infliximab), osteoarthritis, nephrolithiasis, obesity, DLD, PACs, history of tobacco use (quit march 2023), who presented to [place] ED on 2/1 for dark red urine, generalized abdominal pain with nausea transferred to [facility] for further workup of her new cirrhosis and jaundice. Since admission, hepatology and oncology have been consulted with recommendations for workup and further management. Per biopsy results, patient has breast cancer with metastasis to the liver and likely metastasis to her brain. Hepatology suspects that new cirrhosis is related to infiltrative malignancy although slight possibility of autoimmune component given positive anti-smooth muscle antibodies. Patient now s/p prednisone 30 mg 5 day trial without improvement in LFTs. Final breast cancer pathology shows invasive ductal carcinoma grade 3, ER-/UT-/HER2+. Started on chemotherapy and targeted therapy on 2/14 and tolerated this well. TTE done prior to initiation of treatment showed EF 70% and mild impaired diastolic relaxation. NM bone scan completed shows no osseous metastatic disease. Will continue to monitor her closely for signs of severe hepatic dysfunction and TLS.  Increased lactulose to BID. Patient now experiencing significant fatigue likely secondary to chemotherapy as well as some increased swelling of her lower extremities and abdomen.     Transfer to MICU 2/23 due to RR for hypotension, concern for sepsis with RLE cellulitis and gram negative bacteremia sources. Please see subjective section for hospital course and details of RR.      #new liver cirrhosis  #Cholelithiasis  #mixed hepatocellular and cholestatic liver injury   #mild confusion possible grade 1 hepatic encephalopahthy  #liver metastasis (breast primary)  :: hepC negative, hepB negative, hepA negative, T spot negative in 11/2023  :: alpha  1 antitrypsin negative  :: ferritin 406 H/transferrin 136 L  :: RUQ as above in assessment  :: ammonia 79  - autoimmune workup for the most part negative. Anti-SM positive 1:20, could be related to autoimmune hepatitis; however, still suspect malignancy to be driving variable  - liver biopsy significant for poorly differentiated breast cancer  - diagnostic paracentesis unremarkable, no malignant cells noted  - lactulose 20 g BID  - lasix 20 mg and aldactone 50 mg currently held iso SYLVIA  - rifaxamin 550 mg BID for HE  - Hep B surface AG negative, Hep B surface AB positive at 580.6     #nonhealing, draining, L breast wound  #primary breast cancer with liver met and likely brain mets  :: MRI brain showing small enhancing lesions in the cerebellum without mass effect with c/f metastatic disease, potential 4 mm MRA bifurcation aneurysm  :: TTE shows EF 70% and impaired diastolic relaxation  :: NM bone scan without osseous metastatic disease  - plan for outpatient MRA   - inpatient mammogram significant for masses and suspicious lymphadenopathy  - s/p biopsies, final pathology shows invasive ductal carcinoma grade 3, ER-/ME-/HER2+  - initial pathology showed ER+ so patient was started on anastrazole 1 mg, but this read was modified to ER- so anastrozole was stopped on 2/13  - per oncology: plan for inpatient carboplatin (AUC 4) and trastuzumab (loading dose 8 mg/kg) completed on 2/14 with patient tolerating this well  - Daily CMP, LDH, and uric acid to monitor for tumor lysis syndrome  - G6PD testing negative  - Consult supportive onc: olanzapine 5 mg qhs    #SYLVIA  #Pre-renal based on FENa, possibly hepatorenal   - Cr 1.71 now 1.42, baseline Cr normal   - FENa 0.1-0.2% indicating pre-renal etiology  - Holding lasix 20 mg and aldactone 50 mg   - Discontinue IV fluids at 50 ml/hr given signs of volume overload  - Consider albumin and nephrology consult if worsening     #RA - on infliximab  #osteoarthritis  :: hold home  naproxen   - voltaren gel  - tylenol 650 mg q8 prn  - oxycodone 5 mg q4 prn  - outpatient rheum aware to discontinue infliximab     #subacute avulsion fracture lesser trochanter R proximal femur  - chronic?  - consult orthopedic surgery to assess subacute avulsion fracture of R femur found on CT on 2/1: nothing to do, patient can follow-up outpatient, WBAT  - NM bone scan without osseous metastatic disease     #compression deformity at T12  -vitamin D and calcium     #abdominal aorta atherosclerosis on CT  #smoking history   - follow up with PCP outpatient to discuss potential statin/asa     F: none  E: PRN   N: regular diet  GI: none  P: lovenox  A: PIV  C: full code confirmed on admission   Surrogate:   Cierra her daughter [phone] (first option)  Emmanuel her son [phone]    Connie Hebert MD

## 2024-02-23 NOTE — NURSING NOTE
Pt is having increased pain, redness, hot to touch, and swelling in the right lower extremity through the thigh. Team notified and doctor to bedside.

## 2024-02-23 NOTE — PROGRESS NOTES
Meghan Waddell is a 68 y.o. female on day 20 of admission presenting with Cirrhosis (CMS/HCC).    Subjective   Meghan Waddell is a 68 y.o. female on day 20 of admission presenting with Cirrhosis (CMS/HCC).    Subjective   No acute events overnight. This morning patient was complaining of R leg redness and pain (images in media tab) which has developed over the past day or so. No new symptoms otherwise. Endorses prior hx of cellulitis in that leg.     Objective   Physical Exam  Constitutional:       General: She is not in acute distress.     Appearance: Normal appearance. She is not toxic-appearing.   HENT:      Mouth/Throat:      Mouth: Mucous membranes are moist.   Eyes:      General: Scleral icterus present.      Extraocular Movements: Extraocular movements intact.      Pupils: Pupils are equal, round, and reactive to light.   Cardiovascular:      Rate and Rhythm: Normal rate and regular rhythm.      Heart sounds: No murmur heard.     No gallop.   Pulmonary:      Effort: Pulmonary effort is normal. No respiratory distress.      Breath sounds: No wheezing.   Chest:   Breasts:     Left: Inverted nipple, mass, nipple discharge and skin change present.      Comments: L breast with crusted draining lesion without purulence, fullness of the L lateral breast noted, nipple inversion with black scabbing  Abdominal:      General: Abdomen is distended.      Palpations: Abdomen is soft.      Tenderness: There is abdominal tenderness (tenderness to palpation in bilateral lower quadrants).   Musculoskeletal:         General: No swelling.      Right lower le+ edema. Redness and crusting along posterior right calf, see media tab for images.     Left lower le+ edema  Skin:     General: Skin is warm and dry.      Coloration: Skin is jaundiced.   Neurological:      General: No focal deficit present.      Mental Status: She is alert.        Last Recorded Vitals  Blood pressure 103/58, pulse 107, temperature 36.6 °C (97.9 °F),  "temperature source Temporal, resp. rate 20, height 1.695 m (5' 6.73\"), weight 90.5 kg (199 lb 8.3 oz), SpO2 97 %.  Intake/Output last 3 Shifts:  I/O last 3 completed shifts:  In: - (0 mL/kg)   Out: 1150 (12.7 mL/kg) [Urine:1150 (0.4 mL/kg/hr)]  Weight: 90.5 kg     Relevant Results  Results for orders placed or performed during the hospital encounter of 02/03/24 (from the past 24 hour(s))   Sedimentation rate, automated   Result Value Ref Range    Sedimentation Rate 53 (H) 0 - 30 mm/h   C-reactive protein   Result Value Ref Range    C-Reactive Protein 11.62 (H) <1.00 mg/dL   Blood Culture    Specimen: Peripheral Venipuncture; Blood culture   Result Value Ref Range    Gram Stain Gram negative bacilli (AA)    CBC and Auto Differential   Result Value Ref Range    WBC 9.1 4.4 - 11.3 x10*3/uL    nRBC 0.5 (H) 0.0 - 0.0 /100 WBCs    RBC 4.44 4.00 - 5.20 x10*6/uL    Hemoglobin 13.5 12.0 - 16.0 g/dL    Hematocrit 37.3 36.0 - 46.0 %    MCV 84 80 - 100 fL    MCH 30.4 26.0 - 34.0 pg    MCHC 36.2 (H) 32.0 - 36.0 g/dL    RDW 24.2 (H) 11.5 - 14.5 %    Platelets 128 (L) 150 - 450 x10*3/uL    Immature Granulocytes %, Automated 0.7 0.0 - 0.9 %    Immature Granulocytes Absolute, Automated 0.06 0.00 - 0.70 x10*3/uL   Comprehensive metabolic panel   Result Value Ref Range    Glucose 84 74 - 99 mg/dL    Sodium 134 (L) 136 - 145 mmol/L    Potassium 6.0 (H) 3.5 - 5.3 mmol/L    Chloride 102 98 - 107 mmol/L    Bicarbonate 16 (L) 21 - 32 mmol/L    Anion Gap 22 (H) 10 - 20 mmol/L    Urea Nitrogen 82 (H) 6 - 23 mg/dL    Creatinine 1.92 (H) 0.50 - 1.05 mg/dL    eGFR 28 (L) >60 mL/min/1.73m*2    Calcium 8.7 8.6 - 10.6 mg/dL    Albumin 2.0 (L) 3.4 - 5.0 g/dL    Alkaline Phosphatase 177 (H) 33 - 136 U/L    Total Protein 5.8 (L) 6.4 - 8.2 g/dL    AST 68 (H) 9 - 39 U/L    Bilirubin, Total 15.4 (H) 0.0 - 1.2 mg/dL    ALT 39 7 - 45 U/L   Magnesium   Result Value Ref Range    Magnesium 2.76 (H) 1.60 - 2.40 mg/dL   Protime-INR   Result Value Ref Range    " Protime 18.8 (H) 9.8 - 12.8 seconds    INR 1.7 (H) 0.9 - 1.1   Uric Acid   Result Value Ref Range    Uric Acid 4.2 2.3 - 6.7 mg/dL   Lactate dehydrogenase   Result Value Ref Range     (H) 84 - 246 U/L   Phosphorus   Result Value Ref Range    Phosphorus 4.6 2.5 - 4.9 mg/dL   Manual Differential   Result Value Ref Range    Neutrophils %, Manual 68.0 40.0 - 80.0 %    Bands %, Manual 26.7 0.0 - 5.0 %    Lymphocytes %, Manual 2.7 13.0 - 44.0 %    Monocytes %, Manual 0.6 2.0 - 10.0 %    Eosinophils %, Manual 0.0 0.0 - 6.0 %    Basophils %, Manual 0.0 0.0 - 2.0 %    Atypical Lymphocytes %, Manual 0.7 0.0 - 2.0 %    Myelocytes %, Manual 1.3 0.0 - 0.0 %    Seg Neutrophils Absolute, Manual 6.19 1.20 - 7.00 x10*3/uL    Bands Absolute, Manual 2.43 (H) 0.00 - 0.70 x10*3/uL    Lymphocytes Absolute, Manual 0.25 (L) 1.20 - 4.80 x10*3/uL    Monocytes Absolute, Manual 0.05 (L) 0.10 - 1.00 x10*3/uL    Eosinophils Absolute, Manual 0.00 0.00 - 0.70 x10*3/uL    Basophils Absolute, Manual 0.00 0.00 - 0.10 x10*3/uL    Atypical Lymphs Absolute, Manual 0.06 0.00 - 0.50 x10*3/uL    Myelocytes Absolute, Manual 0.12 0.00 - 0.00 x10*3/uL    Total Cells Counted 150     Neutrophils Absolute, Manual 8.62 (H) 1.20 - 7.70 x10*3/uL    RBC Morphology See Below     Target Cells Few     [place] Cells Few     Clumped Platelets Present                   Assessment/Plan   Principal Problem:    Cirrhosis (CMS/HCC)    Ms. Waddell is 69 yo F history of Seropositive RA (on infliximab), osteoarthritis, nephrolithiasis, obesity, DLD, PACs, history of tobacco use (quit march 2023), who presented to [place] ED on 2/1 for dark red urine, generalized abdominal pain with nausea transferred to [facility] for further workup of her new cirrhosis and jaundice. Since admission, hepatology and oncology have been consulted with recommendations for workup and further management. Per biopsy results, patient has breast cancer with metastasis to the liver and likely  metastasis to her brain. Hepatology suspects that new cirrhosis is related to infiltrative malignancy although slight possibility of autoimmune component given positive anti-smooth muscle antibodies. Patient now s/p prednisone 30 mg 5 day trial without improvement in LFTs. Final breast cancer pathology shows invasive ductal carcinoma grade 3, ER-/IN-/HER2+. Started on chemotherapy and targeted therapy on 2/14 and tolerated this well. TTE done prior to initiation of treatment showed EF 70% and mild impaired diastolic relaxation. Started on allopurinol for TLS prophylaxis. NM bone scan completed shows no osseous metastatic disease. Will continue to monitor her closely for signs of severe hepatic dysfunction and TLS. Worsening SYLVIA may be hepatorenal, will consider albumin if Cr worsens again. Increased lactulose to BID. Patient now experiencing significant fatigue likely secondary to chemotherapy as well as some increased swelling of her lower extremities and abdomen. LFTs stable for now.     2/23 updates:  - US of RLE for DVT rule out and zosyn started due to suspicion of cellulitis  - Plan is still for home with [facility]  - LFTs downtrending overall with day to day minor fluctuations  - Lasix held today due to rise in Cr  - Continue dexamethasone 4 mg BID for appetite and energy stimulation  - Continue lactulose and rifaxamin for HE       #new liver cirrhosis  #Cholelithiasis  #mixed hepatocellular and cholestatic liver injury   #mild confusion possible grade 1 hepatic encephalopahthy  #liver metastasis (breast primary)  :: hepC negative, hepB negative, hepA negative, T spot negative in 11/2023  :: alpha 1 antitrypsin negative  :: ferritin 406 H/transferrin 136 L  :: RUQ as above in assessment  :: ammonia 79  - autoimmune workup for the most part negative. Anti-SM positive 1:20, could be related to autoimmune hepatitis; however, still suspect malignancy to be driving variable  - liver biopsy significant for poorly  differentiated breast cancer  - diagnostic paracentesis unremarkable, no malignant cells noted  - lactulose 20 g BID, lasix 20 mg and aldactone 50 mg currently held iso SYLVIA  - trialing prednisone 30 mg to see if it may help with possible autoimmune component per hepatology (2/13 - 2/17), can start rifaxamin 550 mg BID if pt develops HE  - Hep B surface AG negative, Hep B surface AB positive at 580.6     #nonhealing, draining, L breast wound  #primary breast cancer with liver met and likely brain mets  :: MRI brain showing small enhancing lesions in the cerebellum without mass effect with c/f metastatic disease, potential 4 mm MRA bifurcation aneurysm  :: TTE shows EF 70% and impaired diastolic relaxation  :: NM bone scan without osseous metastatic disease  - plan for outpatient MRA   - inpatient mammogram significant for masses and suspicious lymphadenopathy  - s/p biopsies, final pathology shows invasive ductal carcinoma grade 3, ER-/NE-/HER2+  - initial pathology showed ER+ so patient was started on anastrazole 1 mg, but this read was modified to ER- so anastrozole was stopped on 2/13  - per oncology: plan for inpatient carboplatin (AUC 4) and trastuzumab (loading dose 8 mg/kg) completed on 2/14 with patient tolerating this well  - Daily CMP, LDH, and uric acid to monitor for tumor lysis syndrome  - Continue allopurinol 300 mg daily for TLS prophylaxis  - G6PD testing negative  - Consult supportive onc: olanzapine 5 mg qhs  - EKG with Qtc 467    #SYLVIA  #Pre-renal based on FENa, possibly hepatorenal   - Cr 1.71 now 1.42, baseline Cr normal   - FENa 0.1-0.2% indicating pre-renal etiology  - Holding lasix 20 mg and aldactone 50 mg   - Discontinue IV fluids at 50 ml/hr given signs of volume overload  - Consider albumin and nephrology consult if worsening     #RA - on infliximab  #osteoarthritis  :: hold home naproxen   - voltaren gel  - tylenol 650 mg q8 prn  - oxycodone 5 mg q4 prn  - outpatient rheum aware to  discontinue infliximab     #subacute avulsion fracture lesser trochanter R proximal femur  - chronic?  - consult orthopedic surgery to assess subacute avulsion fracture of R femur found on CT on 2/1: nothing to do, patient can follow-up outpatient, WBAT  - NM bone scan without osseous metastatic disease     #compression deformity at T12  -vitamin D and calcium     #abdominal aorta atherosclerosis on CT  #smoking history   - follow up with PCP outpatient to discuss potential statin/asa     F: none  E: PRN   N: regular diet  GI: none  P: lovenox  A: PIV  C: full code confirmed on admission   Surrogate:   Cierra her daughter [phone] (first option)  Emmanuel her son [phone]    Aidan Uriarte MD

## 2024-02-23 NOTE — SIGNIFICANT EVENT
Patient needs hospital bed due to her condition requiring body repositioning, which is not feasible with standard bed. She also needs a wheelchair due to mobility limitations impairing daily life not able to be resolved with a cane or walker.

## 2024-02-23 NOTE — PROGRESS NOTES
SUPPORTIVE AND PALLIATIVE ONCOLOGY INPATIENT FOLLOW-UP      SERVICE DATE: 02/23/24     SUBJECTIVE:  Interval Events:  Primary team met yesterday with patient, patient's son, patient's daughter, and providers regarding her future care.   Pt reported pain in right LE overnight; primary team to obtain US of RLE today  Olanzapine discontinued in setting of lethargy  Total bili trending upward; today T bili 15.1    Pain Assessment:  Pt sleeping    Opioid Use  Past 24 h prn opioid use: Oxycodone 5 mg po x 2 doses = 10 mg = 12 OME  Total 24h OME use:  12    Note: OME calculations based on equianalgesic table below. Please note this table is based on best available evidence but conversions are still approximate. These are NOT opioid DOSES for individual patient use; this is equivalency information.  Drug Parenteral Enteral   Morphine 10 25   Oxycodone N/A 20   Hydromorphone 2 5   Fentanyl 0.15 N/A   Tramadol N/A 120   Citation: Nikko OVALLE. Demystifying opioid conversion calculations: A guide for effective dosing, Second edition. [place]bbie: [employer] of Health-System Pharmacists, 2018.    Symptom Assessment:  Unable to assess-pt soundly sleeping    Information obtained from: chart review, interview of patient, and discussion with primary team  ______________________________________________________________________        OBJECTIVE:    Lab Results   Component Value Date    WBC 12.1 (H) 02/22/2024    HGB 13.0 02/22/2024    HCT 34.6 (L) 02/22/2024    MCV 82 02/22/2024     02/22/2024      Lab Results   Component Value Date    GLUCOSE 93 02/22/2024    CALCIUM 8.5 (L) 02/22/2024     (L) 02/22/2024    K 5.1 02/22/2024    CO2 23 02/22/2024     02/22/2024    BUN 61 (H) 02/22/2024    CREATININE 1.37 (H) 02/22/2024     Lab Results   Component Value Date    ALT 46 (H) 02/22/2024    AST 83 (H) 02/22/2024     (H) 02/01/2024    ALKPHOS 199 (H) 02/22/2024    BILITOT 15.8 (H) 02/22/2024     Cr.Cl as of  2/23/24=32.7 ml/min     Scheduled medications  calcium carbonate-vitamin D3, 2 tablet, oral, Daily  dexAMETHasone, 4 mg, oral, 2 times per day  enoxaparin, 40 mg, subcutaneous, q24h  furosemide, 20 mg, oral, Daily  lactulose, 20 g, oral, BID  lidocaine, 1 patch, transdermal, Daily  [Held by provider] OLANZapine, 2.5 mg, oral, Nightly  pantoprazole, 40 mg, oral, Daily before breakfast  rifAXIMin, 550 mg, oral, BID  [Held by provider] spironolactone, 50 mg, oral, Daily      Continuous medications     PRN medications  PRN medications: acetaminophen **OR** acetaminophen **OR** acetaminophen, albuterol, dextrose, diclofenac sodium, diphenhydrAMINE, EPINEPHrine, famotidine, HYDROmorphone, melatonin, methylPREDNISolone sodium succinate (PF), oxyCODONE, prochlorperazine, prochlorperazine, sodium chloride   }     PHYSICAL EXAMINATION:    Vital Signs:   Vital signs reviewed  Visit Vitals  /72 (BP Location: Right arm, Patient Position: Lying)   Pulse 104   Temp 36 °C (96.8 °F) (Temporal)   Resp 16        Pain score: sleeping      Physical Exam  Vitals reviewed.         Physical Exam  Vitals reviewed.   Constitutional:       Appearance: Normal appearance; sleeping  HENT:      Head: Normocephalic.  Cardiovascular:      Rate and Rhythm: Normal rate.      Heart sounds: Normal heart sounds.   Pulmonary:      Effort: Pulmonary effort is normal. On room air  Abdominal:      General: There is distension, mild tenderness      Palpations: Abdomen is soft.   Skin:     General: Skin is warm.; Jaundiced     Capillary Refill: Capillary refill takes less than 2 seconds.   Neurological:      General: Sleeping.   Psychiatric:        Sleeping       ASSESSMENT/PLAN:  Meghan Waddell is a 68 y.o. female diagnosed with poorly-differentiated adenocarcinoma and ER positivity who presented on 2/3 as a transfer from [place] for new cirrhosis and jaundice workup (patient had been having RUQ abdominal pain for a few months), 1 year of left breast  "drainage, and 20-lb weight loss since Dec 2023 with right lobe liver mass biopsy. PMH significant for Seropositive RA (on infliximab), osteoarthritis, nephrolithiasis, obesity, DLD, PACs, history of tobacco use (quit march 2023), . Admitted 2/3/2024 for further evaluation and management. Supportive and Palliative Oncology is consulted for pain management and Introduction to Supportive and Palliative Oncology Services.      Primary team note from 2/15/24 states:  \"Since admission, hepatology and oncology have been consulted with recommendations for workup and further management. Per biopsy results, patient has breast cancer with metastasis to the liver and likely metastasis to her brain. Hepatology suspects that new cirrhosis is related to infiltrative malignancy although slight possibility of autoimmune component given positive anti-smooth muscle antibodies. Currently trialing prednisone 30 mg. Final breast cancer pathology shows invasive ductal carcinoma grade 3, ER-/[place]-/HER2+. Started on chemotherapy and targeted therapy yesterday (2/14/24) and tolerated this well. TTE done prior to initiation of treatment showed EF 70% and mild impaired diastolic relaxation. Started on allopurinol for TLS prophylaxis. Will continue to monitor her closely for signs of severe hepatic dysfunction and TLS.\"     EKG w/ Bau=254 (performed 2/14/24)     Pain:  Pain related to bone mets vs fracture  Pain is: acute on chronic  Type: somatic  Pain control: well-controlled  Home regimen:   Naproxen  Currently using Voltaren topical gel, Tylenol 650 mg po q 8 hrs prn, and oxycodone 5 mg po q 4 hrs  Intolerances/previously tried: none  Personalized pain goal: 2  Total OME usage for the past 24 hours:  12 OME  Continue Oxycodone 5 mg po q 4 hrs prn  Continue Hydromorphone 0.4 mg IVP q 3 hrs prn for breakthrough pain  Continue Lidocaine 4% topical patch to right hip  Continue Voltaren gel 1 % topically to right hip qid prn  Continue " Acetaminophen 650 mg po q 8 hrs prn  Continue to monitor pain scores and administer PRN medications as appropriate  Continue/initiate nonpharmacologic pain management strategies including ice/heat therapy, distraction techniques, deep breathing/relaxation techniques, calming music, and repositioning  Continue to monitor for signs of opioid efficacy (pain scores, improved functionality) and toxicity (pinpoint pupils, excess sedation/drowsiness/confusion, respiratory depression, etc.)     Nausea:  Intermittent nausea without vomiting related to chemotherapy and opioids   Home regimen:  none  Improved and well controlled  Olanzapine 2.5 mg held per primary team in setting of lethargy  Started chemo 2/14/24; has not noticed that nausea has worsened since  Ondansetron 4 mg IVP q 6 hrs prn  Prochlorperazine IV 10 mg q 6 hrs prn    Constipation  At risk for constipation related to opioids, currently not constipated  Usual bowel pattern: every other day  Home regimen: none  LBM 2/23/24  Monitor BM frequency, adjust regimen as needed  Goal to have BM without straining q 48-72h  Lactulose per primary team      Sleeping Difficulty:  Impaired sleep related to hospital environment and new diagnosis  Home regimen:  none  well controlled  Increased lethargy per bedside RN and primary team  Olanzapine 2.5 currently held per primary team in setting of lethargy  Continue Melatonin 5 mg po q hs     Decreased appetite:  Appetite loss related to malignancy, chemotherapy, and disease process  Nutrition consult  Weight loss 20 lbs since December  Sub-optimally controlled  Home regimen:  none  Continue Dexamethasone 4 mg po bid (0800 and 1200)    Medical Decision Making/Goals of Care/Advance Care Planning:  Patient's current clinical condition, including diagnosis, prognosis, and management plan, and goals of care were discussed.   Life limiting disease: metastatic malignancy  Family: Supportive children, siblings  Performance status:  "Major  limitations due to pain and disease process  Joys/meaning/strength: Family and [place]  Understanding of health: Demonstrates good prognostic understanding of disease process, understands plan for further workup, awaiting results of bone scan  Information:Wants full disclosure  Goals: symptom control and cancer directed therapy  Worries and fears now and future: ongoing symptoms and inability to receive cancer treatment   Minimum acceptable outcome/QOL:  to be able to care for self, to have adequate pain relief  Code status discussion:  FULL     Advance Directives  Existence of Advance Directives:No - has interest  Decision maker: Surrogate decision maker is 1) daughter Cierra [phone], 2) son Emmanuel [phone] [in that order]  Code Status: Full code  *Pt states she wants to be resuscitated but not to remain on ventilator or \"life support\" for more than 3 days.      Introduction to Supportive and Palliative Oncology:  Spoke with patient at bedside  Introduced the role and philosophy of Supportive and Palliative oncology in the evaluation and management of symptoms during cancer treatment  Palliative care was introduced as a service for patients with serious illness to help with symptoms, assist with goals of care conversations, navigate complex decision making, improve quality of life for patients, and provide support both patients and families.  Patient seemed to appreciate the extra layer of support.      Supportive Interventions: Interventions: Music Therapy: offered     Disposition:  Please start the process of having prior authorization with meds to beds deliver medications to patient prior to discharge via [facility] pharmacy. Prescriptions will need to be sent 48-72 hours prior to discharge so that a prior authorization can be completed.      Discharge date: unknown pending acute issues, pain control, and symptom control; planning to discharge home with home care per team's discussion 2/22/24 " with pt and family   Will assess if patient needs an appointment with Outpatient Supportive Oncology as appropriate    Patient will see Dr. Stovall at [place] for follow up and for chemotherapy      Signature and billing:  Thank you for allowing us to participate in the care of this patient. Recommendations will be communicated back to the consulting service by way of shared electronic medical record or face-to-face.    Medical complexity was high level due to due to complexity of problems, extensive data review, and high risk of management/treatment.    I spent 50 minutes in the care of this patient which included chart review, interviewing patient/family, discussion with primary team, coordination of care, and documentation.    Data:   Diagnostic tests and information reviewed for today's visit:  Conversation with primary team, Most recent labs and imaging results, Medications     Some elements copied from my note on 2/20/24, the elements have been updated and all reflect current decision making from today, 02/23/24     Plan of Care discussed with: Provider, RN, Patient    Thank you for asking Supportive and Palliative Oncology to assist with care of this patient.  We will continue to follow  Please contact us for additional questions or concerns.      SIGNATURE: JOSE Arciniega-CNP   PAGER/CONTACT:  Contact information:  Supportive and Palliative Oncology  Monday-Friday 8 AM-5 PM  Epic Secure chat or pager [phone].  After hours and weekends:  pager [phone]

## 2024-02-23 NOTE — PROGRESS NOTES
Physical Therapy                 Therapy Communication Note    Patient Name: Meghan Waddell  MRN: [id number]  Today's Date: 2/23/2024     Discipline: Physical Therapy    Missed Visit Reason: Missed Visit Reason: Other (Comment) (Pt with significant right LE pain and swelling at rest (pending duplex) with hx of subacute avulsion fx Right less trochanter; will hold PT today; D/w RN)    Missed Time: Attempt    Comment: 11:48am

## 2024-02-23 NOTE — SIGNIFICANT EVENT
RAPID RESPONSE NOTE    RR called for hypotension.  Patient with breast cancer mets to liver, was more lethargic this AM.  Bcx obtained ~01:00 today came back positive for gram negative rods, did not start Abx yet due to no IV access.  Vitals: AF, P 114, 69/48, 95% on 3L NC, R 22.  Primary team is at bedside, have ordered Zosyn, fluid bolus.  RN was able to obtain 2x 22 gauge IV in RUE.  BP remained low ~70/40s.  Pressure bagged 1L IVF, gave albumin, started first dose of Zosyn.  Lactate came back at 10.  Discussed with MICU fellow who accepted patient.  Note that after 1L bolus, BP responded to 94/56.    Discussed with family at bedside, brother, sister, son, that this is likely sepsis and communicated severity of situation, especially given co-morbidities of cirrhosis and metastatic breast cancer.  Daughter is medical decision maker, and primary team spoke with them again regarding GOC; per primary team, daughter stated that they have already discussed this multiple times and would like pt to be full code, OK for invasive procedures including central venous catheter and ICU care.

## 2024-02-23 NOTE — SIGNIFICANT EVENT
RAPID RESPONSE RN NOTE:       02/23/24 1600   Onset Documentation   Rapid Response Initiated By RN   Location/Room [facility]  (Sherry Stevens)   Pager Time 1600   Arrival Time 1608   Event End Time 1730   Primary Reason for Call SBP less than or equal to 90 mmHg     Rapid response paged for hypotension to 82/59. On my arrival, BP 78/89, , pulse ox stable on 3LNC.  Pt is lethargic; withdraws to painful stimuli, incomprehensible speech.  IVF bolus and IV albumin initiated prior to my arrival. Labs sent including lactate, ammonia, RFP, CBC. VBG sent - ph 7.23, pCO2 36, pO2 39, lactate 10.1, potassium 6.0, HCO3 15.1, glucose 51.  1 amp D50 given by bedside RN.  Calcium gluconate 2gm IV given.  Antibiotic coverage broadened to include Vancomycin.  Pt with slow response to IVF bolus eventually increasing to 90's/50's; no presssors initiated during rapid. Total of 1500ml 0.9NS  bolus given during rapid response. Dr. Ritchie MICU fellow notified and pt accepted to MICU.  RN report called by S4 RN. Pt transferred without incident with rapid response RN.  Family at bedside and aware of plan of care.

## 2024-02-23 NOTE — PROGRESS NOTES
Meghan Waddell is a 68 y.o. female on day 20 of admission presenting with Cirrhosis (CMS/HCC).    2/16- [facility] met with patient to discuss home health care options as patient refused SNF. Patient requested TCC to contact her daughter Linda. TCC contacted Linda with home care choices 1. [facility] 2. [facility]. Referral in [facility] for [facility] who was unable to accept patient. Internal home care order needed for [facility]. TCC will continue to follow patient for discharge needs.  Meli Dixon RN TCC    2/20/24 @ 1150  [facility] referral placed for PT/OT, RN. [facility] was made aware of patient's anticipated date of discharge. Will continue to follow patient for any additional needs.  Tanesha Pandya RN [facility]    2/22/24@12:55   SW spoke with the patient briefly this morning about recommendation for SNF but she was very tired and requested that SW call her daughter. SW spoke with pt.'s daughter at length and related that PT shared the patient is very weak and would not be able to do the stairs. SW shared that [facility] has a Swing bed and they would be able to provide short term rehab but the daughter declined. According to the pt.'s daughter she believes her mother will do mush better emotionally and physically if she returns home. Pt.'s daughter shared that the patient will have 24 hour care and they have many family members that will provide assistance. The medical team has been updated.  Referral has been made for home care and pt's daughter requesting a hospital bed and a wheelchair and TCC has been updated.   MARGUERITE Tovar    2/22/24 @ 1420  Spoke with patient's daughter via phone. They would still like to go home with home care. Told her referral was placed for [facility] for PT/OT, RN. They have previously used [facility] for PT, but state [facility] is fine for now. She already has two walkers, a cane, BSC, shower chair, bedside table, and a wheelchair ramp at home.  Team to place order for a hospital  bed and wheelchair. Will send to [employer] via [employer] when entered. She would like to speak with dietician. SHERIF Bradley made aware and will touch base with the daughter tomorrow.  Tanesha Pandya RN [facility]    2/23/24 @ 6319  Spoke with daughter via phone. She is aware hospital bed and wheelchair are being provided by [facility]. She states [facility] did reach out and will plan to deliver hospital bed.  CMN was emailed to Dr. Fox to sign.  Tanesha Pandya RN [facility]

## 2024-02-23 NOTE — CARE PLAN
The patient's goals for the shift include Pain management    The clinical goals for the shift include pt will remain injury free      Problem: Pain  Goal: My pain/discomfort is manageable  Outcome: Progressing     Problem: Safety  Goal: Patient will be injury free during hospitalization  Outcome: Progressing  Goal: I will remain free of falls  Outcome: Progressing     Problem: Daily Care  Goal: Daily care needs are met  Outcome: Progressing     Problem: Psychosocial Needs  Goal: Demonstrates ability to cope with hospitalization/illness  Outcome: Progressing  Goal: Collaborate with me, my family, and caregiver to identify my specific goals  Outcome: Progressing     Problem: Discharge Barriers  Goal: My discharge needs are met  Outcome: Progressing     Problem: Safety - Adult  Goal: Free from fall injury  Outcome: Progressing     Problem: Discharge Planning  Goal: Discharge to home or other facility with appropriate resources  Outcome: Progressing     Problem: Chronic Conditions and Co-morbidities  Goal: Patient's chronic conditions and co-morbidity symptoms are monitored and maintained or improved  Outcome: Progressing     Problem: Pain  Goal: Takes deep breaths with improved pain control throughout the shift  Outcome: Progressing  Goal: Turns in bed with improved pain control throughout the shift  Outcome: Progressing  Goal: Walks with improved pain control throughout the shift  Outcome: Progressing  Goal: Performs ADL's with improved pain control throughout shift  Outcome: Progressing  Goal: Participates in PT with improved pain control throughout the shift  Outcome: Progressing  Goal: Free from opioid side effects throughout the shift  Outcome: Progressing  Goal: Free from acute confusion related to pain meds throughout the shift  Outcome: Progressing     Problem: Skin  Goal: Decreased wound size/increased tissue granulation at next dressing change  Outcome: Progressing  Flowsheets (Taken 2/22/2024  1954)  Decreased wound size/increased tissue granulation at next dressing change: Promote sleep for wound healing  Goal: Participates in plan/prevention/treatment measures  Outcome: Progressing  Flowsheets (Taken 2/22/2024 1954)  Participates in plan/prevention/treatment measures: Elevate heels  Goal: Prevent/manage excess moisture  Outcome: Progressing  Flowsheets (Taken 2/22/2024 1954)  Prevent/manage excess moisture: Moisturize dry skin  Goal: Prevent/minimize sheer/friction injuries  Outcome: Progressing  Flowsheets (Taken 2/22/2024 1954)  Prevent/minimize sheer/friction injuries: Increase activity/out of bed for meals  Goal: Promote/optimize nutrition  Outcome: Progressing  Flowsheets (Taken 2/22/2024 1954)  Promote/optimize nutrition: Offer water/supplements/favorite foods  Goal: Promote skin healing  Outcome: Progressing  Flowsheets (Taken 2/22/2024 1954)  Promote skin healing: Protective dressings over bony prominences     Problem: Fall/Injury  Goal: Not fall by end of shift  Outcome: Progressing  Goal: Be free from injury by end of the shift  Outcome: Progressing  Goal: Verbalize understanding of personal risk factors for fall in the hospital  Outcome: Progressing  Goal: Verbalize understanding of risk factor reduction measures to prevent injury from fall in the home  Outcome: Progressing  Goal: Use assistive devices by end of the shift  Outcome: Progressing  Goal: Pace activities to prevent fatigue by end of the shift  Outcome: Progressing

## 2024-02-24 NOTE — PROGRESS NOTES
Meghan Waddell is a 68 y.o. female on day 21 of admission presenting with Cirrhosis (CMS/HCC).    Subjective   CCCC: Hypotension      History obtained from chart review given patient's mentation      HPI (for full hospital course please see excellent Dr. Hebert's note on 2/23/2024)      Meghan Waddell is a 68 y.o. female history of Seropositive RA (on infliximab), osteoarthritis, nephrolithiasis, obesity, history of tobacco use (quit march 2023), and on this admission diagnosed with ER-,IN-, Her2+ invasive ductal carcinoma who presented to [facility] ED on 2/1 for dark red urine, generalized abdominal pain with nausea, found to have cirrhosis, and on admission to [facility] found to have metastatic breast cancer, transferred to MICU 2/23/2023 for hypotension.      Patient was transferred to [facility] for further workup of her cirrhosis. She is s/p 2 paracentesis with 2L out, first sample negative for SBP and malignant cells. Cirrhosis was worked up, mild ASMA titers felt to be related to her Rheumatoid arthritis. However, was given a short course of prednisone. Patient had pathology come back +ve for invasive ductal carcinoma ER-,IN-, Her2+. Initially started on anastrozole later discontinued given ER -ve status. Liver biopsy proved liver mets. MRI showed cerebellar lesions favoring mets. NM bone scan ruled out bony mets. Patient was treated lactulose and later rifaximin was added. Patient also started on lasix and spironolactone.      Patient received carboplatin and transtuzumab (completed 2/14/2024) for her breast cancer. Patient began to experience significant fatigue on 12/17 which was felt to be due to chemotherapy. Supportive onc was consulted and dexamethsone 4mg BID for appetite started         Rapid response called 2/23 around 4:30pm for hypotension. Patient given 1.5L fluid bolus, 25g albumin. Vancomycin started in addition to zosyn due to concern for LE cellulitis.  VBG: pH 7.23, pCO2 36, pO2 39, lactate 10.  "CMP with glucose 49, K 6.2, HCO3 12, BUN 88, Cr 2.28. Calcium gluconate given for hyperkalemia. WBC decreased from 9>4.3, Plt 128>92 from AM labs. Hgb stable 14.      CXR with Mild pulmonary interstitial edema without focal consolidation, effusion, or pneumothorax.     EKG showed sinus tachycardia, no peaked T waves.     Patient transferred to MICU      PMH: as above  SurgHx: knee surgery  Allergies: Lates  SocHx: EtOH: unable to obtain Tobacco: former smoker per chart Drugs: unable to obtain  FamHx: unable to obtain      Physical Exam:   General: Lethargic, A&Ox2, but arousable   Chest: clear anteriorly  Pulm: On AirVo 30/30  CVS: tachycardic, no definite murmurs appreciated   Abdomen: tender, but non-peritonitic   LE: no edema on left leg, right legt with swelling and erythema, marked with pen   : carreon in place with minimal urine output     Objective     Physical Exam    Last Recorded Vitals  Blood pressure 94/60, pulse (!) 115, temperature 36.4 °C (97.5 °F), temperature source Temporal, resp. rate 16, height 1.695 m (5' 6.73\"), weight 103 kg (226 lb 3.1 oz), SpO2 93 %.  Intake/Output last 3 Shifts:  I/O last 3 completed shifts:  In: 3458.9 (33.7 mL/kg) [I.V.:1608.9 (15.7 mL/kg); Blood:500; IV Piggyback:1350]  Out: 810 (7.9 mL/kg) [Urine:810 (0.2 mL/kg/hr)]  Weight: 102.6 kg     Relevant Results  Scheduled medications  calcium gluconate, 2 g, intravenous, q4h  clindamycin, 900 mg, intravenous, q8h  hydrocortisone sodium succinate, 50 mg, intravenous, q6h  lactulose, 20 g, oral, BID  lidocaine, 1 patch, transdermal, Daily  [Held by provider] OLANZapine, 2.5 mg, oral, Nightly  pantoprazole, 40 mg, intravenous, Daily  phenylephrine HCl in 0.9% NaCl, , ,   piperacillin-tazobactam, 2.25 g, intravenous, q6h  rifAXIMin, 550 mg, oral, BID  [Held by provider] spironolactone, 50 mg, oral, Daily      Continuous medications  dextrose 10 % in water (D10W), 35 mL/hr, Last Rate: 35 mL/hr (02/24/24 1600)  norepinephrine, 0-3.3 " mcg/kg/min, Last Rate: 0.24 mcg/kg/min (02/24/24 1600)  PrismaSol BGK 2/3.5, 2,700 mL/hr  vasopressin, 0.03 Units/min, Last Rate: 0.03 Units/min (02/24/24 1600)      PRN medications  PRN medications: acetaminophen **OR** acetaminophen **OR** acetaminophen, albuterol, dextrose, diclofenac sodium, diphenhydrAMINE, EPINEPHrine, famotidine, HYDROmorphone, melatonin, oxyCODONE, oxygen, phenylephrine HCl in 0.9% NaCl, prochlorperazine, prochlorperazine, sodium chloride, vancomycin  Results for orders placed or performed during the hospital encounter of 02/03/24 (from the past 24 hour(s))   Ammonia   Result Value Ref Range    Ammonia 60 (H) 16 - 53 umol/L   Potassium   Result Value Ref Range    Potassium 8.1 (HH) 3.5 - 5.3 mmol/L   Lactate   Result Value Ref Range    Lactate 8.8 (HH) 0.4 - 2.0 mmol/L   CBC   Result Value Ref Range    WBC 3.9 (L) 4.4 - 11.3 x10*3/uL    nRBC 1.3 (H) 0.0 - 0.0 /100 WBCs    RBC 3.85 (L) 4.00 - 5.20 x10*6/uL    Hemoglobin 11.9 (L) 12.0 - 16.0 g/dL    Hematocrit 34.0 (L) 36.0 - 46.0 %    MCV 88 80 - 100 fL    MCH 30.9 26.0 - 34.0 pg    MCHC 35.0 32.0 - 36.0 g/dL    RDW 24.9 (H) 11.5 - 14.5 %    Platelets 72 (L) 150 - 450 x10*3/uL   Comprehensive metabolic panel   Result Value Ref Range    Glucose 82 74 - 99 mg/dL    Sodium 136 136 - 145 mmol/L    Potassium 5.3 3.5 - 5.3 mmol/L    Chloride 105 98 - 107 mmol/L    Bicarbonate 15 (L) 21 - 32 mmol/L    Anion Gap 21 (H) 10 - 20 mmol/L    Urea Nitrogen 85 (H) 6 - 23 mg/dL    Creatinine 2.54 (H) 0.50 - 1.05 mg/dL    eGFR 20 (L) >60 mL/min/1.73m*2    Calcium 8.2 (L) 8.6 - 10.6 mg/dL    Albumin 1.6 (L) 3.4 - 5.0 g/dL    Alkaline Phosphatase 140 (H) 33 - 136 U/L    Total Protein 4.7 (L) 6.4 - 8.2 g/dL    AST 56 (H) 9 - 39 U/L    Bilirubin, Total 13.3 (H) 0.0 - 1.2 mg/dL    ALT 34 7 - 45 U/L   Magnesium   Result Value Ref Range    Magnesium 2.62 (H) 1.60 - 2.40 mg/dL   Phosphorus   Result Value Ref Range    Phosphorus 4.8 2.5 - 4.9 mg/dL   Blood Culture     Specimen: Peripheral Venipuncture; Blood culture   Result Value Ref Range    Blood Culture       Identification and susceptibility testing to follow    Gram Stain Gram negative bacilli (AA)    Blood Gas Venous Full Panel   Result Value Ref Range    POCT pH, Venous 7.22 (LL) 7.33 - 7.43 pH    POCT pCO2, Venous 34 (L) 41 - 51 mm Hg    POCT pO2, Venous 44 35 - 45 mm Hg    POCT SO2, Venous 64 45 - 75 %    POCT Oxy Hemoglobin, Venous 62.9 45.0 - 75.0 %    POCT Hematocrit Calculated, Venous 37.0 36.0 - 46.0 %    POCT Sodium, Venous 132 (L) 136 - 145 mmol/L    POCT Potassium, Venous 5.4 (H) 3.5 - 5.3 mmol/L    POCT Chloride, Venous 104 98 - 107 mmol/L    POCT Ionized Calicum, Venous 1.15 1.10 - 1.33 mmol/L    POCT Glucose, Venous 89 74 - 99 mg/dL    POCT Lactate, Venous 9.1 (HH) 0.4 - 2.0 mmol/L    POCT Base Excess, Venous -12.8 (L) -2.0 - 3.0 mmol/L    POCT HCO3 Calculated, Venous 13.9 (L) 22.0 - 26.0 mmol/L    POCT Hemoglobin, Venous 12.2 12.0 - 16.0 g/dL    POCT Anion Gap, Venous 20.0 10.0 - 25.0 mmol/L    Patient Temperature 37.0 degrees Celsius    FiO2 26 %   Coagulation Screen   Result Value Ref Range    Protime 22.8 (H) 9.8 - 12.8 seconds    INR 2.0 (H) 0.9 - 1.1    aPTT 45 (H) 27 - 38 seconds   Blood Culture    Specimen: Peripheral Venipuncture; Blood culture   Result Value Ref Range    Blood Culture       Identification and susceptibility testing to follow    Blood Culture Escherichia coli (AA)     BLOOD CULTURE BOTTLE  Positive Aerobic Bottle     Gram Stain Gram negative bacilli (AA)    Procalcitonin   Result Value Ref Range    Procalcitonin 133.44 (H) <=0.07 ng/mL   POCT GLUCOSE   Result Value Ref Range    POCT Glucose 84 74 - 99 mg/dL   POCT GLUCOSE   Result Value Ref Range    POCT Glucose 106 (H) 74 - 99 mg/dL   BLOOD GAS ARTERIAL FULL PANEL   Result Value Ref Range    POCT pH, Arterial 7.23 (LL) 7.38 - 7.42 pH    POCT pCO2, Arterial 30 (L) 38 - 42 mm Hg    POCT pO2, Arterial 123 (H) 85 - 95 mm Hg    POCT SO2,  Arterial 100 94 - 100 %    POCT Oxy Hemoglobin, Arterial 96.8 94.0 - 98.0 %    POCT Hematocrit Calculated, Arterial 38.0 36.0 - 46.0 %    POCT Sodium, Arterial 129 (L) 136 - 145 mmol/L    POCT Potassium, Arterial 5.3 3.5 - 5.3 mmol/L    POCT Chloride, Arterial 105 98 - 107 mmol/L    POCT Ionized Calcium, Arterial 1.13 1.10 - 1.33 mmol/L    POCT Glucose, Arterial 121 (H) 74 - 99 mg/dL    POCT Lactate, Arterial 8.5 (HH) 0.4 - 2.0 mmol/L    POCT Base Excess, Arterial -13.7 (L) -2.0 - 3.0 mmol/L    POCT HCO3 Calculated, Arterial 12.6 (L) 22.0 - 26.0 mmol/L    POCT Hemoglobin, Arterial 12.5 12.0 - 16.0 g/dL    POCT Anion Gap, Arterial 17 10 - 25 mmo/L    Patient Temperature 37.0 degrees Celsius    FiO2 40 %   POCT GLUCOSE   Result Value Ref Range    POCT Glucose 90 74 - 99 mg/dL   CBC and Auto Differential   Result Value Ref Range    WBC 3.3 (L) 4.4 - 11.3 x10*3/uL    nRBC 1.8 (H) 0.0 - 0.0 /100 WBCs    RBC 3.89 (L) 4.00 - 5.20 x10*6/uL    Hemoglobin 12.4 12.0 - 16.0 g/dL    Hematocrit 32.1 (L) 36.0 - 46.0 %    MCV 83 80 - 100 fL    MCH 31.9 26.0 - 34.0 pg    MCHC 38.6 (H) 32.0 - 36.0 g/dL    RDW 23.7 (H) 11.5 - 14.5 %    Platelets 43 (L) 150 - 450 x10*3/uL    Immature Granulocytes %, Automated 0.6 0.0 - 0.9 %    Immature Granulocytes Absolute, Automated 0.02 0.00 - 0.70 x10*3/uL   Comprehensive metabolic panel   Result Value Ref Range    Glucose 73 (L) 74 - 99 mg/dL    Sodium 138 136 - 145 mmol/L    Potassium 5.3 3.5 - 5.3 mmol/L    Chloride 106 98 - 107 mmol/L    Bicarbonate 14 (L) 21 - 32 mmol/L    Anion Gap 23 (H) 10 - 20 mmol/L    Urea Nitrogen 81 (H) 6 - 23 mg/dL    Creatinine 2.67 (H) 0.50 - 1.05 mg/dL    eGFR 19 (L) >60 mL/min/1.73m*2    Calcium 8.1 (L) 8.6 - 10.6 mg/dL    Albumin <1.5 (L) 3.4 - 5.0 g/dL    Alkaline Phosphatase 118 33 - 136 U/L    Total Protein 4.1 (L) 6.4 - 8.2 g/dL    AST 63 (H) 9 - 39 U/L    Bilirubin, Total 14.1 (H) 0.0 - 1.2 mg/dL    ALT 36 7 - 45 U/L   Magnesium   Result Value Ref Range     Magnesium 2.40 1.60 - 2.40 mg/dL   Uric Acid   Result Value Ref Range    Uric Acid 4.1 2.3 - 6.7 mg/dL   Lactate dehydrogenase   Result Value Ref Range     84 - 246 U/L   Phosphorus   Result Value Ref Range    Phosphorus 4.8 2.5 - 4.9 mg/dL   Coagulation Screen   Result Value Ref Range    Protime 22.4 (H) 9.8 - 12.8 seconds    INR 2.0 (H) 0.9 - 1.1    aPTT 44 (H) 27 - 38 seconds   Type And Screen   Result Value Ref Range    ABO TYPE O     Rh TYPE POS     ANTIBODY SCREEN NEG    Blood Gas Lactic Acid, Venous   Result Value Ref Range    POCT Lactate, Venous 9.9 (HH) 0.4 - 2.0 mmol/L   Lactate   Result Value Ref Range    Lactate 9.0 (HH) 0.4 - 2.0 mmol/L   Ferritin   Result Value Ref Range    Ferritin 864 (H) 8 - 150 ng/mL   Fibrinogen   Result Value Ref Range    Fibrinogen 286 200 - 400 mg/dL   Folate   Result Value Ref Range    Folate, Serum 6.2 >5.0 ng/mL   Haptoglobin   Result Value Ref Range    Haptoglobin 77 37 - 246 mg/dL   Iron and TIBC   Result Value Ref Range    Iron 12 (L) 35 - 150 ug/dL    UIBC 118 110 - 370 ug/dL    TIBC 130 (L) 240 - 445 ug/dL    % Saturation 9 (L) 25 - 45 %   Reticulocytes   Result Value Ref Range    Retic % 2.5 (H) 0.5 - 2.0 %    Retic Absolute 0.095 0.017 - 0.110 x10*6/uL    Reticulocyte Hemoglobin 39 (H) 28 - 38 pg    Immature Retic fraction 17.4 (H) <=16.0 %   Vitamin B12   Result Value Ref Range    Vitamin B12 1,576 (H) 211 - 911 pg/mL   Creatine Kinase   Result Value Ref Range    Creatine Kinase 163 0 - 215 U/L   Manual Differential   Result Value Ref Range    Neutrophils %, Manual 37.4 40.0 - 80.0 %    Lymphocytes %, Manual 8.6 13.0 - 44.0 %    Monocytes %, Manual 44.2 2.0 - 10.0 %    Eosinophils %, Manual 0.0 0.0 - 6.0 %    Basophils %, Manual 0.0 0.0 - 2.0 %    Atypical Lymphocytes %, Manual 0.6 0.0 - 2.0 %    Metamyelocytes %, Manual 4.3 0.0 - 0.0 %    Myelocytes %, Manual 3.7 0.0 - 0.0 %    Promyelocytes %, Manual 1.2 0.0 - 0.0 %    Seg Neutrophils Absolute, Manual 1.23  1.20 - 7.00 x10*3/uL    Lymphocytes Absolute, Manual 0.28 (L) 1.20 - 4.80 x10*3/uL    Monocytes Absolute, Manual 1.46 (H) 0.10 - 1.00 x10*3/uL    Eosinophils Absolute, Manual 0.00 0.00 - 0.70 x10*3/uL    Basophils Absolute, Manual 0.00 0.00 - 0.10 x10*3/uL    Atypical Lymphs Absolute, Manual 0.02 0.00 - 0.50 x10*3/uL    Metamyelocytes Absolute, Manual 0.14 0.00 - 0.00 x10*3/uL    Myelocytes Absolute, Manual 0.12 0.00 - 0.00 x10*3/uL    Promyelocytes Absolute, Manual 0.04 0.00 - 0.00 x10*3/uL    Total Cells Counted 163     RBC Morphology See Below     Polychromasia Mild     Target Cells Few     Russell Cells Few    Blood Gas Arterial Full Panel   Result Value Ref Range    POCT pH, Arterial 7.28 (L) 7.38 - 7.42 pH    POCT pCO2, Arterial 30 (L) 38 - 42 mm Hg    POCT pO2, Arterial 136 (H) 85 - 95 mm Hg    POCT SO2, Arterial 100 94 - 100 %    POCT Oxy Hemoglobin, Arterial 97.5 94.0 - 98.0 %    POCT Hematocrit Calculated, Arterial 38.0 36.0 - 46.0 %    POCT Sodium, Arterial 131 (L) 136 - 145 mmol/L    POCT Potassium, Arterial 5.4 (H) 3.5 - 5.3 mmol/L    POCT Chloride, Arterial 104 98 - 107 mmol/L    POCT Ionized Calcium, Arterial 1.21 1.10 - 1.33 mmol/L    POCT Glucose, Arterial 74 74 - 99 mg/dL    POCT Lactate, Arterial 9.1 (HH) 0.4 - 2.0 mmol/L    POCT Base Excess, Arterial -11.3 (L) -2.0 - 3.0 mmol/L    POCT HCO3 Calculated, Arterial 14.1 (L) 22.0 - 26.0 mmol/L    POCT Hemoglobin, Arterial 12.8 12.0 - 16.0 g/dL    POCT Anion Gap, Arterial 18 10 - 25 mmo/L    Patient Temperature 37.0 degrees Celsius    FiO2 36 %   Urinalysis with Reflex Microscopic   Result Value Ref Range    Color, Urine Dark-Yellow Light-Yellow, Yellow, Dark-Yellow    Appearance, Urine Turbid (N) Clear    Specific Gravity, Urine 1.016 1.005 - 1.035    pH, Urine 5.0 5.0, 5.5, 6.0, 6.5, 7.0, 7.5, 8.0    Protein, Urine 10 (TRACE) NEGATIVE, 10 (TRACE), 20 (TRACE) mg/dL    Glucose, Urine Normal Normal mg/dL    Blood, Urine 0.2 (2+) (A) NEGATIVE    Ketones,  Urine NEGATIVE NEGATIVE mg/dL    Bilirubin, Urine 2 (2+) (A) NEGATIVE    Urobilinogen, Urine Normal Normal mg/dL    Nitrite, Urine NEGATIVE NEGATIVE    Leukocyte Esterase, Urine NEGATIVE NEGATIVE   Microscopic Only, Urine   Result Value Ref Range    WBC, Urine 11-20 (A) 1-5, NONE /HPF    RBC, Urine >20 (A) NONE, 1-2, 3-5 /HPF    Squamous Epithelial Cells, Urine 10-25 (FEW) Reference range not established. /HPF   POCT GLUCOSE   Result Value Ref Range    POCT Glucose 129 (H) 74 - 99 mg/dL   CBC and Auto Differential   Result Value Ref Range    WBC 5.6 4.4 - 11.3 x10*3/uL    nRBC 0.5 (H) 0.0 - 0.0 /100 WBCs    RBC 3.67 (L) 4.00 - 5.20 x10*6/uL    Hemoglobin 11.3 (L) 12.0 - 16.0 g/dL    Hematocrit 32.3 (L) 36.0 - 46.0 %    MCV 88 80 - 100 fL    MCH 30.8 26.0 - 34.0 pg    MCHC 35.0 32.0 - 36.0 g/dL    RDW 24.4 (H) 11.5 - 14.5 %    Platelets 26 (LL) 150 - 450 x10*3/uL    Immature Granulocytes %, Automated 0.2 0.0 - 0.9 %    Immature Granulocytes Absolute, Automated 0.01 0.00 - 0.70 x10*3/uL   Magnesium   Result Value Ref Range    Magnesium 2.35 1.60 - 2.40 mg/dL   Hepatic Function Panel   Result Value Ref Range    Albumin 1.8 (L) 3.4 - 5.0 g/dL    Bilirubin, Total 15.3 (H) 0.0 - 1.2 mg/dL    Bilirubin, Direct 9.3 (H) 0.0 - 0.3 mg/dL    Alkaline Phosphatase 99 33 - 136 U/L    ALT 33 7 - 45 U/L    AST 59 (H) 9 - 39 U/L    Total Protein 4.3 (L) 6.4 - 8.2 g/dL   Lactate   Result Value Ref Range    Lactate 8.2 (HH) 0.4 - 2.0 mmol/L   Phosphorus   Result Value Ref Range    Phosphorus 4.2 2.5 - 4.9 mg/dL   Basic Metabolic Panel   Result Value Ref Range    Glucose 128 (H) 74 - 99 mg/dL    Sodium 135 (L) 136 - 145 mmol/L    Potassium 5.0 3.5 - 5.3 mmol/L    Chloride 102 98 - 107 mmol/L    Bicarbonate 16 (L) 21 - 32 mmol/L    Anion Gap 22 (H) 10 - 20 mmol/L    Urea Nitrogen 84 (H) 6 - 23 mg/dL    Creatinine 2.64 (H) 0.50 - 1.05 mg/dL    eGFR 19 (L) >60 mL/min/1.73m*2    Calcium 7.7 (L) 8.6 - 10.6 mg/dL   Manual Differential    Result Value Ref Range    Neutrophils %, Manual 26.7 40.0 - 80.0 %    Lymphocytes %, Manual 18.5 13.0 - 44.0 %    Monocytes %, Manual 54.8 2.0 - 10.0 %    Eosinophils %, Manual 0.0 0.0 - 6.0 %    Basophils %, Manual 0.0 0.0 - 2.0 %    Seg Neutrophils Absolute, Manual 1.50 1.20 - 7.00 x10*3/uL    Lymphocytes Absolute, Manual 1.04 (L) 1.20 - 4.80 x10*3/uL    Monocytes Absolute, Manual 3.07 (H) 0.10 - 1.00 x10*3/uL    Eosinophils Absolute, Manual 0.00 0.00 - 0.70 x10*3/uL    Basophils Absolute, Manual 0.00 0.00 - 0.10 x10*3/uL    Total Cells Counted 146     RBC Morphology See Below     Polychromasia Mild     Russell Cells Few    Blood Gas Arterial Full Panel   Result Value Ref Range    POCT pH, Arterial 7.28 (L) 7.38 - 7.42 pH    POCT pCO2, Arterial 33 (L) 38 - 42 mm Hg    POCT pO2, Arterial 103 (H) 85 - 95 mm Hg    POCT SO2, Arterial 99 94 - 100 %    POCT Oxy Hemoglobin, Arterial 96.5 94.0 - 98.0 %    POCT Hematocrit Calculated, Arterial 38.0 36.0 - 46.0 %    POCT Sodium, Arterial 129 (L) 136 - 145 mmol/L    POCT Potassium, Arterial 5.3 3.5 - 5.3 mmol/L    POCT Chloride, Arterial 101 98 - 107 mmol/L    POCT Ionized Calcium, Arterial 1.13 1.10 - 1.33 mmol/L    POCT Glucose, Arterial 130 (H) 74 - 99 mg/dL    POCT Lactate, Arterial 8.3 (HH) 0.4 - 2.0 mmol/L    POCT Base Excess, Arterial -10.2 (L) -2.0 - 3.0 mmol/L    POCT HCO3 Calculated, Arterial 15.5 (L) 22.0 - 26.0 mmol/L    POCT Hemoglobin, Arterial 12.8 12.0 - 16.0 g/dL    POCT Anion Gap, Arterial 18 10 - 25 mmo/L    Patient Temperature 37.0 degrees Celsius    FiO2 32 %   Urine electrolytes   Result Value Ref Range    Sodium, Urine Random 37 mmol/L    Sodium/Creatinine Ratio 33 Not established. mmol/g Creat    Potassium, Urine Random 56 mmol/L    Potassium/Creatinine Ratio 51 Not established mmol/g Creat    Chloride, Urine Random 46 mmol/L    Chloride/Creatinine Ratio 42 38 - 318 mmol/g creat    Creatinine, Urine Random 110.8 20.0 - 320.0 mg/dL   POCT GLUCOSE    Result Value Ref Range    POCT Glucose 129 (H) 74 - 99 mg/dL   Blood Gas Lactic Acid, Venous   Result Value Ref Range    POCT Lactate, Venous 7.4 (HH) 0.4 - 2.0 mmol/L   Blood Gas Venous Full Panel   Result Value Ref Range    POCT pH, Venous 7.29 (L) 7.33 - 7.43 pH    POCT pCO2, Venous 39 (L) 41 - 51 mm Hg    POCT pO2, Venous 50 (H) 35 - 45 mm Hg    POCT SO2, Venous 82 (H) 45 - 75 %    POCT Oxy Hemoglobin, Venous 80.0 (H) 45.0 - 75.0 %    POCT Hematocrit Calculated, Venous 36.0 36.0 - 46.0 %    POCT Sodium, Venous 129 (L) 136 - 145 mmol/L    POCT Potassium, Venous 5.2 3.5 - 5.3 mmol/L    POCT Chloride, Venous 98 98 - 107 mmol/L    POCT Ionized Calicum, Venous 1.08 (L) 1.10 - 1.33 mmol/L    POCT Glucose, Venous 158 (H) 74 - 99 mg/dL    POCT Lactate, Venous 7.4 (HH) 0.4 - 2.0 mmol/L    POCT Base Excess, Venous -7.3 (L) -2.0 - 3.0 mmol/L    POCT HCO3 Calculated, Venous 18.8 (L) 22.0 - 26.0 mmol/L    POCT Hemoglobin, Venous 12.1 12.0 - 16.0 g/dL    POCT Anion Gap, Venous 17.0 10.0 - 25.0 mmol/L    Patient Temperature 37.0 degrees Celsius    FiO2 35 %   Fibrinogen   Result Value Ref Range    Fibrinogen 279 200 - 400 mg/dL   Coagulation Screen   Result Value Ref Range    Protime 24.5 (H) 9.8 - 12.8 seconds    INR 2.2 (H) 0.9 - 1.1    aPTT 62 (H) 27 - 38 seconds   Blood Gas Arterial Full Panel   Result Value Ref Range    POCT pH, Arterial 7.31 (L) 7.38 - 7.42 pH    POCT pCO2, Arterial 37 (L) 38 - 42 mm Hg    POCT pO2, Arterial 90 85 - 95 mm Hg    POCT SO2, Arterial 99 94 - 100 %    POCT Oxy Hemoglobin, Arterial 95.8 94.0 - 98.0 %    POCT Hematocrit Calculated, Arterial 36.0 36.0 - 46.0 %    POCT Sodium, Arterial 129 (L) 136 - 145 mmol/L    POCT Potassium, Arterial 5.3 3.5 - 5.3 mmol/L    POCT Chloride, Arterial 99 98 - 107 mmol/L    POCT Ionized Calcium, Arterial 1.08 (L) 1.10 - 1.33 mmol/L    POCT Glucose, Arterial 153 (H) 74 - 99 mg/dL    POCT Lactate, Arterial 7.3 (HH) 0.4 - 2.0 mmol/L    POCT Base Excess, Arterial  -7.0 (L) -2.0 - 3.0 mmol/L    POCT HCO3 Calculated, Arterial 18.6 (L) 22.0 - 26.0 mmol/L    POCT Hemoglobin, Arterial 12.0 12.0 - 16.0 g/dL    POCT Anion Gap, Arterial 17 10 - 25 mmo/L    Patient Temperature 37.0 degrees Celsius    FiO2 35 %   POCT GLUCOSE   Result Value Ref Range    POCT Glucose 125 (H) 74 - 99 mg/dL   Blood Gas Arterial Full Panel   Result Value Ref Range    POCT pH, Arterial 7.28 (L) 7.38 - 7.42 pH    POCT pCO2, Arterial 39 38 - 42 mm Hg    POCT pO2, Arterial 70 (L) 85 - 95 mm Hg    POCT SO2, Arterial 94 94 - 100 %    POCT Oxy Hemoglobin, Arterial 91.8 (L) 94.0 - 98.0 %    POCT Hematocrit Calculated, Arterial 37.0 36.0 - 46.0 %    POCT Sodium, Arterial 128 (L) 136 - 145 mmol/L    POCT Potassium, Arterial 5.6 (H) 3.5 - 5.3 mmol/L    POCT Chloride, Arterial 98 98 - 107 mmol/L    POCT Ionized Calcium, Arterial 1.06 (L) 1.10 - 1.33 mmol/L    POCT Glucose, Arterial 109 (H) 74 - 99 mg/dL    POCT Lactate, Arterial 6.8 (HH) 0.4 - 2.0 mmol/L    POCT Base Excess, Arterial -7.9 (L) -2.0 - 3.0 mmol/L    POCT HCO3 Calculated, Arterial 18.3 (L) 22.0 - 26.0 mmol/L    POCT Hemoglobin, Arterial 12.4 12.0 - 16.0 g/dL    POCT Anion Gap, Arterial 17 10 - 25 mmo/L    Patient Temperature 37.0 degrees Celsius    FiO2 35 %   Renal Function Panel   Result Value Ref Range    Glucose 107 (H) 74 - 99 mg/dL    Sodium 133 (L) 136 - 145 mmol/L    Potassium 5.4 (H) 3.5 - 5.3 mmol/L    Chloride 98 98 - 107 mmol/L    Bicarbonate 20 (L) 21 - 32 mmol/L    Anion Gap 20 10 - 20 mmol/L    Urea Nitrogen 84 (H) 6 - 23 mg/dL    Creatinine 2.88 (H) 0.50 - 1.05 mg/dL    eGFR 17 (L) >60 mL/min/1.73m*2    Calcium 7.9 (L) 8.6 - 10.6 mg/dL    Phosphorus 5.2 (H) 2.5 - 4.9 mg/dL    Albumin 2.0 (L) 3.4 - 5.0 g/dL   Magnesium   Result Value Ref Range    Magnesium 2.37 1.60 - 2.40 mg/dL   CBC and Auto Differential   Result Value Ref Range    WBC 8.4 4.4 - 11.3 x10*3/uL    nRBC 0.6 (H) 0.0 - 0.0 /100 WBCs    RBC 3.86 (L) 4.00 - 5.20 x10*6/uL     Hemoglobin 11.9 (L) 12.0 - 16.0 g/dL    Hematocrit 33.4 (L) 36.0 - 46.0 %    MCV 87 80 - 100 fL    MCH 30.8 26.0 - 34.0 pg    MCHC 35.6 32.0 - 36.0 g/dL    RDW 24.1 (H) 11.5 - 14.5 %    Platelets 8 (LL) 150 - 450 x10*3/uL    Neutrophils % 93.4 40.0 - 80.0 %    Immature Granulocytes %, Automated 0.8 0.0 - 0.9 %    Lymphocytes % 3.0 13.0 - 44.0 %    Monocytes % 1.7 2.0 - 10.0 %    Eosinophils % 0.1 0.0 - 6.0 %    Basophils % 1.0 0.0 - 2.0 %    Neutrophils Absolute 7.86 (H) 1.20 - 7.70 x10*3/uL    Immature Granulocytes Absolute, Automated 0.07 0.00 - 0.70 x10*3/uL    Lymphocytes Absolute 0.25 (L) 1.20 - 4.80 x10*3/uL    Monocytes Absolute 0.14 0.10 - 1.00 x10*3/uL    Eosinophils Absolute 0.01 0.00 - 0.70 x10*3/uL    Basophils Absolute 0.08 0.00 - 0.10 x10*3/uL   Morphology   Result Value Ref Range    RBC Morphology See Below     Polychromasia Mild     Target Cells Many     [place] Cells Many      CT abdomen pelvis wo IV contrast    Result Date: 2/24/2024  Interpreted By:  Garth Watson,  and Vadim Maurice STUDY: CT ABDOMEN PELVIS WO IV CONTRAST;  2/24/2024 2:01 pm   INDICATION: Signs/Symptoms:septic shock, assess for hematoma, has hx of R-femur avulsion fracture. 68-year-old female with history of seropositive RA on infliximab, osteoarthritis, nephrolithiasis, with recently diagnosed breast cancer status post chemotherapy completed 02/14/2024 and cirrhosis, transferred to MICU 02/23/2023 for hypertension, being treated for septic shock.   COMPARISON: CT abdomen pelvis 02/01/2024 and 01/30/2023, MRCP 02/05/2024   ACCESSION NUMBER(S): [id number]   ORDERING CLINICIAN: YVETTE LEMON   TECHNIQUE: CT of the abdomen and pelvis was performed. Contiguous axial images were obtained at 3 mm slice thickness through the abdomen and pelvis. Coronal and sagittal reconstructions at 3 mm slice thickness were performed.  No intravenous or oral contrast agents were administered.   FINDINGS: Please note that the evaluation of  vessels, lymph nodes and organs is limited without intravenous contrast.   LOWER CHEST: Small bilateral pleural effusions with associated atelectasis. The heart is mildly enlarged without significant pericardial effusion. Coronary artery calcifications are noted. Decrease in size of a 2 x 1.3 cm spiculated mass in the lateral left breast, previously 2 x 1.8 cm.   ABDOMEN:   LIVER: The liver demonstrates nodular contour and diffuse hypoattenuation consistent with cirrhosis. The liver is better assessed on recent MR 02/05/2024.   BILE DUCTS: The intrahepatic and extrahepatic ducts are not dilated.   GALLBLADDER: The gallbladder contains numerous radiopaque stones. No pericholecystic fluid or fat stranding.   PANCREAS: The pancreas appears unremarkable without evidence of ductal dilatation or masses.   SPLEEN: The spleen is normal in size without focal lesions.   ADRENAL GLANDS: Bilateral adrenal glands appear normal.   KIDNEYS AND URETERS: The kidneys are symmetric in size. There are multiple bilateral nonobstructing renal calculi measuring up to 6 mm at the right superior pole. No hydroureteronephrosis.   PELVIS:   BLADDER: The urinary bladder is partially decompressed with Trevino catheter in place. Small amount of anti dependent air is noted within the urinary bladder, likely related to catheter placement.   REPRODUCTIVE ORGANS: The uterus is present.   BOWEL: A small gastric diverticulum is again noted in the posterior aspect of the fundus. The small bowel is normal caliber without wall thickening. There is interval increased circumferential edematous mural thickening of the entire colon and rectum with perirectal fat stranding (series 3, images 159-169). The appendix is not definitely visualized. There is however no pericecal stranding.   VESSELS: Severe atherosclerosis of the abdominal aorta and its branches with ectasia of the infrarenal aorta measuring up to 3 cm. The IVC is normal.    PERITONEUM/RETROPERITONEUM/LYMPH NODES: Moderate abdominopelvic ascites. No focal fluid collection or pneumoperitoneum. No abdominopelvic lymphadenopathy.   ABDOMINAL WALL: There is mild-to-moderate body wall edema, most pronounced at the lateral and lower abdominal wall.   BONES: There is a similar heterogeneity with lytic lesion and cortical irregularity of the right lesser trochanter (series 3, image 183). Multilevel degenerative changes of the thoracolumbar spine are noted.       1.  Interval increased circumferential edematous mural thickening of the entire colon and rectum with perirectal fat stranding, nonspecific, but may be seen in the setting of infectious/inflammatory proctocolitis. 2. Irregular lytic lesion with cortical irregularity of the right lesser trochanter, nonspecific, can represent avulsion fracture or aggressive osseous lesion including metastatic disease. 3. Interval decreased size of a left breast mass in keeping with patient's known primary malignancy. 4. Moderate ascites and small bilateral pleural effusions. Please correlate clinically with patient's volume status. 5. Additional findings as above.   I personally reviewed the images/study and I agree with the findings as stated by Josafat Fierro MD. This study was interpreted at [place], [place].   MACRO: None   Signed by: Garth Waston 2/24/2024 3:38 PM Dictation workstation:   [id number]    CT tibia fibula right wo IV contrast    Result Date: 2/24/2024  STUDY: CT TIBIA FIBULA RIGHT WO IV CONTRAST;  2/24/2024 2:00 pm   INDICATION: Signs/Symptoms:r\o infection. 68-year-old female with history of seropositive RA on infliximab, osteoarthritis, nephrolithiasis, with recently diagnosed breast cancer status post chemotherapy completed 02/14/2024 and cirrhosis, transferred to MICU 02/23/2023 for hypertension, being treated for septic shock. Concern for right lower extremity infection.    COMPARISON: None.   ACCESSION NUMBER(S): [id number]   ORDERING CLINICIAN: JENN WALTER   TECHNIQUE: CT imaging of the distal right femur to the foot was obtained without contrast. Coronal and sagittal reformatted images were performed.   FINDINGS: OSSEOUS STRUCTURES: Postsurgical changes from total right knee arthroplasty. Beam hardening artifact limits evaluation of the adjacent structures. Within these limitations, there is no acute fracture or malalignment.     SOFT TISSUES: There is moderate subcutaneous edema of the visualized right lower extremity, most pronounced in the anterior aspect of the mid to distal lower leg and extending to the dorsal midfoot. No focal fluid collection.       Moderate subcutaneous edema of the visualized right lower extremity, most pronounced in the anterior mid to distal lower leg extending to the dorsal midfoot. Please correlate clinically with cellulitis. No drainable fluid collection.   Postsurgical changes from total right knee arthroplasty without evidence of hardware complication. No acute osseous abnormality of the visualized right lower extremity.   I personally reviewed the images/study and I agree with the findings as stated by Josafat Fierro MD. This study was interpreted at [place], [place].   MACRO: None     Dictation workstation:   [id number]    XR chest 1 view    Result Date: 2/24/2024  Interpreted By:  Freedom Sethi and Meyers Emily STUDY: XR CHEST 1 VIEW;  2/24/2024 1:41 am   INDICATION: Signs/Symptoms:line placement.   COMPARISON: Chest radiograph 02/23/2024   ACCESSION NUMBER(S): [id number]   ORDERING CLINICIAN: JESUS ALEMAN   FINDINGS: AP radiograph of the chest was provided.   Left IJ central venous catheter with its tip overlying the expected location of the proximal superior vena cava.   CARDIOMEDIASTINAL SILHOUETTE: Cardiomediastinal silhouette is stable in size and configuration.   LUNGS:  Persistent low lung volumes with resultant bronchovascular crowding. Increased interstitial prominence when compared to prior exam. No focal consolidation or pneumothorax. Blunting of the costophrenic angles.   ABDOMEN: No remarkable upper abdominal findings.   BONES: No acute osseous changes.       1. Slight interval increase in pulmonary interstitial edema when compared to prior exam. 2. Small bibasilar effusion and bibasilar atelectasis. Correlate with concern for superimposed infection. 3. Left IJ central venous catheter with its tip overlying the expected location of the proximal superior vena cava.   I personally reviewed the images/study, and I agree with the findings as stated above. This study was interpreted at [place], [place].   MACRO: None   Signed by: Freedom Renteria 2/24/2024 8:07 AM Dictation workstation:   [id number]    Vascular US lower extremity venous duplex right    Result Date: 2/23/2024  Interpreted By:  Jessenia Abraham and Stephens Katherine STUDY: [facility] US LOWER EXTREMITY VENOUS DUPLEX RIGHT;  2/23/2024 2:31 pm   INDICATION: Signs/Symptoms:RLE edema and redness. Hx of malignancy. Rule out DVT.   COMPARISON: None.   ACCESSION NUMBER(S): [id number]   ORDERING CLINICIAN: LASHAWN RICHARD   TECHNIQUE: Vascular ultrasound of the right lower extremity was performed. Real-time compression views as well as Gray scale, color Doppler and spectral Doppler waveform analysis was performed.   FINDINGS: Evaluation of the visualized portions of the right common femoral vein, proximal, mid, and distal femoral vein, and popliteal vein were performed.  Evaluation of the visualized portions of the   peroneal veins were also performed. The posterior tibial vein was not well-visualized.   Limitations:  Lower extremity edema.   The evaluated veins demonstrate normal compressibility. There is intact venous flow demonstrating normal respiratory variability  and normal augmentation of flow with calf compression. Therefore, there is no ultrasonographic evidence for deep vein thrombosis within the evaluated veins.       No sonographic evidence for deep vein thrombosis within the evaluated veins of the right lower extremity.   I personally reviewed the images/study and I agree with Lexie Bourgeois DO's (radiology resident) findings as stated. This study was interpreted at [facility], [place], [place].   MACRO: None   Signed by: Jessenia Abraham 2/23/2024 10:18 PM Dictation workstation:   [id number]    XR abdomen 1 view    Result Date: 2/23/2024  Interpreted By:  Freedom Sethi, STUDY: XR ABDOMEN 1 VIEW;  2/23/2024 7:08 pm   INDICATION: Signs/Symptoms:hap.   COMPARISON: CT dated 02/01/2024   ACCESSION NUMBER(S): [id number]   ORDERING CLINICIAN: JENNIFER MAE   FINDINGS: Two views of the abdomen and pelvis. Significant gaseous distension of the stomach. Paucity of bowel gas throughout the abdomen and pelvis. Limited evaluation of pneumoperitoneum on supine imaging, however no gross evidence of free air is noted.   Faint airspace opacity in lung bases. Question trace left basilar pleural effusion.   Osseous structures demonstrate no acute bony changes.       1.  Significant gaseous distention of the stomach. Consider decompression by NG tube. 2. Nonspecific paucity of gas in the loops of bowel which might be due to fluid-filled loops of bowel. No gas-filled dilated loop of bowel is identified.   Signed by: Freedom Renteria 2/23/2024 8:51 PM Dictation workstation:   [id number]    XR chest 1 view    Result Date: 2/23/2024  Interpreted By:  Freedom Sethi and Liller Gregory STUDY: XR CHEST 1 VIEW;  2/23/2024 4:59 pm   INDICATION: Signs/Symptoms:hypoxia.   COMPARISON: 02/04/2024   ACCESSION NUMBER(S): [id number]   ORDERING CLINICIAN: MARIBEL MUNOZ   FINDINGS: Patient is rotated, limiting evaluation.   AP radiograph  of the chest was provided.   CARDIOMEDIASTINAL SILHOUETTE: Cardiomediastinal silhouette is borderline enlarged in size and configuration when compared to prior exam which is likely positional in nature. Recommend attention follow-up   LUNGS: Low lung volumes contributing to bronchovascular crowding. There is mild interstitial prominence. No focal consolidation, effusion, or pneumothorax.   ABDOMEN: No remarkable upper abdominal findings.   BONES: No osseous injury.       1. Mild pulmonary interstitial edema without focal consolidation, effusion, or pneumothorax. 2. Cardiac silhouette size appears prominent/enlarged compared to prior study which might be due to patient's rotation. Recommend attention on follow-up     I personally reviewed the images/study and I agree with the findings as stated above by resident physician, Dr. Khari South. The study was interpreted at [facility] in [facility].   MACRO: none.   Signed by: Freedom Renteria 2/23/2024 8:49 PM Dictation workstation:   [id number]     Assessment/Plan   Principal Problem:    Cirrhosis (CMS/HCC)    A 68 y.o. female history of Seropositive RA (on infliximab), osteoarthritis, nephrolithiasis, obesity, history of tobacco use (quit march 2023), and on this admission diagnosed with ER-,CT-, Her2+ invasive ductal carcinoma who presented to [place] ED on 2/1 for dark red urine, generalized abdominal pain with nausea, found to have cirrhosis, and on admission to [facility] found to have metastatic breast cancer to liver and likely brain, s/p carbo and transtuzumab transferred to MICU 2/23/2023 for hypotension. Etiology of hypotension is likely septic shock 2/2 gram negative bacteremia.      Impression:   #Septic shock 2/2 gram negative bacteremia, potential sources include RLE cellulitis (though more commonly gram +ve), abdomen (?SBP), urine (Hx of Nephrolithiasis)  #HAGMA 2/2 lactic acidosis 2/2 septic shock with  decreased clearance 2/2 liver disease  #SYLVIA likely ATN 2/2 sepsis   #Leukopenia and thrombocytopenia (Bicytopenia) likely 2/2 sepsis, however recent chemotherapy induced is another possibility      2/24 Updates:  - CT Ab/pel/leg: Possible cellulitis of leg, possible proctocolitis, avulsion fx of R lesser trochanter, decreased size of left breast mass, moderate ascites and small bl pleural effusions  - ID consulted  - Stopped the Bicarb drip  - Nephro consulted - plan on dialysis  - plan for intubation with OG tube  - Lactulose and rifaximin started  - discontinued clindamycin  - Sacral wounds (consider wound consult)  - wound on breast    NEURO  #Encephalopathy  ::Likely toxic metabolic   -delirium precautions   -treat underlying metabolic disturbances - lactulose + rifaximin started     #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  -MRA once out of the acute setting, potentially outpatient      PULM  -Placed on AirVo   - consider support to help blow off CO2 in compensation for metabolic acidosis   - Intubation anticipated to protect airway due to encephalosis     CVS  #Septic Shock  ::Growing gram negative Bacilli   ::source is right leg likely cellulitis, vs. breast wound , need to r/o urine   -vanc/Zosyn/Clindamycin  - 1 x dose tobramycin   - follow BCx   - send for UA with reflex to culture   - trend lactates   - stress dose steroids  - MAP goal >65  - Discontinued clindamycin     RENAL  #SYLVIA  #Hyperkalemia   ::FENa from 2/14 suggestive of pre-renal etiology   ::likely ATN 2/2 sepsis   :: poor UOP  -strict I/Os  -renally dose meds  -nephrology consult in the AM  -plan for dialysis  -consider renal ultrasound in the AM (portable)   -TLS labs   -CK level     #Metabolic Acidosis (HAGMA AND NAGMA)  #Lactic Acidosis   ::lactate 2/2 septic shock with decreased clearance given liver disease  ::NAGMA likely 2/2 renal failure   -bicarb 50mEq x2 injections   -bicarb drip at 150cc/hr - stopped  -may need Renal  replacement therapy for refractory acidosis         GI  #new liver cirrhosis  #Cholelithiasis  #mixed hepatocellular and cholestatic liver injury   #mild confusion possible grade 1 hepatic encephalopahthy  #liver metastasis (breast primary)  :: hepC negative, hepB negative, hepA negative, T spot negative in 11/2023  :: alpha 1 antitrypsin negative  :: ferritin 406 H/transferrin 136 L  :: Hep B surface AG negative, Hep B surface AB positive at 580.6  ::liver biopsy significant for poorly differentiated breast cancer  ::no safe pocket to tap on 2/23/2024   - lactulose 20 g BID once oral access established   - lasix 20 mg and aldactone 50 mg currently held iso SYLVIA  - rifaxamin 550 mg BID for HE once oral access established      #Gaseous Distention of stomach   -consider NG tube decompression if blood counts acceptable and do not continue to drop   -consider CT abdomen non-con once stable      ONC  #nonhealing, draining, L breast wound  #primary breast cancer with liver met and likely brain mets  :: MRI brain showing small enhancing lesions in the cerebellum without mass effect with c/f metastatic disease, potential 4 mm MRA bifurcation aneurysm  :: TTE shows EF 70% and impaired diastolic relaxation  :: NM bone scan without definitive osseous metastatic disease  :: inpatient mammogram significant for masses and suspicious lymphadenopathy  :: s/p biopsies, final pathology shows invasive ductal carcinoma grade 3, ER-/DE-/HER2+  :: initial pathology showed ER+ so patient was started on anastrazole 1 mg, but this read was modified to ER- so anastrozole was stopped on 2/13  :: carboplatin (AUC 4) and trastuzumab (loading dose 8 mg/kg) completed on 2/14 with patient tolerating this well  - Daily CMP, LDH, and uric acid to monitor for tumor lysis syndrome  - G6PD testing negative        HEME  #Dropping blood counts   #Bicytopenia  ::chemo related vs. Sepsis   -send for hemolysis labs   -T+S  -send for vitamin B12, folate,  iron  -reticulocyte count  -peripheral smear      INFECTIOUS DISEASES  #Septic Shock  #Gram negative bacteremia  #RLE cellulitis   ::Bcx with gram negative bacilli  -vanc/zosyn/clinda   -1x tobra on 2/23/2024   -send for UA with reflex to culture      ENDO  #Hypoglycemia   ::likely related to sepsis + poor liver compensation given cirrhosis   -D10 drip      MSK  #RA - on infliximab  #osteoarthritis  :: hold home naproxen   - voltaren gel  - tylenol 650 mg q8 prn  - oxycodone 5 mg q4 prn  - outpatient rheum aware to discontinue infliximab  -      #subacute avulsion fracture lesser trochanter R proximal femur  - chronic?  - consult orthopedic surgery to assess subacute avulsion fracture of R femur found on CT on 2/1: nothing to do, patient can follow-up outpatient, WBAT  - NM bone scan without definitive osseous metastatic disease   -consider CT scan if Hgb dropping      #compression deformity at T12  -consider vitamin D and calcium     #abdominal aorta atherosclerosis on CT  #smoking history   - follow up with PCP outpatient to discuss potential statin/asa     F: PRN  E: PRN   N: regular diet  GI: none  P: holding  A: PIVs, left internal jugular CVC  C: full code  Surrogate:   Cierra daughter [phone] (first option)  Emmanuel son [phone]       Tej Enamorado,   Emergency Medicine PGY1

## 2024-02-24 NOTE — CONSULTS
"Meghan Waddell   68 y.o.    @WT@  MRN/Room: [id number]/09/09-A  DOA: 2/3/2024    REASON FOR CONSULT: with BUN 84, Cr 2.64 and oliguric     REQUESTING PHYSICIAN: Lexie Salmon MD  PRIMARY CARE PHYSICIAN: Chikis Peralta, DO    ADMISSION DIAGNOSIS:   1. Cirrhosis (CMS/HCC)    2. Other abnormal and inconclusive findings on diagnostic imaging of breast    3. Encounter for monitoring cardiotoxic drug therapy    4. Carcinoma of left breast metastatic to liver (CMS/HCC)    5. Secondary malignant neoplasm of other specified sites (CMS/HCC)    6. Bilateral leg edema    7. Shock (CMS/HCC)        ___________________________________________________________________________________  P/C:  dark red urine, generalized abdominal pain with nausea   ___________________________________________________________________________________  HPI:  Meghan Waddell is a 68 y.o. female with PMH of Seropositive RA (on infliximab), osteoarthritis, nephrolithiasis, obesity, history of tobacco use (quit march 2023), and on this admission diagnosed with ER-,[place]-, Her2+ invasive ductal carcinoma who presented to [place] ED on 2/1 for dark red urine, generalized abdominal pain with nausea, found to have cirrhosis and on admission to [facility] found to have metastatic breast cancer c/b liver mets, cerebellar mets, transferred to MICU 2/23/2023 for hypotension. . Nephrology consulted for SYLVIA to assist with management.    received carboplatin and transtuzumab (completed 2/14/2024) for her breast cancer and rcvd steroids as well for appetite          ROS: Other than noted in the HPI 12 point review of system was negative.      In The ER: BP 94/60   Pulse (!) 117   Temp 36.4 °C (97.5 °F) (Temporal)   Resp 14   Ht 1.695 m (5' 6.73\")   Wt 103 kg (226 lb 3.1 oz)   SpO2 93%   BMI 35.71 kg/m²      Past Medical History  She has a past medical history of Arthritis, rheumatoid (CMS/HCC), Cellulitis of right lower limb, and Personal history of other diseases " of the nervous system and sense organs.    Surgical History  She has a past surgical history that includes Other surgical history (02/10/2021) and Joint replacement (Bilateral).     Social History  She reports that she has quit smoking. Her smoking use included cigarettes. She has a 2.00 pack-year smoking history. She has never used smokeless tobacco. She reports that she does not currently use alcohol. She reports that she does not use drugs.    Family History  Family History   Problem Relation Name Age of Onset    Diabetes type II Mother      Heart disease Father      Breast cancer Sister  60        60-70    Ovarian cancer Maternal Grandmother         Meds:   clindamycin, 900 mg, q8h  hydrocortisone sodium succinate, 50 mg, q6h  lactulose, 20 g, BID  lidocaine, 1 patch, Daily  [Held by provider] OLANZapine, 2.5 mg, Nightly  pantoprazole, 40 mg, Daily  phenylephrine HCl in 0.9% NaCl, ,   piperacillin-tazobactam, 2.25 g, q6h  rifAXIMin, 550 mg, BID  [Held by provider] spironolactone, 50 mg, Daily      dextrose 10 % in water (D10W), Last Rate: 35 mL/hr (02/24/24 1500)  norepinephrine, Last Rate: 0.24 mcg/kg/min (02/24/24 1509)  PrismaSol BGK 2/3.5  vasopressin, Last Rate: 0.03 Units/min (02/24/24 1500)      acetaminophen, 650 mg, q8h PRN   Or  acetaminophen, 650 mg, q8h PRN   Or  acetaminophen, 650 mg, q8h PRN  albuterol, 3 mL, PRN  dextrose, 500 mL, PRN  diclofenac sodium, 4 g, 4x daily PRN  diphenhydrAMINE, 50 mg, PRN  EPINEPHrine, 0.3 mg, q5 min PRN  famotidine, 20 mg, Once PRN  HYDROmorphone, 0.4 mg, q3h PRN  melatonin, 5 mg, Nightly PRN  oxyCODONE, 5 mg, q4h PRN  oxygen, , Continuous PRN - O2/gases  phenylephrine HCl in 0.9% NaCl, ,   prochlorperazine, 10 mg, q6h PRN  prochlorperazine, 10 mg, q6h PRN  sodium chloride, 500 mL, PRN  vancomycin, , Daily PRN      Current Outpatient Medications   Medication Instructions    naproxen sodium (Aleve) 220 mg tablet 1 tablet, oral, 3 times daily PRN  "      ___________________________________________________________________________________  VITALS:  Temp:  [36.1 °C (97 °F)-36.9 °C (98.4 °F)] 36.4 °C (97.5 °F)  Heart Rate:  [] 117  Resp:  [10-20] 14  BP: ()/(32-76) 94/60  Arterial Line BP 1: ()/(45-71) 110/47  FiO2 (%):  [35 %] 35 %     Intake/Output Summary (Last 24 hours) at 2/24/2024 1548  Last data filed at 2/24/2024 1500  Gross per 24 hour   Intake 4748.69 ml   Output 230 ml   Net 4518.69 ml      I/O last 3 completed shifts:  In: 3458.9 (33.7 mL/kg) [I.V.:1608.9 (15.7 mL/kg); Blood:500; IV Piggyback:1350]  Out: 810 (7.9 mL/kg) [Urine:810 (0.2 mL/kg/hr)]  Weight: 102.6 kg        PHYSICAL EXAMINATION:  General appearance: altered  Eyes: non-icteric  Skin: no apparent rash  Heart: regular  Lungs: NVB B/L dec air entry at bases  Abdomen: soft, nt/nd  Extremities: edema B/L with LE cellulitis  [place]  Access: not yet for dialysis    ___________________________________________________________________________________  INVESTIGATIONS:  Results from last 7 days   Lab Units 02/24/24  0354   WBC AUTO x10*3/uL 5.6   RBC AUTO x10*6/uL 3.67*   HEMOGLOBIN g/dL 11.3*   HEMATOCRIT % 32.3*     Results from last 7 days   Lab Units 02/24/24  0354   SODIUM mmol/L 135*   POTASSIUM mmol/L 5.0   CHLORIDE mmol/L 102   CO2 mmol/L 16*   BUN mg/dL 84*   CREATININE mg/dL 2.64*   CALCIUM mg/dL 7.7*   PHOSPHORUS mg/dL 4.2   MAGNESIUM mg/dL 2.35   BILIRUBIN TOTAL mg/dL 15.3*   ALT U/L 33   AST U/L 59*     Results from last 7 days   Lab Units 02/24/24  0011   COLOR U  Dark-Yellow   PH U  5.0   SPEC GRAV UR  1.016   PROTEIN U mg/dL 10 (TRACE)   BLOOD UR  0.2 (2+)*   NITRITE U  NEGATIVE   WBC UR /HPF 11-20*     No results found for: \"ALBUR\", \"[id number]\"   Susceptibility data from last 90 days.  Collected Specimen Info Organism Amoxicillin/Clavulanate Ampicillin Ampicillin/Sulbactam Cefazolin Ceftriaxone Cefuroxime Ciprofloxacin Gentamicin Levofloxacin Piperacillin/Tazobactam " Trimethoprim/Sulfamethoxazole   02/23/24 Blood culture from Peripheral Venipuncture Escherichia coli              02/23/24 Blood culture from Peripheral Venipuncture Escherichia coli S R I I S S R S R S S   02/01/24 Tissue/Biopsy from Skin/Superficial Abscess Mixed Skin Microorganisms                   IMAGING:  CT abdomen pelvis wo IV contrast    Result Date: 2/24/2024  Interpreted By:  Garth Watson,  and Vadim Maurice STUDY: CT ABDOMEN PELVIS WO IV CONTRAST;  2/24/2024 2:01 pm   INDICATION: Signs/Symptoms:septic shock, assess for hematoma, has hx of R-femur avulsion fracture. 68-year-old female with history of seropositive RA on infliximab, osteoarthritis, nephrolithiasis, with recently diagnosed breast cancer status post chemotherapy completed 02/14/2024 and cirrhosis, transferred to MICU 02/23/2023 for hypertension, being treated for septic shock.   COMPARISON: CT abdomen pelvis 02/01/2024 and 01/30/2023, MRCP 02/05/2024   ACCESSION NUMBER(S): [id number]   ORDERING CLINICIAN: YVETTE LEMON   TECHNIQUE: CT of the abdomen and pelvis was performed. Contiguous axial images were obtained at 3 mm slice thickness through the abdomen and pelvis. Coronal and sagittal reconstructions at 3 mm slice thickness were performed.  No intravenous or oral contrast agents were administered.   FINDINGS: Please note that the evaluation of vessels, lymph nodes and organs is limited without intravenous contrast.   LOWER CHEST: Small bilateral pleural effusions with associated atelectasis. The heart is mildly enlarged without significant pericardial effusion. Coronary artery calcifications are noted. Decrease in size of a 2 x 1.3 cm spiculated mass in the lateral left breast, previously 2 x 1.8 cm.   ABDOMEN:   LIVER: The liver demonstrates nodular contour and diffuse hypoattenuation consistent with cirrhosis. The liver is better assessed on recent MR 02/05/2024.   BILE DUCTS: The intrahepatic and extrahepatic ducts are not  dilated.   GALLBLADDER: The gallbladder contains numerous radiopaque stones. No pericholecystic fluid or fat stranding.   PANCREAS: The pancreas appears unremarkable without evidence of ductal dilatation or masses.   SPLEEN: The spleen is normal in size without focal lesions.   ADRENAL GLANDS: Bilateral adrenal glands appear normal.   KIDNEYS AND URETERS: The kidneys are symmetric in size. There are multiple bilateral nonobstructing renal calculi measuring up to 6 mm at the right superior pole. No hydroureteronephrosis.   PELVIS:   BLADDER: The urinary bladder is partially decompressed with Trevino catheter in place. Small amount of anti dependent air is noted within the urinary bladder, likely related to catheter placement.   REPRODUCTIVE ORGANS: The uterus is present.   BOWEL: A small gastric diverticulum is again noted in the posterior aspect of the fundus. The small bowel is normal caliber without wall thickening. There is interval increased circumferential edematous mural thickening of the entire colon and rectum with perirectal fat stranding (series 3, images 159-169). The appendix is not definitely visualized. There is however no pericecal stranding.   VESSELS: Severe atherosclerosis of the abdominal aorta and its branches with ectasia of the infrarenal aorta measuring up to 3 cm. The IVC is normal.   PERITONEUM/RETROPERITONEUM/LYMPH NODES: Moderate abdominopelvic ascites. No focal fluid collection or pneumoperitoneum. No abdominopelvic lymphadenopathy.   ABDOMINAL WALL: There is mild-to-moderate body wall edema, most pronounced at the lateral and lower abdominal wall.   BONES: There is a similar heterogeneity with lytic lesion and cortical irregularity of the right lesser trochanter (series 3, image 183). Multilevel degenerative changes of the thoracolumbar spine are noted.       1.  Interval increased circumferential edematous mural thickening of the entire colon and rectum with perirectal fat stranding,  nonspecific, but may be seen in the setting of infectious/inflammatory proctocolitis. 2. Irregular lytic lesion with cortical irregularity of the right lesser trochanter, nonspecific, can represent avulsion fracture or aggressive osseous lesion including metastatic disease. 3. Interval decreased size of a left breast mass in keeping with patient's known primary malignancy. 4. Moderate ascites and small bilateral pleural effusions. Please correlate clinically with patient's volume status. 5. Additional findings as above.   I personally reviewed the images/study and I agree with the findings as stated by Josafat Fierro MD. This study was interpreted at [place], [place].   MACRO: None   Signed by: Garth Watson 2/24/2024 3:38 PM Dictation workstation:   [id number]    CT tibia fibula right wo IV contrast    Result Date: 2/24/2024  STUDY: CT TIBIA FIBULA RIGHT WO IV CONTRAST;  2/24/2024 2:00 pm   INDICATION: Signs/Symptoms:r\o infection. 68-year-old female with history of seropositive RA on infliximab, osteoarthritis, nephrolithiasis, with recently diagnosed breast cancer status post chemotherapy completed 02/14/2024 and cirrhosis, transferred to MICU 02/23/2023 for hypertension, being treated for septic shock. Concern for right lower extremity infection.   COMPARISON: None.   ACCESSION NUMBER(S): [id number]   ORDERING CLINICIAN: JENN WALTER   TECHNIQUE: CT imaging of the distal right femur to the foot was obtained without contrast. Coronal and sagittal reformatted images were performed.   FINDINGS: OSSEOUS STRUCTURES: Postsurgical changes from total right knee arthroplasty. Beam hardening artifact limits evaluation of the adjacent structures. Within these limitations, there is no acute fracture or malalignment.     SOFT TISSUES: There is moderate subcutaneous edema of the visualized right lower extremity, most pronounced in the anterior aspect of the mid to  distal lower leg and extending to the dorsal midfoot. No focal fluid collection.       Moderate subcutaneous edema of the visualized right lower extremity, most pronounced in the anterior mid to distal lower leg extending to the dorsal midfoot. Please correlate clinically with cellulitis. No drainable fluid collection.   Postsurgical changes from total right knee arthroplasty without evidence of hardware complication. No acute osseous abnormality of the visualized right lower extremity.   I personally reviewed the images/study and I agree with the findings as stated by Josafat Fierro MD. This study was interpreted at [place], [place].   MACRO: None     Dictation workstation:   [id number]    XR chest 1 view    Result Date: 2/24/2024  Interpreted By:  Freedom Sethi and Meyers Emily STUDY: XR CHEST 1 VIEW;  2/24/2024 1:41 am   INDICATION: Signs/Symptoms:line placement.   COMPARISON: Chest radiograph 02/23/2024   ACCESSION NUMBER(S): [id number]   ORDERING CLINICIAN: JESUS ALEMAN   FINDINGS: AP radiograph of the chest was provided.   Left IJ central venous catheter with its tip overlying the expected location of the proximal superior vena cava.   CARDIOMEDIASTINAL SILHOUETTE: Cardiomediastinal silhouette is stable in size and configuration.   LUNGS: Persistent low lung volumes with resultant bronchovascular crowding. Increased interstitial prominence when compared to prior exam. No focal consolidation or pneumothorax. Blunting of the costophrenic angles.   ABDOMEN: No remarkable upper abdominal findings.   BONES: No acute osseous changes.       1. Slight interval increase in pulmonary interstitial edema when compared to prior exam. 2. Small bibasilar effusion and bibasilar atelectasis. Correlate with concern for superimposed infection. 3. Left IJ central venous catheter with its tip overlying the expected location of the proximal superior vena cava.   I  personally reviewed the images/study, and I agree with the findings as stated above. This study was interpreted at [place], [place].   MACRO: None   Signed by: Freedom Rodriguezani 2/24/2024 8:07 AM Dictation workstation:   [id number]    Vascular US lower extremity venous duplex right    Result Date: 2/23/2024  Interpreted By:  Jessenia Abraham,  and Christelle Owen STUDY: VASC US LOWER EXTREMITY VENOUS DUPLEX RIGHT;  2/23/2024 2:31 pm   INDICATION: Signs/Symptoms:RLE edema and redness. Hx of malignancy. Rule out DVT.   COMPARISON: None.   ACCESSION NUMBER(S): [id number]   ORDERING CLINICIAN: LASHAWN RICHARD   TECHNIQUE: Vascular ultrasound of the right lower extremity was performed. Real-time compression views as well as Gray scale, color Doppler and spectral Doppler waveform analysis was performed.   FINDINGS: Evaluation of the visualized portions of the right common femoral vein, proximal, mid, and distal femoral vein, and popliteal vein were performed.  Evaluation of the visualized portions of the   peroneal veins were also performed. The posterior tibial vein was not well-visualized.   Limitations:  Lower extremity edema.   The evaluated veins demonstrate normal compressibility. There is intact venous flow demonstrating normal respiratory variability and normal augmentation of flow with calf compression. Therefore, there is no ultrasonographic evidence for deep vein thrombosis within the evaluated veins.       No sonographic evidence for deep vein thrombosis within the evaluated veins of the right lower extremity.   I personally reviewed the images/study and I agree with Lexie Bourgeois DO's (radiology resident) findings as stated. This study was interpreted at [place], [place].   MACRO: None   Signed by: Jessenia Abraham 2/23/2024 10:18 PM Dictation workstation:   [id number]    XR abdomen 1 view    Result  Date: 2/23/2024  Interpreted By:  Freedom Sethi, STUDY: XR ABDOMEN 1 VIEW;  2/23/2024 7:08 pm   INDICATION: Signs/Symptoms:hap.   COMPARISON: CT dated 02/01/2024   ACCESSION NUMBER(S): [id number]   ORDERING CLINICIAN: JENNIFER MAE   FINDINGS: Two views of the abdomen and pelvis. Significant gaseous distension of the stomach. Paucity of bowel gas throughout the abdomen and pelvis. Limited evaluation of pneumoperitoneum on supine imaging, however no gross evidence of free air is noted.   Faint airspace opacity in lung bases. Question trace left basilar pleural effusion.   Osseous structures demonstrate no acute bony changes.       1.  Significant gaseous distention of the stomach. Consider decompression by NG tube. 2. Nonspecific paucity of gas in the loops of bowel which might be due to fluid-filled loops of bowel. No gas-filled dilated loop of bowel is identified.   Signed by: Freedom Renteria 2/23/2024 8:51 PM Dictation workstation:   [id number]    XR chest 1 view    Result Date: 2/23/2024  Interpreted By:  Freedom Sethi and Liller Gregory STUDY: XR CHEST 1 VIEW;  2/23/2024 4:59 pm   INDICATION: Signs/Symptoms:hypoxia.   COMPARISON: 02/04/2024   ACCESSION NUMBER(S): [id number]   ORDERING CLINICIAN: MARIBEL MUNOZ   FINDINGS: Patient is rotated, limiting evaluation.   AP radiograph of the chest was provided.   CARDIOMEDIASTINAL SILHOUETTE: Cardiomediastinal silhouette is borderline enlarged in size and configuration when compared to prior exam which is likely positional in nature. Recommend attention follow-up   LUNGS: Low lung volumes contributing to bronchovascular crowding. There is mild interstitial prominence. No focal consolidation, effusion, or pneumothorax.   ABDOMEN: No remarkable upper abdominal findings.   BONES: No osseous injury.       1. Mild pulmonary interstitial edema without focal consolidation, effusion, or pneumothorax. 2. Cardiac silhouette size appears  prominent/enlarged compared to prior study which might be due to patient's rotation. Recommend attention on follow-up     I personally reviewed the images/study and I agree with the findings as stated above by resident physician, Dr. Khari South. The study was interpreted at [facility] in [facility].   MACRO: none.   Signed by: Freedom Renteria 2/23/2024 8:49 PM Dictation workstation:   [id number]      ___________________________________________________________________________________  ASSESSMENT:  Meghan Waddell is a 68 y.o. female with PMH of Seropositive RA (on infliximab), osteoarthritis, nephrolithiasis, obesity, history of tobacco use (quit march 2023), and on this admission diagnosed with ER-,[place]-, Her2+ invasive ductal carcinoma who presented to [facility] ED on 2/1 for dark red urine, generalized abdominal pain with nausea, found to have cirrhosis and on admission to [facility] found to have metastatic breast cancer c/b liver mets, cerebellar mets, transferred to MICU 2/23/2023 for hypotension. . Nephrology consulted for SYLVIA to assist with management.    #Oliguric SYLVIA  - She has baseline Cr of 1 that worsened on 2/12 to 1.38 and since then it has been majorly trending up to 2.9(2/24) with few dips. High BUN may be contributed by steroids  - Volume status: hypervolemic  - UOP: ~400 in last 24 hrs  - UA sig for blood, RBC >20, WBC 11-20  - Urine lytes: FeNA of 0.7 %   - CT abdomen: The kidneys are symmetric in size. There are multiple bilateral non obstructing renal calculi measuring up to 6 mm at the right superior pole. No hydroureteronephrosis.  - secondary to septic shock- e.coli bacteremia ATN/vanc, zosyn/carboplatin induced AIN or tubular injury    #Electrolytes  - hyperkalemia    #Acid-Base  - bicarb 20  - ammonia 60  - lactate 8.3 trending down to 6.8    #Hemodynamics  - on vaso and levo with BP of 110/50  - 30L of oxygen- increased requirements    #E.coli  Bacteremia  #Breast Ca with liver and cerebellar mets   #LE Cellulitis  - vanc and zosyn      ___________________________________________________________________________________  RECOMMENDATIONS:  - starting CVVH in setting of oliguric SYLVIA with persistent hyperkalemia after discussing with team- informed that family wants to do everything that is possible  - consent taken and placed in file  - Keep MAP >65 or SBP >90, avoid nephrotoxic medications, radiocontrast if possible, follow medication trough levels as appropriate and titrate zosyn according to GFR.  - Strict I/O monitoring, daily weights, daily BMP  - Will continue to follow      Patient is discussed with the attending.    Was updated about pt making more urine so requested to repeat BMP and use lokelma for hyperkalemia. Will defer Crrt for now until indication develops.       Kinsey Sanders MD  Nephrology Fellow   Daytime / Weekend Renal Pager [phone]  After 7 pm Emergencies [phone] Pager [phone]

## 2024-02-24 NOTE — PROCEDURES
Arterial Line Insertion    Date/Time: 2/23/2024 11:58 PM    Performed by: Estfeania Shafer MD  Authorized by: Estefania Shafer MD    Consent:     Consent obtained:  Written    Consent given by:  Healthcare agent  Universal protocol:     Patient identity confirmed:  Arm band  Indications:     Indications: hemodynamic monitoring    Pre-procedure details:     Skin preparation:  Chlorhexidine  Sedation:     Sedation type:  None  Anesthesia:     Anesthesia method:  Local infiltration    Local anesthetic:  Lidocaine 1% w/o epi  Procedure details:     Location:  L radial    Placement technique:  Seldinger    Number of attempts:  1  Post-procedure details:     Post-procedure:  Sterile dressing applied    Procedure completion:  Tolerated  Comments:      Previous attempt on R radial artery was unsuccessful by Dr. Jung

## 2024-02-24 NOTE — PROCEDURES
CENTRAL LINE INSERTION    Consent: Written, reviewed in chart    Indications: vasopressor use, hemodynamic monitoring    Performed by: Blaine Gao MD    Assistants: None    Details:     Sedation: None  Patient positioned supine +/- slight Trendelenburg.  Sterility: Chlorhexidine skin prep. Hat and mask on myself and assistant(s). Antiseptic hand foam. Sterile gown, gloves and drape.  Local anesthesia: 5 mL of 1% lidocaine subcutaneously.    A time out was performed and the patient was identified.  Correct procedure and site verified.     The patient was prepped and draped in the normal sterile fashion.  The ultrasound probe was draped with a sterile probe cover. Using ultrasound guidance, the left internal jugular vein was identified and cannulated.  There was good back flow through the needle.  A wire was then introduced into the vein and its position was confirmed with ultrasound.  The needle was removed and a skin nick was made using a scalpel.  A tract was then dilated over the wire.  Using the seldinger technique, the 7F triple lumen catheter was advanced over the wire.  Blood was aspirated from each port and then flushed. The catheter was then secured with two sutures.  A dressing was applied.      A post procedure Xray was obtained and the catheter was in good position and no pneumothorax was appreciated.  The patient tolerated the procedure well.

## 2024-02-24 NOTE — H&P
MICU ADMISSION NOTE       [facility]: Hypotension     History obtained from chart review given patient's mentation     HPI (for full hospital course please see excellent Dr. Hebert's note on 2/23/2024)     Meghan Waddell is a 68 y.o. female history of Seropositive RA (on infliximab), osteoarthritis, nephrolithiasis, obesity, history of tobacco use (quit march 2023), and on this admission diagnosed with ER-,AZ-, Her2+ invasive ductal carcinoma who presented to [place] ED on 2/1 for dark red urine, generalized abdominal pain with nausea, found to have cirrhosis, and on admission to [facility] found to have metastatic breast cancer, transferred to MICU 2/23/2023 for hypotension.     Patient was transferred to [facility] for further workup of her cirrhosis. She is s/p 2 paracentesis with 2L out, first sample negative for SBP and malignant cells. Cirrhosis was worked up, mild ASMA titers felt to be related to her Rheumatoid arthritis. However, was given a short course of prednisone. Patient had pathology come back +ve for invasive ductal carcinoma ER-,AZ-, Her2+. Initially started on anastrozole later discontinued given ER -ve status. Liver biopsy proved liver mets. MRI showed cerebellar lesions favoring mets. NM bone scan ruled out bony mets. Patient was treated lactulose and later rifaximin was added. Patient also started on lasix and spironolactone.     Patient received carboplatin and transtuzumab (completed 2/14/2024) for her breast cancer. Patient began to experience significant fatigue on 12/17 which was felt to be due to chemotherapy. Supportive onc was consulted and dexamethsone 4mg BID for appetite started       Rapid response called 2/23 around 4:30pm for hypotension. Patient given 1.5L fluid bolus, 25g albumin. Vancomycin started in addition to zosyn due to concern for LE cellulitis.  VBG: pH 7.23, pCO2 36, pO2 39, lactate 10. CMP with glucose 49, K 6.2, HCO3 12, BUN 88, Cr 2.28. Calcium gluconate given for  hyperkalemia. WBC decreased from 9>4.3, Plt 128>92 from AM labs. Hgb stable 14.     CXR with Mild pulmonary interstitial edema without focal consolidation, effusion, or pneumothorax.    EKG showed sinus tachycardia, no peaked T waves.    Patient transferred to MICU        PMH: as above  SurgHx: knee surgery  Allergies: Lates  SocHx: EtOH: unable to obtain Tobacco: former smoker per chart Drugs: unable to obtain  FamHx: unable to obtain     Physical Exam:   General: Lethargic, but arousable   Chest: clear anteriorly   CVS: tachycardic, no definite murmurs appreciated   Abdomen: tender, but non-peritonitic   LE: no edema on left leg, right legt with swelling and erythema, marked with pen   : carreon in place with minimal urine output       A/P:  A 68 y.o. female history of Seropositive RA (on infliximab), osteoarthritis, nephrolithiasis, obesity, history of tobacco use (quit march 2023), and on this admission diagnosed with ER-,MD-, Her2+ invasive ductal carcinoma who presented to [place] ED on 2/1 for dark red urine, generalized abdominal pain with nausea, found to have cirrhosis, and on admission to [facility] found to have metastatic breast cancer to liver and likely brain, s/p carbo and transtuzumab transferred to MICU 2/23/2023 for hypotension. Etiology of hypotension is likely septic shock 2/2 gram negative bacteremia.     Impression:   #Septic shock 2/2 gram negative bacteremia, potential sources include RLE cellulitis (though more commonly gram +ve), abdomen (?SBP), urine (Hx of Nephrolithiasis)  #HAGMA 2/2 lactic acidosis 2/2 septic shock with decreased clearance 2/2 liver disease  #SYLVIA likely ATN 2/2 sepsis   #Leukopenia and thrombocytopenia (Bicytopenia) likely 2/2 sepsis, however recent chemotherapy induced is another possibility       NEURO  #Encephalopathy  ::Likely toxic metabolic   -delirium precautions   -treat underlying metabolic disturbances, lactulose + rifaximin once oral access established      #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  -MRA once out of the acute setting, potentially outpatient     PULM  No acute issues   -may need BiPAP support to help blow off CO2 in compensation for metabolic acidosis     CVS  #Septic Shock  ::Growing gram negative Bacilli   ::source is right leg likely cellulitis, vs. breast wound , need to r/o urine   -vanc/Zosyn/Clindamycin  - 1 x dose tobramycin   - follow BCx   - send for UA with reflex to culture   - trend lactates   - stress dose steroids  - MAP goal >70     RENAL  #SYLVIA  #Hyperkalemia   ::FENa from 2/14 suggestive of pre-renal etiology   ::likely ATN 2/2 sepsis   :: poor UOP  -strict I/Os  -renally dose meds  -nephrology consult in the AM   -consider renal ultrasound in the AM (portable)   -TLS labs   -CK level    #Metabolic Acidosis (HAGMA AND NAGMA)  #Lactic Acidosis   ::lactate 2/2 septic shock with decreased clearance given liver disease  ::NAGMA likely 2/2 renal failure   -bicarb 50mEq x2 injections   -bicarb drip at 150cc/hr  -may need Renal replacement therapy for refractory acidosis       GI  #new liver cirrhosis  #Cholelithiasis  #mixed hepatocellular and cholestatic liver injury   #mild confusion possible grade 1 hepatic encephalopahthy  #liver metastasis (breast primary)  :: hepC negative, hepB negative, hepA negative, T spot negative in 11/2023  :: alpha 1 antitrypsin negative  :: ferritin 406 H/transferrin 136 L  :: Hep B surface AG negative, Hep B surface AB positive at 580.6  ::liver biopsy significant for poorly differentiated breast cancer  ::no safe pocket to tap on 2/23/2024   - lactulose 20 g BID once oral access established   - lasix 20 mg and aldactone 50 mg currently held iso SYLVIA  - rifaxamin 550 mg BID for HE once oral access established     #Gaseous Distention of stomach   -consider NG tube decompression if blood counts acceptable and do not continue to drop   -consider CT abdomen non-con once stable     ONC  #nonhealing,  draining, L breast wound  #primary breast cancer with liver met and likely brain mets  :: MRI brain showing small enhancing lesions in the cerebellum without mass effect with c/f metastatic disease, potential 4 mm MRA bifurcation aneurysm  :: TTE shows EF 70% and impaired diastolic relaxation  :: NM bone scan without definitive osseous metastatic disease  :: inpatient mammogram significant for masses and suspicious lymphadenopathy  :: s/p biopsies, final pathology shows invasive ductal carcinoma grade 3, ER-/[place]-/HER2+  :: initial pathology showed ER+ so patient was started on anastrazole 1 mg, but this read was modified to ER- so anastrozole was stopped on 2/13  :: carboplatin (AUC 4) and trastuzumab (loading dose 8 mg/kg) completed on 2/14 with patient tolerating this well  - Daily CMP, LDH, and uric acid to monitor for tumor lysis syndrome  - G6PD testing negative       HEME  #Dropping blood counts   #Bicytopenia  ::chemo related vs. Sepsis   -send for hemolysis labs   -T+S  -send for vitamin B12, folate, iron  -reticulocyte count  -peripheral smear     INFECTIOUS DISEASES  #Septic Shock  #Gram negative bacteremia  #RLE cellulitis   ::Bcx with gram negative bacilli  -vanc/zosyn/clinda   -1x tobra on 2/23/2024   -send for UA with reflex to culture     ENDO  #Hypoglycemia   ::likely related to sepsis + poor liver compensation given cirrhosis   -D10 drip     YINKA  #RA - on infliximab  #osteoarthritis  :: hold home naproxen   - voltaren gel  - tylenol 650 mg q8 prn  - oxycodone 5 mg q4 prn  - outpatient rheum aware to discontinue infliximab     #subacute avulsion fracture lesser trochanter R proximal femur  - chronic?  - consult orthopedic surgery to assess subacute avulsion fracture of R femur found on CT on 2/1: nothing to do, patient can follow-up outpatient, WBAT  - NM bone scan without definitive osseous metastatic disease   -consider CT scan if Hgb dropping     #compression deformity at T12  -consider vitamin D  and calcium     #abdominal aorta atherosclerosis on CT  #smoking history   - follow up with PCP outpatient to discuss potential statin/asa     F: PRN  E: PRN   N: regular diet  GI: none  P: holding  A: PIVs, left internal jugular CVC  C: full code  Surrogate:   Cierra daughter [phone] (first option)  Emmanuel son [phone]      Of note patient's son is in the  stationed in [place]. [employer] called to verify illness severity. Our recommendation was for son to fly in as prognosis is guarded at this time and patient is at risk of further decompensation.     Eron Jung   Internal Medicine PGY-2   [facility]

## 2024-02-25 NOTE — PROGRESS NOTES
"Meghan Waddell is a 68 y.o. female on day 22 of admission presenting with Cirrhosis (CMS/HCC).    Subjective   Intubated and sedated yesterday at 5 pm. Worsening pressor requirements. Deferred CVVH yesterday due to stable BUN, improved urine output.      Objective     Physical Exam    Last Recorded Vitals  Blood pressure 105/55, pulse (!) 123, temperature 38 °C (100.4 °F), resp. rate 20, height 1.695 m (5' 6.73\"), weight 107 kg (235 lb 10.8 oz), SpO2 95 %.  Intake/Output last 3 Shifts:  I/O last 3 completed shifts:  In: 5825.9 (54.5 mL/kg) [I.V.:3278.4 (30.7 mL/kg); Blood:1137.5; NG/GT:60; IV Piggyback:1350]  Out: 1245 (11.6 mL/kg) [Urine:845 (0.2 mL/kg/hr); Emesis/NG output:400]  Weight: 106.9 kg     Relevant Results  Scheduled medications  calcium gluconate, 2 g, intravenous, Once  [Held by provider] clindamycin, 900 mg, intravenous, q8h  hydrocortisone sodium succinate, 50 mg, intravenous, q6h  lactulose, 20 g, oral, BID  lidocaine, 1 patch, transdermal, Daily  micafungin, 100 mg, intravenous, q24h  midazolam, 4 mg, intravenous, Once  [Held by provider] OLANZapine, 2.5 mg, oral, Nightly  pantoprazole, 40 mg, intravenous, Daily  phenylephrine HCl in 0.9% NaCl, 40 mcg, intravenous, Once  piperacillin-tazobactam, 2.25 g, intravenous, q6h  polyethylene glycol, 17 g, oral, Daily  rifAXIMin, 550 mg, oral, BID  sodium zirconium cyclosilicate, 10 g, oral, q8h  [Held by provider] spironolactone, 50 mg, oral, Daily      Continuous medications  dextrose 10 % in water (D10W), 35 mL/hr, Last Rate: 35 mL/hr (02/25/24 0324)  fentaNYL,  mcg/hr, Last Rate: 50 mcg/hr (02/25/24 1647)  norepinephrine, 0-3.3 mcg/kg/min, Last Rate: 0.28 mcg/kg/min (02/25/24 1646)  PrismaSol BGK 2/3.5, 2,700 mL/hr  propofol, 5-20 mcg/kg/min, Last Rate: 15 mcg/kg/min (02/25/24 1653)  vasopressin, 0.03 Units/min, Last Rate: 0.03 Units/min (02/25/24 1654)      PRN medications  PRN medications: acetaminophen **OR** acetaminophen **OR** acetaminophen, " albuterol, dextrose, diclofenac sodium, diphenhydrAMINE, EPINEPHrine, famotidine, fentaNYL, artificial tears, [Held by provider] melatonin, oxygen, oxygen, prochlorperazine, prochlorperazine, sodium chloride, vancomycin                   Physical Exam:   General: Lethargic, A&Ox2, but arousable   Chest: clear anteriorly  Pulm: On AirVo 30/30  CVS: tachycardic, no definite murmurs appreciated   Abdomen: tender, but non-peritonitic   LE: no edema on left leg, right legt with swelling and erythema, marked with pen   : carreon in place with minimal urine output      Assessment/Plan   Principal Problem:    Cirrhosis (CMS/HCC)  A 68 yF with seropositive RA (on infliximab), morbid obesity, > 50 pack year smoker (quit march 2023). Presented to [place] ED on 2/1 for ABD pain and hematuria. She was found to have new-diagnosis of liver cirrhosis and was transferred to [facility] for hepatology evaluation. Here she was found to have biopsy-proven ER -, AK -, Her 2+ invasive ductal carcinoma c/b liver and brain metastases. She is s/p induction chemo with carbo and transtuzumab. Then transferred to the MICU 2/23/2023 for septic shock, E. Coli bacteremia, and multisystem organ failure including toxic metabolic encephalopathy, acute hypercarbic respiratory failure, and oliguric SYLVIA. Course now complicated by atrial fibrillation with rapid ventricular response. Most likely source of her E. Coli bacteremia is gut bacterial translocation from inflammatory vs. Infectious colitis. Her condition is currently guarded.    Updates 2/25:  - New-onset AF, rate controlled, goal HR < 130  - Deferring anticoagulation,  aggressive e- repletion  - Sedation holiday, RASS goal - 1, currently on 40% FIO2  - R-internal jugular HD line placed, however CVVH unable to start due to AF RVR  - Made 78 ml of urine this shift, has progressively oliguric despite yesterday's improvement  - C/w Zosyn, micafungin for now. Pending sensitivities. ID consulted   -  Pending Goals of Care discussion with family if unable to start CVVH due AF RVR     Impression:   #Septic shock 2/2 gram negative bacteremia, potential sources include RLE cellulitis (though more commonly gram +ve), abdomen (?SBP), urine (Hx of Nephrolithiasis)  #HAGMA 2/2 lactic acidosis 2/2 septic shock with decreased clearance 2/2 liver disease  #SYLVIA likely ATN 2/2 sepsis   #Leukopenia and thrombocytopenia (Bicytopenia) likely 2/2 sepsis, however recent chemotherapy induced is another possibility      2/24 Updates:  - CT Ab/pel/leg: Possible cellulitis of leg, possible proctocolitis, avulsion fx of R lesser trochanter, decreased size of left breast mass, moderate ascites and small bl pleural effusions  - ID consulted  - Stopped the Bicarb drip  - Nephro consulted - plan on dialysis  - plan for intubation with OG tube  - Lactulose and rifaximin started  - discontinued clindamycin  - Sacral wounds (consider wound consult)  - wound on breast     NEURO  #Encephalopathy  ::Likely toxic metabolic   -delirium precautions   -treat underlying metabolic disturbances - lactulose + rifaximin started     #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  -MRA once out of the acute setting, potentially outpatient      PULM  -Placed on AirVo   - consider support to help blow off CO2 in compensation for metabolic acidosis   - Intubation anticipated to protect airway due to encephalosis     CVS  #Septic Shock  ::Growing gram negative Bacilli   ::source is right leg likely cellulitis, vs. breast wound , need to r/o urine   -vanc/Zosyn/Clindamycin  - 1 x dose tobramycin   - follow BCx   - send for UA with reflex to culture   - trend lactates   - stress dose steroids  - MAP goal >65  - Discontinued clindamycin     RENAL  #SYLVIA  #Hyperkalemia   ::FENa from 2/14 suggestive of pre-renal etiology   ::likely ATN 2/2 sepsis   :: poor UOP  -strict I/Os  -renally dose meds  -nephrology consult in the AM  -plan for dialysis  -consider renal  ultrasound in the AM (portable)   -TLS labs   -CK level     #Metabolic Acidosis (HAGMA AND NAGMA)  #Lactic Acidosis   ::lactate 2/2 septic shock with decreased clearance given liver disease  ::NAGMA likely 2/2 renal failure   -bicarb 50mEq x2 injections   -bicarb drip at 150cc/hr - stopped  -may need Renal replacement therapy for refractory acidosis         GI  #new liver cirrhosis  #Cholelithiasis  #mixed hepatocellular and cholestatic liver injury   #mild confusion possible grade 1 hepatic encephalopahthy  #liver metastasis (breast primary)  :: hepC negative, hepB negative, hepA negative, T spot negative in 11/2023  :: alpha 1 antitrypsin negative  :: ferritin 406 H/transferrin 136 L  :: Hep B surface AG negative, Hep B surface AB positive at 580.6  ::liver biopsy significant for poorly differentiated breast cancer  ::no safe pocket to tap on 2/23/2024   - lactulose 20 g BID once oral access established   - lasix 20 mg and aldactone 50 mg currently held iso SYLVIA  - rifaxamin 550 mg BID for HE once oral access established      #Gaseous Distention of stomach   -consider NG tube decompression if blood counts acceptable and do not continue to drop   -consider CT abdomen non-con once stable      ONC  #nonhealing, draining, L breast wound  #primary breast cancer with liver met and likely brain mets  :: MRI brain showing small enhancing lesions in the cerebellum without mass effect with c/f metastatic disease, potential 4 mm MRA bifurcation aneurysm  :: TTE shows EF 70% and impaired diastolic relaxation  :: NM bone scan without definitive osseous metastatic disease  :: inpatient mammogram significant for masses and suspicious lymphadenopathy  :: s/p biopsies, final pathology shows invasive ductal carcinoma grade 3, ER-/TN-/HER2+  :: initial pathology showed ER+ so patient was started on anastrazole 1 mg, but this read was modified to ER- so anastrozole was stopped on 2/13  :: carboplatin (AUC 4) and trastuzumab (loading  dose 8 mg/kg) completed on 2/14 with patient tolerating this well  - Daily CMP, LDH, and uric acid to monitor for tumor lysis syndrome  - G6PD testing negative        HEME  #Dropping blood counts   #Bicytopenia  ::chemo related vs. Sepsis   -send for hemolysis labs   -T+S  -send for vitamin B12, folate, iron  -reticulocyte count  -peripheral smear      INFECTIOUS DISEASES  #Septic Shock  #Gram negative bacteremia  #RLE cellulitis   ::Bcx with gram negative bacilli  -vanc/zosyn/clinda   -1x tobra on 2/23/2024   -send for UA with reflex to culture      ENDO  #Hypoglycemia   ::likely related to sepsis + poor liver compensation given cirrhosis   -D10 drip      MSK  #RA - on infliximab  #osteoarthritis  :: hold home naproxen   - voltaren gel  - tylenol 650 mg q8 prn  - oxycodone 5 mg q4 prn  - outpatient rheum aware to discontinue infliximab     #subacute avulsion fracture lesser trochanter R proximal femur  - chronic?  - consult orthopedic surgery to assess subacute avulsion fracture of R femur found on CT on 2/1: nothing to do, patient can follow-up outpatient, WBAT  - NM bone scan without definitive osseous metastatic disease   -consider CT scan if Hgb dropping      #compression deformity at T12  -consider vitamin D and calcium     #abdominal aorta atherosclerosis on CT  #smoking history   - follow up with PCP outpatient to discuss potential statin/asa     N: Dobhoff  GI: PPI  P: holding  A: PIVs, left internal jugular CVC, R internal jugular   C: full code  Surrogate:  Cierra daughter [phone] (first option)  Emmanuel son [phone]              Arnaud Mello MD

## 2024-02-25 NOTE — CONSULTS
Inpatient consult to Infectious Diseases  Consult performed by: Servando Arnold MD  Consult ordered by: Lexie Salmon MD          History Of Present Illness  Meghan Waddell is a 68 y.o. female with history of seropositive RA (on infliximab) obesity, osteoarthritis and recent hospital admission at [facility] on February 1 for generalized weakness and abdominal pain and transferred to [facility] for further evaluation of cirrhosis of liver.  Patient underwent paracentesis and evaluation for cirrhosis of liver and was diagnosed with metastatic breast cancer involving liver and brain.  Patient is started on chemotherapy.  Patient developed hypotension and so transferred to MICU on February 23.  Patient is started on broad-spectrum antibiotic therapy after performing blood cultures which grew E. coli.  ID is consulted for antibiotic recommendations.    Patient is sedated and intubated and on multiple pressors.  History is taken from patient's daughter who is at bedside.  Until this hospital admission patient was functional.     Past Medical History  She has a past medical history of Arthritis, rheumatoid (CMS/HCC), Cellulitis of right lower limb, and Personal history of other diseases of the nervous system and sense organs.    Surgical History  She has a past surgical history that includes Other surgical history (02/10/2021) and Joint replacement (Bilateral).     Social History     Occupational History    Not on file   Tobacco Use    Smoking status: Former     Packs/day: 0.50     Years: 4.00     Additional pack years: 0.00     Total pack years: 2.00     Types: Cigarettes    Smokeless tobacco: Never   Vaping Use    Vaping Use: Never used   Substance and Sexual Activity    Alcohol use: Not Currently    Drug use: Never    Sexual activity: Not Currently     Travel History   Travel since 01/25/24    No documented travel since 01/25/24            Family History  Family History   Problem Relation Name Age of Onset     Diabetes type II Mother      Heart disease Father      Breast cancer Sister  60        60-70    Ovarian cancer Maternal Grandmother       Allergies  Latex     Immunization History   Administered Date(s) Administered    Moderna SARS-CoV-2 Vaccination 06/21/2021, 07/19/2021     Medications  Home medications:  Medications Prior to Admission   Medication Sig Dispense Refill Last Dose    naproxen sodium (Aleve) 220 mg tablet Take 1 tablet (220 mg) by mouth 3 times a day as needed.        Current medications:  Scheduled medications  calcium gluconate, 2 g, intravenous, Once  [Held by provider] clindamycin, 900 mg, intravenous, q8h  hydrocortisone sodium succinate, 50 mg, intravenous, q6h  lactulose, 20 g, oral, BID  lidocaine, 1 patch, transdermal, Daily  micafungin, 100 mg, intravenous, q24h  midazolam, 4 mg, intravenous, Once  [Held by provider] OLANZapine, 2.5 mg, oral, Nightly  pantoprazole, 40 mg, intravenous, Daily  phenylephrine HCl in 0.9% NaCl, 40 mcg, intravenous, Once  piperacillin-tazobactam, 2.25 g, intravenous, q6h  polyethylene glycol, 17 g, oral, Daily  rifAXIMin, 550 mg, oral, BID  sodium zirconium cyclosilicate, 10 g, oral, q8h  [Held by provider] spironolactone, 50 mg, oral, Daily      Continuous medications  dextrose 10 % in water (D10W), 35 mL/hr, Last Rate: 35 mL/hr (02/25/24 0324)  fentaNYL,  mcg/hr, Last Rate: 25 mcg/hr (02/25/24 0653)  norepinephrine, 0-3.3 mcg/kg/min, Last Rate: 0.28 mcg/kg/min (02/25/24 0830)  PrismaSol BGK 2/3.5, 2,700 mL/hr  propofol, 5-20 mcg/kg/min, Last Rate: 10 mcg/kg/min (02/25/24 0400)  vasopressin, 0.03 Units/min, Last Rate: 0.03 Units/min (02/25/24 0914)      PRN medications  PRN medications: acetaminophen **OR** acetaminophen **OR** acetaminophen, albuterol, dextrose, diclofenac sodium, diphenhydrAMINE, EPINEPHrine, famotidine, fentaNYL, artificial tears, [Held by provider] melatonin, oxygen, oxygen, prochlorperazine, prochlorperazine, sodium chloride,  vancomycin    Review of Systems unable to be obtained     Objective  Range of Vitals (last 24 hours)  Heart Rate:  []   Temp:  [36 °C (96.8 °F)-38 °C (100.4 °F)]   Resp:  [14-23]   BP: ()/(50-60)   Weight:  [107 kg (235 lb 10.8 oz)]   SpO2:  [93 %-100 %]   Daily Weight  02/25/24 : 107 kg (235 lb 10.8 oz)    Body mass index is 37.21 kg/m².     Physical Exam   GENERAL APPEARANCE: Sedated and intubated  HEENT: Atraumatic and normocephalic  CARDIAC: Regular   LUNGS: Clear  ABDOMEN: Soft and nontender  EXTREMITIES: Significant ecchymosis noted over right leg.  Both legs are grossly swollen but there is a localized area of swelling over dorsum of right foot.  No pus was able to be expressed.  SKIN: Central lines noted    Relevant Results    Labs  Results from last 72 hours   Lab Units 02/25/24 0405 02/25/24 0017 02/24/24 1952 02/24/24  1500   WBC AUTO x10*3/uL 15.4* 13.9* 11.8* 8.4   HEMOGLOBIN g/dL 10.4* 10.8* 11.6* 11.9*   HEMATOCRIT % 29.0* 29.9* 31.7* 33.4*   PLATELETS AUTO x10*3/uL 48* 77* 63* 8*   NEUTROS PCT AUTO %  --   --  92.9 93.4   LYMPHO PCT MAN % 6.0  --   --   --    LYMPHS PCT AUTO %  --   --  2.6 3.0   MONO PCT MAN % 11.0  --   --   --    MONOS PCT AUTO %  --   --  3.7 1.7   EOSINO PCT MAN % 0.0  --   --   --    EOS PCT AUTO %  --   --  0.0 0.1     Results from last 72 hours   Lab Units 02/25/24 0405 02/25/24 0017 02/24/24 1952   SODIUM mmol/L 128* 130* 131*   POTASSIUM mmol/L 5.5* 5.4* 5.9*   CHLORIDE mmol/L 95* 97* 97*   CO2 mmol/L 16* 15* 19*   BUN mg/dL 88* 85* 88*   CREATININE mg/dL 2.86* 2.85* 2.95*   GLUCOSE mg/dL 121* 116* 109*   CALCIUM mg/dL 7.7* 7.8* 7.8*   ANION GAP mmol/L 23* 23* 21*   EGFR mL/min/1.73m*2 18* 18* 17*   PHOSPHORUS mg/dL 4.8 4.6 5.1*     Results from last 72 hours   Lab Units 02/25/24  0405 02/25/24  0017 02/24/24  1952 02/24/24  1500 02/24/24  0354 02/23/24  2306   ALK PHOS U/L  --   --  85  --  99 118   BILIRUBIN TOTAL mg/dL  --   --  16.3*  --  15.3* 14.1*  "  BILIRUBIN DIRECT mg/dL  --   --  9.5*  --  9.3*  --    PROTEIN TOTAL g/dL  --   --  4.6*  --  4.3* 4.1*   ALT U/L  --   --  32  --  33 36   AST U/L  --   --  55*  --  59* 63*   ALBUMIN g/dL 2.0* 2.0* 2.0*   < > 1.8* <1.5*    < > = values in this interval not displayed.     Estimated Creatinine Clearance: 23.9 mL/min (A) (by C-G formula based on SCr of 2.86 mg/dL (H)).  C-Reactive Protein   Date Value Ref Range Status   02/23/2024 11.62 (H) <1.00 mg/dL Final   11/01/2023 4.28 (H) <1.00 mg/dL Final     CRP   Date Value Ref Range Status   03/31/2023 1.15 (A) mg/dL Final     Comment:     REF VALUE  < 1.00       Sedimentation Rate   Date Value Ref Range Status   02/23/2024 53 (H) 0 - 30 mm/h Final   11/01/2023 79 (H) 0 - 30 mm/h Final   03/31/2023 61 (H) 0 - 30 mm/h Final     Comment:     Please note new reference ranges as of 5/9/2022.     No results found for: \"HIV1X2\", \"HIVCONF\", \"XAKDQF6NN\"  Hepatitis C AB   Date Value Ref Range Status   02/01/2024 Nonreactive Nonreactive Final     Comment:     Results from patients taking biotin supplements or receiving high-dose biotin therapy should be interpreted with caution due to possible interference with this test. Providers may contact their local laboratory for further information.     Microbiology  Susceptibility data from last 90 days.  Collected Specimen Info Organism Amoxicillin/Clavulanate Ampicillin Ampicillin/Sulbactam Cefazolin Ceftriaxone Cefuroxime Ciprofloxacin Gentamicin Imipenem Levofloxacin Piperacillin/Tazobactam Trimethoprim/Sulfamethoxazole   02/23/24 Blood culture from Peripheral Venipuncture Escherichia coli S R S I S S R S  R S S   02/23/24 Blood culture from Peripheral Venipuncture Escherichia coli               02/23/24 Blood culture from Peripheral Venipuncture Escherichia coli S R I I S S R S S R S S   02/01/24 Tissue/Biopsy from Skin/Superficial Abscess Mixed Skin Microorganisms                    Imaging         Assessment/Plan     Meghan WEISS" Bairon is a 68 y.o. female with history of seropositive RA (on infliximab) obesity, osteoarthritis and recent hospital admission at [facility] on February 1 for generalized weakness and abdominal pain and transferred to [facility] for further evaluation of cirrhosis of liver.  Patient underwent paracentesis and evaluation for cirrhosis of liver and was diagnosed with metastatic breast cancer involving liver and brain.  Patient is started on chemotherapy.  Patient developed hypotension and so transferred to MICU on February 23.  Patient is started on broad-spectrum antibiotic therapy after performing blood cultures which grew E. coli.  ID is consulted for antibiotic recommendations.    Problems  Septic shock with E. Coli        Potential source is likely GI translocation.    2.  Recent diagnosis of metastatic breast cancer    Plan  Agree with Zosyn IV (renally dosed) and micafungin 100 mg IV every 24 hours.  Prognosis guarded.  Will follow.    Servando Arnold MD

## 2024-02-25 NOTE — PROGRESS NOTES
Vancomycin Dosing by Pharmacy- FOLLOW UP    Meghan Waddell is a 68 y.o. year old female who Pharmacy has been consulted for vancomycin dosing for cellulitis, skin and soft tissue. Based on the patient's indication and renal status this patient is being dosed based on a goal trough/random level of 15-20.      Renal function is currently declining. CVVH has been ordered, team is unsure when or if it will be started.     Current vancomycin dose: 1000 mg once.     Most recent trough level: 16.5  mcg/mL    Visit Vitals  /54   Pulse (!) 118   Temp 36.8 °C (98.2 °F) (Temporal)   Resp 18        Lab Results   Component Value Date    CREATININE 2.88 (H) 02/24/2024    CREATININE 2.64 (H) 02/24/2024    CREATININE 2.67 (H) 02/23/2024    CREATININE 2.54 (H) 02/23/2024        Patient weight is 103 kg.         I/O last 3 completed shifts:  In: 5010.2 (48.8 mL/kg) [I.V.:3160.2 (30.8 mL/kg); Blood:500; IV Piggyback:1350]  Out: 570 (5.6 mL/kg) [Urine:570 (0.2 mL/kg/hr)]  Weight: 102.6 kg   [unfilled]    Lab Results   Component Value Date    PATIENTTEMP 37.0 02/24/2024    PATIENTTEMP 37.0 02/24/2024    PATIENTTEMP 37.0 02/24/2024        Assessment/Plan    Within goal random/trough level. Dosing by level, will re-dose with 750mg.     The next level will be obtained on 2/25 at 2100. May be obtained sooner if clinically indicated.   Will continue to monitor renal function daily while on vancomycin and order serum creatinine at least every 48 hours if not already ordered.  Follow for continued vancomycin needs, clinical response, and signs/symptoms of toxicity.       Ct Parker, PharmD

## 2024-02-25 NOTE — CARE PLAN
The clinical goals for the shift include wean pressor support, trend lactate and potassium levels, monitor urine output.

## 2024-02-25 NOTE — PROGRESS NOTES
"Meghan Waddell is a 68 y.o. female on day 21 of admission presenting with Cirrhosis (CMS/HCC).    Subjective   Septic shock    MICU night attending    Patient seen and evaluated, updated by MICU team regarding clinical decompensation.   Now intubated, sedated, on pressors, making urine; daughter at bedside updated.        Objective     Physical Exam:  Intubated, sedated  Pupils pinpoint, jaundiced sclera and skin, injected sclera, eye lids stay open  Tachy RR no m/r/g  CTAB anteriorly  Abdomen full no appreciated masses, rebound, or guarding  ++ Edema, R leg with areas of ecchymosis in hip/thigh, and R shin/calf to foot, some areas with blister and leaking bilous colored serous fluid, extremities warm, R foot 1+ doraslis pedis pulse     Last Recorded Vitals  Blood pressure 110/54, pulse 107, temperature 36 °C (96.8 °F), temperature source Temporal, resp. rate 20, height 1.695 m (5' 6.73\"), weight 103 kg (226 lb 3.1 oz), SpO2 97 %.  Intake/Output last 3 Shifts:  I/O last 3 completed shifts:  In: 5010.2 (48.8 mL/kg) [I.V.:3160.2 (30.8 mL/kg); Blood:500; IV Piggyback:1350]  Out: 570 (5.6 mL/kg) [Urine:570 (0.2 mL/kg/hr)]  Weight: 102.6 kg     Relevant Results  Labs, Medications, Chart reviewed     This patient has a central line   Reason for the central line remaining today? Hemodynamic instability    This patient has a urinary catheter   Reason for the urinary catheter remaining today? critically ill patient who need accurate urinary output measurements    This patient is intubated   Reason for patient to remain intubated today? they have continued cardiopulmonary lability/instability      Assessment/Plan   Principal Problem:    Cirrhosis (CMS/HCC)    69 yo F prior 50+ pack year smoker, RA prior on infliximab, other chronic medical issues, this admission newly diagnosed with metastatic breast cancer, immunosuppressed s/p chemotherapy treatment initialization, now with septic shock E. Coli, multiple organ failure- " acute renal failure, liver failure, acute encephalopathy; intubated this evening prior to night shift due to worsening respiratory failure.       -Continue supportive care  -Empiric antibiotics, narrow with follow up of cultures, renally dose medications  -Source possibly GI, , other, monitor cultures   -Monitor skin lesions  -Monitor eye dryness and consider opthalmology input   -Trend lactate, renal output, no indication for emergent renal replacement at this time- plan from evening discussion with nephrology and MICU team to monitor and possible need tomorrow- if renal function, labs show more urgent need for RRT will place HD line with platelets supported given thrombocytopenia  - Plan for OGT placement and confirmation with KUB   -Respiratory failure supported with intubation and vent settings- monitoring blood gases  - MAP goal > 65 with pressors, wean as able, on stress dose steroids  ICU ppx     Prognosis guarded, will continue further discussion of prognosis and goals     I spent 68 minutes in the professional and overall care of this patient.      Geeta Gama MD

## 2024-02-25 NOTE — PROCEDURES
Intubation    Date/Time: 2/24/2024 8:29 PM    Performed by: Deborah Calero MD  Authorized by: Lexie Salmon MD    Consent:     Consent obtained:  Verbal    Consent given by: daughter.  Pre-procedure details:     Indications: altered consciousness      Patient status:  Altered mental status    Induction agents:  Etomidate    Paralytics:  Rocuronium  Procedure details:     Preoxygenation: Airvo.    Number of attempts:  1  Successful intubation attempt details:     Intubation method:  Oral    Intubation technique: video assisted      Laryngoscope blade:  Mac 3    Bougie used: no      Grade view: II      Tube size (mm):  7.5    Tube type:  Cuffed    Tube visualized through cords: yes    Placement assessment:     ETT at teeth/gumline (cm):  24    Tube secured with:  ETT starkey    Breath sounds:  Equal  Post-procedure details:     Procedure completion:  Tolerated

## 2024-02-25 NOTE — PROGRESS NOTES
Patient seen, examind on rounds this AM  Events noted    Discussed with family and primary team.  CVVH orders placed.  Pending MICU discussion, no acute need but UO declining this AM.  Will support GOC of primary team and family.

## 2024-02-26 NOTE — PROGRESS NOTES
"Meghan Waddell is a 68 y.o. female on day 23 of admission presenting with Cirrhosis (CMS/HCC).    Subjective   Intubated and sedated yesterday at 5 pm. Worsening pressor requirements. Deferred CVVH yesterday due to stable BUN, improved urine output.      Objective     Physical Exam  General: Lethargic, intubated, sedated   Chest: clear anteriorly  Pulm: Ventilated  CVS: tachycardic, no definite murmurs appreciated   Abdomen: tender, but non-peritonitic   LE: no edema on left leg, right legt with swelling and erythema, marked with pen   : carreon in place with minimal urine output     Last Recorded Vitals  Blood pressure 117/68, pulse (!) 134, temperature 37.2 °C (99 °F), temperature source Temporal, resp. rate 20, height 1.695 m (5' 6.73\"), weight 107 kg (235 lb 10.8 oz), SpO2 97 %.  Intake/Output last 3 Shifts:  I/O last 3 completed shifts:  In: 3280.8 (30.7 mL/kg) [I.V.:2083.3 (19.5 mL/kg); Blood:637.5; NG/GT:560]  Out: 943 (8.8 mL/kg) [Urine:543 (0.1 mL/kg/hr); Emesis/NG output:400]  Weight: 106.9 kg     Relevant Results  Scheduled medications  [Held by provider] clindamycin, 900 mg, intravenous, q8h  hydrocortisone sodium succinate, 50 mg, intravenous, q6h  lactulose, 20 g, oral, BID  lidocaine, 1 patch, transdermal, Daily  micafungin, 100 mg, intravenous, q24h  midazolam, 4 mg, intravenous, Once  [Held by provider] OLANZapine, 2.5 mg, oral, Nightly  pantoprazole, 40 mg, intravenous, Daily  phenylephrine HCl in 0.9% NaCl, 40 mcg, intravenous, Once  piperacillin-tazobactam, 2.25 g, intravenous, q6h  polyethylene glycol, 17 g, oral, Daily  rifAXIMin, 550 mg, oral, BID  sodium zirconium cyclosilicate, 10 g, oral, q8h  [Held by provider] spironolactone, 50 mg, oral, Daily      Continuous medications  dextrose 10 % in water (D10W), 35 mL/hr, Last Rate: 35 mL/hr (02/26/24 0621)  fentaNYL,  mcg/hr, Last Rate: 75 mcg/hr (02/26/24 0647)  norepinephrine, 0-3.3 mcg/kg/min, Last Rate: 0.26 mcg/kg/min (02/26/24 " 0152)  PrismaSol BGK 2/3.5, 2,700 mL/hr  propofol, 5-20 mcg/kg/min, Last Rate: 15 mcg/kg/min (02/26/24 0621)  vasopressin, 0.03 Units/min, Last Rate: 0.03 Units/min (02/26/24 0621)      PRN medications  PRN medications: acetaminophen **OR** acetaminophen **OR** acetaminophen, albuterol, dextrose, diclofenac sodium, diphenhydrAMINE, EPINEPHrine, famotidine, fentaNYL, artificial tears, [Held by provider] melatonin, oxygen, oxygen, prochlorperazine, prochlorperazine, sodium chloride, vancomycin  Results for orders placed or performed during the hospital encounter of 02/03/24 (from the past 24 hour(s))   BUN   Result Value Ref Range    Urea Nitrogen 92 (HH) 6 - 23 mg/dL   Creatinine, Serum   Result Value Ref Range    Creatinine 2.79 (H) 0.50 - 1.05 mg/dL    eGFR 18 (L) >60 mL/min/1.73m*2   Electrolyte panel   Result Value Ref Range    Sodium 127 (L) 136 - 145 mmol/L    Potassium 5.5 (H) 3.5 - 5.3 mmol/L    Chloride 93 (L) 98 - 107 mmol/L    Bicarbonate 16 (L) 21 - 32 mmol/L    Anion Gap 24 (H) 10 - 20 mmol/L   Phosphorus   Result Value Ref Range    Phosphorus 4.9 2.5 - 4.9 mg/dL   POCT GLUCOSE   Result Value Ref Range    POCT Glucose 73 (L) 74 - 99 mg/dL   Renal Function Panel   Result Value Ref Range    Glucose 86 74 - 99 mg/dL    Sodium 128 (L) 136 - 145 mmol/L    Potassium 5.7 (H) 3.5 - 5.3 mmol/L    Chloride 94 (L) 98 - 107 mmol/L    Bicarbonate 18 (L) 21 - 32 mmol/L    Anion Gap 22 (H) 10 - 20 mmol/L    Urea Nitrogen 95 (HH) 6 - 23 mg/dL    Creatinine 2.96 (H) 0.50 - 1.05 mg/dL    eGFR 17 (L) >60 mL/min/1.73m*2    Calcium 7.6 (L) 8.6 - 10.6 mg/dL    Phosphorus 4.9 2.5 - 4.9 mg/dL    Albumin 2.0 (L) 3.4 - 5.0 g/dL   Magnesium   Result Value Ref Range    Magnesium 2.37 1.60 - 2.40 mg/dL   Blood Gas Arterial   Result Value Ref Range    POCT pH, Arterial 7.39 7.38 - 7.42 pH    POCT pCO2, Arterial 26 (L) 38 - 42 mm Hg    POCT pO2, Arterial 87 85 - 95 mm Hg    POCT SO2, Arterial 98 94 - 100 %    POCT Oxy Hemoglobin,  Arterial 95.7 94.0 - 98.0 %    POCT Base Excess, Arterial -7.6 (L) -2.0 - 3.0 mmol/L    POCT HCO3 Calculated, Arterial 15.7 (L) 22.0 - 26.0 mmol/L    Patient Temperature 37.0 degrees Celsius    FiO2 40 %   POCT GLUCOSE   Result Value Ref Range    POCT Glucose 119 (H) 74 - 99 mg/dL   CBC and Auto Differential   Result Value Ref Range    WBC 15.8 (H) 4.4 - 11.3 x10*3/uL    nRBC 0.9 (H) 0.0 - 0.0 /100 WBCs    RBC 3.78 (L) 4.00 - 5.20 x10*6/uL    Hemoglobin 11.9 (L) 12.0 - 16.0 g/dL    Hematocrit 30.6 (L) 36.0 - 46.0 %    MCV 81 80 - 100 fL    MCH 31.5 26.0 - 34.0 pg    MCHC 38.9 (H) 32.0 - 36.0 g/dL    RDW 22.6 (H) 11.5 - 14.5 %    Platelets 6 (LL) 150 - 450 x10*3/uL    Immature Granulocytes %, Automated 0.9 0.0 - 0.9 %    Immature Granulocytes Absolute, Automated 0.14 0.00 - 0.70 x10*3/uL   Comprehensive metabolic panel   Result Value Ref Range    Glucose 129 (H) 74 - 99 mg/dL    Sodium 125 (L) 136 - 145 mmol/L    Potassium 5.6 (H) 3.5 - 5.3 mmol/L    Chloride 92 (L) 98 - 107 mmol/L    Bicarbonate 17 (L) 21 - 32 mmol/L    Anion Gap 22 (H) 10 - 20 mmol/L    Urea Nitrogen 98 (HH) 6 - 23 mg/dL    Creatinine 2.86 (H) 0.50 - 1.05 mg/dL    eGFR 18 (L) >60 mL/min/1.73m*2    Calcium 7.2 (L) 8.6 - 10.6 mg/dL    Albumin 1.8 (L) 3.4 - 5.0 g/dL    Alkaline Phosphatase 88 33 - 136 U/L    Total Protein 4.4 (L) 6.4 - 8.2 g/dL    AST 37 9 - 39 U/L    Bilirubin, Total 19.2 (H) 0.0 - 1.2 mg/dL    ALT 28 7 - 45 U/L   Magnesium   Result Value Ref Range    Magnesium 2.23 1.60 - 2.40 mg/dL   Protime-INR   Result Value Ref Range    Protime 18.4 (H) 9.8 - 12.8 seconds    INR 1.6 (H) 0.9 - 1.1   Uric Acid   Result Value Ref Range    Uric Acid 3.8 2.3 - 6.7 mg/dL   Lactate dehydrogenase   Result Value Ref Range     84 - 246 U/L   Phosphorus   Result Value Ref Range    Phosphorus 4.9 2.5 - 4.9 mg/dL   Calcium, ionized   Result Value Ref Range    POCT Calcium, Ionized 1.00 (L) 1.1 - 1.33 mmol/L   Vancomycin   Result Value Ref Range     Vancomycin 16.0 5.0 - 20.0 ug/mL   Bilirubin, Direct   Result Value Ref Range    Bilirubin, Direct 12.9 (H) 0.0 - 0.3 mg/dL   Manual Differential   Result Value Ref Range    Neutrophils %, Manual 86.8 40.0 - 80.0 %    Bands %, Manual 6.2 0.0 - 5.0 %    Lymphocytes %, Manual 4.7 13.0 - 44.0 %    Monocytes %, Manual 2.3 2.0 - 10.0 %    Eosinophils %, Manual 0.0 0.0 - 6.0 %    Basophils %, Manual 0.0 0.0 - 2.0 %    Seg Neutrophils Absolute, Manual 13.71 (H) 1.20 - 7.00 x10*3/uL    Bands Absolute, Manual 0.98 (H) 0.00 - 0.70 x10*3/uL    Lymphocytes Absolute, Manual 0.74 (L) 1.20 - 4.80 x10*3/uL    Monocytes Absolute, Manual 0.36 0.10 - 1.00 x10*3/uL    Eosinophils Absolute, Manual 0.00 0.00 - 0.70 x10*3/uL    Basophils Absolute, Manual 0.00 0.00 - 0.10 x10*3/uL    Total Cells Counted 129     Neutrophils Absolute, Manual 14.69 (H) 1.20 - 7.70 x10*3/uL    RBC Morphology See Below     Polychromasia Mild     Target Cells Many     Russell Cells Many    Prepare Platelets: 2 Units   Result Value Ref Range    PRODUCT CODE V0738G91     Unit Number [id number]     Unit ABO A     Unit RH POS     Dispense Status IS     Blood Expiration Date February 26, 2024 23:59 EST     PRODUCT BLOOD TYPE 6200     UNIT VOLUME 299     PRODUCT CODE C2304H60     Unit Number [id number]     Unit ABO A     Unit RH POS     Dispense Status IS     Blood Expiration Date February 26, 2024 23:59 EST     PRODUCT BLOOD TYPE 6200     UNIT VOLUME 570    POCT GLUCOSE   Result Value Ref Range    POCT Glucose 122 (H) 74 - 99 mg/dL   Blood Gas Arterial Full Panel   Result Value Ref Range    POCT pH, Arterial 7.41 7.38 - 7.42 pH    POCT pCO2, Arterial 26 (L) 38 - 42 mm Hg    POCT pO2, Arterial 86 85 - 95 mm Hg    POCT SO2, Arterial 99 94 - 100 %    POCT Oxy Hemoglobin, Arterial 96.2 94.0 - 98.0 %    POCT Hematocrit Calculated, Arterial 37.0 36.0 - 46.0 %    POCT Sodium, Arterial 120 (LL) 136 - 145 mmol/L    POCT Potassium, Arterial 5.6 (H) 3.5 - 5.3 mmol/L     POCT Chloride, Arterial 93 (L) 98 - 107 mmol/L    POCT Ionized Calcium, Arterial 1.00 (L) 1.10 - 1.33 mmol/L    POCT Glucose, Arterial 131 (H) 74 - 99 mg/dL    POCT Lactate, Arterial 5.5 (HH) 0.4 - 2.0 mmol/L    POCT Base Excess, Arterial -6.6 (L) -2.0 - 3.0 mmol/L    POCT HCO3 Calculated, Arterial 16.5 (L) 22.0 - 26.0 mmol/L    POCT Hemoglobin, Arterial 12.4 12.0 - 16.0 g/dL    POCT Anion Gap, Arterial 16 10 - 25 mmo/L    Patient Temperature 37.0 degrees Celsius    FiO2 40 %   POCT GLUCOSE   Result Value Ref Range    POCT Glucose 115 (H) 74 - 99 mg/dL   Electrocardiogram, 12-lead PRN ACS symptoms   Result Value Ref Range    Ventricular Rate 137 BPM    Atrial Rate 274 BPM    QRS Duration 84 ms    QT Interval 248 ms    QTC Calculation(Bazett) 374 ms    P Axis 227 degrees    R Axis 15 degrees    T Axis 76 degrees    QRS Count 22 beats    Q Onset 217 ms    P Onset 131 ms    P Offset 192 ms    T Offset 341 ms    QTC Fredericia 326 ms   POCT GLUCOSE   Result Value Ref Range    POCT Glucose 116 (H) 74 - 99 mg/dL     Electrocardiogram, 12-lead PRN ACS symptoms    Result Date: 2/26/2024  Atrial flutter with variable AV block Cannot rule out Anterior infarct (cited on or before 25-FEB-2024) Abnormal ECG When compared with ECG of 25-FEB-2024 07:23, Atrial flutter has replaced Atrial fibrillation Non-specific change in ST segment in Inferior leads ST now depressed in Anterior leads Confirmed by Vlad Marinelli ([id number]) on 2/26/2024 11:43:02 AM    XR chest 1 view    Result Date: 2/26/2024  Interpreted By:  Arnaud Villalobos and Afshari Mirak Sohrab STUDY: XR CHEST 1 VIEW;  2/25/2024 5:08 pm   INDICATION: Signs/Symptoms:post line.   COMPARISON: Chest x-ray 02/24/2024.   ACCESSION NUMBER(S): [id number]   ORDERING CLINICIAN: JENN WALTER   FINDINGS: AP radiograph of the chest was provided.   Endotracheal tube tip is approximately 1.9 cm above the keith. Esophageal temperature probe projects over upper mediastinum. Left IJ approach  central venous catheter tip projects over right atrium. Right IJ approach central venous catheter tip projects over mid SVC. An enteric tube courses below the diaphragm with the tip beyond the field of view.   CARDIOMEDIASTINAL SILHOUETTE: The heart is mildly enlarged.   LUNGS: Low lung volumes with bronchovascular crowding. Interstitial infiltrates are demonstrated bilaterally. Bilateral pleural effusion and subsegmental atelectasis is greater on the right than left. The pulmonary vascular distribution appears slightly increased in degree in comparison to previous study.   ABDOMEN: No remarkable upper abdominal findings.   BONES: No acute osseous changes.       1. Bilateral interstitial pulmonary edema 2. Mild bilateral pleural effusion and subsegmental atelectasis greater on the right than left. 3. The endotracheal tube is 1.9 cm above the keith.   I personally reviewed the images/study and I agree with the findings as stated by resident physician Dr. Natalio Duran . This study was interpreted at [place], [place].   MACRO: None   Signed by: Arnaud Villalobos 2/26/2024 9:45 AM Dictation workstation:   [id number]    XR abdomen 1 view    Result Date: 2/25/2024  Interpreted By:  Freedom Sethi and Jiang Sirui STUDY: XR ABDOMEN 1 VIEW;  2/25/2024 12:16 am   INDICATION: Signs/Symptoms:assess OG tube placement.   COMPARISON: Abdomen radiograph 02/23/2024   ACCESSION NUMBER(S): [id number]   ORDERING CLINICIAN: VIRAJ GARCIA   FINDINGS: Single AP view of the abdomen was obtained. Evaluation is limited secondary to photon starvation.   Enteric tube in place coursing below the diaphragm and the distal tip is overlying the expected location of the gastric body.   Bowel is not well visualized. However there is no definite evidence of dilated gas-filled loop of bowel.       Evaluation is limited secondary to photon starvation. 1. Enteric tube overlying the  expected location of the gastric body.   I personally reviewed the image(s) / study and I agree with the findings as stated by Ricardo Cabrera MD. This study was interpreted at [place], [place].   MACRO: None   Signed by: Freedom Renteria 2/25/2024 7:53 AM Dictation workstation:   [id number]    XR chest 1 view    Result Date: 2/25/2024  Interpreted By:  Freedom Sethi and Jiang Sirui STUDY: XR CHEST 1 VIEW;  2/24/2024 9:22 pm   INDICATION: Signs/Symptoms:assess repositoning of ET tube.   COMPARISON: Chest radiograph 02/24/2024   ACCESSION NUMBER(S): [id number]   ORDERING CLINICIAN: VIRAJ GARCIA   FINDINGS: AP radiograph of the chest was provided.   Endotracheal tube is in place with distal tip projecting 3.2 cm above the keith. Left IJ central venous catheter with distal tip overlying the superior cavoatrial junction.   CARDIOMEDIASTINAL SILHOUETTE: Cardiomediastinal silhouette is stable in size and configuration. Calcifications of the aortic knob are noted.   LUNGS: Elevation of the right hemidiaphragm with atelectasis of the right lung base. Suggestion of trace bilateral pleural effusion. No pneumothorax.   ABDOMEN: Partially imaged gaseous distension of the stomach.   BONES: No acute osseous changes.       1. Right hemidiaphragm elevation similar to the prior examination with atelectasis of the right lung base. Left basilar atelectasis with suggestion of trace bilateral pleural effusion. 2.  Medical devices as above. No pneumothorax. 3. Gaseous distension of the stomach. Consider decompression by NG tube.   I personally reviewed the image(s) / study and I agree with the findings as stated by Ricardo Cabrera MD. This study was interpreted at [place], [place].   MACRO: None   Signed by: Freedom Renteria 2/25/2024 7:52 AM Dictation workstation:   [id number]    XR chest 1 view    Result Date: 2/25/2024  Interpreted By:  Freedom Sethi,  STUDY: XR CHEST 1 VIEW;  2/24/2024 6:44 pm   INDICATION: Signs/Symptoms:postintubation.   COMPARISON: Radiograph dated 02/24/2024   ACCESSION NUMBER(S): [id number]   ORDERING CLINICIAN: JENN WALTER   FINDINGS: Interval intubation with the tip 5.8 cm from the keith. Left IJ central venous catheter is projecting over lower SVC.   The cardiac silhouette size is stable.   Low lung volumes and bronchovascular crowding. Bibasilar atelectasis and suggestion of trace bilateral pleural effusion. Mild interstitial prominence. No sizable pneumothorax.   Gaseous distension of the stomach, partially imaged.   No acute osseous change.       1. Bibasilar atelectasis with suggestion of trace bilateral pleural effusion and mild perihilar congestion/edema, unchanged. 2. Gaseous suggestion of the stomach. Consider decompression by NG tube. 3. Interval intubation.       Signed by: Freedom Renteria 2/25/2024 7:52 AM Dictation workstation:   [id number]    CT tibia fibula right wo IV contrast    Result Date: 2/24/2024  Interpreted By:  Nav Hansen,  and Vadim Maurice STUDY: CT TIBIA FIBULA RIGHT WO IV CONTRAST;  2/24/2024 2:00 pm   INDICATION: Signs/Symptoms:r\o infection. 68-year-old female with history of seropositive RA on infliximab, osteoarthritis, nephrolithiasis, with recently diagnosed breast cancer status post chemotherapy completed 02/14/2024 and cirrhosis, transferred to MICU 02/23/2023 for hypertension, being treated for septic shock. Concern for right lower extremity infection.   COMPARISON: None.   ACCESSION NUMBER(S): [id number]   ORDERING CLINICIAN: JENN WALTER   TECHNIQUE: CT imaging of the distal right femur to the foot was obtained without contrast. Coronal and sagittal reformatted images were performed.   FINDINGS: OSSEOUS STRUCTURES: Postsurgical changes from total right knee arthroplasty. Beam hardening artifact limits evaluation of the adjacent structures. Within these limitations, there is no acute  fracture or malalignment.   SOFT TISSUES: There is moderate subcutaneous edema of the visualized right lower extremity, most pronounced in the anterior aspect of the mid to distal lower leg and extending to the dorsal midfoot. No focal fluid collection.       Moderate subcutaneous edema of the visualized right lower extremity, most pronounced in the anterior mid to distal lower leg extending to the dorsal midfoot. Evident fluid collection within the limits of a noncontrast enhanced examination. Findings may reflect cellulitis, venous stasis, lymphedema, or fluid overload.   Postsurgical changes from total right knee arthroplasty without evidence of hardware complication. No acute osseous abnormality of the visualized right lower extremity.   I personally reviewed the images/study and I agree with the findings as stated by Josafat Fierro MD. This study was interpreted at [place], [place].   MACRO: None   Signed by: Nav Hansen 2/24/2024 5:32 PM Dictation workstation:   [id number]    CT abdomen pelvis wo IV contrast    Result Date: 2/24/2024  Interpreted By:  Garth Watson,  and Vadim Maurice STUDY: CT ABDOMEN PELVIS WO IV CONTRAST;  2/24/2024 2:01 pm   INDICATION: Signs/Symptoms:septic shock, assess for hematoma, has hx of R-femur avulsion fracture. 68-year-old female with history of seropositive RA on infliximab, osteoarthritis, nephrolithiasis, with recently diagnosed breast cancer status post chemotherapy completed 02/14/2024 and cirrhosis, transferred to MICU 02/23/2023 for hypertension, being treated for septic shock.   COMPARISON: CT abdomen pelvis 02/01/2024 and 01/30/2023, MRCP 02/05/2024   ACCESSION NUMBER(S): [id number]   ORDERING CLINICIAN: YVETTE LEMON   TECHNIQUE: CT of the abdomen and pelvis was performed. Contiguous axial images were obtained at 3 mm slice thickness through the abdomen and pelvis. Coronal and sagittal reconstructions at  3 mm slice thickness were performed.  No intravenous or oral contrast agents were administered.   FINDINGS: Please note that the evaluation of vessels, lymph nodes and organs is limited without intravenous contrast.   LOWER CHEST: Small bilateral pleural effusions with associated atelectasis. The heart is mildly enlarged without significant pericardial effusion. Coronary artery calcifications are noted. Decrease in size of a 2 x 1.3 cm spiculated mass in the lateral left breast, previously 2 x 1.8 cm.   ABDOMEN:   LIVER: The liver demonstrates nodular contour and diffuse hypoattenuation consistent with cirrhosis. The liver is better assessed on recent MR 02/05/2024.   BILE DUCTS: The intrahepatic and extrahepatic ducts are not dilated.   GALLBLADDER: The gallbladder contains numerous radiopaque stones. No pericholecystic fluid or fat stranding.   PANCREAS: The pancreas appears unremarkable without evidence of ductal dilatation or masses.   SPLEEN: The spleen is normal in size without focal lesions.   ADRENAL GLANDS: Bilateral adrenal glands appear normal.   KIDNEYS AND URETERS: The kidneys are symmetric in size. There are multiple bilateral nonobstructing renal calculi measuring up to 6 mm at the right superior pole. No hydroureteronephrosis.   PELVIS:   BLADDER: The urinary bladder is partially decompressed with Trevino catheter in place. Small amount of anti dependent air is noted within the urinary bladder, likely related to catheter placement.   REPRODUCTIVE ORGANS: The uterus is present.   BOWEL: A small gastric diverticulum is again noted in the posterior aspect of the fundus. The small bowel is normal caliber without wall thickening. There is interval increased circumferential edematous mural thickening of the entire colon and rectum with perirectal fat stranding (series 3, images 159-169). The appendix is not definitely visualized. There is however no pericecal stranding.   VESSELS: Severe atherosclerosis of  the abdominal aorta and its branches with ectasia of the infrarenal aorta measuring up to 3 cm. The IVC is normal.   PERITONEUM/RETROPERITONEUM/LYMPH NODES: Moderate abdominopelvic ascites. No focal fluid collection or pneumoperitoneum. No abdominopelvic lymphadenopathy.   ABDOMINAL WALL: There is mild-to-moderate body wall edema, most pronounced at the lateral and lower abdominal wall.   BONES: There is a similar heterogeneity with lytic lesion and cortical irregularity of the right lesser trochanter (series 3, image 183). Multilevel degenerative changes of the thoracolumbar spine are noted.       1.  Interval increased circumferential edematous mural thickening of the entire colon and rectum with perirectal fat stranding, nonspecific, but may be seen in the setting of infectious/inflammatory proctocolitis. 2. Irregular lytic lesion with cortical irregularity of the right lesser trochanter, nonspecific, can represent avulsion fracture or aggressive osseous lesion including metastatic disease. 3. Interval decreased size of a left breast mass in keeping with patient's known primary malignancy. 4. Moderate ascites and small bilateral pleural effusions. Please correlate clinically with patient's volume status. 5. Additional findings as above.   I personally reviewed the images/study and I agree with the findings as stated by Josafat Fierro MD. This study was interpreted at [facility], [place], [place].   MACRO: None   Signed by: Garth Watson 2/24/2024 3:38 PM Dictation workstation:   [id number]      Assessment/Plan   Principal Problem:    Cirrhosis (CMS/HCC)  A 68 yF with seropositive RA (on infliximab), morbid obesity, > 50 pack year smoker (quit march 2023). Presented to [facility] ED on 2/1 for ABD pain and hematuria. She was found to have new-diagnosis of liver cirrhosis and was transferred to [facility] hepatology evaluation. 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 She is s/p induction chemo with carbo and transtuzumab. Then transferred to the MICU 2/23/2023 for septic shock, E. Coli bacteremia, and multisystem organ failure including toxic metabolic encephalopathy, acute hypercarbic respiratory failure, and oliguric SYLVIA. Course now complicated by atrial fibrillation with rapid ventricular response. Most likely source of her E. Coli bacteremia is gut bacterial translocation from inflammatory vs. Infectious colitis. Her condition is currently guarded.    Updates 2/26:  - One episode of Atrial flutter to the 140s  - Given 150mg bolus Amioderone  - Levo 0.28, Vaso 0.03  - Plt 6 - given 2 units platlets  - Goals of Care discussion with family planned for when son arrives  - C/w Zosyn, micafungin for now. Pending sensitivities. ID consulted      Impression:   #Septic shock 2/2 gram negative bacteremia, potential sources include RLE cellulitis (though more commonly gram +ve), abdomen (?SBP), urine (Hx of Nephrolithiasis)  #HAGMA 2/2 lactic acidosis 2/2 septic shock with decreased clearance 2/2 liver disease  #SYLVIA likely ATN 2/2 sepsis   #Leukopenia and thrombocytopenia (Bicytopenia) likely 2/2 sepsis, however recent chemotherapy induced is another possibility       NEURO  #Encephalopathy  ::Likely toxic metabolic   -delirium precautions   -treat underlying metabolic disturbances - lactulose + rifaximin started   - Fentanyl 75, Prop 20    #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  -MRA once out of the acute setting, potentially outpatient      PULM  - Intubated (40, 420, 20, 5)   - Saturating 97%     CVS  #Septic Shock  ::Growing gram negative Bacilli   ::source is right leg likely cellulitis, vs. breast wound , need to r/o urine   -vanc/Zosyn/Clindamycin (continued Vanc & Zosyn)  - 1 x dose tobramycin   - follow BCx   - send for UA with reflex to culture   - trend lactates   - stress dose steroids  - MAP  goal >65  - Discontinued clindamycin  - Levo 0.28, Vaso 0.03     RENAL  #SYLVIA  #Hyperkalemia   ::FENa from 2/14 suggestive of pre-renal etiology   ::likely ATN 2/2 sepsis   :: poor UOP  -strict I/Os  -renally dose meds  -nephrology consult -plan for possible dialysis put on hold pending Goals of Care discussion  -TLS labs   -CK level  - BUN 98, Cr 2.86     #Metabolic Acidosis (HAGMA AND NAGMA)  #Lactic Acidosis   ::lactate 2/2 septic shock with decreased clearance given liver disease  ::NAGMA likely 2/2 renal failure   -bicarb 50mEq x2 injections   -bicarb drip at 150cc/hr - stopped  -may need Renal replacement therapy for refractory acidosis         GI  #new liver cirrhosis  #Cholelithiasis  #mixed hepatocellular and cholestatic liver injury   #mild confusion possible grade 1 hepatic encephalopahthy  #liver metastasis (breast primary)  :: hepC negative, hepB negative, hepA negative, T spot negative in 11/2023  :: alpha 1 antitrypsin negative  :: ferritin 406 H/transferrin 136 L  :: Hep B surface AG negative, Hep B surface AB positive at 580.6  ::liver biopsy significant for poorly differentiated breast cancer  ::no safe pocket to tap on 2/23/2024   - lactulose 20 g BID once oral access established   - lasix 20 mg and aldactone 50 mg currently held iso SYLVIA  - rifaxamin 550 mg BID for HE once oral access established      #Gaseous Distention of stomach   -consider NG tube decompression if blood counts acceptable and do not continue to drop   -consider CT abdomen non-con once stable      ONC  #nonhealing, draining, L breast wound  #primary breast cancer with liver met and likely brain mets  :: MRI brain showing small enhancing lesions in the cerebellum without mass effect with c/f metastatic disease, potential 4 mm MRA bifurcation aneurysm  :: TTE shows EF 70% and impaired diastolic relaxation  :: NM bone scan without definitive osseous metastatic disease  :: inpatient mammogram significant for masses and suspicious  lymphadenopathy  :: s/p biopsies, final pathology shows invasive ductal carcinoma grade 3, ER-/MA-/HER2+  :: initial pathology showed ER+ so patient was started on anastrazole 1 mg, but this read was modified to ER- so anastrozole was stopped on 2/13  :: carboplatin (AUC 4) and trastuzumab (loading dose 8 mg/kg) completed on 2/14 with patient tolerating this well  - Daily CMP, LDH, and uric acid to monitor for tumor lysis syndrome  - G6PD testing negative        HEME  #Dropping blood counts   #Bicytopenia  ::chemo related vs. Sepsis   -send for hemolysis labs   -T+S  -send for vitamin B12, folate, iron  -reticulocyte count  -peripheral smear   - Platelets dropped to 6 from 48 (2 units transfused)     INFECTIOUS DISEASES  #Septic Shock  #Gram negative bacteremia  #RLE cellulitis   ::Bcx with gram negative bacilli  -vanc/zosyn/clinda   -1x tobra on 2/23/2024   -send for UA with reflex to culture   - Vanc & Zosyn continued     ENDO  #Hypoglycemia   ::likely related to sepsis + poor liver compensation given cirrhosis   -D10 drip   - Bilirubin Total 19.2; direct 12.9     MSK  #RA - on infliximab  #osteoarthritis  :: hold home naproxen   - voltaren gel  - tylenol 650 mg q8 prn  - oxycodone 5 mg q4 prn  - outpatient rheum aware to discontinue infliximab     #subacute avulsion fracture lesser trochanter R proximal femur  - chronic?  - consult orthopedic surgery to assess subacute avulsion fracture of R femur found on CT on 2/1: nothing to do, patient can follow-up outpatient, WBAT  - NM bone scan without definitive osseous metastatic disease   -consider CT scan if Hgb dropping      #compression deformity at T12  -consider vitamin D and calcium     #abdominal aorta atherosclerosis on CT  #smoking history   - follow up with PCP outpatient to discuss potential statin/asa     N: Dobhoff  GI: PPI  P: holding  A: PIVs, left internal jugular CVC, R internal jugular, ETT  C: full code  Surrogate:  Cierra daughter [phone] (first  option)  Emmanuel kendrick [phone]               Tej Enamorado, DO

## 2024-02-26 NOTE — NURSING NOTE
MICU team states to hold off on CRRT due to patient hemodynamic status. Will continue to follow up.

## 2024-02-26 NOTE — PROGRESS NOTES
"Vancomycin Dosing by Pharmacy- FOLLOW UP    Meghan Waddell is a 68 y.o. year old female who Pharmacy has been consulted for vancomycin dosing for cellulitis, skin and soft tissue. Based on the patient's indication and renal status this patient is being dosed based on a goal trough/random level of 15-20.     Renal function is currently declining. CVVH ordered but pending start.     Vancomycin last random level: 16 mcg/ml    Visit Vitals  /68   Pulse (!) 134   Temp 36.7 °C (98.1 °F) (Temporal)   Resp 20        Lab Results   Component Value Date    CREATININE 2.86 (H) 02/26/2024    CREATININE 2.96 (H) 02/25/2024    CREATININE 2.79 (H) 02/25/2024    CREATININE 2.86 (H) 02/25/2024        Patient weight is No results found for: \"PTWEIGHT\"    No results found for: \"CULTURE\"     I/O last 3 completed shifts:  In: 3280.8 (30.7 mL/kg) [I.V.:2083.3 (19.5 mL/kg); Blood:637.5; NG/GT:560]  Out: 943 (8.8 mL/kg) [Urine:543 (0.1 mL/kg/hr); Emesis/NG output:400]  Weight: 106.9 kg   [unfilled]    Lab Results   Component Value Date    PATIENTTEMP 37.0 02/26/2024    PATIENTTEMP 37.0 02/25/2024    PATIENTTEMP 37.0 02/25/2024        Assessment/Plan    Within goal random/trough level. Will redose with one time dose of 750mg on 2/26/24 at 0900.   The next level will be obtained on 2/27/24 at 0800. May be obtained sooner if clinically indicated.   Will continue to monitor renal function daily while on vancomycin and order serum creatinine at least every 48 hours if not already ordered.  Follow for continued vancomycin needs, clinical response, and signs/symptoms of toxicity.       Virgen Esposito, PharmD           "

## 2024-02-26 NOTE — PROGRESS NOTES
Art Therapy Note    Meghan Waddell     Therapy Session  Referral Type: New referral this admission  Visit Type: Follow-up visit  Session Start Time: 1513  Session End Time: 1520  Intervention Delivery: In-person  Conflict of Service: None  Number of family members present: 0  Family Present for Session: None  Number of staff members present: 0              Treatment/Interventions  Areas of Focus: Emotional support  Art Therapy Interventions: Empathic listening/validating emotions  Interruption: No  Patient Fell Asleep at End of Session: Yes    Post-assessment  Total Session Time (min): 7 minutes    Narrative  Assessment Detail: ATR made a visit to Pt.s; room to check in a offer emotional and presence.  Pt appeared to be sleeping but her eyes opend when she heard the door open.  She welcomed the visit.  Plan: (S) .  Intervention: Provided presence, emotional support and presence.  Evaluation: No family present.  Pt resting, dozing in and out of sleep.Pt. shared her family visited earlier in the day and reported how tired she was.  Pt seemed appreciative of ATR's presence and thanked her for coming by.  Pt open to further emotional check in visits.  ATR willfollow up as needed.    Education Documentation  No documentation found.

## 2024-02-26 NOTE — PROGRESS NOTES
Spiritual Care Visit    Clinical Encounter Type  Visited With: Family  Routine Visit: Introduction  Continue Visiting: Yes    Taxonomy  Intended Effects: Convey a calming presence, Demonstrate caring and concern, Establish rapport and connectedness  Methods: Encourage self reflection, Encourage sharing of feelings, Encourage story-telling, Offer support  Interventions: Acknowledge current situation, Active listening, Discuss coping mechanisms with someone, Identify supportive relationship(s)     introduced self and role to patient Meghan Waddell's family. Family shared events leading up to patient's admission onto ICU, and shared that patient received Anointing of the Sick this morning.  held space for them to share how they are feeling and to reflect on patient.  provided support through compassionate presence, reflective listening, and supportive conversation. They were appreciative of visit and did not have any needs at this time. Spiritual Care remains available as needed/requested.    Rev. Emilia Leyva MDiv, BCC

## 2024-02-26 NOTE — PROGRESS NOTES
Physical Therapy                 Therapy Communication Note    Patient Name: Meghan Waddell  MRN: [id number]  Today's Date: 2/26/2024     Discipline: Physical Therapy    Missed Visit Reason: Missed Visit Reason:  (patient intubated, multiple pressors; per RN, GOC conversation this date. PT will hold and re-attempt as time and scehdule allows and as medically appropriate.)    Missed Time: Attempt    Comment:

## 2024-02-26 NOTE — ACP (ADVANCE CARE PLANNING)
Confirming Previous Code Status:   Advance Care Planning Note     Discussion Date: 02/25/24   Discussion Participants: patient and daughter    The patient wishes to discuss Advance Care Planning today and the following is a brief summary of our discussion.     Patient has capacity to make their own medical decisions: No  Health Care Agent/Surrogate Decision Maker documented in chart: Yes    Documents on file and valid:  Advance Directive/Living Will: Yes   Health Care Power of : Yes    Communication of Medical Status/Prognosis:   Decision was made by POA to not pursue CVVH given clinical instability. Developed AF/AFL with rapid ventricular response with heart rates in the 140s and worsening vasopressor requirements over 24 hours. Family understands patient's poor prognosis at its current state. Goal is supportive care until brother arrives from [place] tomorrow evening at 6PM. At that point,  will come to the bed side and family will transition to comfort care.     Communication of Treatment Goals/Options:   \Supportive care.    Treatment Decisions  Goals of Care: quality of life is prioritized; willing to accept low-burden treatments to achieve meaningful goals     Follow Up Plan  Transition to comfort care at 6 pm tomorrow     Team Members  [facility] Blue, kim Bonilla    Time Statement: Total face to face time spent on advance care planning was 30 minutes with 30 minutes spent in counseling, including the explanation.    Arnaud Mello MD  2/25/2024 7:02 PM

## 2024-02-26 NOTE — PROGRESS NOTES
Meghan Waddell is a 68 y.o. female on day 23 of admission presenting with Cirrhosis (CMS/HCC).    Subjective   Interval History: Patient's family at bedside.  Patient's daughter is having difficult time going through this situation and making difficult decisions.  Patient's daughter does not want to withdraw care if there is possibility of complete recovery.  I informed her that from ID standpoint we are managing infection but prognosis will depend on underlying metastatic breast cancer liver failure and renal failure.  I offered palliative care consult to help her navigate this difficult situation but she wanted to talk with oncologist to have a clear idea about prognosis.        Objective   Range of Vitals (last 24 hours)  Heart Rate:  []   Temp:  [36.7 °C (98.1 °F)-38.1 °C (100.6 °F)]   Resp:  [20-40]   BP: (115-122)/(68-71)   SpO2:  [95 %-98 %]   Daily Weight  02/25/24 : 107 kg (235 lb 10.8 oz)    Body mass index is 37.21 kg/m².    Physical Exam  Sedated and intubated    Antibiotics  enoxaparin (Lovenox) syringe 40 mg  polyethylene glycol (Glycolax, Miralax) packet 17 g  lactulose 20 gram/30 mL oral solution 10 g  diclofenac sodium (Voltaren) 1 % gel 4 g  calcium carbonate-vitamin D3 500 mg-5 mcg (200 unit) per tablet 2 tablet  pantoprazole (ProtoNix) EC tablet 40 mg  lactulose 20 gram/30 mL oral solution 20 g  cefTRIAXone (Rocephin) IVPB 1 g  cefTRIAXone (Rocephin) IVPB 1 g  gadoterate meglumine (Dotarem) 0.5 mmol/mL contrast injection 18 mL  lactulose 20 gram/30 mL oral solution 20 g  furosemide (Lasix) tablet 20 mg  spironolactone (Aldactone) tablet 50 mg  lidocaine with 8.4% sod bicarb (Buffered Xylocaine) 0.9 % (10 mL) injection  lidocaine with 8.4% sod bicarb (Buffered Xylocaine) 0.9 % (10 mL) injection  midazolam (Versed) injection  - Omnicell Override Pull  fentaNYL PF (Sublimaze) injection  - Omnicell Override Pull  acetaminophen (Tylenol) tablet 650 mg  acetaminophen (Tylenol) oral liquid 650  mg  acetaminophen (Tylenol) suppository 650 mg  lidocaine 4 % patch 1 patch  anastrozole (Arimidex) tablet 1 mg  oxyCODONE (Roxicodone) immediate release tablet 5 mg  gadoterate meglumine (Dotarem) 0.5 mmol/mL contrast injection 18 mL  predniSONE (Deltasone) tablet 30 mg  perflutren lipid microspheres (Definity) injection 0.5-10 mL of dilution  sulfur hexafluoride microsphr (Lumason) injection 24.28 mg  perflutren protein A microsphere (Optison) injection 0.5 mL  fosaprepitant (Emend) 150 mg in sodium chloride 0.9% 250 mL IV  dexAMETHasone (Decadron) in dextrose 5% 50 mL IV 12 mg  ondansetron (Zofran) in dextrose 5 % 50 mL IV 16 mg  melatonin tablet 5 mg  perflutren lipid microspheres (Definity) injection 0.5-10 mL of dilution  sulfur hexafluoride microsphr (Lumason) injection 24.28 mg  perflutren protein A microsphere (Optison) injection 0.5 mL  perflutren lipid microspheres (Definity) injection 0.5-10 mL of dilution  sulfur hexafluoride microsphr (Lumason) injection 24.28 mg  perflutren protein A microsphere (Optison) injection 0.5 mL  prochlorperazine (Compazine) tablet 10 mg  prochlorperazine (Compazine) injection 10 mg  CARBOplatin (Paraplatin) 302 mg in sodium chloride 0.9% 130.2 mL IV  trastuzumab-anns (Kanjinti) 720 mg in sodium chloride 0.9% 284.29 mL IV  sodium chloride 0.9 % bolus 500 mL  dextrose 5 % in water (D5W) bolus  diphenhydrAMINE (BENADryl) injection 50 mg  methylPREDNISolone sod succinate (PF) (SOLU-Medrol) 40 mg/mL injection 40 mg  famotidine PF (Pepcid) injection 20 mg  EPINEPHrine (Adrenalin) 1 mg/10mL injection 0.3 mg  albuterol 2.5 mg /3 mL (0.083 %) nebulizer solution 3 mL  allopurinol (Zyloprim) tablet 300 mg  sodium chloride 0.9% infusion  sodium chloride 0.9 % bolus 1,000 mL  oxyCODONE (Roxicodone) immediate release tablet 5 mg  Tc-99m-medronate sodium (Draximage) injection 25 millicurie  OLANZapine (ZyPREXA) tablet 5 mg  lactated Ringer's bolus 1,000 mL  lactulose 20 gram/30 mL oral  solution 20 g  sodium chloride 0.9% infusion  OLANZapine (ZyPREXA) tablet 2.5 mg  ondansetron (Zofran) injection 4 mg  dexAMETHasone (Decadron) tablet 4 mg  dexAMETHasone (Decadron) tablet 4 mg  dexAMETHasone (Decadron) tablet 4 mg  rifAXIMin (Xifaxan) tablet 550 mg  furosemide (Lasix) tablet 20 mg  dexAMETHasone (Decadron) tablet 4 mg  furosemide (Lasix) tablet 20 mg  HYDROmorphone (Dilaudid) injection 0.4 mg  sodium chloride 0.9 % bolus 500 mL  piperacillin-tazobactam-dextrose (Zosyn) IV 2.25 g  albumin human 25 % solution 25 g  albumin human 25 % solution 25 g  dextrose injection  - Omnicell Override Pull  calcium gluconate in NS IV 2 g  vancomycin (Vancocin) 2000 mg/500 ml in D5W 500 mL IV piggyback 2,000 mg  sodium chloride 0.9 % bolus 1,000 mL  vancomycin (Vancocin) placeholder  tobramycin (Nebcin) 152 mg in dextrose 5 % in water (D5W) 50 mL IV  norepinephrine in dextrose 5 % (Levophed) infusion  - Omnicell Override Pull  dextrose 10 % in water (D10W) infusion  calcium gluconate in NS IV 2 g  oxygen (O2) therapy  lactated Ringer's bolus 500 mL  pantoprazole (ProtoNix) injection 40 mg  sodium bicarbonate 1 mEq/mL (8.4 %) injection 50 mEq  sodium bicarbonate 1 mEq/mL (8.4 %) injection 50 mEq  sodium bicarbonate 150 mEq in dextrose 5 % in water (D5W) 1,000 mL infusion  norepinephrine (Levophed) 8 mg in dextrose 5% 250 mL (0.032 mg/mL) infusion (premix)  dexAMETHasone (Decadron) injection 4 mg  lactated Ringer's bolus 500 mL  albumin human 5 % infusion 25 g  clindamycin in D5W (Cleocin) IVPB 900 mg  norepinephrine (Levophed) 8 mg in dextrose 5% 250 mL (0.032 mg/mL) infusion (premix)  vasopressin (Vasostrict) 0.2 unit/mL infusion  hydrocortisone sod succ (PF) (Solu-CORTEF) injection 100 mg  hydrocortisone sod succ (PF) (Solu-CORTEF) injection 50 mg  vasopressin (Vasostrict) 0.2 unit/mL infusion  HYDROmorphone (Dilaudid) injection 0.4 mg  HYDROmorphone (Dilaudid) injection 0.2 mg  dextrose 10 % in water (D10W)  infusion  glucagon (Glucagen) injection 1 mg  phenylephrine HCl in 0.9% NaCl syringe  - Omnicell Override Pull  PrismaSol BGK 2/3.5 CRRT solution  calcium gluconate in NS IV 2 g  fentaNYL PF (Sublimaze) injection  - Omnicell Override Pull  etomidate (Amidate) injection  - Omnicell Override Pull  midazolam (Versed) injection  - Omnicell Override Pull  fentaNYL infusion  - Omnicell Override Pull  propofol (Diprivan) injection  - Omnicell Override Pull  rocuronium (ZeMuron) injection  - Omnicell Override Pull  sodium bicarbonate injection  - Omnicell Override Pull  etomidate (Amidate) injection  oxygen (O2) therapy  midazolam (Versed) injection 4 mg  fentaNYL PF (Sublimaze) injection 150 mcg  etomidate (Amidate) injection 20 mg  rocuronium (ZeMuron) injection 100 mg  lactated Ringer's bolus 500 mL  phenylephrine in NS (Tucker-Synephrine) 100 mcg/mL syringe 40 mcg  lubricating eye drops ophthalmic solution 1 drop  calcium gluconate in NS IV 2 g  sodium zirconium cyclosilicate (Lokelma) packet 10 g  insulin regular (HumuLIN R,NovoLIN R) injection 5 Units  dextrose 50 % injection 25 g  dextrose 10 % in water (D10W) infusion  sodium chloride 0.9 % bolus 1,000 mL  sodium chloride 0.9 % bolus 1,000 mL  vancomycin (Vancocin) 1,000 mg in dextrose 5% water 200 mL  phenylephrine HCl in 0.9% NaCl syringe 40 mcg  vancomycin in dextrose 5 % (Vancocin) IVPB 750 mg  calcium gluconate in NS IV 2 g  sodium zirconium cyclosilicate (Lokelma) packet 10 g  micafungin (Mycamine) 100 mg in dextrose 5 % in water (D5W) 100 mL IV  propofol (Diprivan) infusion  lactated Ringer's bolus 1,000 mL  fentaNYL PF (Sublimaze) injection 25 mcg  fentanyl (Sublimaze) 1000 mcg in sodium chloride 0.9% 100 mL (10 mcg/mL) infusion (premix)  polyethylene glycol (Glycolax, Miralax) packet 17 g  perflutren lipid microspheres (Definity) injection 0.5-10 mL of dilution  sulfur hexafluoride microsphr (Lumason) injection 24.28 mg  perflutren protein A microsphere  (Optison) injection 0.5 mL  amiodarone in dextrose,iso-osm (Nexterone) IV  - Omnicell Override Pull  amiodarone (Nexterone) 150 mg in dextrose,iso-osm 100 mL (1.5 mg/mL) IV (premix)  calcium gluconate in NS IV 2 g  vancomycin in dextrose 5 % (Vancocin) IVPB 750 mg      Relevant Results  Labs  Results from last 72 hours   Lab Units 02/26/24  0217 02/25/24  0405 02/25/24  0017 02/24/24  1952 02/24/24  1500   WBC AUTO x10*3/uL 15.8* 15.4* 13.9* 11.8* 8.4   HEMOGLOBIN g/dL 11.9* 10.4* 10.8* 11.6* 11.9*   HEMATOCRIT % 30.6* 29.0* 29.9* 31.7* 33.4*   PLATELETS AUTO x10*3/uL 6* 48* 77* 63* 8*   NEUTROS PCT AUTO %  --   --   --  92.9 93.4   LYMPHO PCT MAN % 4.7 6.0  --   --   --    LYMPHS PCT AUTO %  --   --   --  2.6 3.0   MONO PCT MAN % 2.3 11.0  --   --   --    MONOS PCT AUTO %  --   --   --  3.7 1.7   EOSINO PCT MAN % 0.0 0.0  --   --   --    EOS PCT AUTO %  --   --   --  0.0 0.1     Results from last 72 hours   Lab Units 02/26/24  0217 02/25/24  1706 02/25/24  1558 02/25/24  0405   SODIUM mmol/L 125* 128* 127* 128*   POTASSIUM mmol/L 5.6* 5.7* 5.5* 5.5*   CHLORIDE mmol/L 92* 94* 93* 95*   CO2 mmol/L 17* 18* 16* 16*   BUN mg/dL 98* 95* 92* 88*   CREATININE mg/dL 2.86* 2.96* 2.79* 2.86*   GLUCOSE mg/dL 129* 86  --  121*   CALCIUM mg/dL 7.2* 7.6*  --  7.7*   ANION GAP mmol/L 22* 22* 24* 23*   EGFR mL/min/1.73m*2 18* 17* 18* 18*   PHOSPHORUS mg/dL 4.9 4.9 4.9 4.8     Results from last 72 hours   Lab Units 02/26/24  0217 02/25/24  1706 02/25/24  0405 02/25/24  0017 02/24/24  1952 02/24/24  1500 02/24/24  0354   ALK PHOS U/L 88  --   --   --  85  --  99   BILIRUBIN TOTAL mg/dL 19.2*  --   --   --  16.3*  --  15.3*   BILIRUBIN DIRECT mg/dL 12.9*  --   --   --  9.5*  --  9.3*   PROTEIN TOTAL g/dL 4.4*  --   --   --  4.6*  --  4.3*   ALT U/L 28  --   --   --  32  --  33   AST U/L 37  --   --   --  55*  --  59*   ALBUMIN g/dL 1.8* 2.0* 2.0*   < > 2.0*   < > 1.8*    < > = values in this interval not displayed.     Estimated  Creatinine Clearance: 23.9 mL/min (A) (by C-G formula based on SCr of 2.86 mg/dL (H)).  C-Reactive Protein   Date Value Ref Range Status   02/23/2024 11.62 (H) <1.00 mg/dL Final   11/01/2023 4.28 (H) <1.00 mg/dL Final     CRP   Date Value Ref Range Status   03/31/2023 1.15 (A) mg/dL Final     Comment:     REF VALUE  < 1.00       Microbiology  Susceptibility data from last 14 days.  Collected Specimen Info Organism Amoxicillin/Clavulanate Ampicillin Ampicillin/Sulbactam Cefazolin Ceftriaxone Cefuroxime Ciprofloxacin Gentamicin Imipenem Levofloxacin Piperacillin/Tazobactam Trimethoprim/Sulfamethoxazole   02/23/24 Blood culture from Peripheral Venipuncture Escherichia coli S R S I S S R S  R S S   02/23/24 Blood culture from Peripheral Venipuncture Escherichia coli               02/23/24 Blood culture from Peripheral Venipuncture Escherichia coli S R I I S S R S S R S S       Imaging              Assessment/Plan   ICU team to assist with family in making informed decision and also may consult palliative care to help in decision-making.  Patient's daughter does not want aggressive care if the outcome is poor.  Wants to talk to patient's oncologist.  Provided emotional support.  Continue antibiotics pending goals of care discussion.        Servando Arnold MD

## 2024-02-26 NOTE — PROGRESS NOTES
Spiritual Care Visit    Clinical Encounter Type  Visited With: Patient and family together  Routine Visit: Introduction  Continue Visiting: Yes  Referral From: Nurse  Referral To:     Mosque Encounters  Mosque Needs: Prayer         Sacramental Encounters  Sacrament of Sick-Anointing: Anointed    Patient received the Sacrament of the Anointing of the Sick by Fr. Colin Bird, [facility] .    Family members were preset.      Within the Buddhist Christianity the Sacrament of the Sick, also known as the Anointing of the Sick, is a prayer in which we invoke the healing power of God.  Along with Eucharist and Reconciliation it is a repeatable sacrament and should be received by anyone about to undergo surgery, those in recovery and the elderly.  This IS NOT 'Last Rites' nor does the Taoism have such a ritual.  Those who are actively dying should receive the Anointing of the Sick for physical and spiritual healing.

## 2024-02-26 NOTE — PROCEDURES
Central Line Placement Procedure Note    Indication: dialysis/hemapheresis     Consent: the patient's son and daughter    UNIVERSAL PROTOCOL/ TIMEOUT:  Preprocedure verification is complete patient verified and consents confirmed, procedure sites are identified and marked, timeout was called before the start of the procedure.     right internal jugular vein was prepped with betadine and draped in a sterile fashion. Local anesthesia was with 1% lidocaine was used. Real time ultrasound was utilized throughout this procedure. A large bore needle was used to identify the vein.  A guide wire was then inserted into the vein through the needle. Triple lumen catheter was then inserted into the vessel over the guide wire using the Seldinger technique.  All ports showed good, free flowing blood return and were flushed with saline solution.  The catheter was then securely fastened to the skin with sutures and covered with a sterile dressing.  A post procedure X-ray showed good line position.    The patient tolerated the procedure Patient tolerated the procedure well.    Complications: None

## 2024-02-26 NOTE — PROGRESS NOTES
Meghan Waddell   68 y.o.    @WT@  MRN/Room: [id number]/09/09-A  DOA: 2/3/2024    ___________________________________________________________________________________  SUBJECTIVE: I have seen and examined Meghan Waddell at the bedside.   Meghan Waddell charts and 24 hrs events reviewed.    ___________________________________________________________________________________  OBJECTIVE:  Temp:  [36.7 °C (98.1 °F)-37.7 °C (99.9 °F)] 37.6 °C (99.7 °F)  Heart Rate:  [] 111  Resp:  [20-40] 20  BP: (115-122)/(68-71) 115/69  Arterial Line BP 1: (105-130)/(62-74) 105/63  FiO2 (%):  [40 %] 40 %     Intake/Output Summary (Last 24 hours) at 2/26/2024 1800  Last data filed at 2/26/2024 1600  Gross per 24 hour   Intake 4055.69 ml   Output 430 ml   Net 3625.69 ml      I/O last 3 completed shifts:  In: 3280.8 (30.7 mL/kg) [I.V.:2083.3 (19.5 mL/kg); Blood:637.5; NG/GT:560]  Out: 943 (8.8 mL/kg) [Urine:543 (0.1 mL/kg/hr); Emesis/NG output:400]  Weight: 106.9 kg        General appearance: intubated  Eyes: icteric  Skin: no apparent rash  Heart: regular  Lungs: NVB B/L dec air entry at bases  Abdomen: soft, nt/nd  Extremities: edema B/L with LE cellulitis    Meds:   hydrocortisone sodium succinate, 50 mg, q6h  lactulose, 20 g, BID  lidocaine, 1 patch, Daily  micafungin, 100 mg, q24h  [Held by provider] OLANZapine, 2.5 mg, Nightly  pantoprazole, 40 mg, Daily  piperacillin-tazobactam, 2.25 g, q6h  polyethylene glycol, 17 g, Daily  rifAXIMin, 550 mg, BID  sodium zirconium cyclosilicate, 10 g, q8h  [Held by provider] spironolactone, 50 mg, Daily      dextrose 10 % in water (D10W), Last Rate: 35 mL/hr (02/26/24 1600)  fentaNYL, Last Rate: 75 mcg/hr (02/26/24 1600)  norepinephrine, Last Rate: 0.2 mcg/kg/min (02/26/24 1600)  PrismaSol BGK 2/3.5  propofol, Last Rate: 20 mcg/kg/min (02/26/24 1600)  vasopressin, Last Rate: 0.03 Units/min (02/26/24 1600)      acetaminophen, 650 mg, q8h PRN   Or  acetaminophen, 650 mg, q8h PRN    "Or  acetaminophen, 650 mg, q8h PRN  albuterol, 3 mL, PRN  dextrose, 500 mL, PRN  diclofenac sodium, 4 g, 4x daily PRN  diphenhydrAMINE, 50 mg, PRN  EPINEPHrine, 0.3 mg, q5 min PRN  famotidine, 20 mg, Once PRN  fentaNYL, 25 mcg, q30 min PRN  artificial tears, 1 drop, BID PRN  [Held by provider] melatonin, 5 mg, Nightly PRN  oxygen, , Continuous PRN - O2/gases  oxygen, , Continuous PRN - O2/gases  prochlorperazine, 10 mg, q6h PRN  prochlorperazine, 10 mg, q6h PRN  sodium chloride, 500 mL, PRN  vancomycin, , Daily PRN        Current Outpatient Medications   Medication Instructions    naproxen sodium (Aleve) 220 mg tablet 1 tablet, oral, 3 times daily PRN       Investigations:  Results from last 7 days   Lab Units 02/26/24  0217   WBC AUTO x10*3/uL 15.8*   RBC AUTO x10*6/uL 3.78*   HEMOGLOBIN g/dL 11.9*   HEMATOCRIT % 30.6*     Results from last 7 days   Lab Units 02/26/24  0217   SODIUM mmol/L 125*   POTASSIUM mmol/L 5.6*   CHLORIDE mmol/L 92*   CO2 mmol/L 17*   BUN mg/dL 98*   CREATININE mg/dL 2.86*   CALCIUM mg/dL 7.2*   PHOSPHORUS mg/dL 4.9   MAGNESIUM mg/dL 2.23   BILIRUBIN TOTAL mg/dL 19.2*   ALT U/L 28   AST U/L 37     Results from last 7 days   Lab Units 02/24/24  0011   COLOR U  Dark-Yellow   PH U  5.0   SPEC GRAV UR  1.016   PROTEIN U mg/dL 10 (TRACE)   BLOOD UR  0.2 (2+)*   NITRITE U  NEGATIVE   WBC UR /HPF 11-20*     No results found for: \"ALBUR\", \"[id number]\"   Susceptibility data from last 90 days.  Collected Specimen Info Organism Amoxicillin/Clavulanate Ampicillin Ampicillin/Sulbactam Cefazolin Ceftriaxone Cefuroxime Ciprofloxacin Gentamicin Imipenem Levofloxacin Piperacillin/Tazobactam Trimethoprim/Sulfamethoxazole   02/23/24 Blood culture from Peripheral Venipuncture Escherichia coli S R S I S S R S  R S S   02/23/24 Blood culture from Peripheral Venipuncture Escherichia coli               02/23/24 Blood culture from Peripheral Venipuncture Escherichia coli S R I I S S R S S R S S   02/01/24 " Tissue/Biopsy from Skin/Superficial Abscess Mixed Skin Microorganisms                    Imaging:  Electrocardiogram, 12-lead PRN ACS symptoms    Result Date: 2/26/2024  Sinus tachycardia Nonspecific ST and T wave abnormality Abnormal ECG When compared with ECG of 23-FEB-2024 18:06, Premature ventricular complexes are no longer Present Confirmed by Vlad Marinelli ([id number]) on 2/26/2024 3:17:38 PM    Transthoracic Echo (TTE) Complete    Result Date: 2/26/2024   [facility], [address] [place] [address]                Tel [phone] and Fax [phone] TRANSTHORACIC ECHOCARDIOGRAM REPORT  Patient Name:      NADIRA ABHISHEK [facility] Physician:    [id number] Azael Ribera MD Study Date:        2/26/2024            Ordering Provider:    [id number] ALETHEA RAMÍREZ MRN/PID:           [id number]             Fellow: Accession#:        [id number]         Nurse: Date of Birth/Age: 1955 / 68 years Sonographer:          Lisa Min                                                               [facility] Gender:            F                    Additional Staff: Height:            170.18 cm            Admit Date:           2/1/2024 Weight:            106.60 kg            Admission Status:     Inpatient - STAT BSA / BMI:         2.17 m2 / 36.81      Encounter#:           [id number]                    kg/m2                                         [address] Blood Pressure: 103 /61 mmHg Study Type:    TRANSTHORACIC ECHO (TTE) COMPLETE Diagnosis/ICD: Shock, unspecified-R57.9 Indication:    Shock; LV function CPT Code:      Echo Complete w Full Doppler-[id number] Patient History: Smoker:            Former. Pertinent History: Hyperlipidemia. PAC; Breast cancer with mets; Cirrhosis, s/ p                    2 paracentesis; Obesity. Study Detail: The following Echo studies were  performed: 2D, M-Mode, Doppler and               color flow. Technically challenging study due to body habitus and               patient lying in supine position. The patient is intubated.  PHYSICIAN INTERPRETATION: Left Ventricle: The left ventricular systolic function is hyperdynamic, with an estimated ejection fraction of 70-75%. There are no regional wall motion abnormalities. The left ventricular cavity size is decreased. Left ventricular diastolic filling was indeterminate. Left Atrium: The left atrium is mildly dilated. Right Ventricle: The right ventricle is mildly enlarged. Unable to determine right ventricular systolic function. Right Atrium: The right atrium is mildly dilated. Aortic Valve: The aortic valve is trileaflet. There is mild aortic valve cusp calcification. There is no evidence of aortic valve regurgitation. The peak instantaneous gradient of the aortic valve is 11.6 mmHg. Mitral Valve: The mitral valve is normal in structure. There is mild mitral valve regurgitation. Tricuspid Valve: The tricuspid valve is structurally normal. There is mild tricuspid regurgitation. The Doppler estimated RVSP is within normal limits at 22.9 mmHg. The PA systolic pressure may be higher. Pulmonic Valve: The pulmonic valve is structurally normal. The pulmonic valve regurgitation was not well visualized. Pericardium: There is a trivial pericardial effusion. Pleural: There is left pleural effusion. Aorta: The aortic root is normal. In comparison to the previous echocardiogram(s): Compared with study from 2/14/2024, no significant change.  CONCLUSIONS:  1. Left ventricular systolic function is hyperdynamic with a 70-75% estimated ejection fraction.  2. RVSP within normal limits.  3. The PA systolic pressure may be higher.  4. Left ventricular cavity size is decreased.  5. The noninvasive cardiac output and index are 6.1 L/min and 2.25 L/m/m2, not consistent with a cardiac etiology of shock. QUANTITATIVE DATA SUMMARY:  2D MEASUREMENTS:                          Normal Ranges: IVSd:          1.00 cm   (0.6-1.1cm) LVPWd:         1.10 cm   (0.6-1.1cm) LVIDd:         3.50 cm   (3.9-5.9cm) LVIDs:         1.90 cm LV Mass Index: 51.3 g/m2 LV % FS        45.7 % LA VOLUME:                               Normal Ranges: LA Vol A4C:        91.8 ml    (22+/-6mL/m2) LA Vol A2C:        62.9 ml LA Vol BP:         78.3 ml LA Vol Index A4C:  42.4ml/m2 LA Vol Index A2C:  29.1 ml/m2 LA Vol Index BP:   36.1 ml/m2 LA Area A4C:       26.5 cm2 LA Area A2C:       21.3 cm2 LA Major Axis A4C: 6.5 cm LA Major Axis A2C: 6.1 cm RA VOLUME BY A/L METHOD:                       Normal Ranges: RA Area A4C: 23.1 cm2 M-MODE MEASUREMENTS:                  Normal Ranges: Ao Root: 3.50 cm (2.0-3.7cm) LAs:     5.00 cm (2.7-4.0cm) AORTA MEASUREMENTS:                    Normal Ranges: Asc Ao, d: 2.45 cm (2.1-3.4cm) LV SYSTOLIC FUNCTION BY 2D PLANIMETRY (MOD):                     Normal Ranges: EF-A4C View: 77.9 % (>=55%) EF-A2C View: 68.9 % EF-Biplane:  72.6 % LV DIASTOLIC FUNCTION:                               Normal Ranges: MV Peak E:        0.95 m/s    (0.7-1.2 m/s) MV Peak A:        0.42 m/s    (0.42-0.7 m/s) E/A Ratio:        2.25        (1.0-2.2) MV e'             0.14 m/s    (>8.0) MV lateral e'     0.17 m/s MV medial e'      0.12 m/s MV A Dur:         132.00 msec E/e' Ratio:       6.53        (<8.0) PulmV Sys Stanley:    34.60 cm/s PulmV Lawrence Stanley:   30.60 cm/s PulmV S/D Stanley:    1.10 PulmV A Revs Stanley: 26.20 cm/s PulmV A Revs Dur: 92.00 msec MITRAL VALVE:                 Normal Ranges: MV DT: 166 msec (150-240msec) AORTIC VALVE:                          Normal Ranges: AoV Vmax:      1.70 m/s  (<=1.7m/s) AoV Peak P.6 mmHg (<20mmHg) LVOT Max Stanley:  0.91 m/s  (<=1.1m/s) LVOT VTI:      13.30 cm LVOT Diameter: 2.30 cm   (1.8-2.4cm) AoV Area,Vmax: 2.22 cm2  (2.5-4.5cm2)  RIGHT VENTRICLE: RV Basal 4.72 cm RV Mid   2.67 cm RV Major 6.9 cm TAPSE:   14.3 mm RV s'    0.13 m/s  TRICUSPID VALVE/RVSP:                             Normal Ranges: Peak TR Velocity: 2.23 m/s RV Syst Pressure: 22.9 mmHg (< 30mmHg) IVC Diam:         2.08 cm PULMONIC VALVE:                         Normal Ranges: PV Accel Time: 90 msec  (>120ms) PV Max Stanley:    1.0 m/s  (0.6-0.9m/s) PV Max PG:     3.6 mmHg Pulmonary Veins: PulmV A Revs Dur: 92.00 msec PulmV A Revs Stanley: 26.20 cm/s PulmV Lawrence Stanley:   30.60 cm/s PulmV S/D Stanley:    1.10 PulmV Sys Stanley:    34.60 cm/s  [id number] Azael Ribera MD Electronically signed on 2/26/2024 at 3:00:10 PM  ** Final **     Electrocardiogram, 12-lead PRN ACS symptoms    Result Date: 2/26/2024  Atrial fibrillation with rapid ventricular response Cannot rule out Anterior infarct , age undetermined Abnormal ECG When compared with ECG of 23-FEB-2024 18:07, Atrial fibrillation has replaced Sinus rhythm Confirmed by Vito Marinellio ([id number]) on 2/26/2024 1:55:12 PM    Electrocardiogram, 12-lead PRN ACS symptoms    Result Date: 2/26/2024  Atrial flutter with variable AV block Cannot rule out Anterior infarct (cited on or before 25-FEB-2024) Abnormal ECG When compared with ECG of 25-FEB-2024 07:23, Atrial flutter has replaced Atrial fibrillation Non-specific change in ST segment in Inferior leads ST now depressed in Anterior leads Confirmed by Vito Marinellio ([id number]) on 2/26/2024 11:43:02 AM    XR chest 1 view    Result Date: 2/26/2024  Interpreted By:  Arnaud Villalobos  and Marie Lance STUDY: XR CHEST 1 VIEW;  2/25/2024 5:08 pm   INDICATION: Signs/Symptoms:post line.   COMPARISON: Chest x-ray 02/24/2024.   ACCESSION NUMBER(S): [id number]   ORDERING CLINICIAN: JENN WALTER   FINDINGS: AP radiograph of the chest was provided.   Endotracheal tube tip is approximately 1.9 cm above the keith. Esophageal temperature probe projects over upper mediastinum. Left IJ approach central venous catheter tip projects over right atrium. Right IJ approach central venous catheter tip projects over mid SVC. An enteric tube  courses below the diaphragm with the tip beyond the field of view.   CARDIOMEDIASTINAL SILHOUETTE: The heart is mildly enlarged.   LUNGS: Low lung volumes with bronchovascular crowding. Interstitial infiltrates are demonstrated bilaterally. Bilateral pleural effusion and subsegmental atelectasis is greater on the right than left. The pulmonary vascular distribution appears slightly increased in degree in comparison to previous study.   ABDOMEN: No remarkable upper abdominal findings.   BONES: No acute osseous changes.       1. Bilateral interstitial pulmonary edema 2. Mild bilateral pleural effusion and subsegmental atelectasis greater on the right than left. 3. The endotracheal tube is 1.9 cm above the keith.   I personally reviewed the images/study and I agree with the findings as stated by resident physician Dr. Natalio Duran . This study was interpreted at [place], [place].   MACRO: None   Signed by: Arnaud Villalobos 2/26/2024 9:45 AM Dictation workstation:   [id number]    XR abdomen 1 view    Result Date: 2/25/2024  Interpreted By:  Freedom Sethi and Jiang Sirui STUDY: XR ABDOMEN 1 VIEW;  2/25/2024 12:16 am   INDICATION: Signs/Symptoms:assess OG tube placement.   COMPARISON: Abdomen radiograph 02/23/2024   ACCESSION NUMBER(S): [id number]   ORDERING CLINICIAN: VIRAJ GARCIA   FINDINGS: Single AP view of the abdomen was obtained. Evaluation is limited secondary to photon starvation.   Enteric tube in place coursing below the diaphragm and the distal tip is overlying the expected location of the gastric body.   Bowel is not well visualized. However there is no definite evidence of dilated gas-filled loop of bowel.       Evaluation is limited secondary to photon starvation. 1. Enteric tube overlying the expected location of the gastric body.   I personally reviewed the image(s) / study and I agree with the findings as stated by Ricardo Cabrera MD. This  study was interpreted at [place], [place].   MACRO: None   Signed by: Freedom Renteria 2/25/2024 7:53 AM Dictation workstation:   [id number]    XR chest 1 view    Result Date: 2/25/2024  Interpreted By:  Freedom Sethi and Jiang Sirui STUDY: XR CHEST 1 VIEW;  2/24/2024 9:22 pm   INDICATION: Signs/Symptoms:assess repositoning of ET tube.   COMPARISON: Chest radiograph 02/24/2024   ACCESSION NUMBER(S): [id number]   ORDERING CLINICIAN: VIRAJ GARCIA   FINDINGS: AP radiograph of the chest was provided.   Endotracheal tube is in place with distal tip projecting 3.2 cm above the keith. Left IJ central venous catheter with distal tip overlying the superior cavoatrial junction.   CARDIOMEDIASTINAL SILHOUETTE: Cardiomediastinal silhouette is stable in size and configuration. Calcifications of the aortic knob are noted.   LUNGS: Elevation of the right hemidiaphragm with atelectasis of the right lung base. Suggestion of trace bilateral pleural effusion. No pneumothorax.   ABDOMEN: Partially imaged gaseous distension of the stomach.   BONES: No acute osseous changes.       1. Right hemidiaphragm elevation similar to the prior examination with atelectasis of the right lung base. Left basilar atelectasis with suggestion of trace bilateral pleural effusion. 2.  Medical devices as above. No pneumothorax. 3. Gaseous distension of the stomach. Consider decompression by NG tube.   I personally reviewed the image(s) / study and I agree with the findings as stated by Ricardo Cabrera MD. This study was interpreted at [facility], [place], [place].   MACRO: None   Signed by: Freedom Renteria 2/25/2024 7:52 AM Dictation workstation:   [id number]    XR chest 1 view    Result Date: 2/25/2024  Interpreted By:  Freedom Sethi, STUDY: XR CHEST 1 VIEW;  2/24/2024 6:44 pm   INDICATION: Signs/Symptoms:postintubation.   COMPARISON: Radiograph dated 02/24/2024   ACCESSION  NUMBER(S): [id number]   ORDERING CLINICIAN: JENN WALTER   FINDINGS: Interval intubation with the tip 5.8 cm from the keith. Left IJ central venous catheter is projecting over lower SVC.   The cardiac silhouette size is stable.   Low lung volumes and bronchovascular crowding. Bibasilar atelectasis and suggestion of trace bilateral pleural effusion. Mild interstitial prominence. No sizable pneumothorax.   Gaseous distension of the stomach, partially imaged.   No acute osseous change.       1. Bibasilar atelectasis with suggestion of trace bilateral pleural effusion and mild perihilar congestion/edema, unchanged. 2. Gaseous suggestion of the stomach. Consider decompression by NG tube. 3. Interval intubation.       Signed by: Freedom Renteria 2/25/2024 7:52 AM Dictation workstation:   [id number]      ___________________________________________________________________________________  ASSESSMENT:  Meghan Waddell is a 68 y.o. female with PMH of Seropositive RA (on infliximab), osteoarthritis, nephrolithiasis, obesity, history of tobacco use (quit march 2023), and on this admission diagnosed with ER-,UT-, Her2+ invasive ductal carcinoma who presented to [place] ED on 2/1 for dark red urine, generalized abdominal pain with nausea, found to have cirrhosis and on admission to [facility] found to have metastatic breast cancer c/b liver mets, cerebellar mets, transferred to MICU 2/23/2023 for hypotension. . Nephrology consulted for SYLVIA to assist with management.     #Oliguric SYLVIA  - She has baseline Cr of 1 that worsened on 2/12 to 1.38 and since then it has been majorly trending up to 2.9(2/24) with few dips. High BUN may be contributed by steroids  - secondary to septic shock- e.coli bacteremia ATN/vanc, zosyn/carboplatin induced AIN or tubular injury     #Electrolytes  - hyperkalemia     #Acid-Base  - bicarb 17, high lactate     #Hemodynamics  - on vaso and levo with BP of 110/50  - intubated at 40% Fio2  - remains  oliguric     #E.coli Bacteremia  #Breast Ca with liver and cerebellar mets   #LE Cellulitis  - vanc and zosyn, micafungin      ___________________________________________________________________________________  RECOMMENDATIONS:  - will support the primary team and family in terms of GOC.  - Discussed with MICU team and they want to hold on CVVH pending GOC     Patient is discussed with the attending.         Kinsey Sanders MD  Nephrology Fellow   Daytime / Weekend Renal Pager [phone]  After 7 pm Emergencies [phone] Pager [phone]

## 2024-02-27 NOTE — SIGNIFICANT EVENT
Meghan Waddell is  as of . Their spouse/power of  to contact is Linda Castillo (Child)  [phone] (Home Phone) .     Cause of death: multisystem organ failure  Precipitating etiology: septic shock, gram negative bacteremia, end-stage cirrhosis, metastatic breast cancer    Exam: Unresponsive, absent cardiopulmonary sounds, absent pupillary and corneal reflexes, no cough or gag reflex

## 2024-02-27 NOTE — PROGRESS NOTES
Spiritual Care Visit    Clinical Encounter Type  Visited With: Patient and family together  Routine Visit: Follow-up  Continue Visiting: Yes    Zoroastrianism Encounters  Zoroastrianism Needs: Prayer         Sacramental Encounters  Other Sacrament: P&B S    Patient was unresponsive, received a prayer and blessing by Fr. Colin Bird,  Episcopalian .    Family members were preset.   Patient had received the Sacrament of the Anointing of the Sick on 2/26/24.

## 2024-02-27 NOTE — SIGNIFICANT EVENT
Nephrology Note:    Noticed the change in code status to comfort measures. Will sign off.     Kinsey Sanders MD  Nephrology Fellow   Daytime / Weekend Renal Pager [phone]  After 7 pm Emergencies [phone] Pager [phone]

## 2024-02-27 NOTE — DISCHARGE SUMMARY
Discharge Diagnosis  Cirrhosis (CMS/HCC), multiple organ failure    Issues Requiring Follow-Up  none    Hospital Course  Meghan Waddell is a 68 y.o. female history of Seropositive RA (on infliximab), osteoarthritis, nephrolithiasis, obesity, DLD, PACs, history of tobacco use (quit march 2023), who presented to [place] ED on 2/1 for dark red urine, generalized abdominal pain with nausea. Patient also endorses yellow colored stools with 1 episode of melena the week prior to admission. Additionally, patient presents with at least 1 year of a L breast draining lesion, has not noticed any lumps in her armpits. She has lost 20lb since December, endorsing early satiety and denying night sweats. She was transferred to [facility] on 2/3/2024 for further workup of new cirrhosis and jaundice.     At [place]:  Patient was afebrile and vitally stable, labs with CBC and CMP unremarkable. HFP showed , ALT 60, t bili 6.4 Alk phos 346 albumin 2.9 no direct bili, INR 1.1.  hepB nonimmune, hepA nonreactive, hepC nonreactive,  (ULD is 55), Guaiac positive stool, Lactate 3.0 >1.9, Lipase 53, ethanol <10, Urinalysis small blood, moderate bilirubin, 4+ urobilinogen negative nitrites and leuk esterase, microscopy with few squamous cells, 1+ bacteria, hyaline casts, calcium oxalate crystals, no pyuria or hematuria, Urine culture negative, Wound culture of draining breast lesion: mixed skin organisms. CT AP with contrast performed showing, since last year, interval development of significant parenchymal liver disease with cirrhotic morphology. Additional development of diffuse minimal ascites. Likely subacute avulsion fracture lesser trochanter right proximal femur. Interventions in [place] included: maintenance fluids, naproxen 250mg once, zofran 4mg, potassium 40mEq.    Upon admission to [facility] RUQ with dopplers performed revealing Liver cirrhosis with diffusely heterogenous hepatic parenchyma, no definite focal lesion  identified, although neoplasm can not be excluded. Perihepatic ascites, slow portal venous flow and recanalized paraumbilical vein, likely sequela of portal hypertension. Cholelithiasis without sonographic evidence of acute cholecystitis. Alpha-1 antitrypsin returned negative, feritin 406, transferrin 136, ammonia 79. JOCELYN, ASMA, AMA, ceruloplasmin pending. Patient started on lactulose TID given confusion as noted per patient's daughter. Hepatology was consulted and recommended diagnostic paracentesis, MRI liver/ MRCP, anti-LKM, anti- SLA/LP, anti-liver cytosol antibody, CXR, Bcx, and empiric ceftriaxone.     Patient s/p two paracenteses (~2 L each time), the first of which was negative for SBP and did not yield any malignant cells (did not run studies on second sample). Hepatology has recommended patient be started on lasix 20 mg PO and spironolactone 50 mg PO to help with ascitic fluid buildup (were held on 2/12/2024 iso SYLVIA). Patient started on lactulose 20g once daily as well. Patient's autoimmune workup was largely negative except for anti-SM antibody being positive 1:20. Although this could be suggestive of autoimmune hepatitis, specificity is not high given the weakness of titer and also Hx of RA. However, per GI, would be worth considering trial of steroids to see if it improves liver function. Prednisone 30 mg started 2/13/2024.    Throughout admission, patient's hepatic function has gradually worsened reflected by rising total bilirubin (12.1 on 2/13) and stable but elevated AST/ALT (53/197 non 2/13). Synthetic function also poor with albumin of 2.2 and INR 1.3. After discussions with oncology and hepatology, it appears that multiple etiologies may be responsible for patient's liver decompensation; however, malignant cells in liver appear to be the culprit. Patient was noted to be ER+ so anastrazole was started at recommendation of oncology team; however, remainder of tumor markers are still pending.  Because of this, final oncology plan is still up in the air. Since inpatient chemotherapy is an option, oncology team requested transfer to primary cancer service on 2/13/2024. [facility].    Final breast cancer pathology showed invasive ductal carcinoma grade 3, ER-/TX-/HER2+. Given ER-, anastrozole stopped. Patient was planned to start inpatient chemotherapy per discussion with Dr. Stovall on 2/14. TTE completed that morning prior to therapy showed EF 70% with impairment of diastolic relaxation. Plan to start carboplatin (AUC 4) and trastuzumab (loading dose 8 mg/kg). Baseline CMP, uric acid, and LDH obtained to monitor for tumor lysis syndrome. Repeat Hep B testing showed elevated antibody and non-reactive antigen. Patient tolerated chemotherapy well without nausea or vomiting. NM bone scan completed on 2/15 did not show any osseous metastatic disease. Patient cleared by ortho to WBAT. Supportive oncology consulted for pain management of hip and abdominal pain as well as social support with recommendations for olanzapine for sleep and appetite stimulation and continuing prn oxycodone for pain. Hepatology signed-off; pt had little improvement in LFTs on prednisone, 5 day course completed with the idea that her hepatic impairment was likely not autoimmune in nature. Lactulose was up titrated to BID as patient experienced intermittent confusion on 2/15 and 2/16. Obtained BID CMP, LDH, and uric acid to continue to monitor for TLS but these remained stable or improved. Allopurinol was discontinued for TLS. Patient began to experience significant fatigue on 12/17 likely a delayed response from chemotherapy. PT worked with her and recommended SNF but daughter and pt declined but are agreeable to home care. Started dexamethasone 4 mg qAM on 2/18 due to continued severe fatigue and low appetite, increased to BID 2/19.     2/19 US liver ordered to rule out compression in the liver  as LFTs were slightly elevated. US showed markedly heterogenous hepatic parenchyma with cirrhosis, gallstones (no cholecystitis) and moderate volume perihepatic ascites . Rifaxamin started for HE. Lasix 20mg daily started 2/20, held 2/23 given rise in Cr. On 2/23 US of RLE ordered for DVT rule out and patient started on zosyn due to suspicion of cellulitis and Bcx with gram negative bacilil. Preliminary read of DVT US negative.    RR called 2/23 around 4:30pm for hypotension. Patient given 1.5L fluid bolus, 25g albumin. Vancomycin started in addition to zosyn due to concern for LE cellulitis.  VBG: pH 7.23, pCO2 36, pO2 39, lactate 10. CMP with glucose 49, K 6.2, HCO3 12, BUN 88, Cr 2.28. Calcium gluconate given for hyperkalemia. WBC decreased from 9>4.3, Plt 128>92 from AM labs. Hgb stable 14. Ammonia pending.  CXR with Mild pulmonary interstitial edema without focal consolidation, effusion, or pneumothorax.  EKG showed sinus tachycardia, no peaked T waves.  Patient transferred to MICU d/t concern for sepsis with potential sources including cellulitis of the LE vs gram negative bacteremia.     On 2/24, CT scan showed possible cellulitis of the right leg, possible proctocolitis, avulsion fx of R lesser trochanter, decreased size of left breast mass, moderate ascites and small bl pleural effusions. Infectious disease was consulted.  Patient give one does of tobramycin and placed on Vanc, Zosyn, and clindamycin. Vancomycin and Zosyn continued. Patient with delirium and AMS and intubated due to inability to protect her airway. Lactulose and Rifaximin started and Bicarb drip stopped. On 2/25, she was found to have a new onset AF. Patient remained oliguric and zosyn and micafungin continued. Patient's prognosis remained poor and patient's family expressed the desire to keep patient full code until her son got in from [place] tomorrow, when they would have a goals of care discussion. On 2/26, patient remained sedated with  high pressor requirements. On 2/27, patient's family first made the patient DNR.  She was febrile overnight and her antibiotics were switched from zosyn to meropenem. Patient given lokelma for hyperkalemia. Her Hemoglobin remained stable at 10.2.  Patient's family then agreed to make the patient comfort care and terminal extubation was performed. Patient was pronounced dead at 1440 on 2/27/2024.      Tej Enamorado,   Emergency Medicine, PGY1

## 2024-02-27 NOTE — PROGRESS NOTES
Spiritual Care Visit    Clinical Encounter Type  Visited With: Family  Routine Visit: Follow-up  Crisis Visit: Death     learned of patient Meghan Waddell's death from her son-in-law.  introduced self and role to family in the room and expressed condolences.  said a prayer with them and excused self to allow family time to grieve.     Rev. Emilia Leyva MDiv, BCC

## 2024-02-27 NOTE — PROGRESS NOTES
Art Therapy Note    Meghan Waddell     Therapy Session  Referral Type: New referral this admission  Visit Type: New visit  Session Start Time: 1540  Session End Time: 1559  Intervention Delivery: In-person  Conflict of Service: Declined treatment  Number of family members present: 5              Treatment/Interventions       Post-assessment  Total Session Time (min): 19 minutes    Narrative  Assessment Detail: Pt lying in bed with loved ones at bedside. A family member indicated pt had had last rights, and family was waiting for additional family members to arrive tomorrow.  Plan: ATR introduced self and services to one of pt's daughters and offered legacy art services.  Evaluation: Pt's daughter declined, stating that the  home would take care of this and offered a similar service that family/friends could purchase if interested.  Follow-up: ATR will sign off for now. Pt's family is aware of how to connect with expressive therapies if they want to re-initiate services.    Education Documentation  No documentation found.

## 2024-02-27 NOTE — PROGRESS NOTES
"Meghan Waddell is a 68 y.o. female on day 24 of admission presenting with Cirrhosis (CMS/HCC).    Subjective   Patient's family decided upon comfort care measures with terminal extubation.     Objective     Physical Exam  General: Lethargic, intubated, sedated   Chest: clear anteriorly  Pulm: Ventilated  CVS: tachycardic, no definite murmurs appreciated   Abdomen: tender, but non-peritonitic   LE: no edema on left leg, right legt with swelling and erythema, marked with pen   : carreon in place with minimal urine output     Last Recorded Vitals  Blood pressure 115/69, pulse 106, temperature 38 °C (100.4 °F), temperature source Esophageal, resp. rate 18, height 1.695 m (5' 6.73\"), weight 107 kg (235 lb 10.8 oz), SpO2 98 %.  Intake/Output last 3 Shifts:  I/O last 3 completed shifts:  In: 05059.7 (186.9 mL/kg) [I.V.:4364.2 (40.8 mL/kg); Blood:52321.5; NG/GT:560; IV Piggyback:600]  Out: 1640 (15.3 mL/kg) [Urine:890 (0.2 mL/kg/hr); Emesis/NG output:750]  Weight: 106.9 kg     Relevant Results  Scheduled medications  hydrocortisone sodium succinate, 50 mg, intravenous, q6h  lactulose, 20 g, oral, BID  lidocaine, 1 patch, transdermal, Daily  meropenem, 1,000 mg, intravenous, q12h  micafungin, 100 mg, intravenous, q24h  [Held by provider] OLANZapine, 2.5 mg, oral, Nightly  pantoprazole, 40 mg, intravenous, Daily  polyethylene glycol, 17 g, oral, Daily  rifAXIMin, 550 mg, oral, BID  sodium zirconium cyclosilicate, 10 g, oral, q8h  [Held by provider] spironolactone, 50 mg, oral, Daily      Continuous medications  dextrose 10 % in water (D10W), 35 mL/hr, Last Rate: 35 mL/hr (02/27/24 0823)  fentaNYL,  mcg/hr, Last Rate: 75 mcg/hr (02/27/24 0800)  norepinephrine, 0-3.3 mcg/kg/min, Last Rate: 0.22 mcg/kg/min (02/27/24 0833)  PrismaSol BGK 2/3.5, 2,700 mL/hr  propofol, 5-20 mcg/kg/min, Last Rate: 20 mcg/kg/min (02/27/24 0800)  vasopressin, 0.03 Units/min, Last Rate: 0.03 Units/min (02/27/24 0800)      PRN medications  PRN " medications: acetaminophen **OR** acetaminophen **OR** acetaminophen, albuterol, dextrose, diclofenac sodium, diphenhydrAMINE, EPINEPHrine, famotidine, fentaNYL, artificial tears, [Held by provider] melatonin, oxygen, oxygen, prochlorperazine, prochlorperazine, sodium chloride, vancomycin  Results for orders placed or performed during the hospital encounter of 02/03/24 (from the past 24 hour(s))   Electrocardiogram, 12-lead PRN ACS symptoms   Result Value Ref Range    Ventricular Rate 137 BPM    Atrial Rate 274 BPM    QRS Duration 84 ms    QT Interval 248 ms    QTC Calculation(Bazett) 374 ms    P Axis 227 degrees    R Axis 15 degrees    T Axis 76 degrees    QRS Count 22 beats    Q Onset 217 ms    P Onset 131 ms    P Offset 192 ms    T Offset 341 ms    QTC Fredericia 326 ms   Electrocardiogram, 12-lead PRN ACS symptoms   Result Value Ref Range    Ventricular Rate 111 BPM    Atrial Rate 111 BPM    OR Interval 180 ms    QRS Duration 68 ms    QT Interval 298 ms    QTC Calculation(Bazett) 405 ms    P Axis 62 degrees    R Axis 38 degrees    T Axis 57 degrees    QRS Count 19 beats    Q Onset 219 ms    P Onset 129 ms    P Offset 200 ms    T Offset 368 ms    QTC Fredericia 366 ms   POCT GLUCOSE   Result Value Ref Range    POCT Glucose 116 (H) 74 - 99 mg/dL   Transthoracic Echo (TTE) Complete   Result Value Ref Range    AV pk marcelo 1.70 m/s    LVOT diam 2.30 cm    LV biplane EF 73 %    MV avg E/e' ratio 6.53     MV E/A ratio 2.25     LA vol index A/L 36.1 ml/m2    Tricuspid annular plane systolic excursion 1.4 cm    RV free wall pk S' 12.60 cm/s    LVIDd 3.50 cm    RVSP 22.9 mmHg    Aortic Valve Area by Continuity of Peak Velocity 2.22 cm2    AV pk grad 11.6 mmHg    LV A4C EF 77.9    PST Top   Result Value Ref Range    Extra Tube Hold for add-ons.    CBC and Auto Differential   Result Value Ref Range    WBC 8.8 4.4 - 11.3 x10*3/uL    nRBC 0.8 (H) 0.0 - 0.0 /100 WBCs    RBC 3.35 (L) 4.00 - 5.20 x10*6/uL    Hemoglobin 10.2 (L)  12.0 - 16.0 g/dL    Hematocrit 28.3 (L) 36.0 - 46.0 %    MCV 85 80 - 100 fL    MCH 30.4 26.0 - 34.0 pg    MCHC 36.0 32.0 - 36.0 g/dL    RDW 22.2 (H) 11.5 - 14.5 %    Platelets 27 (LL) 150 - 450 x10*3/uL    Immature Granulocytes %, Automated 1.1 (H) 0.0 - 0.9 %    Immature Granulocytes Absolute, Automated 0.10 0.00 - 0.70 x10*3/uL   Comprehensive metabolic panel   Result Value Ref Range    Glucose 132 (H) 74 - 99 mg/dL    Sodium 121 (L) 136 - 145 mmol/L    Potassium 5.8 (H) 3.5 - 5.3 mmol/L    Chloride 88 (L) 98 - 107 mmol/L    Bicarbonate 15 (L) 21 - 32 mmol/L    Anion Gap 24 (H) 10 - 20 mmol/L    Urea Nitrogen 114 (HH) 6 - 23 mg/dL    Creatinine 2.99 (H) 0.50 - 1.05 mg/dL    eGFR 17 (L) >60 mL/min/1.73m*2    Calcium 7.3 (L) 8.6 - 10.6 mg/dL    Albumin 2.1 (L) 3.4 - 5.0 g/dL    Alkaline Phosphatase 93 33 - 136 U/L    Total Protein 5.1 (L) 6.4 - 8.2 g/dL    AST 45 (H) 9 - 39 U/L    Bilirubin, Total 20.9 (H) 0.0 - 1.2 mg/dL    ALT 28 7 - 45 U/L   Magnesium   Result Value Ref Range    Magnesium 2.20 1.60 - 2.40 mg/dL   Protime-INR   Result Value Ref Range    Protime 17.3 (H) 9.8 - 12.8 seconds    INR 1.5 (H) 0.9 - 1.1   Uric Acid   Result Value Ref Range    Uric Acid 4.0 2.3 - 6.7 mg/dL   Lactate dehydrogenase   Result Value Ref Range     84 - 246 U/L   Phosphorus   Result Value Ref Range    Phosphorus 5.7 (H) 2.5 - 4.9 mg/dL   Manual Differential   Result Value Ref Range    Neutrophils %, Manual 89.7 40.0 - 80.0 %    Lymphocytes %, Manual 7.4 13.0 - 44.0 %    Monocytes %, Manual 2.9 2.0 - 10.0 %    Eosinophils %, Manual 0.0 0.0 - 6.0 %    Basophils %, Manual 0.0 0.0 - 2.0 %    Seg Neutrophils Absolute, Manual 7.89 (H) 1.20 - 7.00 x10*3/uL    Lymphocytes Absolute, Manual 0.65 (L) 1.20 - 4.80 x10*3/uL    Monocytes Absolute, Manual 0.26 0.10 - 1.00 x10*3/uL    Eosinophils Absolute, Manual 0.00 0.00 - 0.70 x10*3/uL    Basophils Absolute, Manual 0.00 0.00 - 0.10 x10*3/uL    Total Cells Counted 136     RBC Morphology  See Below     Target Cells Few     Russell Cells Many      Electrocardiogram, 12-lead PRN ACS symptoms    Result Date: 2/26/2024  Atrial flutter with variable AV block Cannot rule out Anterior infarct (cited on or before 25-FEB-2024) Abnormal ECG When compared with ECG of 25-FEB-2024 07:23, Atrial flutter has replaced Atrial fibrillation Non-specific change in ST segment in Inferior leads ST now depressed in Anterior leads Confirmed by Vlad Marinelli ([id number]) on 2/26/2024 11:43:02 AM    XR chest 1 view    Result Date: 2/26/2024  Interpreted By:  Arnaud Villalobos,  and Marie Lance STUDY: XR CHEST 1 VIEW;  2/25/2024 5:08 pm   INDICATION: Signs/Symptoms:post line.   COMPARISON: Chest x-ray 02/24/2024.   ACCESSION NUMBER(S): [id number]   ORDERING CLINICIAN: JENN WALTER   FINDINGS: AP radiograph of the chest was provided.   Endotracheal tube tip is approximately 1.9 cm above the keith. Esophageal temperature probe projects over upper mediastinum. Left IJ approach central venous catheter tip projects over right atrium. Right IJ approach central venous catheter tip projects over mid SVC. An enteric tube courses below the diaphragm with the tip beyond the field of view.   CARDIOMEDIASTINAL SILHOUETTE: The heart is mildly enlarged.   LUNGS: Low lung volumes with bronchovascular crowding. Interstitial infiltrates are demonstrated bilaterally. Bilateral pleural effusion and subsegmental atelectasis is greater on the right than left. The pulmonary vascular distribution appears slightly increased in degree in comparison to previous study.   ABDOMEN: No remarkable upper abdominal findings.   BONES: No acute osseous changes.       1. Bilateral interstitial pulmonary edema 2. Mild bilateral pleural effusion and subsegmental atelectasis greater on the right than left. 3. The endotracheal tube is 1.9 cm above the keith.   I personally reviewed the images/study and I agree with the findings as stated by resident physician Dr. Lance  Marie Duran . This study was interpreted at [place], [place].   MACRO: None   Signed by: Arnaud Villalobos 2/26/2024 9:45 AM Dictation workstation:   [id number]    XR abdomen 1 view    Result Date: 2/25/2024  Interpreted By:  Freedom Sethi and Jiang Sirui STUDY: XR ABDOMEN 1 VIEW;  2/25/2024 12:16 am   INDICATION: Signs/Symptoms:assess OG tube placement.   COMPARISON: Abdomen radiograph 02/23/2024   ACCESSION NUMBER(S): [id number]   ORDERING CLINICIAN: VIRAJ GARCIA   FINDINGS: Single AP view of the abdomen was obtained. Evaluation is limited secondary to photon starvation.   Enteric tube in place coursing below the diaphragm and the distal tip is overlying the expected location of the gastric body.   Bowel is not well visualized. However there is no definite evidence of dilated gas-filled loop of bowel.       Evaluation is limited secondary to photon starvation. 1. Enteric tube overlying the expected location of the gastric body.   I personally reviewed the image(s) / study and I agree with the findings as stated by Ricardo Cabrera MD. This study was interpreted at [facility], [place], [place].   MACRO: None   Signed by: Freedom Renteria 2/25/2024 7:53 AM Dictation workstation:   [id number]    XR chest 1 view    Result Date: 2/25/2024  Interpreted By:  Freedom Sethi and Jiang Sirui STUDY: XR CHEST 1 VIEW;  2/24/2024 9:22 pm   INDICATION: Signs/Symptoms:assess repositoning of ET tube.   COMPARISON: Chest radiograph 02/24/2024   ACCESSION NUMBER(S): [id number]   ORDERING CLINICIAN: VIRAJ GARCIA   FINDINGS: AP radiograph of the chest was provided.   Endotracheal tube is in place with distal tip projecting 3.2 cm above the keith. Left IJ central venous catheter with distal tip overlying the superior cavoatrial junction.   CARDIOMEDIASTINAL SILHOUETTE: Cardiomediastinal silhouette is stable in size and configuration.  Calcifications of the aortic knob are noted.   LUNGS: Elevation of the right hemidiaphragm with atelectasis of the right lung base. Suggestion of trace bilateral pleural effusion. No pneumothorax.   ABDOMEN: Partially imaged gaseous distension of the stomach.   BONES: No acute osseous changes.       1. Right hemidiaphragm elevation similar to the prior examination with atelectasis of the right lung base. Left basilar atelectasis with suggestion of trace bilateral pleural effusion. 2.  Medical devices as above. No pneumothorax. 3. Gaseous distension of the stomach. Consider decompression by NG tube.   I personally reviewed the image(s) / study and I agree with the findings as stated by Ricardo Cabrera MD. This study was interpreted at [facility], [place], [place].   MACRO: None   Signed by: Freedom Renteria 2/25/2024 7:52 AM Dictation workstation:   [id number]    XR chest 1 view    Result Date: 2/25/2024  Interpreted By:  Freedom Sethi, STUDY: XR CHEST 1 VIEW;  2/24/2024 6:44 pm   INDICATION: Signs/Symptoms:postintubation.   COMPARISON: Radiograph dated 02/24/2024   ACCESSION NUMBER(S): [id number]   ORDERING CLINICIAN: JENN WALTER   FINDINGS: Interval intubation with the tip 5.8 cm from the keith. Left IJ central venous catheter is projecting over lower SVC.   The cardiac silhouette size is stable.   Low lung volumes and bronchovascular crowding. Bibasilar atelectasis and suggestion of trace bilateral pleural effusion. Mild interstitial prominence. No sizable pneumothorax.   Gaseous distension of the stomach, partially imaged.   No acute osseous change.       1. Bibasilar atelectasis with suggestion of trace bilateral pleural effusion and mild perihilar congestion/edema, unchanged. 2. Gaseous suggestion of the stomach. Consider decompression by NG tube. 3. Interval intubation.       Signed by: Freedom Renteria 2/25/2024 7:52 AM Dictation workstation:   [id number]    CT tibia fibula  right wo IV contrast    Result Date: 2/24/2024  Interpreted By:  Nav Hansen and Calo Sean-Matthew STUDY: CT TIBIA FIBULA RIGHT WO IV CONTRAST;  2/24/2024 2:00 pm   INDICATION: Signs/Symptoms:r\o infection. 68-year-old female with history of seropositive RA on infliximab, osteoarthritis, nephrolithiasis, with recently diagnosed breast cancer status post chemotherapy completed 02/14/2024 and cirrhosis, transferred to MICU 02/23/2023 for hypertension, being treated for septic shock. Concern for right lower extremity infection.   COMPARISON: None.   ACCESSION NUMBER(S): [id number]   ORDERING CLINICIAN: JENN WALTER   TECHNIQUE: CT imaging of the distal right femur to the foot was obtained without contrast. Coronal and sagittal reformatted images were performed.   FINDINGS: OSSEOUS STRUCTURES: Postsurgical changes from total right knee arthroplasty. Beam hardening artifact limits evaluation of the adjacent structures. Within these limitations, there is no acute fracture or malalignment.   SOFT TISSUES: There is moderate subcutaneous edema of the visualized right lower extremity, most pronounced in the anterior aspect of the mid to distal lower leg and extending to the dorsal midfoot. No focal fluid collection.       Moderate subcutaneous edema of the visualized right lower extremity, most pronounced in the anterior mid to distal lower leg extending to the dorsal midfoot. Evident fluid collection within the limits of a noncontrast enhanced examination. Findings may reflect cellulitis, venous stasis, lymphedema, or fluid overload.   Postsurgical changes from total right knee arthroplasty without evidence of hardware complication. No acute osseous abnormality of the visualized right lower extremity.   I personally reviewed the images/study and I agree with the findings as stated by Josafat Fierro MD. This study was interpreted at [facility], [place], [place].   MACRO: None   Signed  by: Nav Hansen 2/24/2024 5:32 PM Dictation workstation:   [id number]    CT abdomen pelvis wo IV contrast    Result Date: 2/24/2024  Interpreted By:  Garth Watson,  and Vadim Maurice STUDY: CT ABDOMEN PELVIS WO IV CONTRAST;  2/24/2024 2:01 pm   INDICATION: Signs/Symptoms:septic shock, assess for hematoma, has hx of R-femur avulsion fracture. 68-year-old female with history of seropositive RA on infliximab, osteoarthritis, nephrolithiasis, with recently diagnosed breast cancer status post chemotherapy completed 02/14/2024 and cirrhosis, transferred to MICU 02/23/2023 for hypertension, being treated for septic shock.   COMPARISON: CT abdomen pelvis 02/01/2024 and 01/30/2023, MRCP 02/05/2024   ACCESSION NUMBER(S): [id number]   ORDERING CLINICIAN: YVETTE LEMON   TECHNIQUE: CT of the abdomen and pelvis was performed. Contiguous axial images were obtained at 3 mm slice thickness through the abdomen and pelvis. Coronal and sagittal reconstructions at 3 mm slice thickness were performed.  No intravenous or oral contrast agents were administered.   FINDINGS: Please note that the evaluation of vessels, lymph nodes and organs is limited without intravenous contrast.   LOWER CHEST: Small bilateral pleural effusions with associated atelectasis. The heart is mildly enlarged without significant pericardial effusion. Coronary artery calcifications are noted. Decrease in size of a 2 x 1.3 cm spiculated mass in the lateral left breast, previously 2 x 1.8 cm.   ABDOMEN:   LIVER: The liver demonstrates nodular contour and diffuse hypoattenuation consistent with cirrhosis. The liver is better assessed on recent MR 02/05/2024.   BILE DUCTS: The intrahepatic and extrahepatic ducts are not dilated.   GALLBLADDER: The gallbladder contains numerous radiopaque stones. No pericholecystic fluid or fat stranding.   PANCREAS: The pancreas appears unremarkable without evidence of ductal dilatation or masses.   SPLEEN: The spleen  is normal in size without focal lesions.   ADRENAL GLANDS: Bilateral adrenal glands appear normal.   KIDNEYS AND URETERS: The kidneys are symmetric in size. There are multiple bilateral nonobstructing renal calculi measuring up to 6 mm at the right superior pole. No hydroureteronephrosis.   PELVIS:   BLADDER: The urinary bladder is partially decompressed with Trevino catheter in place. Small amount of anti dependent air is noted within the urinary bladder, likely related to catheter placement.   REPRODUCTIVE ORGANS: The uterus is present.   BOWEL: A small gastric diverticulum is again noted in the posterior aspect of the fundus. The small bowel is normal caliber without wall thickening. There is interval increased circumferential edematous mural thickening of the entire colon and rectum with perirectal fat stranding (series 3, images 159-169). The appendix is not definitely visualized. There is however no pericecal stranding.   VESSELS: Severe atherosclerosis of the abdominal aorta and its branches with ectasia of the infrarenal aorta measuring up to 3 cm. The IVC is normal.   PERITONEUM/RETROPERITONEUM/LYMPH NODES: Moderate abdominopelvic ascites. No focal fluid collection or pneumoperitoneum. No abdominopelvic lymphadenopathy.   ABDOMINAL WALL: There is mild-to-moderate body wall edema, most pronounced at the lateral and lower abdominal wall.   BONES: There is a similar heterogeneity with lytic lesion and cortical irregularity of the right lesser trochanter (series 3, image 183). Multilevel degenerative changes of the thoracolumbar spine are noted.       1.  Interval increased circumferential edematous mural thickening of the entire colon and rectum with perirectal fat stranding, nonspecific, but may be seen in the setting of infectious/inflammatory proctocolitis. 2. Irregular lytic lesion with cortical irregularity of the right lesser trochanter, nonspecific, can represent avulsion fracture or aggressive osseous  lesion including metastatic disease. 3. Interval decreased size of a left breast mass in keeping with patient's known primary malignancy. 4. Moderate ascites and small bilateral pleural effusions. Please correlate clinically with patient's volume status. 5. Additional findings as above.   I personally reviewed the images/study and I agree with the findings as stated by Josafat Fierro MD. This study was interpreted at [facility], [place], [place].   MACRO: None   Signed by: Garth Watson 2/24/2024 3:38 PM Dictation workstation:   [id number]      Assessment/Plan   Principal Problem:    Cirrhosis (CMS/HCC)  A 68 yF with seropositive RA (on infliximab), morbid obesity, > 50 pack year smoker (quit march 2023). Presented to [place] ED on 2/1 for ABD pain and hematuria. She was found to have new-diagnosis of liver cirrhosis and was transferred to [facility] for hepatology evaluation. 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 She is s/p induction chemo with carbo and transtuzumab. Then transferred to the MICU 2/23/2023 for septic shock, E. Coli bacteremia, and multisystem organ failure including toxic metabolic encephalopathy, acute hypercarbic respiratory failure, and oliguric SYLVIA. Course complicated by atrial fibrillation with rapid ventricular response. Most likely source of her E. Coli bacteremia is gut bacterial translocation from inflammatory vs. Infectious colitis. Patient's family decided upon comfort care measures on 2/27.    Updates 2/27:  - febrile overnight.  Placed on Meropenem and discontinued Zosyn  - Lokelma given for hyperkalemia  - Levo 0.24, Vaso 0.03  - blood cultures ordered  - Patient's family decided upon comfort care measures.     Impression:   #Septic shock 2/2 gram negative bacteremia, potential sources include RLE cellulitis (though more commonly gram +ve), abdomen (?SBP), urine  (Hx of Nephrolithiasis)  #HAGMA 2/2 lactic acidosis 2/2 septic shock with decreased clearance 2/2 liver disease  #SYLVIA likely ATN 2/2 sepsis   #Leukopenia and thrombocytopenia (Bicytopenia) likely 2/2 sepsis, however recent chemotherapy induced is another possibility       NEURO  #Encephalopathy  ::Likely toxic metabolic   -delirium precautions   -treat underlying metabolic disturbances - lactulose + rifaximin started   - Fentanyl 75, Prop 20    #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  -MRA once out of the acute setting, potentially outpatient      PULM  - Intubated (40, 420, 20, 5)   - Saturating 97%     CVS  #Septic Shock  ::Growing gram negative Bacilli   ::source is right leg likely cellulitis, vs. breast wound , need to r/o urine   -vanc/Zosyn/Clindamycin (continued Vanc & Zosyn)  - 1 x dose tobramycin given  - follow BCx   - send for UA with reflex to culture   - trend lactates   - stress dose steroids  - MAP goal >65  - Discontinued clindamycin  - Levo 0.24, Vaso 0.03     RENAL  #SYLVIA  #Hyperkalemia   ::FENa from 2/14 suggestive of pre-renal etiology   ::likely ATN 2/2 sepsis   :: poor UOP  -strict I/Os  -renally dose meds  -nephrology consult -plan for possible dialysis put on hold pending Goals of Care discussion  -TLS labs   -CK level  - , Cr 3.07     #Metabolic Acidosis (HAGMA AND NAGMA)  #Lactic Acidosis   ::lactate 2/2 septic shock with decreased clearance given liver disease  ::NAGMA likely 2/2 renal failure   -bicarb 50mEq x2 injections   -bicarb drip at 150cc/hr - stopped  -may need Renal replacement therapy for refractory acidosis         GI  #new liver cirrhosis  #Cholelithiasis  #mixed hepatocellular and cholestatic liver injury   #mild confusion possible grade 1 hepatic encephalopahthy  #liver metastasis (breast primary)  :: hepC negative, hepB negative, hepA negative, T spot negative in 11/2023  :: alpha 1 antitrypsin negative  :: ferritin 406 H/transferrin 136 L  :: Hep B surface  AG negative, Hep B surface AB positive at 580.6  ::liver biopsy significant for poorly differentiated breast cancer  ::no safe pocket to tap on 2/23/2024   - lactulose 20 g BID once oral access established   - lasix 20 mg and aldactone 50 mg currently held iso SYLVIA  - rifaxamin 550 mg BID for HE once oral access established      #Gaseous Distention of stomach   -consider NG tube decompression if blood counts acceptable and do not continue to drop   -consider CT abdomen non-con once stable      ONC  #nonhealing, draining, L breast wound  #primary breast cancer with liver met and likely brain mets  :: MRI brain showing small enhancing lesions in the cerebellum without mass effect with c/f metastatic disease, potential 4 mm MRA bifurcation aneurysm  :: TTE shows EF 70% and impaired diastolic relaxation  :: NM bone scan without definitive osseous metastatic disease  :: inpatient mammogram significant for masses and suspicious lymphadenopathy  :: s/p biopsies, final pathology shows invasive ductal carcinoma grade 3, ER-/MS-/HER2+  :: initial pathology showed ER+ so patient was started on anastrazole 1 mg, but this read was modified to ER- so anastrozole was stopped on 2/13  :: carboplatin (AUC 4) and trastuzumab (loading dose 8 mg/kg) completed on 2/14 with patient tolerating this well  - Daily CMP, LDH, and uric acid to monitor for tumor lysis syndrome  - G6PD testing negative        HEME  #Dropping blood counts   #Bicytopenia  ::chemo related vs. Sepsis   -send for hemolysis labs   -T+S  -send for vitamin B12, folate, iron  -reticulocyte count  -peripheral smear   - Platelets dropped to 6 from 48 (2 units transfused)     INFECTIOUS DISEASES  #Septic Shock  #Gram negative bacteremia  #RLE cellulitis   ::Bcx with gram negative bacilli  -vanc/zosyn/clinda   -1x tobra on 2/23/2024   -send for UA with reflex to culture   - Vanc & Zosyn continued  - Stopped Zosyn and started Meropenem 2/27 (febrile overnight)      ENDO  #Hypoglycemia   ::likely related to sepsis + poor liver compensation given cirrhosis   -D10 drip   - Bilirubin Total 19.2; direct 12.9     MSK  #RA - on infliximab  #osteoarthritis  :: hold home naproxen   - voltaren gel  - tylenol 650 mg q8 prn  - oxycodone 5 mg q4 prn  - outpatient rheum aware to discontinue infliximab     #subacute avulsion fracture lesser trochanter R proximal femur  - chronic?  - consult orthopedic surgery to assess subacute avulsion fracture of R femur found on CT on 2/1: nothing to do, patient can follow-up outpatient, WBAT  - NM bone scan without definitive osseous metastatic disease   -consider CT scan if Hgb dropping      #compression deformity at T[facility]  -consider vitamin D and calcium     #abdominal aorta atherosclerosis on CT  #smoking history   - follow up with PCP outpatient to discuss potential statin/asa     N: Dobhoff  GI: PPI  P: holding  A: PIVs, left internal jugular CVC, R internal jugular, ETT  C: DNR  Surrogate:  Cierra daughter [phone] (first option)  Emmanuel son [phone]               Tej Enamorado DO

## 2024-02-27 NOTE — CARE PLAN
Problem: Pain  Goal: My pain/discomfort is manageable  Outcome: Progressing     Problem: Safety  Goal: Patient will be injury free during hospitalization  Outcome: Progressing  Goal: I will remain free of falls  Outcome: Progressing     Problem: Daily Care  Goal: Daily care needs are met  Outcome: Progressing     Problem: Psychosocial Needs  Goal: Demonstrates ability to cope with hospitalization/illness  Outcome: Progressing  Goal: Collaborate with me, my family, and caregiver to identify my specific goals  Outcome: Progressing     Problem: Discharge Barriers  Goal: My discharge needs are met  Outcome: Progressing     Problem: Safety - Adult  Goal: Free from fall injury  Outcome: Progressing     Problem: Chronic Conditions and Co-morbidities  Goal: Patient's chronic conditions and co-morbidity symptoms are monitored and maintained or improved  Outcome: Progressing     Problem: Discharge Planning  Goal: Discharge to home or other facility with appropriate resources  Outcome: Progressing     Problem: Pain  Goal: Takes deep breaths with improved pain control throughout the shift  Outcome: Progressing  Goal: Turns in bed with improved pain control throughout the shift  Outcome: Progressing  Goal: Walks with improved pain control throughout the shift  Outcome: Progressing  Goal: Performs ADL's with improved pain control throughout shift  Outcome: Progressing  Goal: Participates in PT with improved pain control throughout the shift  Outcome: Progressing  Goal: Free from opioid side effects throughout the shift  Outcome: Progressing  Goal: Free from acute confusion related to pain meds throughout the shift  Outcome: Progressing

## 2024-02-28 LAB
BACTERIA BLD AEROBE CULT: ABNORMAL
BACTERIA BLD AEROBE CULT: ABNORMAL
BACTERIA BLD CULT: ABNORMAL
BACTERIA BLD CULT: ABNORMAL
GRAM STN SPEC: ABNORMAL
GRAM STN SPEC: ABNORMAL

## 2024-03-01 LAB — DAT-POLYSPECIFIC: NORMAL

## 2024-03-06 ENCOUNTER — APPOINTMENT (OUTPATIENT)
Dept: HEMATOLOGY/ONCOLOGY | Facility: CLINIC | Age: 69
End: 2024-03-06
Payer: MEDICARE

## 2024-05-09 ENCOUNTER — APPOINTMENT (OUTPATIENT)
Dept: PRIMARY CARE | Facility: CLINIC | Age: 69
End: 2024-05-09
Payer: MEDICARE

## 2024-05-15 ENCOUNTER — APPOINTMENT (OUTPATIENT)
Dept: RHEUMATOLOGY | Facility: CLINIC | Age: 69
End: 2024-05-15
Payer: MEDICARE